# Patient Record
Sex: FEMALE | Race: WHITE | NOT HISPANIC OR LATINO | Employment: OTHER | URBAN - NONMETROPOLITAN AREA
[De-identification: names, ages, dates, MRNs, and addresses within clinical notes are randomized per-mention and may not be internally consistent; named-entity substitution may affect disease eponyms.]

---

## 2017-01-09 RX ORDER — POLYETHYLENE GLYCOL 3350 17 G/17G
POWDER, FOR SOLUTION ORAL
Qty: 1581 G | Refills: 11 | Status: SHIPPED | OUTPATIENT
Start: 2017-01-09 | End: 2018-01-25 | Stop reason: SDUPTHER

## 2017-01-09 NOTE — TELEPHONE ENCOUNTER
----- Message from Holly Macdonald sent at 1/9/2017 10:10 AM CST -----  Regarding: Refill  Contact: [phone]  Refill of miralax 1cap 1-3 times a day prn zahida

## 2017-01-30 ENCOUNTER — OFFICE VISIT (OUTPATIENT)
Dept: FAMILY MEDICINE CLINIC | Facility: CLINIC | Age: 77
End: 2017-01-30

## 2017-01-30 VITALS
HEIGHT: 66 IN | OXYGEN SATURATION: 95 % | TEMPERATURE: 97.6 F | SYSTOLIC BLOOD PRESSURE: 138 MMHG | HEART RATE: 60 BPM | BODY MASS INDEX: 28.19 KG/M2 | WEIGHT: 175.4 LBS | DIASTOLIC BLOOD PRESSURE: 78 MMHG

## 2017-01-30 DIAGNOSIS — L98.9 SKIN LESION: Primary | ICD-10-CM

## 2017-01-30 DIAGNOSIS — I10 ESSENTIAL HYPERTENSION: ICD-10-CM

## 2017-01-30 PROCEDURE — 11100 PR BIOPSY OF SKIN LESION: CPT | Performed by: FAMILY MEDICINE

## 2017-01-30 PROCEDURE — 88305 TISSUE EXAM BY PATHOLOGIST: CPT | Performed by: PATHOLOGY

## 2017-01-30 PROCEDURE — 99213 OFFICE O/P EST LOW 20 MIN: CPT | Performed by: FAMILY MEDICINE

## 2017-01-30 RX ORDER — IBUPROFEN 400 MG/1
400 TABLET ORAL EVERY 6 HOURS PRN
COMMUNITY
End: 2017-01-31 | Stop reason: SDUPTHER

## 2017-01-30 NOTE — PROGRESS NOTES
Subjective   Anahi Calix is a 76 y.o. female.     History of Present Illness     bp good.  Edema better.  Right ankle swells at night still.  Has lasix if needed.  Took for 4 days last week.  i had stopped amlodipine and increased his lisinopril to 40mg qd from 20mg qd.  aaa was an open repair and not endograph.  Has follow up with vascular 6 months with ultrasound.  She is releived there is no other aneurysm.  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  withing last few months, a black spot appeared and skin around it is scaley and red.  It itches sometimes.    Review of Systems   Constitutional: Negative for chills, fatigue and fever.   HENT: Negative for congestion, ear discharge, ear pain, facial swelling, hearing loss, postnasal drip, rhinorrhea, sinus pressure, sore throat, trouble swallowing and voice change.    Eyes: Negative for discharge, redness and visual disturbance.   Respiratory: Negative for cough, chest tightness, shortness of breath and wheezing.    Cardiovascular: Negative for chest pain and palpitations.   Gastrointestinal: Negative for abdominal pain, blood in stool, constipation, diarrhea, nausea and vomiting.   Endocrine: Negative for polydipsia and polyuria.   Genitourinary: Negative for dysuria, flank pain, hematuria and urgency.   Musculoskeletal: Negative for arthralgias, back pain, joint swelling and myalgias.   Skin: Negative for rash.   Neurological: Negative for dizziness, weakness, numbness and headaches.   Hematological: Negative for adenopathy.   Psychiatric/Behavioral: Negative for confusion and sleep disturbance. The patient is not nervous/anxious.        Objective   Physical Exam   Constitutional: She is oriented to person, place, and time. She appears well-developed and well-nourished.   HENT:   Head: Normocephalic and atraumatic.   Right Ear: External ear normal.   Left Ear: External ear normal.   Nose: Nose normal.   Eyes: Conjunctivae and EOM  are normal. Pupils are equal, round, and reactive to light.   Neck: Normal range of motion.   Pulmonary/Chest: Effort normal.   Musculoskeletal: Normal range of motion.   Neurological: She is alert and oriented to person, place, and time.   Skin:   Left lateral wrist, 2cm area of scaley erythema mild, but central is 1mm black macule.     Psychiatric: She has a normal mood and affect. Her behavior is normal. Judgment and thought content normal.   Nursing note and vitals reviewed.      Assessment/Plan   Anahi was seen today for follow-up, leg swelling and wrist injury.    Diagnoses and all orders for this visit:    Skin lesion    Essential hypertension      Continue lasix prn  bp looks good      PROCEDURE NOTE:   AREA OF WRIST BETADINE APPLIED.  LOCAL ANESTHESIA 1% LIDOCAINE.  2MM PUNCH BIOPSY OBTAINED AND DRESSING APPLIED.   SEND SPECIMEN TO PATHOLOGY.

## 2017-01-30 NOTE — MR AVS SNAPSHOT
Anahi Calix   1/30/2017 11:45 AM   Office Visit    Dept Phone:  [phone]   Encounter #:  [id number]    Provider:  Ramiro Gloria MD   Department:  [facility]                Your Full Care Plan              Your Updated Medication List          This list is accurate as of: 1/30/17 12:38 PM.  Always use your most recent med list.                aspirin 81 MG EC tablet       atorvastatin 80 MG tablet   Commonly known as:  LIPITOR   Take 1 tablet by mouth Daily.       azithromycin 250 MG tablet   Commonly known as:  ZITHROMAX   Take 2 tablets the first day, then 1 tablet daily for 4 days.       calcium acetate 667 MG tablet   Commonly known as:  PHOSLO       clopidogrel 75 MG tablet   Commonly known as:  PLAVIX   Take 1 tablet by mouth Daily.       furosemide 20 MG tablet   Commonly known as:  LASIX   Take 1 tablet by mouth Daily. As needed for edema       gabapentin 300 MG capsule   Commonly known as:  NEURONTIN       GNP VITAMIN D SUPER STRENGTH 5000 UNITS tablet   Generic drug:  Cholecalciferol       ibuprofen 400 MG tablet   Commonly known as:  ADVIL,MOTRIN       lisinopril 40 MG tablet   Commonly known as:  PRINIVIL,ZESTRIL   Take 1 tablet by mouth Daily.       loratadine 10 MG tablet   Commonly known as:  CLARITIN       metoprolol tartrate 50 MG tablet   Commonly known as:  LOPRESSOR   Take 1 tablet by mouth 2 (two) times a day.       omeprazole 20 MG capsule   Commonly known as:  priLOSEC   Take 1 capsule by mouth Daily.       polyethylene glycol powder   Commonly known as:  MIRALAX   ONE CAPFUL IN 8 OUNCES OF LIQUID ONCE DAILY UP TO THREE TIMES DAILY AS NEEDED.       VISION FORMULA PO       vitamin D 66554 UNITS capsule capsule   Commonly known as:  ERGOCALCIFEROL               Instructions     None    Patient Instructions History      Upcoming Appointments     Visit Type Date Time Department    OFFICE VISIT 1/30/2017 11:45 AM DENILSON LERNER    "   OFFICE VISIT 8/31/2017 10:30 AM [facility] EZRA Coronel Signup     Our records indicate that you have declined [facility] signup. If you would like to sign up for [facility], please email [email or web address] [email or web address] or call [phone] to obtain an activation code.             Other Info from Your Visit           Your Appointments     Aug 31, 2017 10:30 AM CDT   Office Visit with Dony Conner MD   [facility] CARDIOLOGY (--)    [address] [address] [address]  [facility] [address] [phone]           Arrive 15 minutes prior to appointment.              Allergies     No Known Allergies      Reason for Visit     Follow-up     Leg Swelling     Wrist Injury           Vital Signs     Blood Pressure Pulse Temperature Height    138/78 (BP Location: Right arm, Patient Position: Sitting, Cuff Size: Adult) 60 97.6 °F (36.4 °C) (Temporal Artery ) 65.5\" (166.4 cm)    Weight Oxygen Saturation Body Mass Index Smoking Status    175 lb 6.4 oz (79.6 kg) 95% 28.74 kg/m2 Current Some Day Smoker        "

## 2017-01-31 RX ORDER — GABAPENTIN 300 MG/1
300 CAPSULE ORAL 3 TIMES DAILY
Qty: 270 CAPSULE | Refills: 3 | Status: SHIPPED | OUTPATIENT
Start: 2017-01-31 | End: 2017-12-18 | Stop reason: SDUPTHER

## 2017-01-31 RX ORDER — LISINOPRIL 40 MG/1
40 TABLET ORAL DAILY
Qty: 90 TABLET | Refills: 3 | Status: SHIPPED | OUTPATIENT
Start: 2017-01-31 | End: 2017-10-24 | Stop reason: HOSPADM

## 2017-01-31 RX ORDER — IBUPROFEN 400 MG/1
400 TABLET ORAL EVERY 6 HOURS PRN
Qty: 120 TABLET | Refills: 5 | Status: SHIPPED | OUTPATIENT
Start: 2017-01-31 | End: 2017-10-24 | Stop reason: HOSPADM

## 2017-02-01 LAB
LAB AP CASE REPORT: NORMAL
Lab: NORMAL
PATH REPORT.FINAL DX SPEC: NORMAL
PATH REPORT.GROSS SPEC: NORMAL

## 2017-02-06 DIAGNOSIS — D04.9 BOWEN'S DISEASE: Primary | ICD-10-CM

## 2017-09-18 DIAGNOSIS — I10 ESSENTIAL HYPERTENSION: ICD-10-CM

## 2017-09-18 RX ORDER — METOPROLOL TARTRATE 50 MG/1
TABLET, FILM COATED ORAL
Qty: 180 TABLET | Refills: 0 | Status: SHIPPED | OUTPATIENT
Start: 2017-09-18 | End: 2017-09-19 | Stop reason: SDUPTHER

## 2017-09-19 DIAGNOSIS — I10 ESSENTIAL HYPERTENSION: ICD-10-CM

## 2017-09-19 RX ORDER — METOPROLOL TARTRATE 50 MG/1
50 TABLET, FILM COATED ORAL 2 TIMES DAILY
Qty: 180 TABLET | Refills: 0 | Status: SHIPPED | OUTPATIENT
Start: 2017-09-19 | End: 2018-01-11 | Stop reason: SDUPTHER

## 2017-09-30 ENCOUNTER — APPOINTMENT (OUTPATIENT)
Dept: GENERAL RADIOLOGY | Facility: HOSPITAL | Age: 77
End: 2017-09-30

## 2017-09-30 ENCOUNTER — HOSPITAL ENCOUNTER (INPATIENT)
Facility: HOSPITAL | Age: 77
LOS: 11 days | End: 2017-10-11
Attending: EMERGENCY MEDICINE | Admitting: INTERNAL MEDICINE

## 2017-09-30 DIAGNOSIS — I73.9 PERIPHERAL ARTERIAL DISEASE (HCC): Chronic | ICD-10-CM

## 2017-09-30 DIAGNOSIS — S72.142A DISPLACED INTERTROCHANTERIC FRACTURE OF LEFT FEMUR, INITIAL ENCOUNTER FOR CLOSED FRACTURE (HCC): Primary | ICD-10-CM
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
## 2017-09-30 DIAGNOSIS — K21.9 GASTROESOPHAGEAL REFLUX DISEASE, ESOPHAGITIS PRESENCE NOT SPECIFIED: Chronic | ICD-10-CM

## 2017-09-30 DIAGNOSIS — I71.40 ABDOMINAL AORTIC ANEURYSM (AAA) WITHOUT RUPTURE (HCC): ICD-10-CM

## 2017-09-30 DIAGNOSIS — I10 ESSENTIAL HYPERTENSION: Chronic | ICD-10-CM

## 2017-09-30 DIAGNOSIS — Z74.09 IMPAIRED PHYSICAL MOBILITY: ICD-10-CM

## 2017-09-30 DIAGNOSIS — Z74.09 IMPAIRED MOBILITY AND ACTIVITIES OF DAILY LIVING: ICD-10-CM

## 2017-09-30 DIAGNOSIS — S72.002A CLOSED LEFT HIP FRACTURE, INITIAL ENCOUNTER (HCC): ICD-10-CM

## 2017-09-30 DIAGNOSIS — Z78.9 IMPAIRED MOBILITY AND ACTIVITIES OF DAILY LIVING: ICD-10-CM

## 2017-09-30 PROBLEM — J42 CHRONIC BRONCHITIS (HCC): Chronic | Status: ACTIVE | Noted: 2017-09-30

## 2017-09-30 PROBLEM — I25.10 CAD (CORONARY ARTERY DISEASE): Chronic | Status: ACTIVE | Noted: 2017-09-30

## 2017-09-30 LAB
ALBUMIN SERPL-MCNC: 4 G/DL (ref 3.4–4.8)
ALBUMIN/GLOB SERPL: 1.3 G/DL (ref 1.1–1.8)
ALP SERPL-CCNC: 87 U/L (ref 38–126)
ALT SERPL W P-5'-P-CCNC: 31 U/L (ref 9–52)
ANION GAP SERPL CALCULATED.3IONS-SCNC: 14 MMOL/L (ref 5–15)
APTT PPP: 29.4 SECONDS (ref 20–40.3)
AST SERPL-CCNC: 30 U/L (ref 14–36)
BASOPHILS # BLD AUTO: 0.06 10*3/MM3 (ref 0–0.2)
BASOPHILS NFR BLD AUTO: 0.6 % (ref 0–2)
BILIRUB SERPL-MCNC: 0.4 MG/DL (ref 0.2–1.3)
BUN BLD-MCNC: 21 MG/DL (ref 7–21)
BUN/CREAT SERPL: 22.8 (ref 7–25)
CALCIUM SPEC-SCNC: 9.4 MG/DL (ref 8.4–10.2)
CHLORIDE SERPL-SCNC: 100 MMOL/L (ref 95–110)
CLOSE TME COLL+ADP + EPINEP PNL BLD: 45 % (ref 80–97)
CO2 SERPL-SCNC: 28 MMOL/L (ref 22–31)
CREAT BLD-MCNC: 0.92 MG/DL (ref 0.5–1)
DEPRECATED RDW RBC AUTO: 44.8 FL (ref 36.4–46.3)
EOSINOPHIL # BLD AUTO: 0.24 10*3/MM3 (ref 0–0.7)
EOSINOPHIL NFR BLD AUTO: 2.2 % (ref 0–7)
ERYTHROCYTE [DISTWIDTH] IN BLOOD BY AUTOMATED COUNT: 13.7 % (ref 11.5–14.5)
GFR SERPL CREATININE-BSD FRML MDRD: 59 ML/MIN/1.73 (ref 60–90)
GLOBULIN UR ELPH-MCNC: 3.2 GM/DL (ref 2.3–3.5)
GLUCOSE BLD-MCNC: 119 MG/DL (ref 60–100)
HCT VFR BLD AUTO: 45.4 % (ref 35–45)
HGB BLD-MCNC: 15.2 G/DL (ref 12–15.5)
HOLD SPECIMEN: NORMAL
HOLD SPECIMEN: NORMAL
IMM GRANULOCYTES # BLD: 0.04 10*3/MM3 (ref 0–0.02)
IMM GRANULOCYTES NFR BLD: 0.4 % (ref 0–0.5)
INR PPP: 0.96 (ref 0.8–1.2)
LYMPHOCYTES # BLD AUTO: 3.71 10*3/MM3 (ref 0.6–4.2)
LYMPHOCYTES NFR BLD AUTO: 34.3 % (ref 10–50)
MCH RBC QN AUTO: 29.7 PG (ref 26.5–34)
MCHC RBC AUTO-ENTMCNC: 33.5 G/DL (ref 31.4–36)
MCV RBC AUTO: 88.8 FL (ref 80–98)
MONOCYTES # BLD AUTO: 0.76 10*3/MM3 (ref 0–0.9)
MONOCYTES NFR BLD AUTO: 7 % (ref 0–12)
NEUTROPHILS # BLD AUTO: 6 10*3/MM3 (ref 2–8.6)
NEUTROPHILS NFR BLD AUTO: 55.5 % (ref 37–80)
PA COLL PRP: 72 % (ref 70–100)
PLATELET # BLD AUTO: 229 10*3/MM3 (ref 150–450)
PLATELET # BLD AUTO: 264 10*3/MM3 (ref 150–450)
PMV BLD AUTO: 9.5 FL (ref 8–12)
POTASSIUM BLD-SCNC: 3.7 MMOL/L (ref 3.5–5.1)
PROT SERPL-MCNC: 7.2 G/DL (ref 6.3–8.6)
PROTHROMBIN TIME: 12.7 SECONDS (ref 11.1–15.3)
RBC # BLD AUTO: 5.11 10*6/MM3 (ref 3.77–5.16)
SODIUM BLD-SCNC: 142 MMOL/L (ref 137–145)
WBC NRBC COR # BLD: 10.81 10*3/MM3 (ref 3.2–9.8)
WHOLE BLOOD HOLD SPECIMEN: NORMAL
WHOLE BLOOD HOLD SPECIMEN: NORMAL

## 2017-09-30 PROCEDURE — 93005 ELECTROCARDIOGRAM TRACING: CPT | Performed by: EMERGENCY MEDICINE

## 2017-09-30 PROCEDURE — 73080 X-RAY EXAM OF ELBOW: CPT

## 2017-09-30 PROCEDURE — 85576 BLOOD PLATELET AGGREGATION: CPT | Performed by: EMERGENCY MEDICINE

## 2017-09-30 PROCEDURE — 85610 PROTHROMBIN TIME: CPT | Performed by: EMERGENCY MEDICINE

## 2017-09-30 PROCEDURE — 25010000002 TDAP 5-2.5-18.5 LF-MCG/0.5 SUSPENSION: Performed by: EMERGENCY MEDICINE

## 2017-09-30 PROCEDURE — 80053 COMPREHEN METABOLIC PANEL: CPT | Performed by: EMERGENCY MEDICINE

## 2017-09-30 PROCEDURE — 71010 HC CHEST PA OR AP: CPT

## 2017-09-30 PROCEDURE — 85025 COMPLETE CBC W/AUTO DIFF WBC: CPT | Performed by: EMERGENCY MEDICINE

## 2017-09-30 PROCEDURE — 25010000002 ONDANSETRON PER 1 MG: Performed by: EMERGENCY MEDICINE

## 2017-09-30 PROCEDURE — 73502 X-RAY EXAM HIP UNI 2-3 VIEWS: CPT

## 2017-09-30 PROCEDURE — 94760 N-INVAS EAR/PLS OXIMETRY 1: CPT

## 2017-09-30 PROCEDURE — 93010 ELECTROCARDIOGRAM REPORT: CPT | Performed by: INTERNAL MEDICINE

## 2017-09-30 PROCEDURE — 94799 UNLISTED PULMONARY SVC/PX: CPT

## 2017-09-30 PROCEDURE — 90471 IMMUNIZATION ADMIN: CPT | Performed by: EMERGENCY MEDICINE

## 2017-09-30 PROCEDURE — 85730 THROMBOPLASTIN TIME PARTIAL: CPT | Performed by: EMERGENCY MEDICINE

## 2017-09-30 PROCEDURE — 25010000002 HYDROMORPHONE PER 4 MG: Performed by: EMERGENCY MEDICINE

## 2017-09-30 PROCEDURE — 25010000002 MORPHINE PER 10 MG: Performed by: INTERNAL MEDICINE

## 2017-09-30 PROCEDURE — 99284 EMERGENCY DEPT VISIT MOD MDM: CPT

## 2017-09-30 PROCEDURE — 25010000002 MORPHINE PER 10 MG: Performed by: EMERGENCY MEDICINE

## 2017-09-30 PROCEDURE — 90715 TDAP VACCINE 7 YRS/> IM: CPT | Performed by: EMERGENCY MEDICINE

## 2017-09-30 RX ORDER — LISINOPRIL 40 MG/1
40 TABLET ORAL DAILY
Status: DISCONTINUED | OUTPATIENT
Start: 2017-09-30 | End: 2017-10-02

## 2017-09-30 RX ORDER — ONDANSETRON 2 MG/ML
4 INJECTION INTRAMUSCULAR; INTRAVENOUS EVERY 6 HOURS PRN
Status: DISCONTINUED | OUTPATIENT
Start: 2017-09-30 | End: 2017-10-11 | Stop reason: HOSPADM

## 2017-09-30 RX ORDER — MORPHINE SULFATE 4 MG/ML
4 INJECTION, SOLUTION INTRAMUSCULAR; INTRAVENOUS ONCE
Status: COMPLETED | OUTPATIENT
Start: 2017-09-30 | End: 2017-09-30

## 2017-09-30 RX ORDER — HYDROCHLOROTHIAZIDE 12.5 MG/1
12.5 CAPSULE, GELATIN COATED ORAL DAILY
COMMUNITY
End: 2017-10-24 | Stop reason: HOSPADM

## 2017-09-30 RX ORDER — FUROSEMIDE 20 MG/1
20 TABLET ORAL DAILY
Status: DISCONTINUED | OUTPATIENT
Start: 2017-09-30 | End: 2017-10-11 | Stop reason: HOSPADM

## 2017-09-30 RX ORDER — SODIUM CHLORIDE 0.9 % (FLUSH) 0.9 %
10 SYRINGE (ML) INJECTION AS NEEDED
Status: DISCONTINUED | OUTPATIENT
Start: 2017-09-30 | End: 2017-10-11 | Stop reason: HOSPADM

## 2017-09-30 RX ORDER — SODIUM CHLORIDE 0.9 % (FLUSH) 0.9 %
1-10 SYRINGE (ML) INJECTION AS NEEDED
Status: DISCONTINUED | OUTPATIENT
Start: 2017-09-30 | End: 2017-10-11 | Stop reason: HOSPADM

## 2017-09-30 RX ORDER — SODIUM CHLORIDE 9 MG/ML
50 INJECTION, SOLUTION INTRAVENOUS CONTINUOUS
Status: DISCONTINUED | OUTPATIENT
Start: 2017-09-30 | End: 2017-10-06

## 2017-09-30 RX ORDER — POLYETHYLENE GLYCOL 3350 17 G/17G
17 POWDER, FOR SOLUTION ORAL 2 TIMES DAILY
Status: DISCONTINUED | OUTPATIENT
Start: 2017-09-30 | End: 2017-10-11 | Stop reason: HOSPADM

## 2017-09-30 RX ORDER — SODIUM CHLORIDE 9 MG/ML
125 INJECTION, SOLUTION INTRAVENOUS CONTINUOUS
Status: DISCONTINUED | OUTPATIENT
Start: 2017-09-30 | End: 2017-09-30

## 2017-09-30 RX ORDER — CALCIUM ACETATE 667 MG/1
667 TABLET ORAL
Status: DISCONTINUED | OUTPATIENT
Start: 2017-10-01 | End: 2017-10-11 | Stop reason: HOSPADM

## 2017-09-30 RX ORDER — MORPHINE SULFATE 4 MG/ML
4 INJECTION, SOLUTION INTRAMUSCULAR; INTRAVENOUS
Status: DISCONTINUED | OUTPATIENT
Start: 2017-09-30 | End: 2017-10-03 | Stop reason: CLARIF

## 2017-09-30 RX ORDER — GABAPENTIN 300 MG/1
300 CAPSULE ORAL 3 TIMES DAILY
Status: DISCONTINUED | OUTPATIENT
Start: 2017-09-30 | End: 2017-10-11 | Stop reason: HOSPADM

## 2017-09-30 RX ORDER — ONDANSETRON 2 MG/ML
4 INJECTION INTRAMUSCULAR; INTRAVENOUS ONCE
Status: COMPLETED | OUTPATIENT
Start: 2017-09-30 | End: 2017-09-30

## 2017-09-30 RX ORDER — METOPROLOL TARTRATE 50 MG/1
50 TABLET, FILM COATED ORAL 2 TIMES DAILY
Status: DISCONTINUED | OUTPATIENT
Start: 2017-09-30 | End: 2017-10-05

## 2017-09-30 RX ADMIN — Medication 10 ML: at 20:00

## 2017-09-30 RX ADMIN — LISINOPRIL 40 MG: 40 TABLET ORAL at 21:04

## 2017-09-30 RX ADMIN — MORPHINE SULFATE 4 MG: 4 INJECTION, SOLUTION INTRAMUSCULAR; INTRAVENOUS at 16:16

## 2017-09-30 RX ADMIN — HYDROMORPHONE HYDROCHLORIDE 0.5 MG: 1 INJECTION, SOLUTION INTRAMUSCULAR; INTRAVENOUS; SUBCUTANEOUS at 16:52

## 2017-09-30 RX ADMIN — TETANUS TOXOID, REDUCED DIPHTHERIA TOXOID AND ACELLULAR PERTUSSIS VACCINE, ADSORBED 0.5 ML: 5; 2.5; 8; 8; 2.5 SUSPENSION INTRAMUSCULAR at 18:05

## 2017-09-30 RX ADMIN — GABAPENTIN 300 MG: 300 CAPSULE ORAL at 21:04

## 2017-09-30 RX ADMIN — MORPHINE SULFATE 4 MG: 4 INJECTION, SOLUTION INTRAMUSCULAR; INTRAVENOUS at 19:22

## 2017-09-30 RX ADMIN — MORPHINE SULFATE 4 MG: 4 INJECTION, SOLUTION INTRAMUSCULAR; INTRAVENOUS at 23:04

## 2017-09-30 RX ADMIN — SODIUM CHLORIDE 50 ML/HR: 900 INJECTION, SOLUTION INTRAVENOUS at 19:59

## 2017-09-30 RX ADMIN — METOPROLOL TARTRATE 50 MG: 50 TABLET ORAL at 21:04

## 2017-09-30 RX ADMIN — SODIUM CHLORIDE 125 ML/HR: 0.9 INJECTION, SOLUTION INTRAVENOUS at 16:07

## 2017-09-30 RX ADMIN — MORPHINE SULFATE 4 MG: 4 INJECTION, SOLUTION INTRAMUSCULAR; INTRAVENOUS at 21:04

## 2017-09-30 RX ADMIN — ONDANSETRON 4 MG: 2 INJECTION INTRAMUSCULAR; INTRAVENOUS at 16:15

## 2017-10-01 ENCOUNTER — ANESTHESIA (OUTPATIENT)
Dept: PERIOP | Facility: HOSPITAL | Age: 77
End: 2017-10-01

## 2017-10-01 ENCOUNTER — ANESTHESIA EVENT (OUTPATIENT)
Dept: PERIOP | Facility: HOSPITAL | Age: 77
End: 2017-10-01

## 2017-10-01 ENCOUNTER — APPOINTMENT (OUTPATIENT)
Dept: GENERAL RADIOLOGY | Facility: HOSPITAL | Age: 77
End: 2017-10-01

## 2017-10-01 PROBLEM — I10 ESSENTIAL HYPERTENSION: Status: ACTIVE | Noted: 2017-09-30
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
## 2017-10-01 PROBLEM — F17.200 TOBACCO DEPENDENCE SYNDROME: Status: ACTIVE | Noted: 2017-10-01

## 2017-10-01 LAB
ANION GAP SERPL CALCULATED.3IONS-SCNC: 11 MMOL/L (ref 5–15)
ARTERIAL PATENCY WRIST A: ABNORMAL
ATMOSPHERIC PRESS: ABNORMAL MMHG
BASE EXCESS BLDA CALC-SCNC: -0.6 MMOL/L (ref -2.4–2.4)
BASOPHILS # BLD AUTO: 0.01 10*3/MM3 (ref 0–0.2)
BASOPHILS NFR BLD AUTO: 0.1 % (ref 0–2)
BDY SITE: ABNORMAL
BUN BLD-MCNC: 18 MG/DL (ref 7–21)
BUN/CREAT SERPL: 22.2 (ref 7–25)
CA-I BLD-MCNC: 4.6 MG/DL (ref 4.5–4.9)
CALCIUM SPEC-SCNC: 8.7 MG/DL (ref 8.4–10.2)
CHLORIDE SERPL-SCNC: 102 MMOL/L (ref 95–110)
CO2 BLDA-SCNC: 28.4 MMOL/L (ref 23–27)
CO2 SERPL-SCNC: 28 MMOL/L (ref 22–31)
CREAT BLD-MCNC: 0.81 MG/DL (ref 0.5–1)
DEPRECATED RDW RBC AUTO: 45.3 FL (ref 36.4–46.3)
EOSINOPHIL # BLD AUTO: 0.07 10*3/MM3 (ref 0–0.7)
EOSINOPHIL NFR BLD AUTO: 0.5 % (ref 0–7)
ERYTHROCYTE [DISTWIDTH] IN BLOOD BY AUTOMATED COUNT: 13.7 % (ref 11.5–14.5)
GFR SERPL CREATININE-BSD FRML MDRD: 69 ML/MIN/1.73 (ref 60–90)
GLUCOSE BLD-MCNC: 119 MG/DL (ref 60–100)
GLUCOSE BLDA-MCNC: 130 MMOL/L
HCO3 BLDA-SCNC: 26.7 MMOL/L (ref 22–26)
HCT VFR BLD AUTO: 38.8 % (ref 35–45)
HCT VFR BLD AUTO: 41.6 % (ref 35–45)
HCT VFR BLD CALC: 41 % (ref 38–47)
HGB BLD-MCNC: 12.5 G/DL (ref 12–15.5)
HGB BLD-MCNC: 13.6 G/DL (ref 12–15.5)
HGB BLDA-MCNC: 13.9 G/DL (ref 12–16)
IMM GRANULOCYTES # BLD: 0.03 10*3/MM3 (ref 0–0.02)
IMM GRANULOCYTES NFR BLD: 0.2 % (ref 0–0.5)
INR PPP: 1.13 (ref 0.8–1.2)
LYMPHOCYTES # BLD AUTO: 1.17 10*3/MM3 (ref 0.6–4.2)
LYMPHOCYTES NFR BLD AUTO: 8.4 % (ref 10–50)
MCH RBC QN AUTO: 29.4 PG (ref 26.5–34)
MCHC RBC AUTO-ENTMCNC: 32.7 G/DL (ref 31.4–36)
MCV RBC AUTO: 90 FL (ref 80–98)
MODALITY: ABNORMAL
MONOCYTES # BLD AUTO: 1.26 10*3/MM3 (ref 0–0.9)
MONOCYTES NFR BLD AUTO: 9 % (ref 0–12)
NEUTROPHILS # BLD AUTO: 11.47 10*3/MM3 (ref 2–8.6)
NEUTROPHILS NFR BLD AUTO: 81.8 % (ref 37–80)
PCO2 BLDA: 55.1 MM HG (ref 35–45)
PH BLDA: 7.3 PH UNITS (ref 7.35–7.45)
PLATELET # BLD AUTO: 224 10*3/MM3 (ref 150–450)
PMV BLD AUTO: 9.5 FL (ref 8–12)
PO2 BLDA: 76.5 MM HG (ref 80–105)
POTASSIUM BLD-SCNC: 3.8 MMOL/L (ref 3.5–5.1)
POTASSIUM BLDA-SCNC: 3.64 MMOL/L (ref 3.6–4.9)
PROTHROMBIN TIME: 14.4 SECONDS (ref 11.1–15.3)
RBC # BLD AUTO: 4.62 10*6/MM3 (ref 3.77–5.16)
SAO2 % BLDCOA: 94.1 % (ref 94–100)
SODIUM BLD-SCNC: 141 MMOL/L (ref 137–145)
SODIUM BLDA-SCNC: 133.9 MMOL/L (ref 138–146)
WBC NRBC COR # BLD: 14.01 10*3/MM3 (ref 3.2–9.8)

## 2017-10-01 PROCEDURE — 25010000002 PHENYLEPHRINE PER 1 ML: Performed by: ANESTHESIOLOGY

## 2017-10-01 PROCEDURE — 25010000002 MIDAZOLAM 50 MG/10ML SOLUTION 10 ML VIAL: Performed by: INTERNAL MEDICINE

## 2017-10-01 PROCEDURE — 85014 HEMATOCRIT: CPT | Performed by: INTERNAL MEDICINE

## 2017-10-01 PROCEDURE — 87070 CULTURE OTHR SPECIMN AEROBIC: CPT | Performed by: INTERNAL MEDICINE

## 2017-10-01 PROCEDURE — C1713 ANCHOR/SCREW BN/BN,TIS/BN: HCPCS | Performed by: ORTHOPAEDIC SURGERY

## 2017-10-01 PROCEDURE — 25010000002 SUCCINYLCHOLINE PER 20 MG: Performed by: ANESTHESIOLOGY

## 2017-10-01 PROCEDURE — 80048 BASIC METABOLIC PNL TOTAL CA: CPT | Performed by: INTERNAL MEDICINE

## 2017-10-01 PROCEDURE — 25010000002 PROPOFOL 1000 MG/ML EMULSION: Performed by: INTERNAL MEDICINE

## 2017-10-01 PROCEDURE — 99223 1ST HOSP IP/OBS HIGH 75: CPT | Performed by: ORTHOPAEDIC SURGERY

## 2017-10-01 PROCEDURE — 85018 HEMOGLOBIN: CPT | Performed by: INTERNAL MEDICINE

## 2017-10-01 PROCEDURE — 25010000002 MORPHINE PER 10 MG: Performed by: INTERNAL MEDICINE

## 2017-10-01 PROCEDURE — 25010000002 PROPOFOL 10 MG/ML EMULSION: Performed by: ANESTHESIOLOGY

## 2017-10-01 PROCEDURE — 76000 FLUOROSCOPY <1 HR PHYS/QHP: CPT

## 2017-10-01 PROCEDURE — 85025 COMPLETE CBC W/AUTO DIFF WBC: CPT | Performed by: INTERNAL MEDICINE

## 2017-10-01 PROCEDURE — 82803 BLOOD GASES ANY COMBINATION: CPT | Performed by: INTERNAL MEDICINE

## 2017-10-01 PROCEDURE — 94799 UNLISTED PULMONARY SVC/PX: CPT

## 2017-10-01 PROCEDURE — 87185 SC STD ENZYME DETCJ PER NZM: CPT | Performed by: INTERNAL MEDICINE

## 2017-10-01 PROCEDURE — 5A1955Z RESPIRATORY VENTILATION, GREATER THAN 96 CONSECUTIVE HOURS: ICD-10-PCS | Performed by: INTERNAL MEDICINE

## 2017-10-01 PROCEDURE — 85610 PROTHROMBIN TIME: CPT | Performed by: ORTHOPAEDIC SURGERY

## 2017-10-01 PROCEDURE — 0QS706Z REPOSITION LEFT UPPER FEMUR WITH INTRAMEDULLARY INTERNAL FIXATION DEVICE, OPEN APPROACH: ICD-10-PCS | Performed by: ORTHOPAEDIC SURGERY

## 2017-10-01 PROCEDURE — 25010000002 FENTANYL CITRATE (PF) 100 MCG/2ML SOLUTION: Performed by: ANESTHESIOLOGY

## 2017-10-01 PROCEDURE — 25010000003 CEFAZOLIN PER 500 MG: Performed by: ANESTHESIOLOGY

## 2017-10-01 PROCEDURE — 87205 SMEAR GRAM STAIN: CPT | Performed by: INTERNAL MEDICINE

## 2017-10-01 PROCEDURE — 27245 TREAT THIGH FRACTURE: CPT | Performed by: ORTHOPAEDIC SURGERY

## 2017-10-01 PROCEDURE — 94640 AIRWAY INHALATION TREATMENT: CPT

## 2017-10-01 PROCEDURE — 94002 VENT MGMT INPAT INIT DAY: CPT

## 2017-10-01 PROCEDURE — 25010000002 ONDANSETRON PER 1 MG: Performed by: ANESTHESIOLOGY

## 2017-10-01 PROCEDURE — 0BH17EZ INSERTION OF ENDOTRACHEAL AIRWAY INTO TRACHEA, VIA NATURAL OR ARTIFICIAL OPENING: ICD-10-PCS | Performed by: INTERNAL MEDICINE

## 2017-10-01 DEVICE — LOCKING SCREW, FULLY THREADED: Type: IMPLANTABLE DEVICE | Site: HIP | Status: FUNCTIONAL

## 2017-10-01 DEVICE — LAG SCREW, TI
Type: IMPLANTABLE DEVICE | Status: FUNCTIONAL
Brand: GAMMA

## 2017-10-01 DEVICE — LONG NAIL KIT R1.5, TI, LEFT
Type: IMPLANTABLE DEVICE | Status: FUNCTIONAL
Brand: GAMMA

## 2017-10-01 RX ORDER — ALBUTEROL SULFATE 2.5 MG/3ML
2.5 SOLUTION RESPIRATORY (INHALATION) ONCE
Status: COMPLETED | OUTPATIENT
Start: 2017-10-01 | End: 2017-10-01

## 2017-10-01 RX ORDER — SUCCINYLCHOLINE CHLORIDE 20 MG/ML
INJECTION INTRAMUSCULAR; INTRAVENOUS AS NEEDED
Status: DISCONTINUED | OUTPATIENT
Start: 2017-10-01 | End: 2017-10-01 | Stop reason: SURG

## 2017-10-01 RX ORDER — ONDANSETRON 2 MG/ML
4 INJECTION INTRAMUSCULAR; INTRAVENOUS ONCE AS NEEDED
Status: DISCONTINUED | OUTPATIENT
Start: 2017-10-01 | End: 2017-10-07

## 2017-10-01 RX ORDER — EPHEDRINE SULFATE 50 MG/ML
INJECTION, SOLUTION INTRAVENOUS AS NEEDED
Status: DISCONTINUED | OUTPATIENT
Start: 2017-10-01 | End: 2017-10-01 | Stop reason: SURG

## 2017-10-01 RX ORDER — LIDOCAINE HYDROCHLORIDE 10 MG/ML
0.5 INJECTION, SOLUTION INFILTRATION; PERINEURAL ONCE AS NEEDED
Status: COMPLETED | OUTPATIENT
Start: 2017-10-01 | End: 2017-10-01

## 2017-10-01 RX ORDER — ROCURONIUM BROMIDE 10 MG/ML
INJECTION, SOLUTION INTRAVENOUS AS NEEDED
Status: DISCONTINUED | OUTPATIENT
Start: 2017-10-01 | End: 2017-10-01 | Stop reason: SURG

## 2017-10-01 RX ORDER — ACETAMINOPHEN 500 MG
1000 TABLET ORAL EVERY 6 HOURS
Status: DISCONTINUED | OUTPATIENT
Start: 2017-10-01 | End: 2017-10-11 | Stop reason: HOSPADM

## 2017-10-01 RX ORDER — ACETAMINOPHEN 325 MG/1
650 TABLET ORAL ONCE AS NEEDED
Status: DISCONTINUED | OUTPATIENT
Start: 2017-10-01 | End: 2017-10-07

## 2017-10-01 RX ORDER — DIPHENHYDRAMINE HYDROCHLORIDE 50 MG/ML
12.5 INJECTION INTRAMUSCULAR; INTRAVENOUS
Status: DISCONTINUED | OUTPATIENT
Start: 2017-10-01 | End: 2017-10-07

## 2017-10-01 RX ORDER — SODIUM CHLORIDE, SODIUM GLUCONATE, SODIUM ACETATE, POTASSIUM CHLORIDE, AND MAGNESIUM CHLORIDE 526; 502; 368; 37; 30 MG/100ML; MG/100ML; MG/100ML; MG/100ML; MG/100ML
INJECTION, SOLUTION INTRAVENOUS CONTINUOUS PRN
Status: DISCONTINUED | OUTPATIENT
Start: 2017-10-01 | End: 2017-10-01 | Stop reason: SURG

## 2017-10-01 RX ORDER — NALOXONE HCL 0.4 MG/ML
0.2 VIAL (ML) INJECTION AS NEEDED
Status: DISCONTINUED | OUTPATIENT
Start: 2017-10-01 | End: 2017-10-07

## 2017-10-01 RX ORDER — PROPOFOL 10 MG/ML
VIAL (ML) INTRAVENOUS AS NEEDED
Status: DISCONTINUED | OUTPATIENT
Start: 2017-10-01 | End: 2017-10-01 | Stop reason: SURG

## 2017-10-01 RX ORDER — LIDOCAINE HYDROCHLORIDE 20 MG/ML
INJECTION, SOLUTION INFILTRATION; PERINEURAL AS NEEDED
Status: DISCONTINUED | OUTPATIENT
Start: 2017-10-01 | End: 2017-10-01 | Stop reason: SURG

## 2017-10-01 RX ORDER — FENTANYL CITRATE 50 UG/ML
INJECTION, SOLUTION INTRAMUSCULAR; INTRAVENOUS AS NEEDED
Status: DISCONTINUED | OUTPATIENT
Start: 2017-10-01 | End: 2017-10-01 | Stop reason: SURG

## 2017-10-01 RX ORDER — ACETAMINOPHEN 650 MG/1
650 SUPPOSITORY RECTAL ONCE AS NEEDED
Status: DISCONTINUED | OUTPATIENT
Start: 2017-10-01 | End: 2017-10-07

## 2017-10-01 RX ORDER — FLUMAZENIL 0.1 MG/ML
0.2 INJECTION INTRAVENOUS AS NEEDED
Status: DISCONTINUED | OUTPATIENT
Start: 2017-10-01 | End: 2017-10-07

## 2017-10-01 RX ORDER — WARFARIN SODIUM 5 MG/1
5 TABLET ORAL
Status: DISCONTINUED | OUTPATIENT
Start: 2017-10-01 | End: 2017-10-03 | Stop reason: DRUGHIGH

## 2017-10-01 RX ORDER — HYDROMORPHONE HCL 110MG/55ML
0.5 PATIENT CONTROLLED ANALGESIA SYRINGE INTRAVENOUS
Status: DISCONTINUED | OUTPATIENT
Start: 2017-10-01 | End: 2017-10-07

## 2017-10-01 RX ORDER — LABETALOL HYDROCHLORIDE 5 MG/ML
5 INJECTION, SOLUTION INTRAVENOUS
Status: DISCONTINUED | OUTPATIENT
Start: 2017-10-01 | End: 2017-10-07

## 2017-10-01 RX ORDER — CEFAZOLIN SODIUM 1 G/3ML
INJECTION, POWDER, FOR SOLUTION INTRAMUSCULAR; INTRAVENOUS AS NEEDED
Status: DISCONTINUED | OUTPATIENT
Start: 2017-10-01 | End: 2017-10-01 | Stop reason: SURG

## 2017-10-01 RX ORDER — ONDANSETRON 2 MG/ML
INJECTION INTRAMUSCULAR; INTRAVENOUS AS NEEDED
Status: DISCONTINUED | OUTPATIENT
Start: 2017-10-01 | End: 2017-10-01 | Stop reason: SURG

## 2017-10-01 RX ORDER — EPHEDRINE SULFATE 50 MG/ML
5 INJECTION, SOLUTION INTRAVENOUS ONCE AS NEEDED
Status: DISCONTINUED | OUTPATIENT
Start: 2017-10-01 | End: 2017-10-07

## 2017-10-01 RX ORDER — OXYCODONE HYDROCHLORIDE 5 MG/1
5 TABLET ORAL EVERY 4 HOURS PRN
Status: DISCONTINUED | OUTPATIENT
Start: 2017-10-01 | End: 2017-10-11 | Stop reason: HOSPADM

## 2017-10-01 RX ADMIN — ROCURONIUM BROMIDE 20 MG: 10 INJECTION INTRAVENOUS at 10:30

## 2017-10-01 RX ADMIN — FENTANYL CITRATE 50 MCG: 50 INJECTION, SOLUTION INTRAMUSCULAR; INTRAVENOUS at 10:06

## 2017-10-01 RX ADMIN — ONDANSETRON 4 MG: 2 INJECTION INTRAMUSCULAR; INTRAVENOUS at 09:45

## 2017-10-01 RX ADMIN — ACETAMINOPHEN 1000 MG: 500 TABLET ORAL at 12:45

## 2017-10-01 RX ADMIN — FENTANYL CITRATE 50 MCG: 50 INJECTION, SOLUTION INTRAMUSCULAR; INTRAVENOUS at 09:33

## 2017-10-01 RX ADMIN — SUCCINYLCHOLINE CHLORIDE 100 MG: 20 INJECTION, SOLUTION INTRAMUSCULAR; INTRAVENOUS at 09:30

## 2017-10-01 RX ADMIN — PHENYLEPHRINE HYDROCHLORIDE 200 MCG: 10 INJECTION INTRAVENOUS at 10:30

## 2017-10-01 RX ADMIN — PROPOFOL 150 MG: 10 INJECTION, EMULSION INTRAVENOUS at 09:33

## 2017-10-01 RX ADMIN — EPHEDRINE SULFATE 20 MG: 50 INJECTION INTRAMUSCULAR; INTRAVENOUS; SUBCUTANEOUS at 09:53

## 2017-10-01 RX ADMIN — SODIUM CHLORIDE 1000 ML: 900 INJECTION, SOLUTION INTRAVENOUS at 15:15

## 2017-10-01 RX ADMIN — ROCURONIUM BROMIDE 30 MG: 10 INJECTION INTRAVENOUS at 09:48

## 2017-10-01 RX ADMIN — PROPOFOL 10 MCG/KG/MIN: 10 INJECTION, EMULSION INTRAVENOUS at 11:00

## 2017-10-01 RX ADMIN — CEFAZOLIN SODIUM 2 G: 1 INJECTION, POWDER, FOR SOLUTION INTRAMUSCULAR; INTRAVENOUS at 09:31

## 2017-10-01 RX ADMIN — MORPHINE SULFATE 4 MG: 4 INJECTION, SOLUTION INTRAMUSCULAR; INTRAVENOUS at 06:20

## 2017-10-01 RX ADMIN — EPHEDRINE SULFATE 15 MG: 50 INJECTION INTRAMUSCULAR; INTRAVENOUS; SUBCUTANEOUS at 10:25

## 2017-10-01 RX ADMIN — ALBUTEROL SULFATE 2.5 MG: 2.5 SOLUTION RESPIRATORY (INHALATION) at 08:35

## 2017-10-01 RX ADMIN — SODIUM CHLORIDE: 900 INJECTION, SOLUTION INTRAVENOUS at 09:25

## 2017-10-01 RX ADMIN — LIDOCAINE HYDROCHLORIDE 30 MG: 20 INJECTION, SOLUTION INFILTRATION; PERINEURAL at 09:33

## 2017-10-01 RX ADMIN — SODIUM CHLORIDE, SODIUM GLUCONATE, SODIUM ACETATE, POTASSIUM CHLORIDE, AND MAGNESIUM CHLORIDE: 526; 502; 368; 37; 30 INJECTION, SOLUTION INTRAVENOUS at 10:45

## 2017-10-01 RX ADMIN — EPHEDRINE SULFATE 15 MG: 50 INJECTION INTRAMUSCULAR; INTRAVENOUS; SUBCUTANEOUS at 10:08

## 2017-10-01 RX ADMIN — GABAPENTIN 300 MG: 300 CAPSULE ORAL at 18:04

## 2017-10-01 RX ADMIN — METOPROLOL TARTRATE 50 MG: 50 TABLET ORAL at 07:24

## 2017-10-01 RX ADMIN — ACETAMINOPHEN 1000 MG: 500 TABLET ORAL at 18:04

## 2017-10-01 RX ADMIN — SODIUM CHLORIDE 50 ML/HR: 900 INJECTION, SOLUTION INTRAVENOUS at 15:56

## 2017-10-01 RX ADMIN — MIDAZOLAM 1 MG/HR: 5 INJECTION INTRAMUSCULAR; INTRAVENOUS at 13:52

## 2017-10-01 RX ADMIN — Medication 667 MG: at 12:00

## 2017-10-01 RX ADMIN — ACETAMINOPHEN 1000 MG: 500 TABLET ORAL at 23:54

## 2017-10-01 RX ADMIN — GABAPENTIN 300 MG: 300 CAPSULE ORAL at 22:09

## 2017-10-01 RX ADMIN — SODIUM CHLORIDE 50 ML/HR: 900 INJECTION, SOLUTION INTRAVENOUS at 16:17

## 2017-10-01 RX ADMIN — MORPHINE SULFATE 4 MG: 4 INJECTION, SOLUTION INTRAMUSCULAR; INTRAVENOUS at 01:19

## 2017-10-01 RX ADMIN — WARFARIN SODIUM 5 MG: 5 TABLET ORAL at 18:04

## 2017-10-01 RX ADMIN — LIDOCAINE HYDROCHLORIDE 0.5 ML: 10 INJECTION, SOLUTION EPIDURAL; INFILTRATION; INTRACAUDAL; PERINEURAL at 08:09

## 2017-10-01 RX ADMIN — MORPHINE SULFATE 4 MG: 4 INJECTION, SOLUTION INTRAMUSCULAR; INTRAVENOUS at 03:48

## 2017-10-01 NOTE — PROGRESS NOTES
"Anticoagulation by Pharmacy - Warfarin    Anahi Calix is a 77 y.o.female  [Ht: 68\" (172.7 cm); Wt: 173 lb 8 oz (78.7 kg)] on Warfarin 5 mg PO  for indication of VTE prophylaxis s/p ortho procedure.    Goal INR: 1.7-2.5  Today's INR:   Lab Results   Component Value Date    INR 0.96 09/30/2017         Lab Results   Component Value Date    INR 0.96 09/30/2017    INR 1.0 09/09/2016    PROTIME 12.7 09/30/2017    PROTIME 13.1 09/09/2016     Lab Results   Component Value Date    HGB 13.6 10/01/2017    HGB 15.2 09/30/2017    HGB 15.5 09/11/2016     Lab Results   Component Value Date    HCT 41.6 10/01/2017    HCT 45.4 (H) 09/30/2017    HCT 45.5 (H) 09/11/2016     Assessment/Plan:  Initiated warfarin 5mg daily. PT/INR's are already ordered    Michael Choudhury RPH  10/01/17 12:16 PM     "

## 2017-10-01 NOTE — CONSULTS
Inpatient Consult to Orthopedic Surgery  Consult performed by: MASOOD YBARRA  Consult ordered by: ESTHER LIRIANO  Reason for consult: left hip fracture        Patient Name:  Anahi Calix  Admit Date:  9/30/2017  Consult Date:  10/1/2017      Patient Care Team:  Ramiro Gloria MD as PCP - General    Chief complaint: left hip pain        Subjective .     History of present illness:  The patient is a 77-year-old white female with a long history of cigarette smoking.  She was at her granddaughter's house when she lost her balance and fell landing on her left hip.  She had severe pain in the left hip and was unable to stand or bear weight.  She was brought to the emergency department where she was found to have an intratrochanteric fracture of the left hip.  She was admitted can't obtain for evaluation management of the injury.  She denies a syncopal episode and denies any loss of consciousness.  She denies any other bony injury at this time.  The injury occurred yesterday afternoon just prior to her arrival at the emergency department.    Review of Systems:  The following systems were reviewed and negative;  constitution, respiratory, cardiovascular and gastrointestinal  All other systems reviewed as negative    History  Past Medical History:   Diagnosis Date   • Abdominal aortic aneurysm (AAA) without rupture    • CAD (coronary artery disease)    • Gastroesophageal reflux disease    • Hypercholesterolemia    • Hypertension    • Nuclear senile cataract    • Osteoarthritis    • Osteoporosis    • Peripheral arterial disease    • Solitary lung nodule    ,   Past Surgical History:   Procedure Laterality Date   • ABDOMINAL AORTIC ANEURYSM REPAIR     • ABDOMINAL AORTIC ANEURYSM REPAIR  2006   ,   Family History   Problem Relation Age of Onset   • Hypertension Mother    • Coronary artery disease Father    ,   Social History   Substance Use Topics   • Smoking status: Current Every Day Smoker     Packs/day: 0.50      Types: Cigarettes     Start date: 11/21/1961   • Smokeless tobacco: Never Used   • Alcohol use No   ,   Prescriptions Prior to Admission   Medication Sig Dispense Refill Last Dose   • aspirin 81 MG EC tablet Take 81 mg by mouth daily.   Taking   • calcium acetate (PHOSLO) 667 MG tablet Take 667 mg by mouth every day.   Taking   • Cholecalciferol (GNP VITAMIN D SUPER STRENGTH) 5000 UNITS tablet Take  by mouth.   Taking   • clopidogrel (PLAVIX) 75 MG tablet Take 1 tablet by mouth Daily. 90 tablet 3 Taking   • gabapentin (NEURONTIN) 300 MG capsule Take 1 capsule by mouth 3 (Three) Times a Day. (Patient taking differently: Take 300 mg by mouth 2 (Two) Times a Day.) 270 capsule 3    • hydrochlorothiazide (MICROZIDE) 12.5 MG capsule Take 12.5 mg by mouth Daily.      • ibuprofen (ADVIL,MOTRIN) 400 MG tablet Take 1 tablet by mouth Every 6 (Six) Hours As Needed for mild pain (1-3). 120 tablet 5    • lisinopril (PRINIVIL,ZESTRIL) 40 MG tablet Take 1 tablet by mouth Daily. (Patient taking differently: Take 40 mg by mouth Daily. At noon) 90 tablet 3    • metoprolol tartrate (LOPRESSOR) 50 MG tablet Take 1 tablet by mouth 2 (Two) Times a Day. 180 tablet 0    • Multiple Vitamins-Minerals (VISION FORMULA PO) Take 1 tablet by mouth every day.   Taking   • omeprazole (PriLOSEC) 20 MG capsule Take 1 capsule by mouth Daily. 90 capsule 3 Taking   • polyethylene glycol (MIRALAX) powder ONE CAPFUL IN 8 OUNCES OF LIQUID ONCE DAILY UP TO THREE TIMES DAILY AS NEEDED. 1581 g 11 Taking   • vitamin D (ERGOCALCIFEROL) 39708 UNITS capsule capsule Take 50,000 Units by mouth daily.   Taking   • atorvastatin (LIPITOR) 80 MG tablet Take 1 tablet by mouth Daily. 90 tablet 3 Taking   • azithromycin (ZITHROMAX) 250 MG tablet Take 2 tablets the first day, then 1 tablet daily for 4 days. 6 tablet 0 Taking   • furosemide (LASIX) 20 MG tablet Take 1 tablet by mouth Daily. As needed for edema 30 tablet 0 Taking   • loratadine (CLARITIN) 10 MG tablet Take  10 mg by mouth daily.   Taking    and Allergies:  Review of patient's allergies indicates no known allergies.    Objective     Vital Signs   Temp:  [97.2 °F (36.2 °C)-98.7 °F (37.1 °C)] 97.7 °F (36.5 °C)  Heart Rate:  [65-98] 87  Resp:  [18-20] 18  BP: (129-178)/(68-78) 136/68      Physical Exam:    General:  Awake, Alert, and oriented and in no apparent distress    Eyes:  PERRLA, no lid lesions    Mouth:  Oral mucosa moist, no gum lesions    Neck:  Trachea midline, no thyroidomegaly    Heart:  RR, no murmur, ursula, or rub    Lungs:  Good respiratory effort, clear to ausculation bilaterally    Abdomen:  Soft, nontender, nondistended.  No hepatosplenomegaly.    Skin:  Good skin turgor, no skin lesions    Extremities:  Right lower extremity has good distal pulses and sensation.  Nontender on exam.  Good muscle tone and strength.  Stable joint exam.  Free and good range of motion.    Left lower extremity is tender with any attempted motion.  Good distal pulses and sensation grossly.  Good muscle tone and strength.  Stability is deferred due to known fracture.    Results Review:    Imaging Results (last 24 hours)     Procedure Component Value Units Date/Time    XR Elbow 3+ View Left [[id number]] Collected:  09/30/17 1639     Updated:  09/30/17 1705    Narrative:       Patient Name:  MRS. ALIYA VARGAS  Patient ID:  [id number]   Ordering:  TAMMY OMER  Attending:  TAMMY OMER  Referring:  TAMMY OMER  ------------------------------------------------    Three view left elbow    HISTORY: Pain after falling    AP, lateral and oblique views obtained.    COMPARISON: None    No acute fracture or dislocation.  No joint effusion.  No other osseous or articular abnormality.      Impression:       CONCLUSION:  No acute fracture or dislocation    [id number]    Electronically signed by:  Sammy Marie MD  9/30/2017 5:04 PM CDT  Workstation: [email or web address]    XR Hip With or Without Pelvis 2 - 3 View Left [[id number]] Collected:   09/30/17 1640     Updated:  09/30/17 1708    Narrative:         [facility]    Patient Name: ALIYA VARGAS    ORDERING: TAMMY OMER    ATTENDING: TAMMY OMER     REFERRING: TAMMY OMER    Two view left hip with single view pelvis    HISTORY: Fell    AP and stable lateral views of the left hip and AP film of the  pelvis obtained.    COMPARISON: None    Comminuted displaced intertrochanteric and subtrochanteric  fracture of the left hip.  No dislocation of the femoral head.  Degenerative changes and degenerative disc disease lumbar spine.  Degenerative change greater trochanter each hip.    Previously demonstrated abdominal aortic aneurysm.  Surgical clips overlie the lumbar spine.  Pelvic phleboliths.      Impression:       CONCLUSION:  Comminuted displaced intertrochanteric and subtrochanteric  fracture of the left hip.  Previously demonstrated abdominal aortic aneurysm.    [id number]        Electronically signed by:  Sammy Marie MD  9/30/2017 5:07 PM CDT  Workstation: [employer]    XR Chest 1 View [[id number]] Collected:  09/30/17 1640     Updated:  09/30/17 1713    Narrative:       Patient Name:  MRS. ALIYA VARGAS  Patient ID:  [id number]   Ordering:  TAMMY OMER  Attending:  TAMMY OMER  Referring:  TAMMY OMER  ------------------------------------------------    PORTABLE CHEST    HISTORY: Fell. Left hip fracture.    Portable AP supine film of the chest was obtained at 4:48 PM.  COMPARISON: September 9, 2016    EKG leads.  The lungs are clear of an acute process.  The heart is not enlarged.  The pulmonary vasculature is not increased.  No pleural effusion.  No pneumothorax.  No acute osseous abnormality.  Hypertrophic change right acromioclavicular joint.      Impression:       CONCLUSION:  No Acute Disease    [id number]    Electronically signed by:  Sammy Marie MD  9/30/2017 5:12 PM CDT  Workstation: [employer]          Lab Results (last 24 hours)     Procedure Component Value Units  Date/Time    CBC & Differential [[id number]] Collected:  09/30/17 1609    Specimen:  Blood Updated:  09/30/17 1622    Narrative:       The following orders were created for panel order CBC & Differential.  Procedure                               Abnormality         Status                     ---------                               -----------         ------                     CBC Auto Differential[[id number]]        Abnormal            Final result                 Please view results for these tests on the individual orders.    CBC Auto Differential [[id number]]  (Abnormal) Collected:  09/30/17 1609    Specimen:  Blood Updated:  09/30/17 1622     WBC 10.81 (H) 10*3/mm3      RBC 5.11 10*6/mm3      Hemoglobin 15.2 g/dL      Hematocrit 45.4 (H) %      MCV 88.8 fL      MCH 29.7 pg      MCHC 33.5 g/dL      RDW 13.7 %      RDW-SD 44.8 fl      MPV 9.5 fL      Platelets 264 10*3/mm3      Neutrophil % 55.5 %      Lymphocyte % 34.3 %      Monocyte % 7.0 %      Eosinophil % 2.2 %      Basophil % 0.6 %      Immature Grans % 0.4 %      Neutrophils, Absolute 6.00 10*3/mm3      Lymphocytes, Absolute 3.71 10*3/mm3      Monocytes, Absolute 0.76 10*3/mm3      Eosinophils, Absolute 0.24 10*3/mm3      Basophils, Absolute 0.06 10*3/mm3      Immature Grans, Absolute 0.04 (H) 10*3/mm3     Comprehensive Metabolic Panel [[id number]]  (Abnormal) Collected:  09/30/17 1609    Specimen:  Blood Updated:  09/30/17 1632     Glucose 119 (H) mg/dL      BUN 21 mg/dL      Creatinine 0.92 mg/dL      Sodium 142 mmol/L      Potassium 3.7 mmol/L      Chloride 100 mmol/L      CO2 28.0 mmol/L      Calcium 9.4 mg/dL      Total Protein 7.2 g/dL      Albumin 4.00 g/dL      ALT (SGPT) 31 U/L      AST (SGOT) 30 U/L      Alkaline Phosphatase 87 U/L      Total Bilirubin 0.4 mg/dL      eGFR Non African Amer 59 (L) mL/min/1.73      Globulin 3.2 gm/dL      A/G Ratio 1.3 g/dL      BUN/Creatinine Ratio 22.8     Anion Gap 14.0 mmol/L     Narrative:       The MDRD GFR  formula is only valid for adults with stable renal function between ages 18 and 70.    Protime-INR [[id number]]  (Normal) Collected:  09/30/17 1609    Specimen:  Blood Updated:  09/30/17 1641     Protime 12.7 Seconds      INR 0.96    Narrative:       Therapeutic range for most indications is 2.0-3.0 INR,  or 2.5-3.5 for mechanical heart valves.    aPTT [[id number]]  (Normal) Collected:  09/30/17 1609    Specimen:  Blood Updated:  09/30/17 1641     PTT 29.4 seconds     Narrative:       The recommended Heparin therapeutic range is 68-97 seconds.    [place] Draw [[id number]] Collected:  09/30/17 1609    Specimen:  Blood Updated:  09/30/17 1801    Narrative:       The following orders were created for panel order [place] Draw.  Procedure                               Abnormality         Status                     ---------                               -----------         ------                     Light Blue Top[[id number]]                                   Final result               Green Top (Gel)[[id number]]                                  Final result               Lavender Top[[id number]]                                     Final result               Gold Top - SST[[id number]]                                   Final result                 Please view results for these tests on the individual orders.    Light Blue Top [[id number]] Collected:  09/30/17 1609    Specimen:  Blood Updated:  09/30/17 1801     Extra Tube hold for add-on      Auto resulted       Green Top (Gel) [[id number]] Collected:  09/30/17 1609    Specimen:  Blood Updated:  09/30/17 1801     Extra Tube Hold for add-ons.      Auto resulted.       Lavender Top [[id number]] Collected:  09/30/17 1609    Specimen:  Blood Updated:  09/30/17 1801     Extra Tube hold for add-on      Auto resulted       Gold Top - SST [[id number]] Collected:  09/30/17 1609    Specimen:  Blood Updated:  09/30/17 1801     Extra Tube Hold for add-ons.      Auto resulted.       Platelet  Function ADP [[id number]]  (Abnormal) Collected:  09/30/17 1759    Specimen:  Blood from Arm, Right Updated:  09/30/17 1850     ADP Aggregation, % Platelet 45 (L) %      Platelets 229 10*3/mm3     Platelet Function Collagen [[id number]]  (Normal) Collected:  09/30/17 1759    Specimen:  Blood from Arm, Right Updated:  09/30/17 1850     Collagen Aggregation 72 %     CBC Auto Differential [[id number]] Collected:  10/01/17 0537    Specimen:  Blood Updated:  10/01/17 0614    Basic Metabolic Panel [[id number]] Collected:  10/01/17 0537    Specimen:  Blood Updated:  10/01/17 0615           I reviewed the patient's new clinical results.  I reviewed the patient's new imaging results and agree with the interpretation.      Assessment/Plan     Principal Problem:    Displaced intertrochanteric fracture of left femur, initial encounter for closed fracture  Active Problems:    Peripheral arterial disease    CAD (coronary artery disease)    Hypertension    Chronic bronchitis    Essential hypertension    Fall    Tobacco dependence syndrome      Long discussion was held with patient and her daughter in regards to her injury and resultant treatment.  We discussed proceeding with operative fixation most likely including an intramedullary dakota.  We discussed risks benefits and alternatives including but not limited to infection, bleeding, pneumonia, continued pain, nonunion, malunion, and risk for repeat surgery.  We also discussed risks of anesthesia which would be further clarified by the anesthesia department.  The patient and her daughter were both in agreement to proceed with the surgery.    Plan IM dakota left hip.    Patient is at high risk due to known factors of CAD, HTN, chronic bronchitis, and Prior AAA, although these medical problems are currently stable.    I discussed the patients findings and my recommendations with patient, family and consulting provider    Nicola Rich MD  10/01/17  6:16 AM

## 2017-10-01 NOTE — ANESTHESIA PREPROCEDURE EVALUATION
Anesthesia Evaluation     Patient summary reviewed and Nursing notes reviewed   NPO Solid Status: > 8 hours       Airway   Mallampati: II  TM distance: >3 FB  Neck ROM: full  possible difficult intubation  Dental    (+) edentulous    Pulmonary - normal exam   (+) a smoker Current Abstained day of surgery, COPD moderate, rhonchi, decreased breath sounds, wheezes,     ROS comment: Albuterol treatment given pre-op.  Cardiovascular - normal exam    ECG reviewed  PT is on anticoagulation therapy  Patient on routine beta blocker and Beta blocker given within 24 hours of surgery  Rhythm: regular  Rate: normal    (+) hypertension, CAD, PVD (Peripheral vascular disease.), hyperlipidemia  Murmur: Unable to hear heart tones well due to pulmonary disease.    ROS comment: EKG:NSR EF 60-70%    Neuro/Psych- negative ROS  GI/Hepatic/Renal/Endo    (+)  GERD,     ROS Comment: HGB 13.6 HCT 41.6    Musculoskeletal     Abdominal    Substance History - negative use     OB/GYN negative ob/gyn ROS         Other   (+) arthritis                                   Anesthesia Plan    ASA 4 - emergent     general   (Discussed possible post op ventilation and patient and family understand possible complications,risks and agree.)  intravenous induction   Anesthetic plan and risks discussed with patient and child.

## 2017-10-01 NOTE — ANESTHESIA POSTPROCEDURE EVALUATION
Patient: Anahi Calix    Procedure Summary     Date Anesthesia Start Anesthesia Stop Room / Location    10/01/17 0931 1108  [facility] OR 11 / [facility]       Procedure Diagnosis Surgeon Provider    HIP INTERTROCHANTERIC NAILING - LEFT - Long dakota (Left Hip) Essential hypertension; Peripheral arterial disease; Fall, initial encounter; Gastroesophageal reflux disease, esophagitis presence not specified; Abdominal aortic aneurysm (AAA) without rupture; Displaced intertrochanteric fracture of left femur, initial encounter for closed fracture  (Essential hypertension [I10]; Peripheral arterial disease [I73.9]; Fall, initial encounter [W19.XXXA]; Gastroesophageal reflux disease, esophagitis presence not specified [K21.9]; Abdominal aortic aneurysm (AAA) without rupture [I71.4]; Displaced intertrochanteric fracture of left femur, initial encounter for closed fracture [S72.142A]) MD Terrence Fleming MD          Anesthesia Type: general  Last vitals  BP        Temp        Pulse       Resp        SpO2          Post Anesthesia Care and Evaluation    Patient location during evaluation: ICU  Patient participation: complete - patient cannot participate  Level of consciousness: awake and alert and obtunded/minimal responses  Pain score: 0  Pain management: adequate  Airway patency: patent  Anesthetic complications: No anesthetic complications  PONV Status: none  Cardiovascular status: acceptable, hemodynamically stable, hypertensive and stable  Respiratory status: acceptable, ETT and ventilator  Hydration status: acceptable

## 2017-10-01 NOTE — OP NOTE
HIP INTERTROCHANTERIC NAILING  Procedure Note    Name:    Anahi Calix  YOB: 1940  Date of surgery:   9/30/2017 - 10/1/2017    Pre-op Diagnosis:   Essential hypertension [I10]  Peripheral arterial disease [I73.9]  Fall, initial encounter [W19.XXXA]  Gastroesophageal reflux disease, esophagitis presence not specified [K21.9]  Abdominal aortic aneurysm (AAA) without rupture [I71.4]  Displaced intertrochanteric fracture of left femur, initial encounter for closed fracture [S72.142A]    Post-op Diagnosis:    Post-Op Diagnosis Codes:     * Essential hypertension [I10]     * Peripheral arterial disease [I73.9]     * Fall, initial encounter [W19.XXXA]     * Gastroesophageal reflux disease, esophagitis presence not specified [K21.9]     * Abdominal aortic aneurysm (AAA) without rupture [I71.4]     * Displaced intertrochanteric fracture of left femur, initial encounter for closed fracture [S72.142A]    Procedure:  Procedure(s):  HIP INTERTROCHANTERIC NAILING - LEFT - [place] d[place]    Surgeon:  Surgeon(s):  Nicola Rich MD    ASSISTANT:  Mary Garrett CSA    Anesthesia: Choice    Staff:   Circulator: Ana Rosenthal RN  Scrub Person: Mary Little    Estimated Blood Loss: none    Specimens:                None      Drains:   Urethral Catheter 09/30/17 2100 (Active)   Daily Indications Required activity restriction from trauma, surgery, (e.g. unstable spine, fracture, hemodynamics) 10/1/2017  7:33 AM   Securement secured to upper leg with leg strap 10/1/2017  7:55 AM   Catheter care done Yes 10/1/2017  7:33 AM           Findings:  As below    Complications: None    IMPLANTS:     Implant Name Type Inv. Item Serial No.  Lot No. LRB No. Used   SCRW LAG GAM3 TI [id number] STRL - [id number] Implant SCRW LAG GAM3 TI [id number] STRL  KYLA[employer] KELLIE [id number] Left 1   NAIL FEM RADAMES R1.5 TI 125D [id number] LT STRL - [id number] Implant NAIL FEM RADAEMS R1.5 TI 125D [id number] LT STRL  KYLAH KELLIE [id number] Left  1   SCRW LK FT 5X50MM STRL - [id number] Implant SCRW LK FT 5X50MM STRL   KYLA[employer] KELLIE [id number] Left 1         PROCEDURE:      The patient was taken to the operating room and placed in the supine position.  Preoperative antibiotics were administered. Surgical time out was performed. After adequate induction of anesthesia the patient was then transferred onto the fracture table and positioned appropriately in the supine position. All bony prominences were well padded.  The Left hip was then prepped and draped in the usual sterile fashion.  C-arm image intensification was then used to confirm proper reduction with indirect traction.  The AP and lateral images demonstrated near anatomic reduction.      A small stab wound was then made a few centimeters proximal to the tip of the greater trochanter.  The deep fascia was then incised in line with the skin incision.  A curved Kirkland scissor was then inserted to the tip of the greater trochanter and spread dividing the gluteal muscle.  We then inserted a guide pin at the tip of the greater trochanter as visualized on the AP and lateral C-arm views.  The guidepin was then advanced and images confirm proper position.  We then overreamed the guidepin with the large reamer.  A ball tipped guidewire was then inserted the length of the femur to the level of the patella.  The measuring device was then utilized to determine the appropriate length of the dakota.  A size 13mm reamer was then passed by hand to ensure clear passage of the dakota.  The nail was then chosen, assembled on the back table, and inserted into the shaft of the femur.  We used C-arm image intensification to confirm that the nail had seated appropriately.   We again used C-arm image intensification to make sure that the fracture did not displace.   We used the proximal targeting arm to appropriately position the guidepin for the femoral neck screw.  We made sure that the tip of the guidepin was at the center center  position on AP and lateral views.  The guidepin was then measured, over drilled, and the final lag screw was placed over the guidepin.  We used C-arm image intensification to confirm that the guidepin did not advance through the femoral head.  After the lag screw had been placed in the appropriate position C-arm image intensification was used to confirm and then the locking bolt was seated fully to lock the lag screw.  The distal interlocking screw were then placed by a free hand technique using 'perfect circles'.   Final C-arm images were taken of both the hip and the knee which confirmed near anatomic reduction and proper positioning of the hardware.     All wounds were then copiously irrigated with saline and injected with half percent Marcaine solution.  Sterile dressings were applied and the patient was then removed from the fracture table  and transferred to PACU for recovery from anesthesia.  At the end of the case the sponge and needle counts were reported to me as being correct and there were no known complications.      Nicola Rich MD     Date: 10/1/2017  Time: 11:08 AM

## 2017-10-01 NOTE — ANESTHESIA PROCEDURE NOTES
Airway  Urgency: emergent    Airway not difficult    General Information and Staff    Patient location during procedure: OR  Anesthesiologist: RODGER COLE    Consent for Airway (if performed for an anesthetic, see related documentation for consents)  Patient identity confirmed: arm band and verbally with patient  Consent: No emergent situation. Verbal consent obtained. Written consent obtained.  Risks and benefits: risks, benefits and alternatives were discussed  Consent given by: patient      Indications and Patient Condition  Indications for airway management: respiratory distress/failure and airway protection    Preoxygenated: yes  MILS maintained throughout  Mask difficulty assessment: 2 - vent by mask + OA or adjuvant +/- NMBA    Final Airway Details  Final airway type: endotracheal airway      Successful airway: ETT  Cuffed: yes   Successful intubation technique: direct laryngoscopy  Facilitating devices/methods: cricoid pressure  Endotracheal tube insertion site: oral  Blade: Kathie  Blade size: #3  ETT size: 7.5 mm  Cormack-Lehane Classification: grade I - full view of glottis  Placement verified by: chest auscultation and capnometry   Cuff volume (mL): 8  Measured from: lips  Number of attempts at approach: 1

## 2017-10-01 NOTE — PLAN OF CARE
Problem: Patient Care Overview (Adult)  Goal: Plan of Care Review  Outcome: Ongoing (interventions implemented as appropriate)    10/01/17 0740   Coping/Psychosocial Response Interventions   Plan Of Care Reviewed With patient;family   Patient Care Overview   Progress no change   Outcome Evaluation   Outcome Summary/Follow up Plan sx this morning.       Goal: Adult Individualization and Mutuality  Outcome: Ongoing (interventions implemented as appropriate)  Goal: Discharge Needs Assessment  Outcome: Ongoing (interventions implemented as appropriate)

## 2017-10-02 ENCOUNTER — APPOINTMENT (OUTPATIENT)
Dept: INTERVENTIONAL RADIOLOGY/VASCULAR | Facility: HOSPITAL | Age: 77
End: 2017-10-02
Attending: FAMILY MEDICINE

## 2017-10-02 ENCOUNTER — APPOINTMENT (OUTPATIENT)
Dept: ULTRASOUND IMAGING | Facility: HOSPITAL | Age: 77
End: 2017-10-02

## 2017-10-02 ENCOUNTER — APPOINTMENT (OUTPATIENT)
Dept: CARDIOLOGY | Facility: HOSPITAL | Age: 77
End: 2017-10-02
Attending: INTERNAL MEDICINE

## 2017-10-02 ENCOUNTER — APPOINTMENT (OUTPATIENT)
Dept: GENERAL RADIOLOGY | Facility: HOSPITAL | Age: 77
End: 2017-10-02

## 2017-10-02 PROBLEM — I48.91 ATRIAL FIBRILLATION (HCC): Status: ACTIVE | Noted: 2017-10-02

## 2017-10-02 LAB
ANION GAP SERPL CALCULATED.3IONS-SCNC: 10 MMOL/L (ref 5–15)
ARTERIAL PATENCY WRIST A: ABNORMAL
ATMOSPHERIC PRESS: ABNORMAL MMHG
BASE EXCESS BLDA CALC-SCNC: 0 MMOL/L (ref -2.4–2.4)
BASOPHILS # BLD AUTO: 0.01 10*3/MM3 (ref 0–0.2)
BASOPHILS NFR BLD AUTO: 0.1 % (ref 0–2)
BDY SITE: ABNORMAL
BUN BLD-MCNC: 23 MG/DL (ref 7–21)
BUN/CREAT SERPL: 18.1 (ref 7–25)
CA-I BLD-MCNC: 4.2 MG/DL (ref 4.5–4.9)
CALCIUM SPEC-SCNC: 7.8 MG/DL (ref 8.4–10.2)
CHLORIDE SERPL-SCNC: 108 MMOL/L (ref 95–110)
CO2 BLDA-SCNC: 26.9 MMOL/L (ref 23–27)
CO2 SERPL-SCNC: 24 MMOL/L (ref 22–31)
CREAT BLD-MCNC: 1.27 MG/DL (ref 0.5–1)
DEPRECATED RDW RBC AUTO: 46.3 FL (ref 36.4–46.3)
EOSINOPHIL # BLD AUTO: 0.01 10*3/MM3 (ref 0–0.7)
EOSINOPHIL NFR BLD AUTO: 0.1 % (ref 0–7)
ERYTHROCYTE [DISTWIDTH] IN BLOOD BY AUTOMATED COUNT: 14 % (ref 11.5–14.5)
GFR SERPL CREATININE-BSD FRML MDRD: 41 ML/MIN/1.73 (ref 60–90)
GLUCOSE BLD-MCNC: 130 MG/DL (ref 60–100)
GLUCOSE BLDA-MCNC: 134 MMOL/L
HCO3 BLDA-SCNC: 25.5 MMOL/L (ref 22–26)
HCT VFR BLD AUTO: 35.3 % (ref 35–45)
HCT VFR BLD CALC: 36 % (ref 38–47)
HGB BLD-MCNC: 11.6 G/DL (ref 12–15.5)
HGB BLDA-MCNC: 12.4 G/DL (ref 12–16)
IMM GRANULOCYTES # BLD: 0.07 10*3/MM3 (ref 0–0.02)
IMM GRANULOCYTES NFR BLD: 0.4 % (ref 0–0.5)
INR PPP: 1.28 (ref 0.8–1.2)
LYMPHOCYTES # BLD AUTO: 1.35 10*3/MM3 (ref 0.6–4.2)
LYMPHOCYTES NFR BLD AUTO: 7.7 % (ref 10–50)
MCH RBC QN AUTO: 29.7 PG (ref 26.5–34)
MCHC RBC AUTO-ENTMCNC: 32.9 G/DL (ref 31.4–36)
MCV RBC AUTO: 90.3 FL (ref 80–98)
MODALITY: ABNORMAL
MONOCYTES # BLD AUTO: 1.76 10*3/MM3 (ref 0–0.9)
MONOCYTES NFR BLD AUTO: 10.1 % (ref 0–12)
NEUTROPHILS # BLD AUTO: 14.24 10*3/MM3 (ref 2–8.6)
NEUTROPHILS NFR BLD AUTO: 81.6 % (ref 37–80)
PCO2 BLDA: 44.6 MM HG (ref 35–45)
PH BLDA: 7.38 PH UNITS (ref 7.35–7.45)
PLATELET # BLD AUTO: 216 10*3/MM3 (ref 150–450)
PMV BLD AUTO: 9.5 FL (ref 8–12)
PO2 BLDA: 69.7 MM HG (ref 80–105)
POTASSIUM BLD-SCNC: 4 MMOL/L (ref 3.5–5.1)
POTASSIUM BLDA-SCNC: 4.06 MMOL/L (ref 3.6–4.9)
PROTHROMBIN TIME: 16 SECONDS (ref 11.1–15.3)
RBC # BLD AUTO: 3.91 10*6/MM3 (ref 3.77–5.16)
SAO2 % BLDCOA: 93.7 % (ref 94–100)
SODIUM BLD-SCNC: 142 MMOL/L (ref 137–145)
SODIUM BLDA-SCNC: 135.6 MMOL/L (ref 138–146)
TSH SERPL DL<=0.05 MIU/L-ACNC: 0.24 MIU/ML (ref 0.46–4.68)
WBC NRBC COR # BLD: 17.44 10*3/MM3 (ref 3.2–9.8)

## 2017-10-02 PROCEDURE — 85610 PROTHROMBIN TIME: CPT | Performed by: ORTHOPAEDIC SURGERY

## 2017-10-02 PROCEDURE — 25010000002 PIPERACILLIN-TAZOBACTAM: Performed by: FAMILY MEDICINE

## 2017-10-02 PROCEDURE — 76937 US GUIDE VASCULAR ACCESS: CPT

## 2017-10-02 PROCEDURE — 25010000002 AMIODARONE IN DEXTROSE 5% 150-4.21 MG/100ML-% SOLUTION

## 2017-10-02 PROCEDURE — 93010 ELECTROCARDIOGRAM REPORT: CPT | Performed by: INTERNAL MEDICINE

## 2017-10-02 PROCEDURE — 94799 UNLISTED PULMONARY SVC/PX: CPT

## 2017-10-02 PROCEDURE — 25010000002 ENOXAPARIN PER 10 MG: Performed by: ORTHOPAEDIC SURGERY

## 2017-10-02 PROCEDURE — 87899 AGENT NOS ASSAY W/OPTIC: CPT | Performed by: FAMILY MEDICINE

## 2017-10-02 PROCEDURE — 25010000002 MIDAZOLAM 50 MG/10ML SOLUTION 10 ML VIAL: Performed by: INTERNAL MEDICINE

## 2017-10-02 PROCEDURE — 93005 ELECTROCARDIOGRAM TRACING: CPT | Performed by: INTERNAL MEDICINE

## 2017-10-02 PROCEDURE — 02HV33Z INSERTION OF INFUSION DEVICE INTO SUPERIOR VENA CAVA, PERCUTANEOUS APPROACH: ICD-10-PCS | Performed by: INTERNAL MEDICINE

## 2017-10-02 PROCEDURE — 25010000002 MAGNESIUM SULFATE 2 GM/50ML SOLUTION

## 2017-10-02 PROCEDURE — 99024 POSTOP FOLLOW-UP VISIT: CPT | Performed by: ORTHOPAEDIC SURGERY

## 2017-10-02 PROCEDURE — 80048 BASIC METABOLIC PNL TOTAL CA: CPT | Performed by: FAMILY MEDICINE

## 2017-10-02 PROCEDURE — 85025 COMPLETE CBC W/AUTO DIFF WBC: CPT | Performed by: FAMILY MEDICINE

## 2017-10-02 PROCEDURE — 84443 ASSAY THYROID STIM HORMONE: CPT | Performed by: INTERNAL MEDICINE

## 2017-10-02 PROCEDURE — 25010000002 AMIODARONE IN DEXTROSE 5% 360-4.14 MG/200ML-% SOLUTION: Performed by: INTERNAL MEDICINE

## 2017-10-02 PROCEDURE — 82803 BLOOD GASES ANY COMBINATION: CPT | Performed by: INTERNAL MEDICINE

## 2017-10-02 PROCEDURE — 94003 VENT MGMT INPAT SUBQ DAY: CPT

## 2017-10-02 PROCEDURE — 93306 TTE W/DOPPLER COMPLETE: CPT

## 2017-10-02 PROCEDURE — B548ZZA ULTRASONOGRAPHY OF SUPERIOR VENA CAVA, GUIDANCE: ICD-10-PCS | Performed by: INTERNAL MEDICINE

## 2017-10-02 PROCEDURE — C1751 CATH, INF, PER/CENT/MIDLINE: HCPCS

## 2017-10-02 RX ORDER — LISINOPRIL 2.5 MG/1
2.5 TABLET ORAL DAILY
Status: DISCONTINUED | OUTPATIENT
Start: 2017-10-02 | End: 2017-10-11 | Stop reason: HOSPADM

## 2017-10-02 RX ORDER — SODIUM CHLORIDE 0.9 % (FLUSH) 0.9 %
10 SYRINGE (ML) INJECTION EVERY 12 HOURS SCHEDULED
Status: DISCONTINUED | OUTPATIENT
Start: 2017-10-02 | End: 2017-10-11 | Stop reason: HOSPADM

## 2017-10-02 RX ORDER — DILTIAZEM HYDROCHLORIDE 5 MG/ML
10 INJECTION INTRAVENOUS ONCE
Status: COMPLETED | OUTPATIENT
Start: 2017-10-02 | End: 2017-10-02

## 2017-10-02 RX ORDER — MAGNESIUM SULFATE HEPTAHYDRATE 40 MG/ML
INJECTION, SOLUTION INTRAVENOUS
Status: COMPLETED
Start: 2017-10-02 | End: 2017-10-02

## 2017-10-02 RX ORDER — SODIUM CHLORIDE 0.9 % (FLUSH) 0.9 %
10 SYRINGE (ML) INJECTION AS NEEDED
Status: DISCONTINUED | OUTPATIENT
Start: 2017-10-02 | End: 2017-10-11 | Stop reason: HOSPADM

## 2017-10-02 RX ORDER — MAGNESIUM SULFATE HEPTAHYDRATE 40 MG/ML
2 INJECTION, SOLUTION INTRAVENOUS ONCE
Status: COMPLETED | OUTPATIENT
Start: 2017-10-02 | End: 2017-10-02

## 2017-10-02 RX ADMIN — ACETAMINOPHEN 1000 MG: 500 TABLET ORAL at 13:35

## 2017-10-02 RX ADMIN — DILTIAZEM HYDROCHLORIDE 5 MG/HR: 5 INJECTION INTRAVENOUS at 07:51

## 2017-10-02 RX ADMIN — PIPERACILLIN AND TAZOBACTAM 2.25 G: 2; .25 INJECTION, POWDER, LYOPHILIZED, FOR SOLUTION INTRAVENOUS; PARENTERAL at 21:35

## 2017-10-02 RX ADMIN — ACETAMINOPHEN 1000 MG: 500 TABLET ORAL at 06:19

## 2017-10-02 RX ADMIN — SODIUM CHLORIDE 50 ML/HR: 900 INJECTION, SOLUTION INTRAVENOUS at 22:43

## 2017-10-02 RX ADMIN — SODIUM CHLORIDE 50 ML/HR: 900 INJECTION, SOLUTION INTRAVENOUS at 03:59

## 2017-10-02 RX ADMIN — AMIODARONE HYDROCHLORIDE 1 MG/MIN: 1.8 INJECTION, SOLUTION INTRAVENOUS at 19:46

## 2017-10-02 RX ADMIN — NOREPINEPHRINE BITARTRATE 0.02 MCG/KG/MIN: 1 INJECTION INTRAVENOUS at 08:17

## 2017-10-02 RX ADMIN — ENOXAPARIN SODIUM 40 MG: 40 INJECTION SUBCUTANEOUS at 06:19

## 2017-10-02 RX ADMIN — ACETAMINOPHEN 1000 MG: 500 TABLET ORAL at 18:18

## 2017-10-02 RX ADMIN — GABAPENTIN 300 MG: 300 CAPSULE ORAL at 11:00

## 2017-10-02 RX ADMIN — GABAPENTIN 300 MG: 300 CAPSULE ORAL at 18:13

## 2017-10-02 RX ADMIN — DILTIAZEM HYDROCHLORIDE 10 MG: 5 INJECTION INTRAVENOUS at 07:12

## 2017-10-02 RX ADMIN — LISINOPRIL 2.5 MG: 2.5 TABLET ORAL at 11:00

## 2017-10-02 RX ADMIN — MAGNESIUM SULFATE HEPTAHYDRATE 2 G: 40 INJECTION, SOLUTION INTRAVENOUS at 13:30

## 2017-10-02 RX ADMIN — Medication 10 ML: at 13:39

## 2017-10-02 RX ADMIN — WARFARIN SODIUM 5 MG: 5 TABLET ORAL at 18:13

## 2017-10-02 RX ADMIN — FUROSEMIDE 20 MG: 20 TABLET ORAL at 11:00

## 2017-10-02 RX ADMIN — METOPROLOL TARTRATE 50 MG: 50 TABLET ORAL at 18:14

## 2017-10-02 RX ADMIN — MIDAZOLAM 4 MG/HR: 5 INJECTION INTRAMUSCULAR; INTRAVENOUS at 10:51

## 2017-10-02 RX ADMIN — GABAPENTIN 300 MG: 300 CAPSULE ORAL at 21:33

## 2017-10-02 RX ADMIN — POLYETHYLENE GLYCOL 3350 17 G: 17 POWDER, FOR SOLUTION ORAL at 18:14

## 2017-10-02 RX ADMIN — AMIODARONE HYDROCHLORIDE 300 MG: 1.5 INJECTION, SOLUTION INTRAVENOUS at 13:27

## 2017-10-02 RX ADMIN — AMIODARONE HYDROCHLORIDE 1 MG/MIN: 1.8 INJECTION, SOLUTION INTRAVENOUS at 13:54

## 2017-10-02 RX ADMIN — MIDAZOLAM 6 MG/HR: 5 INJECTION INTRAMUSCULAR; INTRAVENOUS at 20:26

## 2017-10-02 RX ADMIN — POLYETHYLENE GLYCOL 3350 17 G: 17 POWDER, FOR SOLUTION ORAL at 11:00

## 2017-10-02 NOTE — CONSULTS
Cardiology Consultation Note.        Patient Name: Anahi Calix  Age/Sex: 77 y.o. female  : 1940  MRN: [id number]    Date of consultation: 10/2/2017  Consulting Physician: Lacho Courtney MD  Primary care Physician: Ramiro Gloria MD  Requesting Physician:  Ata Farris MD   Reason for consultation:  Atrial fibrillation with a rapid ventricular response      Subjective:       Chief Complaint: Currently intubated in atrial fibrillation with a rapid ventricular response    History of Present Illness:  Anahi Calix is a 77 y.o. female     Body mass index is 26.38 kg/(m^2). with past medical history significant for atherosclerotic coronary artery disease with 50-70% stenosis in the mid circumflex artery and less than 50% calcified stenosis in the first diagonal branch noted on a CTA of the coronary done in 2016, history of arterial hypertension, hypertensive heart disease, vitamin D deficiency, gastroesophageal reflux disease, peripheral vascular disease status post abdominal aortic aneurysm surgical repair done in  with bilateral carotid bruit.    Patient was evaluated recently for routine follow-up in the office and resting electrocardiogram and obtain had revealed normal sinus rhythm.  Patient had no previous history of documented atrial fibrillation. Patient during that office visit had complain of having symptoms of shortness of breath along with dyspnea on exertion.  Patient was awaiting evaluation by Dr. Brar for her carotids.  Patient was hypertensive with a blood pressure 170/80 in the office and was recommended to stagger the medicine for blood pressure which included metoprolol lisinopril and hydrochlorothiazide.    Patient had sustained a fall without any syncopal episode at home and underwent some further surgical intervention for the left hip fracture by Dr. Rich.  Patient postoperatively earlier this morning had episodes of atrial fibrillation with a rapid ventricular  response with low blood pressure requiring pressors for blood pressure support.    Review of the record indicated patient has no previous history of documented atrial fibrillation.  Patient transthoracic echocardiogram done revealed biatrial enlargement with concentric left ventricular hypertrophy with an ejection fraction of 55% with mild mitral regurgitation and mild to moderate mitral stenosis with mitral annular calcification and mild tricuspid regurgitation.    After reviewing the records and discussing with the patient's family discussing the treatment options for atrial fibrillation with a rapid ventricular response patient being hypotensive was recommended electrical cardioversion as the atrial fibrillation is clearly new onset.  Patient did not show off any evidence of any smoke echo contrast or any intracardiac thrombus and is currently on Lovenox.  Plan was to proceed with electrical cardioversion.          Past Medical History:  1.  Atrial fibrillation with a rapid ventricular response.  2.  Atherosclerotic coronary artery disease.  3.  CT angiogram of the coronary which had revealed 50-70% stenosis in the circumflex artery and 50% stenosis in the diagonal branch.  4.  Arterial hypertension.  5.  Hypertensive heart disease.  6.  Mild to moderate mitral stenosis with mild mitral and mild tricuspid regurgitation.  7.  Peripheral vascular disease with bilateral carotid bruit.  8.  Abdominal aortic aneurysm status post surgical repair done in 2006.  9.  Hyperlipidemia.  10.  Osteoarthritis  11.  Vitamin D deficiency.  12.  Gastroesophageal reflux disease.  13.  Solitary pulmonary nodule.    Past Medical History:   Diagnosis Date   • Abdominal aortic aneurysm (AAA) without rupture    • CAD (coronary artery disease)    • Gastroesophageal reflux disease    • Hypercholesterolemia    • Hypertension    • Nuclear senile cataract    • Osteoarthritis    • Osteoporosis    • Peripheral arterial disease    • Solitary  lung nodule        Past Surgical History:  1.  Abdominal aortic aneurysm surgical repair done in 2006.  2.  Appendectomy.  3.  Ectopic pregnancy surgical intervention.     Past Surgical History:   Procedure Laterality Date   • ABDOMINAL AORTIC ANEURYSM REPAIR         Family History: Significant for arterial hypertension and mother with cerebrovascular accidents   Family History   Problem Relation Age of Onset   • Hypertension Mother    • Coronary artery disease Father        Social History: Smokes up to half pack per day denies any alcohol intake   Social History     Social History   • Marital status:      Spouse name: N/A   • Number of children: N/A   • Years of education: N/A     Occupational History   • Not on file.     Social History Main Topics   • Smoking status: Current Every Day Smoker     Packs/day: 0.50     Types: Cigarettes     Start date: 11/21/1961   • Smokeless tobacco: Never Used   • Alcohol use No   • Drug use: No   • Sexual activity: Not Currently     Other Topics Concern   • Not on file     Social History Narrative        Cardiac Risk Factors:   1.  Postmenopausal.  2.  Arterial hypertension.  3.  Hyperlipidemia.  4.  Tobacco abuse.    Allergies:  No Known Allergies    Medication::  Prescriptions Prior to Admission   Medication Sig Dispense Refill Last Dose   • aspirin 81 MG EC tablet Take 81 mg by mouth daily.   Taking   • calcium acetate (PHOSLO) 667 MG tablet Take 667 mg by mouth every day.   Taking   • Cholecalciferol (GNP VITAMIN D SUPER STRENGTH) 5000 UNITS tablet Take  by mouth.   Taking   • clopidogrel (PLAVIX) 75 MG tablet Take 1 tablet by mouth Daily. 90 tablet 3 Taking   • gabapentin (NEURONTIN) 300 MG capsule Take 1 capsule by mouth 3 (Three) Times a Day. (Patient taking differently: Take 300 mg by mouth 2 (Two) Times a Day.) 270 capsule 3    • hydrochlorothiazide (MICROZIDE) 12.5 MG capsule Take 12.5 mg by mouth Daily.      • ibuprofen (ADVIL,MOTRIN) 400 MG tablet Take 1 tablet  by mouth Every 6 (Six) Hours As Needed for mild pain (1-3). 120 tablet 5    • lisinopril (PRINIVIL,ZESTRIL) 40 MG tablet Take 1 tablet by mouth Daily. (Patient taking differently: Take 40 mg by mouth Daily. At noon) 90 tablet 3    • metoprolol tartrate (LOPRESSOR) 50 MG tablet Take 1 tablet by mouth 2 (Two) Times a Day. 180 tablet 0    • Multiple Vitamins-Minerals (VISION FORMULA PO) Take 1 tablet by mouth every day.   Taking   • omeprazole (PriLOSEC) 20 MG capsule Take 1 capsule by mouth Daily. 90 capsule 3 Taking   • polyethylene glycol (MIRALAX) powder ONE CAPFUL IN 8 OUNCES OF LIQUID ONCE DAILY UP TO THREE TIMES DAILY AS NEEDED. 1581 g 11 Taking   • vitamin D (ERGOCALCIFEROL) 46419 UNITS capsule capsule Take 50,000 Units by mouth daily.   Taking   • atorvastatin (LIPITOR) 80 MG tablet Take 1 tablet by mouth Daily. 90 tablet 3 Taking   • azithromycin (ZITHROMAX) 250 MG tablet Take 2 tablets the first day, then 1 tablet daily for 4 days. 6 tablet 0 Taking   • furosemide (LASIX) 20 MG tablet Take 1 tablet by mouth Daily. As needed for edema 30 tablet 0 Taking   • loratadine (CLARITIN) 10 MG tablet Take 10 mg by mouth daily.   Taking           Review of Systems:       Constitutional:  Denies recent weight loss, weight gain, fever or chills, no change in exercise tolerance     HENT:  Denies any hearing loss, epistaxis, hoarseness, or difficulty speaking.     Eyes: Wears eyeglasses or contact lenses     Respiratory:  Denies dyspnea with exertion,no cough, wheezing, or hemoptysis.     Cardiovascular: Negative for palpitations, chest pain, orthopnea, PND, peripheral edema, syncope, or claudication.     Gastrointestinal:  Denies change in bowel habits, dyspepsia, ulcer disease, hematochezia, or melena.  No nausea, no vomiting, no hematemesis, no diarrhea or constipation, no melena      Endocrine: Negative for cold intolerance, heat intolerance, polydipsia, polyphagia and polyuria. Denies any history of weight change,  heat/cold intolerance, polydipsia, polyuria     Genitourinary: Negative for hematuria.      Musculoskeletal: Denies any history of arthritic symptoms or back problems .  No joint pain, joint stiffness, joint swelling, muscle pain, muscle weakness and neck pain    Skin:  Denies any change in hair or nails, rashes, or skin lesions.     Allergic/Immunologic: Negative.  Negative for environmental allergies, food allergies and immunocompromised state.     Neurological:  Denies any history of recurrent headaches, strokes, TIA, or seizure disorder.     Hematological: Denies excessive bleeding, easy bruising, fatigue, lymphadenopathy and petechiae or any bleeding disorders, or lymphadenopathy.     Psychiatric/Behavioral: Denies any history of depression, substance abuse, or change in cognitive function. Denies any psychomotor reaction or tangential thought.  No depression, homicidal ideations and suicidal ideations    Endocrine: No frequent urination and nocturia, temperature intolerance, weight gain, unintended and weight loss, unintended            Objective:     Objective:  Vitals:    10/02/17 1135   BP: 109/56   Pulse: (!) 123   Resp:    Temp:    SpO2: 96%     .    Body mass index is 26.38 kg/(m^2).           Physical Exam:   General Appearance:   Currently intubated on a ventilator with 70% FiO2 and sedated   Head:    Normocephalic, atraumatic, without obvious abnormality   Eyes:           SMILEY  Lids and lashes normal, conjunctivae and sclerae normal, no icterus, no pallor   Ears:    Ears appear intact with no abnormalities noted   Throat:   Mucous membranes pink and moist   Neck:   Supple, trachea midline,Bilateral carotid  bruit, no organomegaly or JVD   Lungs:     Clear to auscultation and percussion, respirations regular, even and Unlabored. No wheezes, rales, rhonchi    Heart:   Irregularly irregular S1 and S2 with a hollow systolic murmur best heard at the apex.   Abdomen:     Soft, non-tender, non-distended, no  guarding, no rebound tenderness, Normal bowel sounds in all four quadrants, no masses, liver and spleen nonpalpable,    Genitalia:    Deferred   Extremities:   Moves all extremities well, no edema, no cyanosis, no              Redness, no clubbing   Pulses:   Pulses palpable and equal bilaterally   Skin:   Moist and warm. No bleeding, bruising or rash   Neurologic/Psychiatric:   Alert and oriented to person, place, and time.  Motor, power and tone in upper and lower extremities are grossly intact. No focal neurological deficits. Normal cognitive function. No psychomotor reaction or tangential thought. No depression, homicidal ideations and suicidal ideations           Lab Review:       Results from last 7 days  Lab Units 10/02/17  0922  09/30/17  1609   SODIUM mmol/L 142  < > 142   SODIUM, ARTERIAL   --   < >  --    POTASSIUM mmol/L 4.0  < > 3.7   CHLORIDE mmol/L 108  < > 100   CO2 mmol/L 24.0  < > 28.0   BUN mg/dL 23*  < > 21   CREATININE mg/dL 1.27*  < > 0.92   CALCIUM mg/dL 7.8*  < > 9.4   BILIRUBIN mg/dL  --   --  0.4   ALK PHOS U/L  --   --  87   ALT (SGPT) U/L  --   --  31   AST (SGOT) U/L  --   --  30   GLUCOSE mg/dL 130*  < > 119*   GLUCOSE, ARTERIAL   --   < >  --    < > = values in this interval not displayed.            Results from last 7 days  Lab Units 10/02/17  0922   WBC 10*3/mm3 17.44*   HEMOGLOBIN g/dL 11.6*   HEMATOCRIT % 35.3   PLATELETS 10*3/mm3 216       Results from last 7 days  Lab Units 10/02/17  0337 10/01/17  1303 09/30/17  1609   INR  1.28* 1.13 0.96   APTT seconds  --   --  29.4                   Invalid input(s):  T4,  FREET4    EKG:   ECG/EMG Results (last 24 hours)     Procedure Component Value Units Date/Time    ECG 12 Lead [[id number]] Collected:  09/30/17 1727     Updated:  10/01/17 1320    Narrative:       Test Reason : fall, hip fx  Blood Pressure : **/** mmHG  Vent. Rate : 073 BPM     Atrial Rate : 073 BPM     P-R Int : 158 ms          QRS Dur : 090 ms      QT Int : 424 ms        P-R-T Axes : 077 010 071 degrees     QTc Int : 467 ms    Normal sinus rhythm  Septal infarct , age undetermined  Abnormal ECG    Confirmed by CLARY LUU MD ([id number]),  YUDI GARZA ([id number]) on  10/1/2017 1:20:37 PM    Referred By:  NEGRA           Confirmed By:CLARY LUU MD    SCANNED EKG [[id number]] Resulted:  09/30/17      Updated:  10/02/17 0843    Adult Transthoracic Echo Complete W/ Cont if Necessary Per Protocol [[id number]] Collected:  10/02/17 0951     Updated:  10/02/17 1107     BSA 1.8 m^2       CV ECHO RUTH ANN - RVDD 2.3 cm      IVSd 1.6 cm      LVIDd 4.1 cm      LVIDs 2.9 cm      LVPWd 1.9 cm      IVS/LVPW 0.84     FS 28.8 %      EDV(Teich) 73.8 ml      ESV(Teich) 32.6 ml      EF(Teich) 55.8 %      EDV(cubed) 68.4 ml      ESV(cubed) 24.8 ml      EF(cubed) 63.8 %      LV mass(C)d 296.7 grams      LV mass(C)dI 162.7 grams/m^2      SV(Teich) 41.2 ml      SI(Teich) 22.6 ml/m^2      SV(cubed) 43.7 ml      SI(cubed) 24.0 ml/m^2      Ao root diam 2.7 cm      Ao root area 5.7 cm^2      ACS 1.6 cm      LA dimension 4.0 cm      LA/Ao 1.5     LVOT diam 1.7 cm      LVOT area 2.3 cm^2      Ao root area (BSA corrected) 1.5     MV E max dimitrios 165.1 cm/sec      MV A max dimitrios 111.3 cm/sec      MV E/A 1.5     MV V2 max 203.0 cm/sec      MV max PG 16.5 mmHg      MV V2 mean 138.0 cm/sec      MV mean PG 8.2 mmHg      MV V2 VTI 36.0 cm      MVA(VTI) 1.8 cm^2      Ao pk dimitrios 254.0 cm/sec      Ao max PG 25.8 mmHg      Ao max PG (full) 8.5 mmHg      Ao V2 mean 160.5 cm/sec      Ao mean PG 11.7 mmHg      Ao mean PG (full) 2.2 mmHg      Ao V2 VTI 31.1 cm      NIMO(I,A) 2.1 cm^2      NIMO(I,D) 2.1 cm^2      NIMO(V,A) 1.9 cm^2      NIMO(V,D) 1.9 cm^2      LV V1 max PG 17.3 mmHg      LV V1 mean PG 9.5 mmHg      LV V1 max 208.0 cm/sec      LV V1 mean 143.7 cm/sec      LV V1 VTI 27.2 cm      MR max dimitrios 682.5 cm/sec      MR max .3 mmHg      SV(Ao) 178.0 ml      SI(Ao) 97.6 ml/m^2      SV(LVOT) 63.8 ml      SI(LVOT) 35.0 ml/m^2       PA V2 max 190.8 cm/sec      PA max PG 14.6 mmHg      PI end-d dimitrios 51.8 cm/sec       CV ECHO RUTH ANN - BZI_BMI 30.9 kilograms/m^2       CV ECHO RUTH ANN - BSA(HAYCOCK) 1.9 m^2       CV ECHO RUTH ANN - BZI_METRIC_WEIGHT 79.0 kg       CV ECHO RUTH ANN - BZI_METRIC_HEIGHT 160.0 cm           Imaging:  Imaging Results (last 24 hours)     Procedure Component Value Units Date/Time    FL C Arm During Surgery [[id number]] Resulted:  10/02/17 0840     Updated:  10/02/17 0840    US Guided Vascular Access [[id number]] Resulted:  10/02/17 1043     Updated:  10/02/17 1043    Narrative:       This procedure was auto-finalized with no dictation required.    XR Chest Post CVA Port [[id number]] Collected:  10/02/17 1015     Updated:  10/02/17 1055    Narrative:         PROCEDURE: Single chest view AP    REASON FOR EXAM:Post PICC placement., S72.142A Displaced  intertrochanteric fracture of left femur, initial encounter for  closed fracture S72.002A Fracture of unspecified part of neck of  left femur, initial encounter for closed fracture W19.XXXA  Unspecified fall, initial encounter I73.9 Peripheral vascular  disease, unspecified I10 Essential (primary) hypertension K21.9  Gastro-esophageal reflux disease without esop    FINDINGS: Comparison exam dated September 30, 2017. ET tube with  tip 5.6 cm above keo. NG tube with tip below level diaphragm.  Right PICC line with tip overlying the SVC. Cardiac size appears  within normal limits. Vague left infrahilar lung base perihilar  haziness. Lungs otherwise clear. Pleural spaces are normal. No  acute osseous abnormality.      Impression:       1.  Tubes and lines as described above.  2.  Vague left infrahilar lung base perihilar haziness. This may  represent very early atypical pulmonary edema versus subsegmental  atelectasis or pneumonia.    Electronically signed by:  Eliseo Smith MD  10/2/2017 10:54 AM CDT  Workstation: [id number]    IR PICC wo fluoro guidance [[id number]] Resulted:  10/02/17 1057      Updated:  10/02/17 1055    Narrative:       This procedure was auto-finalized with no dictation required.          I personally viewed and interpreted the patient's EKG/Telemetry data.    Assessment:   1.  Atrial fibrillation with a rapid ventricular response with hypotension requiring pressors with a current blood pressure of 88/60.  2.  History of arterial hypertension.  3.  Peripheral vascular disease with abdominal aortic aneurysm surgical intervention done in 2006.  4.  Bilateral carotid bruit.  5.  Hyperlipidemia.  6.  Atherosclerotic coronary artery disease noted on a CT angiogram of the coronaries done in 2016          Plan:   1.  Atrial fibrillation with a rapid ventricular response with hypotension.  Patient has no previous history of documented atrial fibrillation.  Patient evaluation on 9/27/2017 resting echocardiogram had revealed sinus rhythm.  As the patient is currently hypotensive with atrial fibrillation with a rapid ventricular response after reviewing the transthoracic echocardiogram and discussing with the patient's family about the treatment options and consideration for electrical cardioversion for atrial fibrillation as the best option as the patient cannot receive any AV blocking medication due to the hypotension.  Risk-benefit treatment options for the electrical cardioversion were discussed with the patient and an informed consent was obtained for the electrical cardioversion.  Patient does have history of peripheral vascular disease with bilateral carotid bruit.  Patient is currently anticoagulated with Lovenox.  As the patient clearly has new onset atrial fibrillation with a rapid ventricular response patient has been recommended to undergo electrical cardioversion in the setting of the hypotension.  2.  Atherosclerotic coronary artery disease.  Patient previous CT angiogram done had revealed 50-70% stenosis in the circumflex artery and 50% stenosis in the first diagonal branch.   Patient on outpatient evaluation and did not complain of having any symptoms of chest pain.  3.  Peripheral vascular disease with abdominal aortic aneurysm surgical intervention done in 2006 is noted.  4.  History of vitamin D deficiency patient is currently on vitamin D supplement.  5.  Status post surgical intervention for the left hip fracture is noted.          Time: time spent in face-to-face evaluation of greater than 55 minutes and interacting and formulating examining and discussing the plan with the patient with 50% of greater time spent in face-to-face interaction.    Lacho Courtney MD  10/02/17  12:14 PM       EMR Dragon/Transcription disclaimer:   Much of this encounter note is an electronic transcription/translation of spoken language to printed text. The electronic translation of spoken language may permit erroneous, or at times, nonsensical words or phrases to be inadvertently transcribed; Although I have reviewed the note for such errors, some may still exist.

## 2017-10-02 NOTE — PROGRESS NOTES
MEDICINE DAILY PROGRESS NOTE  NAME: Anahi Calix  : 1940  MRN: [id number]     LOS: 2 days     PROVIDER OF SERVICE: Ata Farris MD    Chief Complaint: Displaced intertrochanteric fracture of left femur, initial encounter for closed fracture    Subjective:     Interval History:  History taken from: chart RN  Patient currently intubated and sedated.  Went into Afib this morning.  Was given Cardizem bolus and started on Cardizem drip and patient's blood pressure started to drop.  Patient was restarted on her levophed.  PICC line ordered.    Review of Systems:   Review of Systems   Unable to perform ROS: Intubated       Objective:     Vital Signs  Vitals:    10/02/17 0718 10/02/17 0800 10/02/17 0810 10/02/17 0825   BP:  (!) 87/62 (!) 77/54 (!) 85/58   BP Location:       Patient Position:       Pulse: (!) 143 (!) 153 (!) 150 (!) 153   Resp:       Temp:  99.3 °F (37.4 °C)     TempSrc:       SpO2: (!) 87% 97% 97% 97%   Weight:       Height:           Physical Exam  Physical Exam   Constitutional: She appears well-developed and well-nourished.   HENT:   Head: Normocephalic and atraumatic.   Right Ear: External ear normal.   Left Ear: External ear normal.   Nose: Nose normal.   Neck: Neck supple. No thyromegaly present.   Cardiovascular: Normal heart sounds.  Exam reveals no gallop and no friction rub.    Irregularly irregular.   Pulmonary/Chest: She has no wheezes. She has no rales.   Abdominal: Soft. Bowel sounds are normal. She exhibits no distension.   Neurological:   Intubated and sedated.   Skin: Skin is warm and dry.   Vitals reviewed.      Medication Review    Current Facility-Administered Medications:   •  acetaminophen (TYLENOL) tablet 650 mg, 650 mg, Oral, Once PRN **OR** acetaminophen (TYLENOL) suppository 650 mg, 650 mg, Rectal, Once PRN, Terrence Johnson MD  •  acetaminophen (TYLENOL) tablet 1,000 mg, 1,000 mg, Oral, Q6H, Nicola Rich MD, 1,000 mg at 10/02/17 0619  •  calcium acetate  (PHOSLO) tablet 667 mg, 667 mg, Oral, Daily, Cipriano Leigh MD, 667 mg at 10/01/17 1200  •  diltiaZEM (CARDIZEM) 125 mg in 100 mL sodium chloride 0.9% (total volume 125 mL), 5-15 mg/hr, Intravenous, Titrated, Willow Quiñones MD, Last Rate: 15 mL/hr at 10/02/17 0825, 15 mg/hr at 10/02/17 0825  •  diphenhydrAMINE (BENADRYL) injection 12.5 mg, 12.5 mg, Intravenous, Q15 Min PRN, Terrence Johnson MD  •  enoxaparin (LOVENOX) syringe 40 mg, 40 mg, Subcutaneous, Q24H, Nicola Rich MD, 40 mg at 10/02/17 0619  •  ePHEDrine injection 5 mg, 5 mg, Intravenous, Once PRN, Terrence Johnson MD  •  flumazenil (ROMAZICON) injection 0.2 mg, 0.2 mg, Intravenous, PRN, Terrence Johnson MD  •  furosemide (LASIX) tablet 20 mg, 20 mg, Oral, Daily, Cipriano Leigh MD  •  gabapentin (NEURONTIN) capsule 300 mg, 300 mg, Oral, TID, Cipriano Leigh MD, 300 mg at 10/01/17 2209  •  HYDROmorphone (DILAUDID) injection 0.5 mg, 0.5 mg, Intravenous, Q5 Min PRN, Terrence Johnson MD  •  labetalol (NORMODYNE,TRANDATE) injection 5 mg, 5 mg, Intravenous, Q5 Min PRN, Terrence Johnson MD  •  lisinopril (PRINIVIL,ZESTRIL) tablet 2.5 mg, 2.5 mg, Oral, Daily, Ata Farris MD  •  meperidine (DEMEROL) injection 12.5 mg, 12.5 mg, Intravenous, Q5 Min PRN, Terrence Johnson MD  •  metoprolol tartrate (LOPRESSOR) tablet 50 mg, 50 mg, Oral, BID, Cipriano Leigh MD, 50 mg at 10/01/17 0724  •  midazolam (VERSED) 50 mg in sodium chloride 0.9 % 100 mL (0.5 mg/mL) infusion, 1 mg/hr, Intravenous, Titrated, Vivian Banuelos MD, Last Rate: 5 mL/hr at 10/02/17 0709, 2.5 mg/hr at 10/02/17 0709  •  Morphine sulfate (PF) injection 4 mg, 4 mg, Intravenous, Q2H PRN, Cipriano Leigh MD, 4 mg at 10/01/17 0620  •  naloxone (NARCAN) injection 0.2 mg, 0.2 mg, Intravenous, PRN, Terrence Johnson MD  •  norepinephrine (LEVOPHED) 8,000 mcg in sodium chloride 0.9 % 250 mL (32 mcg/mL) infusion, 0.02-0.3 mcg/kg/min, Intravenous, Titrated, Vivian Banuelos MD, Last Rate: 11.81  mL/hr at 10/02/17 0826, 0.08 mcg/kg/min at 10/02/17 0826  •  ondansetron (ZOFRAN) injection 4 mg, 4 mg, Intravenous, Q6H PRN, Cipriano Leigh MD  •  ondansetron (ZOFRAN) injection 4 mg, 4 mg, Intravenous, Once PRN, Terrence Johnson MD  •  oxyCODONE (ROXICODONE) immediate release tablet 5 mg, 5 mg, Oral, Q4H PRN, Nicola Rich MD  •  Pharmacy to dose warfarin, , Does not apply, Continuous PRN, Nicola Rich MD  •  polyethylene glycol (MIRALAX) powder 17 g, 17 g, Oral, BID, Cipriano Leigh MD  •  propofol (DIPRIVAN) infusion 10 mg/mL 100 mL, 5-50 mcg/kg/min, Intravenous, Titrated, Vivian Banuelos MD, Stopped at 10/01/17 1320  •  sodium chloride 0.9 % flush 1-10 mL, 1-10 mL, Intravenous, PRN, Cipriano Leigh MD, 10 mL at 09/30/17 2000  •  sodium chloride 0.9 % flush 10 mL, 10 mL, Intravenous, PRN, Barrie Rodríguez MD  •  sodium chloride 0.9 % infusion, 50 mL/hr, Intravenous, Continuous, Cipriano Leigh MD, Last Rate: 50 mL/hr at 10/02/17 0359, 50 mL/hr at 10/02/17 0359  •  warfarin (COUMADIN) tablet 5 mg, 5 mg, Oral, Daily, Nicola Rich MD, 5 mg at 10/01/17 1804     Diagnostic Data    Lab Results (last 24 hours)     Procedure Component Value Units Date/Time    Blood Gas, Arterial [[id number]]  (Abnormal) Collected:  10/01/17 1253    Specimen:  Arterial Blood Updated:  10/01/17 1254     Site --      Not performed at this site.        Osiel's Test --      Not performed at this site.        pH, Arterial 7.304 (L) pH units      pCO2, Arterial 55.1 (H) mm Hg      pO2, Arterial 76.5 (L) mm Hg      HCO3, Arterial 26.7 (H) mmol/L      Base Excess, Arterial -0.6 mmol/L      O2 Saturation, Arterial 94.1 %      Hemoglobin, Blood Gas 13.9 g/dL      Hematocrit, Blood Gas 41.0 %      CO2 Content 28.4 (H)     Sodium, Arterial 133.9 (L) mmol/L      Potassium, Arterial 3.64 mmol/L      Glucose, Arterial 130 mmol/L      Barometric Pressure for Blood Gas -- mmHg       Not performed at this site.        Modality  --      Not performed at this site.        Ionized Calcium 4.60 mg/dL     Respiratory Culture - Sputum, ET Suction [[id number]] Collected:  10/01/17 1310    Specimen:  Sputum from ET Suction Updated:  10/01/17 1358     Gram Stain Result Many (4+) WBCs per low power field      Rare (1+) Epithelial cells per low power field      Mixed bacterial katie      Many (4+) Gram negative diplococci    Protime-INR [[id number]]  (Normal) Collected:  10/01/17 1303    Specimen:  Blood Updated:  10/01/17 1413     Protime 14.4 Seconds      INR 1.13    Narrative:       Therapeutic range for most indications is 2.0-3.0 INR,  or 2.5-3.5 for mechanical heart valves.    Hemoglobin & Hematocrit, Blood [[id number]]  (Normal) Collected:  10/01/17 1414    Specimen:  Blood Updated:  10/01/17 1420     Hemoglobin 12.5 g/dL      Hematocrit 38.8 %     Protime-INR [[id number]]  (Abnormal) Collected:  10/02/17 0337    Specimen:  Blood Updated:  10/02/17 0427     Protime 16.0 (H) Seconds      INR 1.28 (H)    Narrative:       Therapeutic range for most indications is 2.0-3.0 INR,  or 2.5-3.5 for mechanical heart valves.    Blood Gas, Arterial [[id number]]  (Abnormal) Collected:  10/02/17 0447    Specimen:  Arterial Blood Updated:  10/02/17 0450     Site --      Not performed at this site.        Osiel's Test --      Not performed at this site.        pH, Arterial 7.375 pH units      pCO2, Arterial 44.6 mm Hg      pO2, Arterial 69.7 (L) mm Hg      HCO3, Arterial 25.5 mmol/L      Base Excess, Arterial 0.0 mmol/L      O2 Saturation, Arterial 93.7 (L) %      Hemoglobin, Blood Gas 12.4 g/dL      Hematocrit, Blood Gas 36.0 %      CO2 Content 26.9     Sodium, Arterial 135.6 (L) mmol/L      Potassium, Arterial 4.06 mmol/L      Glucose, Arterial 134 mmol/L      Barometric Pressure for Blood Gas -- mmHg       Not performed at this site.        Modality --      Not performed at this site.        Ionized Calcium 4.20 (L) mg/dL         I reviewed the patient's new  clinical results.    Assessment/Plan:     Active Hospital Problems (** Indicates Principal Problem)    Diagnosis Date Noted   • **Displaced intertrochanteric fracture of left femur, initial encounter for closed fracture [S72.142A] 09/30/2017     POD# 1.  Management per orthopedics.     • Atrial fibrillation [I48.91] 10/02/2017     New onset.  Given Cardizem bolus this morning and started on Cardizem drip.  Blood pressure started to drop with drip.  Patient placed back on pressors.  PICC line ordered.  Cardiology consulted.     • Tobacco dependence syndrome [F17.200] 10/01/2017     Cessation counseling to be provided once extubated and alert.     • CAD (coronary artery disease) [I25.10] 09/30/2017   • Hypertension [I10] 09/30/2017     Has been hypotensive over the last 24 hours intermittently.  Decreased Lisinopril from 40 mg daily to 2.5 mg daily.  Holding lopressor right now.  On Cardizem drip for atrial fibrillation.     • Chronic bronchitis [J42] 09/30/2017     Currently intubated.  Unable to be extubated yesterday.       • Essential hypertension [I10] 09/30/2017     Added automatically from request for surgery [id number]     • Fall [W19.XXXA] 09/30/2017     Fracture s/p repair per orthopedics.     • Peripheral arterial disease [I73.9] 10/31/2016      Resolved Hospital Problems    Diagnosis Date Noted Date Resolved   No resolved problems to display.     Discussed case with pulmonology for vent management.  Dr. Padgett will see.  Discussed case with cardiology for new onset Afib with unstable blood pressure.  Dr. Calvert will see.  Currently on Cardizem drip.  AM labs pending.      Will monitor patient's hospital course and adjust treatment as hospital course dictates.    DVT prophylaxis: Lovenox  Code status is Full Code    Plan for disposition:Unknown at this time.      Time:           This document has been electronically signed by Ata Farris MD on October 2, 2017 8:35 AM

## 2017-10-02 NOTE — PROGRESS NOTES
An informed consent was obtained from the patient's family for the electrical cardioversion for atrial fibrillation with a rapid ventricular response associated with hypotension.    With continuous hemodynamic monitoring with patient being intubated and sedated 250 J biphasic energy was delivered and patient converted to normal sinus rhythm.    Patient would undergo an electrocardiogram and would administer IV amiodarone and IV magnesium to maintain a normal sinus rhythm.    Family was informed of the successful electrical cardioversion.

## 2017-10-02 NOTE — SIGNIFICANT NOTE
10/02/17 1414   Rehab Treatment   Discipline physical therapist   Rehab Evaluation   Evaluation Not Performed other (see comments)  (Am pt waiting for cardiologist due to atrial fib. PM cardiologist performed successful electrical cardioversion. RN requests PT/OT check back tomorrow.)   Recommendation   PT - Next Appointment 10/03/17

## 2017-10-02 NOTE — PROGRESS NOTES
"Anticoagulation by Pharmacy - Warfarin    Anahi Calix is a 77 y.o.female  [Ht: 68\" (172.7 cm); Wt: 173 lb 8 oz (78.7 kg)] on Warfarin 5 mg PO  for indication of VTE prophylaxis s/p ortho procedure.    Goal INR: 1.7-2.5  Today's INR:   Lab Results   Component Value Date    INR 1.28 (H) 10/02/2017         Lab Results   Component Value Date    INR 1.28 (H) 10/02/2017    INR 1.13 10/01/2017    INR 0.96 09/30/2017    PROTIME 16.0 (H) 10/02/2017    PROTIME 14.4 10/01/2017    PROTIME 12.7 09/30/2017     Lab Results   Component Value Date    HGB 11.6 (L) 10/02/2017    HGB 12.5 10/01/2017    HGB 13.6 10/01/2017     Lab Results   Component Value Date    HCT 35.3 10/02/2017    HCT 38.8 10/01/2017    HCT 41.6 10/01/2017     Assessment/Plan:  INR increasing towards goal.  No change.    Nikhil Cartagena III, [facility]  10/02/17 1:54 PM     "

## 2017-10-02 NOTE — PROGRESS NOTES
TWO PATIENT IDENTIFIERS WERE USED. CONSENT WAS SIGNED PER FAMILY EDUCATION MATERIAL WAS GIVEN TO PATIENT AND / OR FAMILY. THE PATIENT WAS DRAPED WITH FULL BODY DRAPE AND PATIENT'SRIGHT   ARM WAS PREPPED WITH CHLORAPREP.  ULTRASOUND WAS USED TO LOCALIZE THERIGHT BASILIC VEIN. SUBCUTANEOUS TISSUE AT THE CATHETER SITE WAS INFILTRATED WITH 2% LIDOCAINE. UNDER ULTRASOUND GUIDANCE, THE VEIN WAS ACCESSED WITH A 21GAUGE  NEEDLE. AN 0.018 WIRE WAS THEN THREADED THROUGH THE NEEDLE INTO THE CENTRAL VENOUS SYSTEM. THE 21GAUGE  NEEDLE WAS REMOVED AND A 6 FRENCHPEEL AWAY SHEATH WAS PLACED OVER THE WIRE. THE PICC LINE CATHETER WAS CUT AT 36 CM. THE PICC LINE CATHETER WAS THEN PLACED OVER THE WIRE INTO THE VEIN, THE SHEATH WAS PEELED AWAY,WIRE WAS REMOVED. CATHETER WAS FLUSHED WITH NORMAL SALINE AND TIPS APPLIED. BIOPATCH PLACED. CATHETER SECURED WITHSTATLOCK  AND TEGADERM. PATIENT TOLERATED PROCEDURE WELL. THIS WAS DONE IN    ICU      IMPRESSION: SUCCESSFUL PLACEMENT OF TRIPLE LUMEN SOLO PICC        Myra Bhagat  10/2/2017  10:30 AM

## 2017-10-02 NOTE — PLAN OF CARE
Problem: Patient Care Overview (Adult)  Goal: Plan of Care Review  Outcome: Ongoing (interventions implemented as appropriate)    10/02/17 0424   Coping/Psychosocial Response Interventions   Plan Of Care Reviewed With patient   Patient Care Overview   Progress no change   Outcome Evaluation   Outcome Summary/Follow up Plan pt rested well overnight. FiO2 decreased to 50% and pt is tolerating well. urine output was just adequate overnight.          Problem: Orthopaedic Fracture (Adult)  Goal: Signs and Symptoms of Listed Potential Problems Will be Absent or Manageable (Orthopaedic Fracture)  Outcome: Ongoing (interventions implemented as appropriate)    Problem: Perioperative Period (Adult)  Goal: Signs and Symptoms of Listed Potential Problems Will be Absent or Manageable (Perioperative Period)  Outcome: Ongoing (interventions implemented as appropriate)    Problem: Fall Risk (Adult)  Goal: Identify Related Risk Factors and Signs and Symptoms  Outcome: Ongoing (interventions implemented as appropriate)  Goal: Absence of Falls  Outcome: Ongoing (interventions implemented as appropriate)    Problem: Skin Integrity Impairment, Risk/Actual (Adult)  Goal: Identify Related Risk Factors and Signs and Symptoms  Outcome: Ongoing (interventions implemented as appropriate)    Problem: Mechanical Ventilation, Invasive (Adult)  Goal: Signs and Symptoms of Listed Potential Problems Will be Absent or Manageable (Mechanical Ventilation, Invasive)  Outcome: Ongoing (interventions implemented as appropriate)

## 2017-10-02 NOTE — SIGNIFICANT NOTE
10/02/17 1425   Rehab Treatment   Discipline occupational therapist   Rehab Evaluation   Evaluation Not Performed other (see comments)  (Nursing says wait until tomorrow.  Pt just finished successful cardioversion..)   Recommendation   OT - Next Appointment 10/03/17

## 2017-10-02 NOTE — PROGRESS NOTES
Discharge Planning Assessment  [facility]     Patient Name: Anahi Calix  MRN: [id number]  Today's Date: 10/2/2017    Admit Date: 9/30/2017          Discharge Needs Assessment       10/02/17 0959    Living Environment    Lives With child(nury), adult    Living Arrangements house    Home Accessibility no concerns    Transportation Available car;family or friend will provide    Living Environment Comment Pt resides at home with son and daughter in law. pt appears to have good family support system. pt independent prior to hospitalization.    Living Environment    Provides Primary Care For no one    Quality Of Family Relationships supportive;helpful;involved    Discharge Needs Assessment    Concerns To Be Addressed adjustment to diagnosis/illness concerns    Equipment Currently Used at Home none    Current Discharge Risk chronically ill    Discharge Disposition still a patient    Discharge Planning Comments Disposition unknown at this time, pt currently intubated.            Discharge Plan       10/02/17 1002    Case Management/Social Work Plan    Additional Comments LSW assesment complete. pt resides at home with son and daughter in law. pt appears to have good family support system. daughter reports pt was very independent prior to hospitalization. goal is for pt to return home at d/c however currently intubated. LSW awaiting further direction from MD. LSW/case mgt will follow up as consulted and complete arrangements as ordered.      10/02/17 0931    Case Management/Social Work Plan    Additional Comments Went into A-fib this morning.  Given Cardizem bolus and then placed on Cardizem gtt. Intubated.  Levophed.          Discharge Placement     No information found                Demographic Summary       10/02/17 0958    Referral Information    Admission Type inpatient    Referral Source high risk screening    Reason For Consult discharge planning    Record Reviewed medical record    Contact Information     Permission Granted to Share Information With     Primary Care Physician Information    Name  Juani            Functional Status       10/02/17 0958    Functional Status Prior    Ambulation 0-->independent    Transferring 0-->independent    Toileting 0-->independent    Bathing 0-->independent    Dressing 0-->independent    Eating 0-->independent    Communication 0-->understands/communicates without difficulty    Swallowing 0-->swallows foods/liquids without difficulty    Prior Functional Level Comment Independent, still drives durning day time    IADL    Medications independent    Meal Preparation independent    Housekeeping independent    Laundry independent    Shopping independent    Oral Care independent    Activity Tolerance    Current Activity Limitations --   Intubated    Usual Activity Tolerance good    Current Activity Tolerance poor    Cognitive/Perceptual/Developmental    Current Mental Status/Cognitive Functioning unable to assess    Recent Changes in Mental Status/Cognitive Functioning unable to assess    Developmental Stage (Eriksson's Stages of Development) Stage 8 (65 years-death/Late Adulthood) Integrity vs. Despair            Psychosocial     None            Abuse/Neglect     None            Legal     None            Substance Abuse     None            Patient Forms     None          AVERY Camarillo

## 2017-10-03 LAB
ANION GAP SERPL CALCULATED.3IONS-SCNC: 9 MMOL/L (ref 5–15)
ARTERIAL PATENCY WRIST A: ABNORMAL
ATMOSPHERIC PRESS: ABNORMAL MMHG
BASE EXCESS BLDA CALC-SCNC: 0.5 MMOL/L (ref -2.4–2.4)
BASOPHILS # BLD AUTO: 0.02 10*3/MM3 (ref 0–0.2)
BASOPHILS NFR BLD AUTO: 0.1 % (ref 0–2)
BDY SITE: ABNORMAL
BUN BLD-MCNC: 23 MG/DL (ref 7–21)
BUN/CREAT SERPL: 20.9 (ref 7–25)
CA-I BLD-MCNC: 4.3 MG/DL (ref 4.5–4.9)
CALCIUM SPEC-SCNC: 7.6 MG/DL (ref 8.4–10.2)
CHLORIDE SERPL-SCNC: 108 MMOL/L (ref 95–110)
CO2 BLDA-SCNC: 27.2 MMOL/L (ref 23–27)
CO2 SERPL-SCNC: 25 MMOL/L (ref 22–31)
CREAT BLD-MCNC: 1.1 MG/DL (ref 0.5–1)
DEPRECATED RDW RBC AUTO: 46.7 FL (ref 36.4–46.3)
EOSINOPHIL # BLD AUTO: 0.01 10*3/MM3 (ref 0–0.7)
EOSINOPHIL NFR BLD AUTO: 0.1 % (ref 0–7)
ERYTHROCYTE [DISTWIDTH] IN BLOOD BY AUTOMATED COUNT: 14.2 % (ref 11.5–14.5)
GFR SERPL CREATININE-BSD FRML MDRD: 48 ML/MIN/1.73 (ref 39–90)
GLUCOSE BLD-MCNC: 127 MG/DL (ref 60–100)
GLUCOSE BLDA-MCNC: 134 MMOL/L
HCO3 BLDA-SCNC: 25.8 MMOL/L (ref 22–26)
HCT VFR BLD AUTO: 31.4 % (ref 35–45)
HCT VFR BLD CALC: 32 % (ref 38–47)
HGB BLD-MCNC: 10.3 G/DL (ref 12–15.5)
HGB BLDA-MCNC: 10.8 G/DL (ref 12–16)
IMM GRANULOCYTES # BLD: 0.08 10*3/MM3 (ref 0–0.02)
IMM GRANULOCYTES NFR BLD: 0.5 % (ref 0–0.5)
INR PPP: 2.28 (ref 0.8–1.2)
L PNEUMO1 AG UR QL IA: NEGATIVE
LYMPHOCYTES # BLD AUTO: 1.42 10*3/MM3 (ref 0.6–4.2)
LYMPHOCYTES NFR BLD AUTO: 8.3 % (ref 10–50)
MCH RBC QN AUTO: 29.6 PG (ref 26.5–34)
MCHC RBC AUTO-ENTMCNC: 32.8 G/DL (ref 31.4–36)
MCV RBC AUTO: 90.2 FL (ref 80–98)
MODALITY: ABNORMAL
MONOCYTES # BLD AUTO: 1.43 10*3/MM3 (ref 0–0.9)
MONOCYTES NFR BLD AUTO: 8.3 % (ref 0–12)
NEUTROPHILS # BLD AUTO: 14.17 10*3/MM3 (ref 2–8.6)
NEUTROPHILS NFR BLD AUTO: 82.7 % (ref 37–80)
PCO2 BLDA: 44.2 MM HG (ref 35–45)
PH BLDA: 7.38 PH UNITS (ref 7.35–7.45)
PLATELET # BLD AUTO: 194 10*3/MM3 (ref 150–450)
PMV BLD AUTO: 9.5 FL (ref 8–12)
PO2 BLDA: 94 MM HG (ref 80–105)
POTASSIUM BLD-SCNC: 3.7 MMOL/L (ref 3.5–5.1)
POTASSIUM BLDA-SCNC: 3.56 MMOL/L (ref 3.6–4.9)
PROTHROMBIN TIME: 25.3 SECONDS (ref 11.1–15.3)
RBC # BLD AUTO: 3.48 10*6/MM3 (ref 3.77–5.16)
S PNEUM AG SPEC QL LA: NEGATIVE
SAO2 % BLDCOA: 97 % (ref 94–100)
SODIUM BLD-SCNC: 142 MMOL/L (ref 137–145)
SODIUM BLDA-SCNC: 137.4 MMOL/L (ref 138–146)
WBC NRBC COR # BLD: 17.13 10*3/MM3 (ref 3.2–9.8)

## 2017-10-03 PROCEDURE — 25010000002 MIDAZOLAM 50 MG/10ML SOLUTION 10 ML VIAL: Performed by: INTERNAL MEDICINE

## 2017-10-03 PROCEDURE — 94799 UNLISTED PULMONARY SVC/PX: CPT

## 2017-10-03 PROCEDURE — 82803 BLOOD GASES ANY COMBINATION: CPT | Performed by: INTERNAL MEDICINE

## 2017-10-03 PROCEDURE — 36600 WITHDRAWAL OF ARTERIAL BLOOD: CPT

## 2017-10-03 PROCEDURE — 94003 VENT MGMT INPAT SUBQ DAY: CPT

## 2017-10-03 PROCEDURE — 94640 AIRWAY INHALATION TREATMENT: CPT

## 2017-10-03 PROCEDURE — 80048 BASIC METABOLIC PNL TOTAL CA: CPT | Performed by: FAMILY MEDICINE

## 2017-10-03 PROCEDURE — 25010000002 ENOXAPARIN PER 10 MG: Performed by: ORTHOPAEDIC SURGERY

## 2017-10-03 PROCEDURE — 94760 N-INVAS EAR/PLS OXIMETRY 1: CPT

## 2017-10-03 PROCEDURE — 99232 SBSQ HOSP IP/OBS MODERATE 35: CPT | Performed by: INTERNAL MEDICINE

## 2017-10-03 PROCEDURE — 85610 PROTHROMBIN TIME: CPT | Performed by: ORTHOPAEDIC SURGERY

## 2017-10-03 PROCEDURE — 25010000002 PIPERACILLIN-TAZOBACTAM: Performed by: FAMILY MEDICINE

## 2017-10-03 PROCEDURE — 85025 COMPLETE CBC W/AUTO DIFF WBC: CPT | Performed by: FAMILY MEDICINE

## 2017-10-03 RX ORDER — MIDAZOLAM HYDROCHLORIDE 1 MG/ML
1 INJECTION INTRAMUSCULAR; INTRAVENOUS EVERY 4 HOURS PRN
Status: DISCONTINUED | OUTPATIENT
Start: 2017-10-03 | End: 2017-10-07

## 2017-10-03 RX ORDER — WARFARIN SODIUM 2.5 MG/1
2.5 TABLET ORAL
Status: DISCONTINUED | OUTPATIENT
Start: 2017-10-03 | End: 2017-10-04

## 2017-10-03 RX ORDER — FAMOTIDINE 10 MG/ML
20 INJECTION, SOLUTION INTRAVENOUS EVERY 12 HOURS SCHEDULED
Status: DISCONTINUED | OUTPATIENT
Start: 2017-10-03 | End: 2017-10-06 | Stop reason: CLARIF

## 2017-10-03 RX ORDER — MORPHINE SULFATE 10 MG/ML
4 INJECTION, SOLUTION INTRAMUSCULAR; INTRAVENOUS
Status: DISCONTINUED | OUTPATIENT
Start: 2017-10-03 | End: 2017-10-11 | Stop reason: HOSPADM

## 2017-10-03 RX ORDER — ALBUTEROL SULFATE 90 UG/1
2 AEROSOL, METERED RESPIRATORY (INHALATION)
Status: DISCONTINUED | OUTPATIENT
Start: 2017-10-03 | End: 2017-10-06 | Stop reason: SDUPTHER

## 2017-10-03 RX ORDER — AMIODARONE HYDROCHLORIDE 200 MG/1
200 TABLET ORAL EVERY 12 HOURS SCHEDULED
Status: DISCONTINUED | OUTPATIENT
Start: 2017-10-03 | End: 2017-10-11 | Stop reason: HOSPADM

## 2017-10-03 RX ADMIN — FUROSEMIDE 20 MG: 20 TABLET ORAL at 10:06

## 2017-10-03 RX ADMIN — NOREPINEPHRINE BITARTRATE 0.08 MCG/KG/MIN: 1 INJECTION INTRAVENOUS at 07:06

## 2017-10-03 RX ADMIN — FAMOTIDINE 20 MG: 10 INJECTION INTRAVENOUS at 21:06

## 2017-10-03 RX ADMIN — ACETAMINOPHEN 1000 MG: 500 TABLET ORAL at 00:33

## 2017-10-03 RX ADMIN — PIPERACILLIN AND TAZOBACTAM 2.25 G: 2; .25 INJECTION, POWDER, LYOPHILIZED, FOR SOLUTION INTRAVENOUS; PARENTERAL at 08:13

## 2017-10-03 RX ADMIN — PIPERACILLIN AND TAZOBACTAM 2.25 G: 2; .25 INJECTION, POWDER, LYOPHILIZED, FOR SOLUTION INTRAVENOUS; PARENTERAL at 02:17

## 2017-10-03 RX ADMIN — GABAPENTIN 300 MG: 300 CAPSULE ORAL at 21:06

## 2017-10-03 RX ADMIN — PIPERACILLIN AND TAZOBACTAM 2.25 G: 2; .25 INJECTION, POWDER, LYOPHILIZED, FOR SOLUTION INTRAVENOUS; PARENTERAL at 20:01

## 2017-10-03 RX ADMIN — MIDAZOLAM 3 MG/HR: 5 INJECTION INTRAMUSCULAR; INTRAVENOUS at 19:36

## 2017-10-03 RX ADMIN — POLYETHYLENE GLYCOL 3350 17 G: 17 POWDER, FOR SOLUTION ORAL at 10:05

## 2017-10-03 RX ADMIN — WARFARIN SODIUM 2.5 MG: 2.5 TABLET ORAL at 18:01

## 2017-10-03 RX ADMIN — ACETAMINOPHEN 1000 MG: 500 TABLET ORAL at 13:37

## 2017-10-03 RX ADMIN — METOPROLOL TARTRATE 50 MG: 50 TABLET ORAL at 18:01

## 2017-10-03 RX ADMIN — PIPERACILLIN AND TAZOBACTAM 2.25 G: 2; .25 INJECTION, POWDER, LYOPHILIZED, FOR SOLUTION INTRAVENOUS; PARENTERAL at 14:06

## 2017-10-03 RX ADMIN — Medication 10 ML: at 10:06

## 2017-10-03 RX ADMIN — MIDAZOLAM 6 MG/HR: 5 INJECTION INTRAMUSCULAR; INTRAVENOUS at 06:24

## 2017-10-03 RX ADMIN — ACETAMINOPHEN 1000 MG: 500 TABLET ORAL at 05:58

## 2017-10-03 RX ADMIN — GABAPENTIN 300 MG: 300 CAPSULE ORAL at 10:05

## 2017-10-03 RX ADMIN — Medication 667 MG: at 13:36

## 2017-10-03 RX ADMIN — METOPROLOL TARTRATE 50 MG: 50 TABLET ORAL at 10:06

## 2017-10-03 RX ADMIN — ALBUTEROL SULFATE 2 PUFF: 90 AEROSOL, METERED RESPIRATORY (INHALATION) at 21:15

## 2017-10-03 RX ADMIN — AMIODARONE HYDROCHLORIDE 200 MG: 200 TABLET ORAL at 13:59

## 2017-10-03 RX ADMIN — ENOXAPARIN SODIUM 40 MG: 40 INJECTION SUBCUTANEOUS at 05:59

## 2017-10-03 RX ADMIN — GABAPENTIN 300 MG: 300 CAPSULE ORAL at 16:23

## 2017-10-03 RX ADMIN — LISINOPRIL 2.5 MG: 2.5 TABLET ORAL at 10:05

## 2017-10-03 RX ADMIN — AMIODARONE HYDROCHLORIDE 200 MG: 200 TABLET ORAL at 21:06

## 2017-10-03 RX ADMIN — Medication 10 ML: at 21:06

## 2017-10-03 RX ADMIN — ACETAMINOPHEN 1000 MG: 500 TABLET ORAL at 18:02

## 2017-10-03 RX ADMIN — POLYETHYLENE GLYCOL 3350 17 G: 17 POWDER, FOR SOLUTION ORAL at 18:01

## 2017-10-03 NOTE — PROGRESS NOTES
[facility]  INPATIENT PROGRESS NOTE    Length of Stay: 3  Date of Admission: 9/30/2017  Primary Care Physician: Ramiro Gloria MD    Subjective   Chief Complaint: SOA   HPI:   Patient is currently intubated, sedated, and on mechanical ventilation. No overnight events.     Review of Systems   Constitutional: Negative for chills and fever.   Respiratory: Negative for shortness of breath.    Cardiovascular: Negative for chest pain.   Gastrointestinal: Negative for abdominal pain, diarrhea, nausea and vomiting.   Genitourinary: Negative for dysuria.   Neurological: Negative for dizziness.        All pertinent negatives and positives are as above. All other systems have been reviewed and are negative unless otherwise stated.     Objective    Temp:  [98.6 °F (37 °C)-99.9 °F (37.7 °C)] 99.9 °F (37.7 °C)  Heart Rate:  [59-84] 67  BP: ()/(50-63) 91/54  FiO2 (%):  [50 %-70 %] 50 %    Physical Exam   Constitutional: She appears well-developed and well-nourished.   Cardiovascular: Normal rate, regular rhythm and normal heart sounds.  Exam reveals no gallop and no friction rub.    No murmur heard.  Pulmonary/Chest: Effort normal and breath sounds normal. No respiratory distress. She has no wheezes. She has no rales.   Abdominal: Soft. Bowel sounds are normal. There is no tenderness.   Musculoskeletal: Normal range of motion. She exhibits no edema.   Neurological:   Pt sedated    Skin: Skin is warm and dry.   Psychiatric:   Pt sedated    Vitals reviewed.        Results Review:  I have reviewed the labs, radiology results, and diagnostic studies.    Laboratory Data:     Results from last 7 days  Lab Units 10/03/17  0434 10/03/17  0335 10/02/17  0922  10/01/17  0537 09/30/17  1609   SODIUM mmol/L  --  142 142  --  141 142   SODIUM, ARTERIAL mmol/L 137.4*  --   --   < >  --   --    POTASSIUM mmol/L  --  3.7 4.0  --  3.8 3.7   CHLORIDE mmol/L  --  108 108  --  102  100   CO2 mmol/L  --  25.0 24.0  --  28.0 28.0   BUN mg/dL  --  23* 23*  --  18 21   CREATININE mg/dL  --  1.10* 1.27*  --  0.81 0.92   GLUCOSE mg/dL  --  127* 130*  --  119* 119*   GLUCOSE, ARTERIAL mmol/L 134  --   --   < >  --   --    CALCIUM mg/dL  --  7.6* 7.8*  --  8.7 9.4   BILIRUBIN mg/dL  --   --   --   --   --  0.4   ALK PHOS U/L  --   --   --   --   --  87   ALT (SGPT) U/L  --   --   --   --   --  31   AST (SGOT) U/L  --   --   --   --   --  30   ANION GAP mmol/L  --  9.0 10.0  --  11.0 14.0   < > = values in this interval not displayed.  Estimated Creatinine Clearance: 47.9 mL/min (by C-G formula based on Cr of 1.1).            Results from last 7 days  Lab Units 10/03/17  0335 10/02/17  0922 10/01/17  1414 10/01/17  0537 09/30/17  1759 09/30/17  1609   WBC 10*3/mm3 17.13* 17.44*  --  14.01*  --  10.81*   HEMOGLOBIN g/dL 10.3* 11.6* 12.5 13.6  --  15.2   HEMATOCRIT % 31.4* 35.3 38.8 41.6  --  45.4*   PLATELETS 10*3/mm3 194 216  --  224 229 264       Results from last 7 days  Lab Units 10/03/17  0335 10/02/17  0337 10/01/17  1303 09/30/17  1609   INR  2.28* 1.28* 1.13 0.96       Culture Data:   No results found for: BLOODCX  No results found for: URINECX  No results found for: RESPCX  No results found for: WOUNDCX  No results found for: STOOLCX  No components found for: BODYFLD    Radiology Data:   Imaging Results (last 24 hours)     ** No results found for the last 24 hours. **          I have reviewed the patient current medications.     Assessment/Plan     Hospital Problem List     * (Principal)Displaced intertrochanteric fracture of left femur, initial encounter for closed fracture    Overview Signed 10/2/2017  8:44 AM by Ata Farris MD     POD# 1.  Management per orthopedics.         Peripheral arterial disease (Chronic)    CAD (coronary artery disease) (Chronic)    Hypertension (Chronic)    Overview Signed 10/2/2017  8:45 AM by Ata Farris MD     Has been hypotensive over the last 24 hours  intermittently.  Decreased Lisinopril from 40 mg daily to 2.5 mg daily.  Holding lopressor right now.  On Cardizem drip for atrial fibrillation.         Chronic bronchitis (Chronic)    Overview Signed 10/2/2017  8:45 AM by Ata Farris MD     Currently intubated.  Unable to be extubated yesterday.           Essential hypertension    Overview Signed 9/30/2017  6:58 PM by Nicola Rich MD     Added automatically from request for surgery [id number]         Fall    Overview Addendum 10/2/2017  8:45 AM by Ata Farris MD     Fracture s/p repair per orthopedics.         Tobacco dependence syndrome    Overview Signed 10/2/2017  8:51 AM by Ata Farris MD     Cessation counseling to be provided once extubated and alert.         Atrial fibrillation    Overview Signed 10/2/2017  8:51 AM by Ata Farris MD     New onset.  Given Cardizem bolus this morning and started on Cardizem drip.  Blood pressure started to drop with drip.  Patient placed back on pressors.  PICC line ordered.  Cardiology consulted.             1) Respiratory failure - Currently intubated and on mechanical ventilation. On AC of 10, Pulmonary following. Sputum cultures growing Gram-negative diplococci, awaiting sensitivities. Patient started on Zosyn. Currently on Versed ggt for sedation, work on weaning drip and relying on versed prn doses.   2) Paroxysmal atrial fibrillation - Currently, has converted to NSR. Continue Amiodarone 200mg. Warfarin 5 given. Cardiology following.   3) Hypotension, unspecified - Started on Levophed yesterday, working to wean pressors.   4) Displaced intertrochanteric fracture of left femur POD#2 s/p [place] placement - Continue routine post op care, Orthopedics following.   5) GI PPx - Pepcid  6) DVT PPx - Lovenox + Coumadin, once bridged d/c Lovenox            Sukhjinder Mendez MD   10/03/17   2:20 PM

## 2017-10-03 NOTE — SIGNIFICANT NOTE
10/03/17 1616   Rehab Treatment   Discipline physical therapist   Rehab Evaluation   Evaluation Not Performed other (see comments)  (OT spoke with MD  and MD requested therapy not see pt until she is off sedation. Nursing is to let PT/OT know when pt off josselyn)   Recommendation   PT - Next Appointment 10/04/17

## 2017-10-03 NOTE — PROGRESS NOTES
ORTHOPEDIC PROGRESS NOTE:    Name:  Anahi Calix  Date:    10/2/2017  Date of admission:  9/30/2017    Post op day:  1 Day Post-Op  Procedure:    Procedure(s) (LRB):  HIP INTERTROCHANTERIC NAILING - LEFT - [place] (Left)    Subjective:  Patient is intubated and sedated.      Vitals:    Vitals:    10/02/17 2303   BP:    Pulse: 64   Resp:    Temp:    SpO2: 97%       Exam:  Dressing is dry.  Good distal pulses.    ASSESSMENT:  Hospital Problem List     * (Principal)Displaced intertrochanteric fracture of left femur, initial encounter for closed fracture    Overview Signed 10/2/2017  8:44 AM by Ata Farris MD     POD# 1.  Management per orthopedics.         Peripheral arterial disease (Chronic)    CAD (coronary artery disease) (Chronic)        Hypertension (Chronic)    Overview Signed 10/2/2017  8:45 AM by Ata Farris MD     Has been hypotensive over the last 24 hours intermittently.  Decreased Lisinopril from 40 mg daily to 2.5 mg daily.  Holding lopressor right now.  On Cardizem drip for atrial fibrillation.             Chronic bronchitis (Chronic)    Overview Signed 10/2/2017  8:45 AM by Ata Farris MD     Currently intubated.  Unable to be extubated yesterday.           Essential hypertension    Overview Signed 9/30/2017  6:58 PM by Nicola Rich MD     Added automatically from request for surgery [id number]             Fall    Overview Addendum 10/2/2017  8:45 AM by Ata Farris MD     Fracture s/p repair per orthopedics.         Tobacco dependence syndrome    Overview Signed 10/2/2017  8:51 AM by Ata Farris MD     Cessation counseling to be provided once extubated and alert.             Atrial fibrillation    Overview Signed 10/2/2017  8:51 AM by Ata Farris MD     New onset.  Given Cardizem bolus this morning and started on Cardizem drip.  Blood pressure started to drop with drip.  Patient placed back on pressors.  PICC line ordered.  Cardiology consulted.                 PLAN:    Patient  was in atrial fibrillation.  She was cardioverted.  She is now on multiple pressors.  Heart rate and blood pressure seemed to be stabilized this point.  No new treatment per orthopedic standpoint.  Events noted.  Continue supportive care and we'll start physical therapy out of bed when extubated.    10/02/17 at 11:34 PM by Nicola Rich MD

## 2017-10-03 NOTE — PLAN OF CARE
Problem: Patient Care Overview (Adult)  Goal: Plan of Care Review  Outcome: Ongoing (interventions implemented as appropriate)    10/03/17 0616   Patient Care Overview   Progress no change

## 2017-10-03 NOTE — PROGRESS NOTES
"Anticoagulation by Pharmacy - Warfarin Day 3 of 42    Anahi Calix is a 77 y.o.female  [Ht: 68\" (172.7 cm); Wt: 173 lb 8 oz (78.7 kg)] on Warfarin 5 mg PO  for indication of VTE prophylaxis s/p ortho procedure.    Goal INR: 1.7-2.5  Today's INR:   Lab Results   Component Value Date    INR 2.28 (H) 10/03/2017         Lab Results   Component Value Date    INR 2.28 (H) 10/03/2017    INR 1.28 (H) 10/02/2017    INR 1.13 10/01/2017    PROTIME 25.3 (H) 10/03/2017    PROTIME 16.0 (H) 10/02/2017    PROTIME 14.4 10/01/2017     Lab Results   Component Value Date    HGB 10.3 (L) 10/03/2017    HGB 11.6 (L) 10/02/2017    HGB 12.5 10/01/2017     Lab Results   Component Value Date    HCT 31.4 (L) 10/03/2017    HCT 35.3 10/02/2017    HCT 38.8 10/01/2017     Lab Results   Component Value Date     10/03/2017     10/02/2017     10/01/2017     Assessment/Plan:  Reviewed above labs. INR is 2.28.  INR is therapeutic, but increased by 1.0 in one day. Pt did receive dose of warfarin last night. No changes in medication list. Concurrent medications include amiodarone and enoxaparin. Will decrease current dose of warfarin to  2.5 mg. Rx will continue to follow and adjust dose accordingly.  Today is day 3 of therapy.      Cristiane Gloria RPH  10/03/17 2:48 PM     "

## 2017-10-03 NOTE — PLAN OF CARE
Problem: Patient Care Overview (Adult)  Goal: Plan of Care Review  Outcome: Ongoing (interventions implemented as appropriate)    10/03/17 6654   Coping/Psychosocial Response Interventions   Plan Of Care Reviewed With caregiver   Patient Care Overview   Progress improving   Outcome Evaluation   Outcome Summary/Follow up Plan vent setting reduced; amio gtt completed et PO amio regimen started; weaned off Levophed et placed on hold for trial discontinuation at this time       Goal: Adult Individualization and Mutuality  Outcome: Ongoing (interventions implemented as appropriate)  Goal: Discharge Needs Assessment  Outcome: Ongoing (interventions implemented as appropriate)    Problem: Orthopaedic Fracture (Adult)  Goal: Signs and Symptoms of Listed Potential Problems Will be Absent or Manageable (Orthopaedic Fracture)  Outcome: Ongoing (interventions implemented as appropriate)    Problem: Fall Risk (Adult)  Goal: Identify Related Risk Factors and Signs and Symptoms  Outcome: Ongoing (interventions implemented as appropriate)  Goal: Absence of Falls  Outcome: Ongoing (interventions implemented as appropriate)    Problem: Mechanical Ventilation, Invasive (Adult)  Goal: Signs and Symptoms of Listed Potential Problems Will be Absent or Manageable (Mechanical Ventilation, Invasive)  Outcome: Ongoing (interventions implemented as appropriate)    Problem: Pressure Ulcer Risk (Keith Q Scale) (Pediatric)  Goal: Identify Related Risk Factors and Signs and Symptoms  Outcome: Ongoing (interventions implemented as appropriate)  Goal: Skin Integrity  Outcome: Ongoing (interventions implemented as appropriate)    Problem: SAFETY - NON-VIOLENT RESTRAINT  Goal: Remains free of injury from restraints (Non-Violent Restraint)  Outcome: Ongoing (interventions implemented as appropriate)  Goal: Free from restraint(s) (Non-Violent Restraint)  Outcome: Ongoing (interventions implemented as appropriate)

## 2017-10-03 NOTE — SIGNIFICANT NOTE
10/03/17 1721   Rehab Treatment   Discipline occupational therapist   Rehab Evaluation   Evaluation Not Performed other (see comments)  (Spoke with MD today and he said wait until pt is off sedation before OT/PT eval.  Nursing to let therapy know when off sedation .  )

## 2017-10-03 NOTE — PROGRESS NOTES
Cardiology Progress Note:     LOS: 3 days   Patient Care Team:  Ramiro Gloria MD as PCP - General      Subjective:    Chart reviewed , patient seen and examined.  Patient has remained in normal sinus rhythm after the electrical cardioversion.  He patient is on IV amiodarone.  Patient is getting weaned off the ventilator.  Patient blood pressures remained stable        Objective:     Objective:  Vitals:    10/03/17 1300   BP: 101/58   Pulse: 64   Resp:    Temp:    SpO2: 96%       Intake/Output Summary (Last 24 hours) at 10/03/17 1322  Last data filed at 10/03/17 0850   Gross per 24 hour   Intake             2889 ml   Output             1000 ml   Net             1889 ml             Physical Exam:   General Appearance:   Currently sedated on a ventilator    Head:    Normocephalic, atraumatic, without obvious abnormality   Eyes:           SMILEY  Lids and lashes normal, conjunctivae and sclerae normal, no icterus, no pallor   Ears:    Ears appear intact with no abnormalities noted   Throat:   Mucous membranes pink and moist   Neck:   Supple, trachea midline, no carotid bruit, no organomegaly or JVD   Lungs:     Clear to auscultation and percussion, respirations regular, even and Unlabored. No wheezes, rales, rhonchi    Heart:    Regular rhythm and normal rate, normal S1 and S2, no            murmur, no gallop, no rub, no click   Abdomen:     Soft, non-tender, non-distended, no guarding, no rebound tenderness, Normal bowel sounds in all four quadrant, no masses, liver and spleen nonpalpable,    Genitalia:    Deferred   Extremities:   Moves all extremities well, no edema, no cyanosis, no              Redness, no clubbing   Pulses:   Pulses palpable and equal bilaterally   Skin:   Moist and warm. No bleeding, bruising or rash   Neurologic/Psychiatric:  Sedated on a ventilator  Motor, power and tone in upper and lower extremity is grossly intact.  No focal neurological deficits. Normal cognitive function. No psychomotor  reaction or tangential thought. No depression, homicidal ideations and suicidal ideations            Results Review:    Lab Results (last 24 hours)     Procedure Component Value Units Date/Time    TSH [[id number]]  (Abnormal) Collected:  10/02/17 0922    Specimen:  Blood Updated:  10/02/17 1850     TSH 0.240 (L) mIU/mL     Legionella Antigen, Urine - Urine, Clean Catch [[id number]]  (Normal) Collected:  10/02/17 2230    Specimen:  Urine from Urine, Clean Catch Updated:  10/03/17 0000     LEGIONELLA ANTIGEN, URINE Negative    S. Pneumo Ag Urine or CSF - Urine, Urine, Clean Catch [[id number]]  (Normal) Collected:  10/02/17 2230    Specimen:  Urine from Urine, Clean Catch Updated:  10/03/17 0001     Strep Pneumo Ag Negative    CBC & Differential [[id number]] Collected:  10/03/17 0335    Specimen:  Blood Updated:  10/03/17 0413    Narrative:       The following orders were created for panel order CBC & Differential.  Procedure                               Abnormality         Status                     ---------                               -----------         ------                     CBC Auto Differential[[id number]]        Abnormal            Final result                 Please view results for these tests on the individual orders.    CBC Auto Differential [[id number]]  (Abnormal) Collected:  10/03/17 0335    Specimen:  Blood Updated:  10/03/17 0413     WBC 17.13 (H) 10*3/mm3      RBC 3.48 (L) 10*6/mm3      Hemoglobin 10.3 (L) g/dL      Hematocrit 31.4 (L) %      MCV 90.2 fL      MCH 29.6 pg      MCHC 32.8 g/dL      RDW 14.2 %      RDW-SD 46.7 (H) fl      MPV 9.5 fL      Platelets 194 10*3/mm3      Neutrophil % 82.7 (H) %      Lymphocyte % 8.3 (L) %      Monocyte % 8.3 %      Eosinophil % 0.1 %      Basophil % 0.1 %      Immature Grans % 0.5 %      Neutrophils, Absolute 14.17 (H) 10*3/mm3      Lymphocytes, Absolute 1.42 10*3/mm3      Monocytes, Absolute 1.43 (H) 10*3/mm3      Eosinophils, Absolute 0.01 10*3/mm3       Basophils, Absolute 0.02 10*3/mm3      Immature Grans, Absolute 0.08 (H) 10*3/mm3     Protime-INR [[id number]]  (Abnormal) Collected:  10/03/17 0335    Specimen:  Blood Updated:  10/03/17 0424     Protime 25.3 (H) Seconds      INR 2.28 (H)    Narrative:       Therapeutic range for most indications is 2.0-3.0 INR,  or 2.5-3.5 for mechanical heart valves.    Basic Metabolic Panel [[id number]]  (Abnormal) Collected:  10/03/17 0335    Specimen:  Blood Updated:  10/03/17 0426     Glucose 127 (H) mg/dL      BUN 23 (H) mg/dL      Creatinine 1.10 (H) mg/dL      Sodium 142 mmol/L      Potassium 3.7 mmol/L      Chloride 108 mmol/L      CO2 25.0 mmol/L      Calcium 7.6 (L) mg/dL      eGFR Non African Amer 48 mL/min/1.73      BUN/Creatinine Ratio 20.9     Anion Gap 9.0 mmol/L     Narrative:       The MDRD GFR formula is only valid for adults with stable renal function between ages 18 and 70.    Blood Gas, Arterial [[id number]]  (Abnormal) Collected:  10/03/17 0434    Specimen:  Arterial Blood Updated:  10/03/17 0502     Site --      Not performed at this site.        Osiel's Test --      Not performed at this site.        pH, Arterial 7.384 pH units      pCO2, Arterial 44.2 mm Hg      pO2, Arterial 94.0 mm Hg      HCO3, Arterial 25.8 mmol/L      Base Excess, Arterial 0.5 mmol/L      O2 Saturation, Arterial 97.0 %      Hemoglobin, Blood Gas 10.8 (L) g/dL      Hematocrit, Blood Gas 32.0 %      CO2 Content 27.2 (H)     Sodium, Arterial 137.4 (L) mmol/L      Potassium, Arterial 3.56 (L) mmol/L      Glucose, Arterial 134 mmol/L      Barometric Pressure for Blood Gas -- mmHg       Not performed at this site.        Modality --      Not performed at this site.        Ionized Calcium 4.30 (L) mg/dL            Medication Review:   Current Facility-Administered Medications   Medication Dose Route Frequency Provider Last Rate Last Dose   • acetaminophen (TYLENOL) tablet 650 mg  650 mg Oral Once PRN Terrence Johnson MD        Or   •  acetaminophen (TYLENOL) suppository 650 mg  650 mg Rectal Once PRN Terrence Johnson MD       • acetaminophen (TYLENOL) tablet 1,000 mg  1,000 mg Oral Q6H Nicola Rich MD   1,000 mg at 10/03/17 0558   • amiodarone (NEXTERONE) 360 mg/200 mL (1.8 mg/mL) infusion  0.5 mg/min Intravenous Continuous Lacho MD Roz 16.67 mL/hr at 10/02/17 2100 0.5 mg/min at 10/02/17 2100   • calcium acetate (PHOSLO) tablet 667 mg  667 mg Oral Daily Cipriano Leigh MD   667 mg at 10/01/17 1200   • diphenhydrAMINE (BENADRYL) injection 12.5 mg  12.5 mg Intravenous Q15 Min PRN Terrence Johnson MD       • enoxaparin (LOVENOX) syringe 40 mg  40 mg Subcutaneous Q24H Nicola Rich MD   40 mg at 10/03/17 0559   • ePHEDrine injection 5 mg  5 mg Intravenous Once PRN Terrence Johnson MD       • flumazenil (ROMAZICON) injection 0.2 mg  0.2 mg Intravenous PRN Terrence Johnson MD       • furosemide (LASIX) tablet 20 mg  20 mg Oral Daily Cipriano Leigh MD   20 mg at 10/03/17 1006   • gabapentin (NEURONTIN) capsule 300 mg  300 mg Oral TID Cipriano Leigh MD   300 mg at 10/03/17 1005   • HYDROmorphone (DILAUDID) injection 0.5 mg  0.5 mg Intravenous Q5 Min PRN Terrence Johnson MD       • labetalol (NORMODYNE,TRANDATE) injection 5 mg  5 mg Intravenous Q5 Min PRN Terrence Johnson MD       • lisinopril (PRINIVIL,ZESTRIL) tablet 2.5 mg  2.5 mg Oral Daily Ata Farris MD   2.5 mg at 10/03/17 1005   • metoprolol tartrate (LOPRESSOR) tablet 50 mg  50 mg Oral BID Cipriano Leigh MD   50 mg at 10/03/17 1006   • midazolam (VERSED) 50 mg in sodium chloride 0.9 % 100 mL (0.5 mg/mL) infusion  1 mg/hr Intravenous Titrated Harshul Phong Banuelos MD 8 mL/hr at 10/03/17 0945 4 mg/hr at 10/03/17 0945   • Morphine sulfate (PF) injection 4 mg  4 mg Intravenous Q2H PRN Cipriano Leigh MD   4 mg at 10/01/17 0620   • naloxone (NARCAN) injection 0.2 mg  0.2 mg Intravenous PRN Terrence Johnson MD       • norepinephrine (LEVOPHED) 8,000 mcg in sodium chloride 0.9  % 250 mL (32 mcg/mL) infusion  0.02-0.3 mcg/kg/min Intravenous Titrated Vivian Banuelos MD 5.9 mL/hr at 10/03/17 1036 0.04 mcg/kg/min at 10/03/17 1036   • ondansetron (ZOFRAN) injection 4 mg  4 mg Intravenous Q6H PRN Cipriano Leigh MD       • ondansetron (ZOFRAN) injection 4 mg  4 mg Intravenous Once PRN Terrence Johnson MD       • oxyCODONE (ROXICODONE) immediate release tablet 5 mg  5 mg Oral Q4H PRN Nicola Rich MD       • Pharmacy to dose warfarin   Does not apply Continuous PRN Nicola Rich MD       • piperacillin-tazobactam (ZOSYN) 2.25 g/100 mL 0.9% NS IVPB (mbp)  2.25 g Intravenous Q6H Ata Farris MD   Stopped at 10/03/17 0850   • polyethylene glycol (MIRALAX) powder 17 g  17 g Oral BID Cipriano Leigh MD   17 g at 10/03/17 1005   • propofol (DIPRIVAN) infusion 10 mg/mL 100 mL  5-50 mcg/kg/min Intravenous Titrated Vivian Banuelos MD   Stopped at 10/01/17 1320   • sodium chloride 0.9 % flush 1-10 mL  1-10 mL Intravenous PRN Cipriano Leigh MD   10 mL at 10/03/17 1006   • sodium chloride 0.9 % flush 10 mL  10 mL Intravenous PRN Barrie Rodríguez MD       • sodium chloride 0.9 % flush 10 mL  10 mL Intracatheter Q12H Ata Farris MD       • sodium chloride 0.9 % flush 10 mL  10 mL Intracatheter PRN Ata Farris MD       • sodium chloride 0.9 % infusion  50 mL/hr Intravenous Continuous Cipriano Leigh MD 50 mL/hr at 10/02/17 2243 50 mL/hr at 10/02/17 2243   • warfarin (COUMADIN) tablet 5 mg  5 mg Oral Daily Nicola Rich MD   5 mg at 10/02/17 1813       Assessment and Plan:    Principal Problem:    Displaced intertrochanteric fracture of left femur, initial encounter for closed fracture  Active Problems:    Peripheral arterial disease    CAD (coronary artery disease)    Hypertension    Chronic bronchitis    Essential hypertension    Fall    Tobacco dependence syndrome    Atrial fibrillation  1.  Paroxysmal atrial fibrillation.  Patient has converted to normal sinus rhythm  after the electrical cardioversion and is currently on IV amiodarone.  At the present time would change to by mouth amiodarone 200 mg twice a day.  Patient is on anticoagulation with Coumadin which would be continued.  Patient would be continued on the present dose of the metoprolol.  2.  Atherosclerotic coronary artery disease.  Patient does not have any electrocardiographic changes suggestive of ischemia.  Patient had not indicated of having any symptoms of chest pain prior to this hospitalization.  3.  Arterial hypertension.  Patient blood pressure has remained stable.  4.  Respiratory support.  Patient is currently getting been off the ventilator with oxygen saturation of 97%.  5.  Status post abdominal aortic aneurysm surgical intervention is noted.  6.  Bilateral carotid bruit.  Would consider getting an ultrasound of the carotids after the patient was extubated.            Lacho Courtney MD  10/03/17  1:22 PM      Time: Time spent on face-to-face interaction 25 minutes    Dictated utilizing Dragon dictation.

## 2017-10-03 NOTE — PLAN OF CARE
Problem: Patient Care Overview (Adult)  Goal: Plan of Care Review  Outcome: Ongoing (interventions implemented as appropriate)    10/03/17 0501   Outcome Evaluation   Outcome Summary/Follow up Plan pt remains on mech vent; pt remains on versed & levophed & amio drip through shift; uop barely adequate; vss; will continue to monitor.        Goal: Adult Individualization and Mutuality  Outcome: Ongoing (interventions implemented as appropriate)  Goal: Discharge Needs Assessment  Outcome: Ongoing (interventions implemented as appropriate)    Problem: Orthopaedic Fracture (Adult)  Goal: Signs and Symptoms of Listed Potential Problems Will be Absent or Manageable (Orthopaedic Fracture)  Outcome: Ongoing (interventions implemented as appropriate)    Problem: Perioperative Period (Adult)  Goal: Signs and Symptoms of Listed Potential Problems Will be Absent or Manageable (Perioperative Period)  Outcome: Ongoing (interventions implemented as appropriate)    Problem: Fall Risk (Adult)  Goal: Identify Related Risk Factors and Signs and Symptoms  Outcome: Ongoing (interventions implemented as appropriate)  Goal: Absence of Falls  Outcome: Ongoing (interventions implemented as appropriate)    Problem: Mechanical Ventilation, Invasive (Adult)  Goal: Signs and Symptoms of Listed Potential Problems Will be Absent or Manageable (Mechanical Ventilation, Invasive)  Outcome: Ongoing (interventions implemented as appropriate)    Problem: Pressure Ulcer Risk (Keith Q Scale) (Pediatric)  Goal: Identify Related Risk Factors and Signs and Symptoms  Outcome: Ongoing (interventions implemented as appropriate)  Goal: Skin Integrity  Outcome: Ongoing (interventions implemented as appropriate)    Problem: SAFETY - NON-VIOLENT RESTRAINT  Goal: Remains free of injury from restraints (Non-Violent Restraint)  Outcome: Ongoing (interventions implemented as appropriate)  Goal: Free from restraint(s) (Non-Violent Restraint)  Outcome: Ongoing  (interventions implemented as appropriate)

## 2017-10-03 NOTE — CONSULTS
"Intensive Care Follow-up      LOS: 3 days     Ms. Anahi Calix, 77 y.o. female is followed for: Displaced intertrochanteric fracture of left femur, initial encounter for closed fracture   CHIEF COMPLIANT  Hip fracture    Subjective - Interval History     This lady sustained a hip fracture which was repaired dakota or she develops respiratory distress and has remained on mechanical ventilator.  She is on assist control of 16 with respiratory rate of 16    The patient's relevant past medical, surgical and social history were reviewed and updated in Epic as appropriate.     Objective   /58  Pulse 73  Temp 98.6 °F (37 °C) (Oral)   Resp 18  Ht 68\" (172.7 cm)  Wt 173 lb 8 oz (78.7 kg)  LMP  (LMP Unknown)  SpO2 97%  BMI 26.38 kg/m2      Vent Settings: Vent Mode: VC+/AC  FiO2 (%):  [60 %-100 %] 60 %  S RR:  [16] 16  PEEP/CPAP (cm H2O):  [5 cm H20] 5 cm H20  IN SUP:  [0 cm H20] 0 cm H20  MAP (cm H2O):  [10-11] 11    Physical Exam:Sedated on mechanical ventilator    General Appearance:       Head:    Normocephalic, without obvious abnormality, atraumatic   Eyes:            Lids and lashes normal, conjunctivae and sclerae normal, no   icterus, no pallor, corneas clear, PERRLA   Ears:    Ears appear intact with no abnormalities noted   Throat:   No oral lesions, no thrush, oral mucosa moist   Neck:   No adenopathy, supple, trachea midline, no thyromegaly, no   carotid bruit, no JVD   Back:     No kyphosis present, no scoliosis present, no skin lesions,      erythema or scars, no tenderness to percussion or                   palpation,   range of motion normal   Lungs:   Lungs are clear     Heart:    Regular rhythm and normal rate, normal S1 and S2, no            murmur, no gallop, no rub, no click   Chest Wall:    No abnormalities observed   Abdomen:     Normal bowel sounds, no masses, no organomegaly, soft        non-tender, non-distended, no guarding, no rebound                tenderness   Rectal:     Deferred "   Extremities:   Moves all extremities well, no edema, no cyanosis, no             redness   Pulses:   Pulses palpable and equal bilaterally   Skin:   No bleeding, bruising or rash   Lymph nodes:   No palpable adenopathy   Neurologic:   Cranial nerves 2 - 12 grossly intact, sensation intact, DTR       present and equal bilaterally     LAB:    pH, Arterial   Date Value Ref Range Status   10/03/2017 7.384 7.350 - 7.450 pH units Final     pCO2, Arterial   Date Value Ref Range Status   10/03/2017 44.2 35.0 - 45.0 mm Hg Final     pO2, Arterial   Date Value Ref Range Status   10/03/2017 94.0 80.0 - 105.0 mm Hg Final     Lab Results   Component Value Date    WBC 17.13 (H) 10/03/2017    HGB 10.3 (L) 10/03/2017    HCT 31.4 (L) 10/03/2017    MCV 90.2 10/03/2017     10/03/2017     Lab Results   Component Value Date    GLUCOSE 134 10/03/2017    BUN 23 (H) 10/03/2017    CREATININE 1.10 (H) 10/03/2017    EGFRIFNONA 48 10/03/2017    BCR 20.9 10/03/2017    CO2 25.0 10/03/2017    CALCIUM 7.6 (L) 10/03/2017    ALBUMIN 4.00 09/30/2017    LABIL2 1.3 09/30/2017    AST 30 09/30/2017    ALT 31 09/30/2017         RAD:   Imaging Results (last 24 hours)     Procedure Component Value Units Date/Time    FL C Arm During Surgery [[id number]] Resulted:  10/02/17 0840     Updated:  10/02/17 0840    US Guided Vascular Access [[id number]] Resulted:  10/02/17 1043     Updated:  10/02/17 1043    Narrative:       This procedure was auto-finalized with no dictation required.    XR Chest Post CVA Port [[id number]] Collected:  10/02/17 1015     Updated:  10/02/17 1055    Narrative:         PROCEDURE: Single chest view AP    REASON FOR EXAM:Post PICC placement., S72.142A Displaced  intertrochanteric fracture of left femur, initial encounter for  closed fracture S72.002A Fracture of unspecified part of neck of  left femur, initial encounter for closed fracture W19.XXXA  Unspecified fall, initial encounter I73.9 Peripheral vascular  disease, unspecified  I10 Essential (primary) hypertension K21.9  Gastro-esophageal reflux disease without esop    FINDINGS: Comparison exam dated September 30, 2017. ET tube with  tip 5.6 cm above keo. NG tube with tip below level diaphragm.  Right PICC line with tip overlying the SVC. Cardiac size appears  within normal limits. Vague left infrahilar lung base perihilar  haziness. Lungs otherwise clear. Pleural spaces are normal. No  acute osseous abnormality.      Impression:       1.  Tubes and lines as described above.  2.  Vague left infrahilar lung base perihilar haziness. This may  represent very early atypical pulmonary edema versus subsegmental  atelectasis or pneumonia.    Electronically signed by:  Eliseo Smith MD  10/2/2017 10:54 AM CDT  Workstation: [id number]    IR PICC wo fluoro guidance [[id number]] Resulted:  10/02/17 1055     Updated:  10/02/17 1055    Narrative:       This procedure was auto-finalized with no dictation required.         Impression      Hospital Problem List     * (Principal)Displaced intertrochanteric fracture of left femur, initial encounter for closed fracture    Overview Signed 10/2/2017  8:44 AM by Ata Farris MD     POD# 1.  Management per orthopedics.         Peripheral arterial disease (Chronic)    CAD (coronary artery disease) (Chronic)    Hypertension (Chronic)    Overview Signed 10/2/2017  8:45 AM by Ata Farris MD     Has been hypotensive over the last 24 hours intermittently.  Decreased Lisinopril from 40 mg daily to 2.5 mg daily.  Holding lopressor right now.  On Cardizem drip for atrial fibrillation.         Chronic bronchitis (Chronic)    Overview Signed 10/2/2017  8:45 AM by Ata Farris MD     Currently intubated.  Unable to be extubated yesterday.           Essential hypertension    Overview Signed 9/30/2017  6:58 PM by Nicola Rich MD     Added automatically from request for surgery [id number]         Fall    Overview Addendum 10/2/2017  8:45 AM by Ata Farris MD      Fracture s/p repair per orthopedics.         Tobacco dependence syndrome    Overview Signed 10/2/2017  8:51 AM by Ata Farris MD     Cessation counseling to be provided once extubated and alert.         Atrial fibrillation    Overview Signed 10/2/2017  8:51 AM by Ata Farris MD     New onset.  Given Cardizem bolus this morning and started on Cardizem drip.  Blood pressure started to drop with drip.  Patient placed back on pressors.  PICC line ordered.  Cardiology consulted.                    Plan        Assessment respiratory failure, improving post hip repair, pulmonary infiltrates    Recommendations reduce assist control rate to 10, follow chest x-ray and blood gas    I discussed the patient's findings and my recommendations with patient and nursing staff

## 2017-10-04 LAB
ANION GAP SERPL CALCULATED.3IONS-SCNC: 7 MMOL/L (ref 5–15)
ARTERIAL PATENCY WRIST A: ABNORMAL
ATMOSPHERIC PRESS: ABNORMAL MMHG
BACTERIA SPEC RESP CULT: ABNORMAL
BASE EXCESS BLDA CALC-SCNC: -1.5 MMOL/L (ref -2.4–2.4)
BASOPHILS # BLD AUTO: 0.01 10*3/MM3 (ref 0–0.2)
BASOPHILS NFR BLD AUTO: 0.1 % (ref 0–2)
BDY SITE: ABNORMAL
BUN BLD-MCNC: 28 MG/DL (ref 7–21)
BUN/CREAT SERPL: 28.6 (ref 7–25)
CA-I BLD-MCNC: 4.5 MG/DL (ref 4.5–4.9)
CALCIUM SPEC-SCNC: 7.7 MG/DL (ref 8.4–10.2)
CHLORIDE SERPL-SCNC: 109 MMOL/L (ref 95–110)
CO2 BLDA-SCNC: 23.9 MMOL/L (ref 23–27)
CO2 SERPL-SCNC: 25 MMOL/L (ref 22–31)
CREAT BLD-MCNC: 0.98 MG/DL (ref 0.5–1)
DEPRECATED RDW RBC AUTO: 47 FL (ref 36.4–46.3)
EOSINOPHIL # BLD AUTO: 0.02 10*3/MM3 (ref 0–0.7)
EOSINOPHIL NFR BLD AUTO: 0.2 % (ref 0–7)
ERYTHROCYTE [DISTWIDTH] IN BLOOD BY AUTOMATED COUNT: 14.3 % (ref 11.5–14.5)
GFR SERPL CREATININE-BSD FRML MDRD: 55 ML/MIN/1.73 (ref 39–90)
GLUCOSE BLD-MCNC: 102 MG/DL (ref 60–100)
GLUCOSE BLDA-MCNC: 93 MMOL/L
GRAM STN SPEC: ABNORMAL
HCO3 BLDA-SCNC: 22.8 MMOL/L (ref 22–26)
HCT VFR BLD AUTO: 28 % (ref 35–45)
HCT VFR BLD CALC: 29 % (ref 38–47)
HGB BLD-MCNC: 9.1 G/DL (ref 12–15.5)
HGB BLDA-MCNC: 9.9 G/DL (ref 12–16)
IMM GRANULOCYTES # BLD: 0.04 10*3/MM3 (ref 0–0.02)
IMM GRANULOCYTES NFR BLD: 0.3 % (ref 0–0.5)
INR PPP: 4.91 (ref 0.8–1.2)
LYMPHOCYTES # BLD AUTO: 0.93 10*3/MM3 (ref 0.6–4.2)
LYMPHOCYTES NFR BLD AUTO: 8.1 % (ref 10–50)
MCH RBC QN AUTO: 29 PG (ref 26.5–34)
MCHC RBC AUTO-ENTMCNC: 32.5 G/DL (ref 31.4–36)
MCV RBC AUTO: 89.2 FL (ref 80–98)
MODALITY: ABNORMAL
MONOCYTES # BLD AUTO: 0.78 10*3/MM3 (ref 0–0.9)
MONOCYTES NFR BLD AUTO: 6.8 % (ref 0–12)
NEUTROPHILS # BLD AUTO: 9.76 10*3/MM3 (ref 2–8.6)
NEUTROPHILS NFR BLD AUTO: 84.5 % (ref 37–80)
PCO2 BLDA: 36.7 MM HG (ref 35–45)
PH BLDA: 7.41 PH UNITS (ref 7.35–7.45)
PLATELET # BLD AUTO: 162 10*3/MM3 (ref 150–450)
PMV BLD AUTO: 9.4 FL (ref 8–12)
PO2 BLDA: 77.1 MM HG (ref 80–105)
POTASSIUM BLD-SCNC: 3.7 MMOL/L (ref 3.5–5.1)
POTASSIUM BLDA-SCNC: 3.61 MMOL/L (ref 3.6–4.9)
PROTHROMBIN TIME: 46.8 SECONDS (ref 11.1–15.3)
RBC # BLD AUTO: 3.14 10*6/MM3 (ref 3.77–5.16)
SAO2 % BLDCOA: 94.6 %
SODIUM BLD-SCNC: 141 MMOL/L (ref 137–145)
SODIUM BLDA-SCNC: 139.2 MMOL/L (ref 138–146)
WBC NRBC COR # BLD: 11.54 10*3/MM3 (ref 3.2–9.8)

## 2017-10-04 PROCEDURE — 94799 UNLISTED PULMONARY SVC/PX: CPT

## 2017-10-04 PROCEDURE — 82803 BLOOD GASES ANY COMBINATION: CPT | Performed by: INTERNAL MEDICINE

## 2017-10-04 PROCEDURE — 25010000002 PIPERACILLIN-TAZOBACTAM: Performed by: FAMILY MEDICINE

## 2017-10-04 PROCEDURE — 85025 COMPLETE CBC W/AUTO DIFF WBC: CPT | Performed by: FAMILY MEDICINE

## 2017-10-04 PROCEDURE — 94760 N-INVAS EAR/PLS OXIMETRY 1: CPT

## 2017-10-04 PROCEDURE — 80048 BASIC METABOLIC PNL TOTAL CA: CPT | Performed by: FAMILY MEDICINE

## 2017-10-04 PROCEDURE — 94003 VENT MGMT INPAT SUBQ DAY: CPT

## 2017-10-04 PROCEDURE — 25010000002 MIDAZOLAM 50 MG/10ML SOLUTION 10 ML VIAL: Performed by: INTERNAL MEDICINE

## 2017-10-04 PROCEDURE — 85610 PROTHROMBIN TIME: CPT | Performed by: ORTHOPAEDIC SURGERY

## 2017-10-04 PROCEDURE — 99232 SBSQ HOSP IP/OBS MODERATE 35: CPT | Performed by: INTERNAL MEDICINE

## 2017-10-04 PROCEDURE — 36600 WITHDRAWAL OF ARTERIAL BLOOD: CPT

## 2017-10-04 RX ADMIN — ACETAMINOPHEN 1000 MG: 500 TABLET ORAL at 06:13

## 2017-10-04 RX ADMIN — Medication 10 ML: at 20:34

## 2017-10-04 RX ADMIN — POLYETHYLENE GLYCOL 3350 17 G: 17 POWDER, FOR SOLUTION ORAL at 08:38

## 2017-10-04 RX ADMIN — AMIODARONE HYDROCHLORIDE 200 MG: 200 TABLET ORAL at 08:33

## 2017-10-04 RX ADMIN — ACETAMINOPHEN 1000 MG: 500 TABLET ORAL at 01:23

## 2017-10-04 RX ADMIN — FUROSEMIDE 20 MG: 20 TABLET ORAL at 08:33

## 2017-10-04 RX ADMIN — LISINOPRIL 2.5 MG: 2.5 TABLET ORAL at 08:34

## 2017-10-04 RX ADMIN — ALBUTEROL SULFATE 2 PUFF: 90 AEROSOL, METERED RESPIRATORY (INHALATION) at 08:10

## 2017-10-04 RX ADMIN — PIPERACILLIN AND TAZOBACTAM 2.25 G: 2; .25 INJECTION, POWDER, LYOPHILIZED, FOR SOLUTION INTRAVENOUS; PARENTERAL at 02:28

## 2017-10-04 RX ADMIN — ACETAMINOPHEN 1000 MG: 500 TABLET ORAL at 18:06

## 2017-10-04 RX ADMIN — FAMOTIDINE 20 MG: 10 INJECTION INTRAVENOUS at 20:21

## 2017-10-04 RX ADMIN — ALBUTEROL SULFATE 2 PUFF: 90 AEROSOL, METERED RESPIRATORY (INHALATION) at 11:27

## 2017-10-04 RX ADMIN — PIPERACILLIN AND TAZOBACTAM 2.25 G: 2; .25 INJECTION, POWDER, LYOPHILIZED, FOR SOLUTION INTRAVENOUS; PARENTERAL at 20:21

## 2017-10-04 RX ADMIN — FAMOTIDINE 20 MG: 10 INJECTION INTRAVENOUS at 08:33

## 2017-10-04 RX ADMIN — PIPERACILLIN AND TAZOBACTAM 2.25 G: 2; .25 INJECTION, POWDER, LYOPHILIZED, FOR SOLUTION INTRAVENOUS; PARENTERAL at 14:39

## 2017-10-04 RX ADMIN — GABAPENTIN 300 MG: 300 CAPSULE ORAL at 18:06

## 2017-10-04 RX ADMIN — GABAPENTIN 300 MG: 300 CAPSULE ORAL at 08:33

## 2017-10-04 RX ADMIN — METOPROLOL TARTRATE 50 MG: 50 TABLET ORAL at 08:34

## 2017-10-04 RX ADMIN — AMIODARONE HYDROCHLORIDE 200 MG: 200 TABLET ORAL at 20:21

## 2017-10-04 RX ADMIN — POLYETHYLENE GLYCOL 3350 17 G: 17 POWDER, FOR SOLUTION ORAL at 18:06

## 2017-10-04 RX ADMIN — GABAPENTIN 300 MG: 300 CAPSULE ORAL at 20:21

## 2017-10-04 RX ADMIN — ACETAMINOPHEN 1000 MG: 500 TABLET ORAL at 12:21

## 2017-10-04 RX ADMIN — PIPERACILLIN AND TAZOBACTAM 2.25 G: 2; .25 INJECTION, POWDER, LYOPHILIZED, FOR SOLUTION INTRAVENOUS; PARENTERAL at 08:33

## 2017-10-04 RX ADMIN — MIDAZOLAM 5 MG/HR: 5 INJECTION INTRAMUSCULAR; INTRAVENOUS at 13:10

## 2017-10-04 RX ADMIN — ALBUTEROL SULFATE 2 PUFF: 90 AEROSOL, METERED RESPIRATORY (INHALATION) at 15:27

## 2017-10-04 RX ADMIN — ALBUTEROL SULFATE 2 PUFF: 90 AEROSOL, METERED RESPIRATORY (INHALATION) at 19:45

## 2017-10-04 RX ADMIN — METOPROLOL TARTRATE 50 MG: 50 TABLET ORAL at 18:06

## 2017-10-04 RX ADMIN — SODIUM CHLORIDE 50 ML/HR: 900 INJECTION, SOLUTION INTRAVENOUS at 20:30

## 2017-10-04 RX ADMIN — Medication 10 ML: at 08:34

## 2017-10-04 NOTE — SIGNIFICANT NOTE
10/04/17 1616   Rehab Treatment   Discipline physical therapist   Rehab Evaluation   Evaluation Not Performed other (see comments)  (Pt still vented and sedated. OT reports RN expects an attempt at weaning tomorrow. PT will recheck tomorrow.)   Recommendation   PT - Next Appointment 10/05/17

## 2017-10-04 NOTE — PROGRESS NOTES
Cardiology Progress Note:     LOS: 4 days   Patient Care Team:  Ramiro Gloria MD as PCP - General      Subjective:     Chart reviewed , patient seen and examined.  He shouldn't remain intubated with an FiO2 of 45%.  Patient telemetry monitor has not revealed off any recurrence of atrial fibrillation.  Patient had an elevated PT/INR and the Coumadin has been held.          Objective:     Objective:  Vitals:    10/04/17 1600   BP: 132/63   Pulse: 77   Resp: 20   Temp: 98.6 °F (37 °C)   SpO2: 92%       Intake/Output Summary (Last 24 hours) at 10/04/17 1651  Last data filed at 10/04/17 1600   Gross per 24 hour   Intake              966 ml   Output              873 ml   Net               93 ml             Physical Exam:   General Appearance:    Currently intubated and sedated on a ventilator    Head:    Normocephalic, atraumatic, without obvious abnormality   Eyes:           SMILEY  Lids and lashes normal, conjunctivae and sclerae normal, no icterus, no pallor   Ears:    Ears appear intact with no abnormalities noted   Throat:   Mucous membranes pink and moist   Neck:   Supple, trachea midline, no carotid bruit, no organomegaly or JVD   Lungs:     Clear to auscultation and percussion, respirations regular, even and Unlabored. No wheezes, rales, rhonchi    Heart:    Regular rhythm and normal rate, normal S1 and S2, no            murmur, no gallop, no rub, no click   Abdomen:     Soft, non-tender, non-distended, no guarding, no rebound tenderness, Normal bowel sounds in all four quadrant, no masses, liver and spleen nonpalpable,    Genitalia:    Deferred   Extremities:   Moves all extremities well, no edema, no cyanosis, no              Redness, no clubbing   Pulses:   Pulses palpable and equal bilaterally   Skin:   Moist and warm. No bleeding, bruising or rash   Neurologic/Psychiatric:  Patient has been moving all 4 extremities             Results Review:    Lab Results (last 24 hours)     Procedure Component Value Units  Date/Time    CBC & Differential [[id number]] Collected:  10/04/17 0402    Specimen:  Blood Updated:  10/04/17 0417    Narrative:       The following orders were created for panel order CBC & Differential.  Procedure                               Abnormality         Status                     ---------                               -----------         ------                     CBC Auto Differential[[id number]]        Abnormal            Final result                 Please view results for these tests on the individual orders.    CBC Auto Differential [[id number]]  (Abnormal) Collected:  10/04/17 0402    Specimen:  Blood Updated:  10/04/17 0417     WBC 11.54 (H) 10*3/mm3      RBC 3.14 (L) 10*6/mm3      Hemoglobin 9.1 (L) g/dL      Hematocrit 28.0 (L) %      MCV 89.2 fL      MCH 29.0 pg      MCHC 32.5 g/dL      RDW 14.3 %      RDW-SD 47.0 (H) fl      MPV 9.4 fL      Platelets 162 10*3/mm3      Neutrophil % 84.5 (H) %      Lymphocyte % 8.1 (L) %      Monocyte % 6.8 %      Eosinophil % 0.2 %      Basophil % 0.1 %      Immature Grans % 0.3 %      Neutrophils, Absolute 9.76 (H) 10*3/mm3      Lymphocytes, Absolute 0.93 10*3/mm3      Monocytes, Absolute 0.78 10*3/mm3      Eosinophils, Absolute 0.02 10*3/mm3      Basophils, Absolute 0.01 10*3/mm3      Immature Grans, Absolute 0.04 (H) 10*3/mm3     Basic Metabolic Panel [[id number]]  (Abnormal) Collected:  10/04/17 0402    Specimen:  Blood Updated:  10/04/17 0424     Glucose 102 (H) mg/dL      BUN 28 (H) mg/dL      Creatinine 0.98 mg/dL      Sodium 141 mmol/L      Potassium 3.7 mmol/L      Chloride 109 mmol/L      CO2 25.0 mmol/L      Calcium 7.7 (L) mg/dL      eGFR Non African Amer 55 mL/min/1.73      BUN/Creatinine Ratio 28.6 (H)     Anion Gap 7.0 mmol/L     Narrative:       The MDRD GFR formula is only valid for adults with stable renal function between ages 18 and 70.    Protime-INR [[id number]]  (Abnormal) Collected:  10/04/17 0402    Specimen:  Blood Updated:  10/04/17 0427      Protime 46.8 (H) Seconds      INR 4.91 (H)    Narrative:       Therapeutic range for most indications is 2.0-3.0 INR,  or 2.5-3.5 for mechanical heart valves.    Blood Gas, Arterial [[id number]]  (Abnormal) Collected:  10/04/17 0436    Specimen:  Arterial Blood Updated:  10/04/17 0447     Site --      Not performed at this site.        Osiel's Test --      Not performed at this site.        pH, Arterial 7.411 pH units      pCO2, Arterial 36.7 mm Hg      pO2, Arterial 77.1 (L) mm Hg      HCO3, Arterial 22.8 mmol/L      Base Excess, Arterial -1.5 mmol/L      O2 Saturation, Arterial 94.6 %      Hemoglobin, Blood Gas 9.9 (L) g/dL      Hematocrit, Blood Gas 29.0 (L) %      CO2 Content 23.9     Sodium, Arterial 139.2 mmol/L      Potassium, Arterial 3.61 mmol/L      Glucose, Arterial 93 mmol/L      Barometric Pressure for Blood Gas -- mmHg       Not performed at this site.        Modality --      Not performed at this site.        Ionized Calcium 4.50 mg/dL     Respiratory Culture - Sputum, ET Suction [[id number]]  (Abnormal) Collected:  10/01/17 1310    Specimen:  Sputum from ET Suction Updated:  10/04/17 0933     Respiratory Culture Heavy growth (4+) Moraxella catarrhalis (A)      Beta lactamase positive confers resistance to Ampicilin, Amoxicillin, Penicillin, Carbenicillin, Ticarcillin, Mezlocillin, and Piperacillin  Susceptibility not indicated          Gram Stain Result Many (4+) WBCs per low power field      Rare (1+) Epithelial cells per low power field      Mixed bacterial katie      Many (4+) Gram negative diplococci           Medication Review:   Current Facility-Administered Medications   Medication Dose Route Frequency Provider Last Rate Last Dose   • acetaminophen (TYLENOL) tablet 650 mg  650 mg Oral Once PRN Terrence Johnson MD        Or   • acetaminophen (TYLENOL) suppository 650 mg  650 mg Rectal Once PRN Terrence Johnson MD       • acetaminophen (TYLENOL) tablet 1,000 mg  1,000 mg Oral Q6H Nicola  Marietta Rich MD   1,000 mg at 10/04/17 1221   • albuterol (PROVENTIL HFA;VENTOLIN HFA) inhaler 2 puff  2 puff Inhalation 4x Daily - RT Mike Teresa MD   2 puff at 10/04/17 1527   • amiodarone (PACERONE) tablet 200 mg  200 mg Oral Q12H Lacho Courtney MD   200 mg at 10/04/17 0833   • calcium acetate (PHOSLO) tablet 667 mg  667 mg Oral Daily Cipriano Leigh MD   667 mg at 10/03/17 1336   • diphenhydrAMINE (BENADRYL) injection 12.5 mg  12.5 mg Intravenous Q15 Min PRN Terrence Johnson MD       • enoxaparin (LOVENOX) syringe 40 mg  40 mg Subcutaneous Q24H Nicola Rich MD   40 mg at 10/03/17 0559   • ePHEDrine injection 5 mg  5 mg Intravenous Once PRN Terrence Johnson MD       • famotidine (PEPCID) injection 20 mg  20 mg Intravenous Q12H Sukhjinder Mendez MD   20 mg at 10/04/17 0833   • flumazenil (ROMAZICON) injection 0.2 mg  0.2 mg Intravenous PRN Terrence Johnson MD       • furosemide (LASIX) tablet 20 mg  20 mg Oral Daily Cipriano Leigh MD   20 mg at 10/04/17 0833   • gabapentin (NEURONTIN) capsule 300 mg  300 mg Oral TID Cipriano Leigh MD   300 mg at 10/04/17 0833   • HYDROmorphone (DILAUDID) injection 0.5 mg  0.5 mg Intravenous Q5 Min PRN Terrence Johnson MD       • labetalol (NORMODYNE,TRANDATE) injection 5 mg  5 mg Intravenous Q5 Min PRN Terrence Johnson MD       • lisinopril (PRINIVIL,ZESTRIL) tablet 2.5 mg  2.5 mg Oral Daily Ata Farris MD   2.5 mg at 10/04/17 0834   • metoprolol tartrate (LOPRESSOR) tablet 50 mg  50 mg Oral BID Cipriano Leigh MD   50 mg at 10/04/17 0834   • midazolam (VERSED) 50 mg in sodium chloride 0.9 % 100 mL (0.5 mg/mL) infusion  1 mg/hr Intravenous Titrated Vivian Banuelos MD 10 mL/hr at 10/04/17 1310 5 mg/hr at 10/04/17 1310   • midazolam (VERSED) injection 1 mg  1 mg Intravenous Q4H PRN Sukhjinder Mendez MD       • Morphine (PF) injection 4 mg  4 mg Intravenous Q2H PRN Nicola Rich MD       • naloxone (NARCAN) injection 0.2 mg  0.2 mg  Intravenous PRN Terrence Johnson MD       • norepinephrine (LEVOPHED) 8,000 mcg in sodium chloride 0.9 % 250 mL (32 mcg/mL) infusion  0.02-0.3 mcg/kg/min Intravenous Titrated Vivian Banuelos MD   Stopped at 10/03/17 1705   • ondansetron (ZOFRAN) injection 4 mg  4 mg Intravenous Q6H PRN Cipriano Leigh MD       • ondansetron (ZOFRAN) injection 4 mg  4 mg Intravenous Once PRN Terrence Johnson MD       • oxyCODONE (ROXICODONE) immediate release tablet 5 mg  5 mg Oral Q4H PRN Nicola Rich MD       • Pharmacy to dose warfarin   Does not apply Continuous PRN Nicola Rich MD       • piperacillin-tazobactam (ZOSYN) 2.25 g/100 mL 0.9% NS IVPB (mbp)  2.25 g Intravenous Q6H Ata Farris MD 0 mL/hr at 10/03/17 1435 2.25 g at 10/04/17 1439   • polyethylene glycol (MIRALAX) powder 17 g  17 g Oral BID Cipriano Leigh MD   17 g at 10/04/17 0838   • propofol (DIPRIVAN) infusion 10 mg/mL 100 mL  5-50 mcg/kg/min Intravenous Titrated Vivian Banuelos MD   Stopped at 10/01/17 1320   • sodium chloride 0.9 % flush 1-10 mL  1-10 mL Intravenous PRN Cipriano Leigh MD   10 mL at 10/03/17 1006   • sodium chloride 0.9 % flush 10 mL  10 mL Intravenous PRN Barrie Rodríguez MD       • sodium chloride 0.9 % flush 10 mL  10 mL Intracatheter Q12H Ata Farris MD   10 mL at 10/04/17 0834   • sodium chloride 0.9 % flush 10 mL  10 mL Intracatheter PRN Ata Farris MD       • sodium chloride 0.9 % infusion  50 mL/hr Intravenous Continuous Cipriano Leigh MD 50 mL/hr at 10/02/17 2243 50 mL/hr at 10/02/17 2243       Assessment and Plan:    Principal Problem:    Displaced intertrochanteric fracture of left femur, initial encounter for closed fracture  Active Problems:    Peripheral arterial disease    CAD (coronary artery disease)    Hypertension    Chronic bronchitis    Essential hypertension    Fall    Tobacco dependence syndrome    Atrial fibrillation  1.  Paroxysmal atrial fibrillation.  Patient has been maintaining normal  sinus rhythm on amiodarone.  Patient is anticoagulated with Coumadin.  Patient had an elevated PT/INR.  Patient Coumadin has been held.  Patient has not had any evidence of any bradycardia arrhythmia.  Patient will be continued on the present dose of the amiodarone.  2.  Arterial hypertension.  Patient blood pressure has been maintained on the current pharmacological therapy and would be continued on the present dose metoprolol.  3.  Respiratory failure.  Patient is currently getting weaned off the ventilator with an FiO2 of 45%.  4.  Anemia.  Patient hemoglobin would be followed closely.  Patient  does not show any focal signs of bleeding.            Lacho Courtney MD  10/04/17  4:51 PM      Time: Time spent on face-to-face interaction 25 minutes    Dictated utilizing Dragon dictation.

## 2017-10-04 NOTE — PLAN OF CARE
Problem: Patient Care Overview (Adult)  Goal: Plan of Care Review  Outcome: Ongoing (interventions implemented as appropriate)    10/04/17 0625   Coping/Psychosocial Response Interventions   Plan Of Care Reviewed With patient   Outcome Evaluation   Outcome Summary/Follow up Plan pt remains on versed drip & mech vent; levophed off entire shift; no complications noted during this shift, vss, will continue to monitor.       Goal: Adult Individualization and Mutuality  Outcome: Ongoing (interventions implemented as appropriate)  Goal: Discharge Needs Assessment  Outcome: Ongoing (interventions implemented as appropriate)    Problem: Orthopaedic Fracture (Adult)  Goal: Signs and Symptoms of Listed Potential Problems Will be Absent or Manageable (Orthopaedic Fracture)  Outcome: Ongoing (interventions implemented as appropriate)    Problem: Fall Risk (Adult)  Goal: Identify Related Risk Factors and Signs and Symptoms  Outcome: Ongoing (interventions implemented as appropriate)  Goal: Absence of Falls  Outcome: Ongoing (interventions implemented as appropriate)    Problem: Mechanical Ventilation, Invasive (Adult)  Goal: Signs and Symptoms of Listed Potential Problems Will be Absent or Manageable (Mechanical Ventilation, Invasive)  Outcome: Ongoing (interventions implemented as appropriate)    Problem: Pressure Ulcer Risk (Keith Q Scale) (Pediatric)  Goal: Identify Related Risk Factors and Signs and Symptoms  Outcome: Ongoing (interventions implemented as appropriate)  Goal: Skin Integrity  Outcome: Ongoing (interventions implemented as appropriate)    Problem: SAFETY - NON-VIOLENT RESTRAINT  Goal: Remains free of injury from restraints (Non-Violent Restraint)  Outcome: Ongoing (interventions implemented as appropriate)  Goal: Free from restraint(s) (Non-Violent Restraint)  Outcome: Ongoing (interventions implemented as appropriate)

## 2017-10-04 NOTE — PROGRESS NOTES
"Anticoagulation by Pharmacy - Warfarin Day 4 of 42    Anahi Calix is a 77 y.o.female  [Ht: 68\" (172.7 cm); Wt: 173 lb 8 oz (78.7 kg)] on Warfarin 2.5 mg PO  for indication of VTE prophylaxis s/p ortho procedure.    Goal INR: 1.7-2.5  Today's INR:   Lab Results   Component Value Date    INR 4.91 (H) 10/04/2017         Lab Results   Component Value Date    INR 4.91 (H) 10/04/2017    INR 2.28 (H) 10/03/2017    INR 1.28 (H) 10/02/2017    PROTIME 46.8 (H) 10/04/2017    PROTIME 25.3 (H) 10/03/2017    PROTIME 16.0 (H) 10/02/2017     Lab Results   Component Value Date    HGB 9.1 (L) 10/04/2017    HGB 10.3 (L) 10/03/2017    HGB 11.6 (L) 10/02/2017     Lab Results   Component Value Date    HCT 28.0 (L) 10/04/2017    HCT 31.4 (L) 10/03/2017    HCT 35.3 10/02/2017     Lab Results   Component Value Date     10/04/2017     10/03/2017     10/02/2017     Assessment/Plan:  Reviewed above labs. INR is 4.91.  INR is supratherapeutic. Pt did receive dose of warfarin last night. Patient is also on Amiodarone, which is likely the culprit for elevated INR. Will hold warfarin today. Rx will continue to follow and adjust dose accordingly.  Today is day 4 of therapy.  .    Cristiane Gloria RPH  10/04/17 1:39 PM     "

## 2017-10-04 NOTE — SIGNIFICANT NOTE
10/04/17 1643   Rehab Treatment   Discipline occupational therapist   Rehab Evaluation   Evaluation Not Performed other (see comments)  (OTR checked on pt via phone and nursing states pt is still sedated and on vent but will attempt to take off vent tomorrow..)   Recommendation   OT - Next Appointment 10/05/17

## 2017-10-04 NOTE — PROGRESS NOTES
"Intensive Care Follow-up      LOS: 4 days     Ms. Anahi Calix, 77 y.o. female is followed for: Displaced intertrochanteric fracture of left femur, initial encounter for closed fracture   CHIEF COMPLIANTVentilator    Subjective - Interval History     This lady has respiratory failure post to hip repair.  He is on assist control of 12 with respiratory rate of 16.  Lungs reveal scattered rhonchi, arterial blood gases are good    The patient's relevant past medical, surgical and social history were reviewed and updated in Epic as appropriate.     Objective   /60  Pulse 74  Temp 99.3 °F (37.4 °C) (Oral)   Resp 15  Ht 68\" (172.7 cm)  Wt 173 lb 8 oz (78.7 kg)  LMP  (LMP Unknown)  SpO2 96%  BMI 26.38 kg/m2      Vent Settings: Vent Mode: VC+/AC  FiO2 (%):  [50 %-55 %] 50 %  S RR:  [12] 12  PEEP/CPAP (cm H2O):  [10 cm H20] 10 cm H20  MD SUP:  [0 cm H20] 0 cm H20  MAP (cm H2O):  [15-17] 16    Physical Exam:Sedated on mechanical ventilator    General Appearance:       Head:    Normocephalic, without obvious abnormality, atraumatic   Eyes:            Lids and lashes normal, conjunctivae and sclerae normal, no   icterus, no pallor, corneas clear, PERRLA   Ears:    Ears appear intact with no abnormalities noted   Throat:   No oral lesions, no thrush, oral mucosa moist   Neck:   No adenopathy, supple, trachea midline, no thyromegaly, no   carotid bruit, no JVD   Back:     No kyphosis present, no scoliosis present, no skin lesions,      erythema or scars, no tenderness to percussion or                   palpation,   range of motion normal   Lungs:   Scattered rhonchi respiratory rate of 16     Heart:    Regular rhythm and normal rate, normal S1 and S2, no            murmur, no gallop, no rub, no click   Chest Wall:    No abnormalities observed   Abdomen:     Normal bowel sounds, no masses, no organomegaly, soft        non-tender, non-distended, no guarding, no rebound                tenderness   Rectal:     " Deferred   Extremities:   Moves all extremities well, no edema, no cyanosis, no             redness   Pulses:   Pulses palpable and equal bilaterally   Skin:   No bleeding, bruising or rash   Lymph nodes:   No palpable adenopathy   Neurologic:   Cranial nerves 2 - 12 grossly intact, sensation intact, DTR       present and equal bilaterally     LAB:    pH, Arterial   Date Value Ref Range Status   10/04/2017 7.411 7.350 - 7.450 pH units Final     pCO2, Arterial   Date Value Ref Range Status   10/04/2017 36.7 35.0 - 45.0 mm Hg Final     pO2, Arterial   Date Value Ref Range Status   10/04/2017 77.1 (L) 80.0 - 105.0 mm Hg Final     Lab Results   Component Value Date    WBC 11.54 (H) 10/04/2017    HGB 9.1 (L) 10/04/2017    HCT 28.0 (L) 10/04/2017    MCV 89.2 10/04/2017     10/04/2017     Lab Results   Component Value Date    GLUCOSE 93 10/04/2017    BUN 28 (H) 10/04/2017    CREATININE 0.98 10/04/2017    EGFRIFNONA 55 10/04/2017    BCR 28.6 (H) 10/04/2017    CO2 25.0 10/04/2017    CALCIUM 7.7 (L) 10/04/2017    ALBUMIN 4.00 09/30/2017    LABIL2 1.3 09/30/2017    AST 30 09/30/2017    ALT 31 09/30/2017         RAD:   Imaging Results (last 24 hours)     ** No results found for the last 24 hours. **         Impression      Hospital Problem List     * (Principal)Displaced intertrochanteric fracture of left femur, initial encounter for closed fracture    Overview Signed 10/2/2017  8:44 AM by Ata Farris MD     POD# 1.  Management per orthopedics.         Peripheral arterial disease (Chronic)    CAD (coronary artery disease) (Chronic)    Hypertension (Chronic)    Overview Signed 10/2/2017  8:45 AM by Ata Farris MD     Has been hypotensive over the last 24 hours intermittently.  Decreased Lisinopril from 40 mg daily to 2.5 mg daily.  Holding lopressor right now.  On Cardizem drip for atrial fibrillation.         Chronic bronchitis (Chronic)    Overview Signed 10/2/2017  8:45 AM by Ata Farris MD     Currently  intubated.  Unable to be extubated yesterday.           Essential hypertension    Overview Signed 9/30/2017  6:58 PM by Nicola Rich MD     Added automatically from request for surgery [id number]         Fall    Overview Addendum 10/2/2017  8:45 AM by Ata Farris MD     Fracture s/p repair per orthopedics.         Tobacco dependence syndrome    Overview Signed 10/2/2017  8:51 AM by Ata Farris MD     Cessation counseling to be provided once extubated and alert.         Atrial fibrillation    Overview Signed 10/2/2017  8:51 AM by Ata Farris MD     New onset.  Given Cardizem bolus this morning and started on Cardizem drip.  Blood pressure started to drop with drip.  Patient placed back on pressors.  PICC line ordered.  Cardiology consulted.                    Plan        Assessment respiratory failure post hip    Plan try IMV, follow chest x-ray blood gas, wean vent when necessary    I discussed the patient's findings and my recommendations with patient and nursing staff

## 2017-10-04 NOTE — CONSULTS
"Adult Nutrition  Assessment    Patient Name:  Anahi Calix  YOB: 1940  MRN: [id number]  Admit Date:  9/30/2017    Assessment Date:  10/4/2017    Comments:  Pt continues to require mechanical ventilation s/p hip repair surgery. RN reports need to start TF. Will order Osmolite 1.2 w/goal rate 55cc/hr to meet javi/pro needs. RD will monitor tolerance and make changes prn.           Reason for Assessment       10/04/17 1002    Reason for Assessment    Reason For Assessment/Visit nurse/nurse practitioner consult;NPO/Clr Policy    Identified At Risk By Screening Criteria tube feeding or parenteral nutrition                    Labs/Tests/Procedures/Meds       10/04/17 1003    Labs/Tests/Procedures/Meds    Labs/Tests Review Reviewed    Medication Review Reviewed, pertinent              Estimated/Assessed Needs       10/04/17 1003    Calculation Measurements    Weight Used For Calculations 63.5 kg (140 lb)    Height Used for Calculations 1.727 m (5' 8\")    Estimated/Assessed Energy Needs    Energy Need Method [place]-St Jeor    Age 77    RMR ([place]-St. Jeor Equation) 1173.54    Activity Factors ([place] St Jeor)  Out of bed, ambulatory  1.3    Total estimated needs ([place] St. Jeor) 1525    Estimated/Assessed Protein Needs    Weight Used for Protein Calculation 63.5 kg (140 lb)    Protein (gm/kg) 1.2    1.2 Gm Protein (gm) 76.2    Estimated/Assessed Fluid Needs    Fluid Need Method RDA method    RDA Method (mL)  1500            Nutrition Prescription Ordered       10/04/17 1007    Nutrition Prescription PO    Current PO Diet NPO              Electronically signed by:  Magda Cerna RD  10/04/17 10:10 AM  "

## 2017-10-05 ENCOUNTER — APPOINTMENT (OUTPATIENT)
Dept: GENERAL RADIOLOGY | Facility: HOSPITAL | Age: 77
End: 2017-10-05

## 2017-10-05 LAB
ANION GAP SERPL CALCULATED.3IONS-SCNC: 9 MMOL/L (ref 5–15)
ARTERIAL PATENCY WRIST A: ABNORMAL
ATMOSPHERIC PRESS: ABNORMAL MMHG
BASE EXCESS BLDA CALC-SCNC: 1.4 MMOL/L (ref -2.4–2.4)
BASOPHILS # BLD AUTO: 0.02 10*3/MM3 (ref 0–0.2)
BASOPHILS NFR BLD AUTO: 0.2 % (ref 0–2)
BDY SITE: ABNORMAL
BUN BLD-MCNC: 30 MG/DL (ref 7–21)
BUN/CREAT SERPL: 37 (ref 7–25)
CA-I BLD-MCNC: 4.6 MG/DL (ref 4.5–4.9)
CALCIUM SPEC-SCNC: 7.7 MG/DL (ref 8.4–10.2)
CHLORIDE SERPL-SCNC: 112 MMOL/L (ref 95–110)
CO2 BLDA-SCNC: 27.1 MMOL/L (ref 23–27)
CO2 SERPL-SCNC: 24 MMOL/L (ref 22–31)
CREAT BLD-MCNC: 0.81 MG/DL (ref 0.5–1)
DEPRECATED RDW RBC AUTO: 46.7 FL (ref 36.4–46.3)
EOSINOPHIL # BLD AUTO: 0.07 10*3/MM3 (ref 0–0.7)
EOSINOPHIL NFR BLD AUTO: 0.8 % (ref 0–7)
ERYTHROCYTE [DISTWIDTH] IN BLOOD BY AUTOMATED COUNT: 14.3 % (ref 11.5–14.5)
GFR SERPL CREATININE-BSD FRML MDRD: 69 ML/MIN/1.73 (ref 60–90)
GLUCOSE BLD-MCNC: 103 MG/DL (ref 60–100)
GLUCOSE BLDA-MCNC: 115 MMOL/L
HCO3 BLDA-SCNC: 25.8 MMOL/L (ref 22–26)
HCT VFR BLD AUTO: 25 % (ref 35–45)
HCT VFR BLD CALC: 27 % (ref 38–47)
HGB BLD-MCNC: 8.3 G/DL (ref 12–15.5)
HGB BLDA-MCNC: 9.1 G/DL (ref 12–16)
IMM GRANULOCYTES # BLD: 0.03 10*3/MM3 (ref 0–0.02)
IMM GRANULOCYTES NFR BLD: 0.3 % (ref 0–0.5)
INR PPP: 4.82 (ref 0.8–1.2)
LYMPHOCYTES # BLD AUTO: 0.79 10*3/MM3 (ref 0.6–4.2)
LYMPHOCYTES NFR BLD AUTO: 8.5 % (ref 10–50)
MCH RBC QN AUTO: 29.5 PG (ref 26.5–34)
MCHC RBC AUTO-ENTMCNC: 33.2 G/DL (ref 31.4–36)
MCV RBC AUTO: 89 FL (ref 80–98)
MODALITY: ABNORMAL
MONOCYTES # BLD AUTO: 0.63 10*3/MM3 (ref 0–0.9)
MONOCYTES NFR BLD AUTO: 6.8 % (ref 0–12)
NEUTROPHILS # BLD AUTO: 7.7 10*3/MM3 (ref 2–8.6)
NEUTROPHILS NFR BLD AUTO: 83.4 % (ref 37–80)
PCO2 BLDA: 40 MM HG (ref 35–45)
PH BLDA: 7.43 PH UNITS (ref 7.35–7.45)
PLATELET # BLD AUTO: 188 10*3/MM3 (ref 150–450)
PMV BLD AUTO: 9.4 FL (ref 8–12)
PO2 BLDA: 101.8 MM HG (ref 80–105)
POTASSIUM BLD-SCNC: 3.6 MMOL/L (ref 3.5–5.1)
POTASSIUM BLDA-SCNC: 3.36 MMOL/L (ref 3.6–4.9)
PROTHROMBIN TIME: 46.1 SECONDS (ref 11.1–15.3)
RBC # BLD AUTO: 2.81 10*6/MM3 (ref 3.77–5.16)
SAO2 % BLDCOA: 97.3 %
SODIUM BLD-SCNC: 145 MMOL/L (ref 137–145)
SODIUM BLDA-SCNC: 141.4 MMOL/L (ref 138–146)
WBC NRBC COR # BLD: 9.24 10*3/MM3 (ref 3.2–9.8)

## 2017-10-05 PROCEDURE — 25010000002 PIPERACILLIN-TAZOBACTAM: Performed by: FAMILY MEDICINE

## 2017-10-05 PROCEDURE — 25010000002 MIDAZOLAM 50 MG/10ML SOLUTION 10 ML VIAL: Performed by: INTERNAL MEDICINE

## 2017-10-05 PROCEDURE — 25010000002 MORPHINE (PF) 10 MG/ML SOLUTION: Performed by: ORTHOPAEDIC SURGERY

## 2017-10-05 PROCEDURE — 94799 UNLISTED PULMONARY SVC/PX: CPT

## 2017-10-05 PROCEDURE — 71010 HC CHEST PA OR AP: CPT

## 2017-10-05 PROCEDURE — 80048 BASIC METABOLIC PNL TOTAL CA: CPT | Performed by: FAMILY MEDICINE

## 2017-10-05 PROCEDURE — 99232 SBSQ HOSP IP/OBS MODERATE 35: CPT | Performed by: INTERNAL MEDICINE

## 2017-10-05 PROCEDURE — 85610 PROTHROMBIN TIME: CPT | Performed by: ORTHOPAEDIC SURGERY

## 2017-10-05 PROCEDURE — 85025 COMPLETE CBC W/AUTO DIFF WBC: CPT | Performed by: FAMILY MEDICINE

## 2017-10-05 PROCEDURE — 82803 BLOOD GASES ANY COMBINATION: CPT | Performed by: INTERNAL MEDICINE

## 2017-10-05 PROCEDURE — 36600 WITHDRAWAL OF ARTERIAL BLOOD: CPT

## 2017-10-05 PROCEDURE — 94003 VENT MGMT INPAT SUBQ DAY: CPT

## 2017-10-05 RX ADMIN — Medication 10 ML: at 08:15

## 2017-10-05 RX ADMIN — POLYETHYLENE GLYCOL 3350 17 G: 17 POWDER, FOR SOLUTION ORAL at 08:14

## 2017-10-05 RX ADMIN — FAMOTIDINE 20 MG: 10 INJECTION INTRAVENOUS at 20:22

## 2017-10-05 RX ADMIN — AMIODARONE HYDROCHLORIDE 200 MG: 200 TABLET ORAL at 20:22

## 2017-10-05 RX ADMIN — ALBUTEROL SULFATE 2 PUFF: 90 AEROSOL, METERED RESPIRATORY (INHALATION) at 17:14

## 2017-10-05 RX ADMIN — ALBUTEROL SULFATE 2 PUFF: 90 AEROSOL, METERED RESPIRATORY (INHALATION) at 07:42

## 2017-10-05 RX ADMIN — ACETAMINOPHEN 1000 MG: 500 TABLET ORAL at 18:11

## 2017-10-05 RX ADMIN — PIPERACILLIN AND TAZOBACTAM 2.25 G: 2; .25 INJECTION, POWDER, LYOPHILIZED, FOR SOLUTION INTRAVENOUS; PARENTERAL at 03:20

## 2017-10-05 RX ADMIN — POLYETHYLENE GLYCOL 3350 17 G: 17 POWDER, FOR SOLUTION ORAL at 17:22

## 2017-10-05 RX ADMIN — MORPHINE SULFATE 4 MG: 10 INJECTION, SOLUTION INTRAMUSCULAR; INTRAVENOUS at 16:12

## 2017-10-05 RX ADMIN — ALBUTEROL SULFATE 2 PUFF: 90 AEROSOL, METERED RESPIRATORY (INHALATION) at 21:14

## 2017-10-05 RX ADMIN — Medication 667 MG: at 13:32

## 2017-10-05 RX ADMIN — Medication 10 ML: at 20:32

## 2017-10-05 RX ADMIN — LISINOPRIL 2.5 MG: 2.5 TABLET ORAL at 09:05

## 2017-10-05 RX ADMIN — FUROSEMIDE 20 MG: 20 TABLET ORAL at 08:14

## 2017-10-05 RX ADMIN — AMIODARONE HYDROCHLORIDE 200 MG: 200 TABLET ORAL at 08:14

## 2017-10-05 RX ADMIN — METOPROLOL TARTRATE 25 MG: 25 TABLET ORAL at 17:22

## 2017-10-05 RX ADMIN — METOPROLOL TARTRATE 25 MG: 25 TABLET ORAL at 09:05

## 2017-10-05 RX ADMIN — PIPERACILLIN AND TAZOBACTAM 2.25 G: 2; .25 INJECTION, POWDER, LYOPHILIZED, FOR SOLUTION INTRAVENOUS; PARENTERAL at 08:14

## 2017-10-05 RX ADMIN — GABAPENTIN 300 MG: 300 CAPSULE ORAL at 16:12

## 2017-10-05 RX ADMIN — GABAPENTIN 300 MG: 300 CAPSULE ORAL at 08:14

## 2017-10-05 RX ADMIN — ACETAMINOPHEN 1000 MG: 500 TABLET ORAL at 01:26

## 2017-10-05 RX ADMIN — MIDAZOLAM 5 MG/HR: 5 INJECTION INTRAMUSCULAR; INTRAVENOUS at 00:59

## 2017-10-05 RX ADMIN — GABAPENTIN 300 MG: 300 CAPSULE ORAL at 20:22

## 2017-10-05 RX ADMIN — PIPERACILLIN AND TAZOBACTAM 2.25 G: 2; .25 INJECTION, POWDER, LYOPHILIZED, FOR SOLUTION INTRAVENOUS; PARENTERAL at 20:20

## 2017-10-05 RX ADMIN — PIPERACILLIN AND TAZOBACTAM 2.25 G: 2; .25 INJECTION, POWDER, LYOPHILIZED, FOR SOLUTION INTRAVENOUS; PARENTERAL at 15:11

## 2017-10-05 RX ADMIN — FAMOTIDINE 20 MG: 10 INJECTION INTRAVENOUS at 08:14

## 2017-10-05 NOTE — PROGRESS NOTES
Cardiology Progress Note:     LOS: 5 days   Patient Care Team:  Ramiro Gloria MD as PCP - General      Subjective:    Chart reviewed , patient seen and examined.  Patient last night had a drop in oxygen saturation requiring increase in the FiO2.  Patient remained intubated and is currently on IMV and getting weaned off the ventilator.  Patient telemetry monitor has not revealed off any recurrence of any atrial fibrillation.  Patient's hemoglobin has dropped with a PT INR which is still elevated and Coumadin has been held.  Patient blood pressure is 114/59.  Patient metoprolol would be over 225 mg twice a day            Objective:     Objective:  Vitals:    10/05/17 0800   BP: 114/59   Pulse: 72   Resp: 17   Temp: 98.6 °F (37 °C)   SpO2: 98%       Intake/Output Summary (Last 24 hours) at 10/05/17 0818  Last data filed at 10/05/17 0800   Gross per 24 hour   Intake             1652 ml   Output              968 ml   Net              684 ml             Physical Exam:   General Appearance:    Alert, oriented, cooperative, in no acute distress   Head:    Normocephalic, atraumatic, without obvious abnormality   Eyes:           SMILEY  Lids and lashes normal, conjunctivae and sclerae normal, no icterus, no pallor   Ears:    Ears appear intact with no abnormalities noted   Throat:   Mucous membranes pink and moist   Neck:   Supple, trachea midline, no carotid bruit, no organomegaly or JVD   Lungs:     Clear to auscultation and percussion, respirations regular, even and Unlabored. No wheezes, rales, rhonchi    Heart:    Regular rhythm and normal rate, normal S1 and S2, no            murmur, no gallop, no rub, no click   Abdomen:     Soft, non-tender, non-distended, no guarding, no rebound tenderness, Normal bowel sounds in all four quadrant, no masses, liver and spleen nonpalpable,    Genitalia:    Deferred   Extremities:   Moves all extremities well, no edema, no cyanosis, no              Redness, no clubbing   Pulses:    Pulses palpable and equal bilaterally   Skin:   Moist and warm. No bleeding, bruising or rash   Neurologic/Psychiatric:   Alert and oriented to person, place, and time.  Motor, power and tone in upper and lower extremity is grossly intact.  No focal neurological deficits. Normal cognitive function. No psychomotor reaction or tangential thought. No depression, homicidal ideations and suicidal ideations            Results Review:    Lab Results (last 24 hours)     Procedure Component Value Units Date/Time    Respiratory Culture - Sputum, ET Suction [[id number]]  (Abnormal) Collected:  10/01/17 1310    Specimen:  Sputum from ET Suction Updated:  10/04/17 0933     Respiratory Culture Heavy growth (4+) Moraxella catarrhalis (A)      Beta lactamase positive confers resistance to Ampicilin, Amoxicillin, Penicillin, Carbenicillin, Ticarcillin, Mezlocillin, and Piperacillin  Susceptibility not indicated          Gram Stain Result Many (4+) WBCs per low power field      Rare (1+) Epithelial cells per low power field      Mixed bacterial katie      Many (4+) Gram negative diplococci    Blood Gas, Arterial [[id number]]  (Abnormal) Collected:  10/05/17 0355    Specimen:  Arterial Blood Updated:  10/05/17 0400     Site --      Not performed at this site.        Osiel's Test --      Not performed at this site.        pH, Arterial 7.428 pH units      pCO2, Arterial 40.0 mm Hg      pO2, Arterial 101.8 mm Hg      HCO3, Arterial 25.8 mmol/L      Base Excess, Arterial 1.4 mmol/L      O2 Saturation, Arterial 97.3 %      Hemoglobin, Blood Gas 9.1 (L) g/dL      Hematocrit, Blood Gas 27.0 (L) %      CO2 Content 27.1 (H)     Sodium, Arterial 141.4 mmol/L      Potassium, Arterial 3.36 (L) mmol/L      Glucose, Arterial 115 mmol/L      Barometric Pressure for Blood Gas -- mmHg       Not performed at this site.        Modality --      Not performed at this site.        Ionized Calcium 4.60 mg/dL     CBC & Differential [[id number]] Collected:   10/05/17 0251    Specimen:  Blood Updated:  10/05/17 0403    Narrative:       The following orders were created for panel order CBC & Differential.  Procedure                               Abnormality         Status                     ---------                               -----------         ------                     CBC Auto Differential[[id number]]        Abnormal            Final result                 Please view results for these tests on the individual orders.    CBC Auto Differential [[id number]]  (Abnormal) Collected:  10/05/17 0251    Specimen:  Blood Updated:  10/05/17 0403     WBC 9.24 10*3/mm3      RBC 2.81 (L) 10*6/mm3      Hemoglobin 8.3 (L) g/dL      Hematocrit 25.0 (L) %      MCV 89.0 fL      MCH 29.5 pg      MCHC 33.2 g/dL      RDW 14.3 %      RDW-SD 46.7 (H) fl      MPV 9.4 fL      Platelets 188 10*3/mm3      Neutrophil % 83.4 (H) %      Lymphocyte % 8.5 (L) %      Monocyte % 6.8 %      Eosinophil % 0.8 %      Basophil % 0.2 %      Immature Grans % 0.3 %      Neutrophils, Absolute 7.70 10*3/mm3      Lymphocytes, Absolute 0.79 10*3/mm3      Monocytes, Absolute 0.63 10*3/mm3      Eosinophils, Absolute 0.07 10*3/mm3      Basophils, Absolute 0.02 10*3/mm3      Immature Grans, Absolute 0.03 (H) 10*3/mm3     Basic Metabolic Panel [[id number]]  (Abnormal) Collected:  10/05/17 0251    Specimen:  Blood Updated:  10/05/17 0409     Glucose 103 (H) mg/dL      BUN 30 (H) mg/dL      Creatinine 0.81 mg/dL      Sodium 145 mmol/L      Potassium 3.6 mmol/L      Chloride 112 (H) mmol/L      CO2 24.0 mmol/L      Calcium 7.7 (L) mg/dL      eGFR Non African Amer 69 mL/min/1.73      BUN/Creatinine Ratio 37.0 (H)     Anion Gap 9.0 mmol/L     Narrative:       The MDRD GFR formula is only valid for adults with stable renal function between ages 18 and 70.    Protime-INR [[id number]]  (Abnormal) Collected:  10/05/17 0251    Specimen:  Blood Updated:  10/05/17 0438     Protime 46.1 (H) Seconds      INR 4.82 (H)    Narrative:        Therapeutic range for most indications is 2.0-3.0 INR,  or 2.5-3.5 for mechanical heart valves.           Medication Review:   Current Facility-Administered Medications   Medication Dose Route Frequency Provider Last Rate Last Dose   • acetaminophen (TYLENOL) tablet 650 mg  650 mg Oral Once PRN Terrence Johnson MD        Or   • acetaminophen (TYLENOL) suppository 650 mg  650 mg Rectal Once PRN Terrence Johnson MD       • acetaminophen (TYLENOL) tablet 1,000 mg  1,000 mg Oral Q6H Nicola Rich MD   Stopped at 10/05/17 0624   • albuterol (PROVENTIL HFA;VENTOLIN HFA) inhaler 2 puff  2 puff Inhalation 4x Daily - RT Mike Teresa MD   2 puff at 10/05/17 0742   • amiodarone (PACERONE) tablet 200 mg  200 mg Oral Q12H Lacho Courtney MD   200 mg at 10/05/17 0814   • calcium acetate (PHOSLO) tablet 667 mg  667 mg Oral Daily Cipriano Leigh MD   667 mg at 10/03/17 1336   • diphenhydrAMINE (BENADRYL) injection 12.5 mg  12.5 mg Intravenous Q15 Min PRN Terrence Johnson MD       • ePHEDrine injection 5 mg  5 mg Intravenous Once PRN Terrence Johnson MD       • famotidine (PEPCID) injection 20 mg  20 mg Intravenous Q12H Sukhjinder Mendez MD   20 mg at 10/05/17 0814   • flumazenil (ROMAZICON) injection 0.2 mg  0.2 mg Intravenous PRN Terrence Johnson MD       • furosemide (LASIX) tablet 20 mg  20 mg Oral Daily Cipriano Leigh MD   20 mg at 10/05/17 0814   • gabapentin (NEURONTIN) capsule 300 mg  300 mg Oral TID Cipriano Leigh MD   300 mg at 10/05/17 0814   • HYDROmorphone (DILAUDID) injection 0.5 mg  0.5 mg Intravenous Q5 Min PRN Terrence Johnson MD       • labetalol (NORMODYNE,TRANDATE) injection 5 mg  5 mg Intravenous Q5 Min PRN Terrence Johnson MD       • lisinopril (PRINIVIL,ZESTRIL) tablet 2.5 mg  2.5 mg Oral Daily Ata Farris MD   2.5 mg at 10/04/17 0834   • metoprolol tartrate (LOPRESSOR) tablet 50 mg  50 mg Oral BID Cipriano Leigh MD   50 mg at 10/04/17 1806   • midazolam (VERSED) 50 mg in sodium chloride  0.9 % 100 mL (0.5 mg/mL) infusion  1 mg/hr Intravenous Titrated Vivian Banuelos MD 4 mL/hr at 10/05/17 0742 2 mg/hr at 10/05/17 0742   • midazolam (VERSED) injection 1 mg  1 mg Intravenous Q4H PRN Sukhjinder Mendez MD       • Morphine (PF) injection 4 mg  4 mg Intravenous Q2H PRN Nicola Rich MD       • naloxone (NARCAN) injection 0.2 mg  0.2 mg Intravenous PRN Terrence Johnson MD       • norepinephrine (LEVOPHED) 8,000 mcg in sodium chloride 0.9 % 250 mL (32 mcg/mL) infusion  0.02-0.3 mcg/kg/min Intravenous Titrated Vivian Banuelos MD   Stopped at 10/03/17 1705   • ondansetron (ZOFRAN) injection 4 mg  4 mg Intravenous Q6H PRN Cipriano Leigh MD       • ondansetron (ZOFRAN) injection 4 mg  4 mg Intravenous Once PRN Terrence Johnson MD       • oxyCODONE (ROXICODONE) immediate release tablet 5 mg  5 mg Oral Q4H PRN Nicola Rich MD       • Pharmacy to dose warfarin   Does not apply Continuous PRN Nicola Rich MD       • piperacillin-tazobactam (ZOSYN) 2.25 g/100 mL 0.9% NS IVPB (mbp)  2.25 g Intravenous Q6H Ata Farris MD 0 mL/hr at 10/03/17 1435 2.25 g at 10/05/17 0814   • polyethylene glycol (MIRALAX) powder 17 g  17 g Oral BID Cipriano Leigh MD   17 g at 10/05/17 0814   • propofol (DIPRIVAN) infusion 10 mg/mL 100 mL  5-50 mcg/kg/min Intravenous Titrated Vivian Banuelos MD   Stopped at 10/01/17 1320   • sodium chloride 0.9 % flush 1-10 mL  1-10 mL Intravenous PRN Cipriano Leigh MD   10 mL at 10/03/17 1006   • sodium chloride 0.9 % flush 10 mL  10 mL Intravenous PRN Barrie Rodríguez MD       • sodium chloride 0.9 % flush 10 mL  10 mL Intracatheter Q12H Ata Farris MD   10 mL at 10/05/17 0815   • sodium chloride 0.9 % flush 10 mL  10 mL Intracatheter PRN Ata Farris MD       • sodium chloride 0.9 % infusion  50 mL/hr Intravenous Continuous Cipriano Leigh MD 50 mL/hr at 10/04/17 2030 50 mL/hr at 10/04/17 2030       Assessment and Plan:    Principal Problem:     Displaced intertrochanteric fracture of left femur, initial encounter for closed fracture  Active Problems:    Peripheral arterial disease    CAD (coronary artery disease)    Hypertension    Chronic bronchitis    Essential hypertension    Fall    Tobacco dependence syndrome    Atrial fibrillation  1.  Paroxysmal atrial fibrillation.  Patient has remained in normal sinus rhythm after the electrical cardioversion and has been tolerating the amiodarone.  Patient would be continued on the present dose of the amiodarone 200 mg twice a day.  Due to the patient's low blood pressure patient metoprolol would be lowered to 25 mg twice a day.  Patient anticoagulation has been held secondary to the patient's elevated PT/INR.  2.  Arterial hypertension.  Patient blood pressure is currently well controlled.  3.  Hypertensive heart disease.  Clinically patient is not in congestive heart failure.  4.  Respiratory failure post open reduction and internal fixation of the left hip.  Patient is getting weaned off the ventilator.  5.  Bilateral carotid bruit.  Patient would undergo carotid ultrasound after the patient is extubated to further evaluate the carotid stenosis.  6.  Anemia.  Patient's hemoglobin and 8.3 patient does not show any signs of occult bleeding patient PT/INR is elevated.  Will follow the H&H            Lacho Courtney MD  10/05/17  8:18 AM      Time:       EMR Dragon/Transcription disclaimer:   Much of this encounter note is an electronic transcription/translation of spoken language to printed text. The electronic translation of spoken language may permit erroneous, or at times, nonsensical words or phrases to be inadvertently transcribed; Although I have reviewed the note for such errors, some may still exist.

## 2017-10-05 NOTE — PLAN OF CARE
Problem: Patient Care Overview (Adult)  Goal: Plan of Care Review  Outcome: Ongoing (interventions implemented as appropriate)    10/05/17 6529   Coping/Psychosocial Response Interventions   Plan Of Care Reviewed With family   Patient Care Overview   Progress improving      Sedation dc'd in anticipation of extubation, tolerating spontaneous breathing trial, morphine for pain control.    Problem: Orthopaedic Fracture (Adult)  Goal: Signs and Symptoms of Listed Potential Problems Will be Absent or Manageable (Orthopaedic Fracture)  Outcome: Ongoing (interventions implemented as appropriate)    Problem: Perioperative Period (Adult)  Goal: Signs and Symptoms of Listed Potential Problems Will be Absent or Manageable (Perioperative Period)  Outcome: Ongoing (interventions implemented as appropriate)    Problem: Fall Risk (Adult)  Goal: Identify Related Risk Factors and Signs and Symptoms  Outcome: Ongoing (interventions implemented as appropriate)  Goal: Absence of Falls  Outcome: Ongoing (interventions implemented as appropriate)    Problem: Mechanical Ventilation, Invasive (Adult)  Goal: Signs and Symptoms of Listed Potential Problems Will be Absent or Manageable (Mechanical Ventilation, Invasive)  Outcome: Ongoing (interventions implemented as appropriate)    Problem: SAFETY - NON-VIOLENT RESTRAINT  Goal: Remains free of injury from restraints (Non-Violent Restraint)  Outcome: Ongoing (interventions implemented as appropriate)  Goal: Free from restraint(s) (Non-Violent Restraint)  Outcome: Ongoing (interventions implemented as appropriate)    Problem: Pressure Ulcer Risk (Keith Scale) (Adult,Obstetrics,Pediatric)  Goal: Identify Related Risk Factors and Signs and Symptoms  Outcome: Ongoing (interventions implemented as appropriate)  Goal: Skin Integrity  Outcome: Ongoing (interventions implemented as appropriate)

## 2017-10-05 NOTE — PROGRESS NOTES
"Intensive Care Follow-up      LOS: 5 days     Ms. Anahi Calix, 77 y.o. female is followed for: Displaced intertrochanteric fracture of left femur, initial encounter for closed fracture   CHIEF COMPLIANTShort of breath    Subjective - Interval History     This lady is on the ventilator post hip repair.  She is now on IMV.  She arouses off sedation.  Lungs appear to be less congested today    The patient's relevant past medical, surgical and social history were reviewed and updated in Epic as appropriate.     Objective   BP 98/54  Pulse 66  Temp 98.9 °F (37.2 °C) (Temporal Artery )   Resp 16  Ht 68\" (172.7 cm)  Wt 173 lb 8 oz (78.7 kg)  LMP  (LMP Unknown)  SpO2 98%  BMI 26.38 kg/m2      Vent Settings: Vent Mode: SIMV/VC+  FiO2 (%):  [45 %-65 %] 45 %  S RR:  [12] 12  PEEP/CPAP (cm H2O):  [5 cm H20-10 cm H20] 10 cm H20  [place] SUP:  [15 cm H20] 15 cm H20  MAP (cm H2O):  [16-18] 16    Physical Exam:Sedated on mechanical ventilator    General Appearance:       Head:    Normocephalic, without obvious abnormality, atraumatic   Eyes:            Lids and lashes normal, conjunctivae and sclerae normal, no   icterus, no pallor, corneas clear, PERRLA   Ears:    Ears appear intact with no abnormalities noted   Throat:   No oral lesions, no thrush, oral mucosa moist   Neck:   No adenopathy, supple, trachea midline, no thyromegaly, no   carotid bruit, no JVD   Back:     No kyphosis present, no scoliosis present, no skin lesions,      erythema or scars, no tenderness to percussion or                   palpation,   range of motion normal   Lungs:   No rales rhonchi or wheeze, mild diminished breath sounds in the left base     Heart:    Regular rhythm and normal rate, normal S1 and S2, no            murmur, no gallop, no rub, no click   Chest Wall:    No abnormalities observed   Abdomen:     Normal bowel sounds, no masses, no organomegaly, soft        non-tender, non-distended, no guarding, no rebound                " tenderness   Rectal:     Deferred   Extremities:   Moves all extremities well, no edema, no cyanosis, no             redness   Pulses:   Pulses palpable and equal bilaterally   Skin:   No bleeding, bruising or rash   Lymph nodes:   No palpable adenopathy   Neurologic:   Cranial nerves 2 - 12 grossly intact, sensation intact, DTR       present and equal bilaterally     LAB:    pH, Arterial   Date Value Ref Range Status   10/05/2017 7.428 7.350 - 7.450 pH units Final     pCO2, Arterial   Date Value Ref Range Status   10/05/2017 40.0 35.0 - 45.0 mm Hg Final     pO2, Arterial   Date Value Ref Range Status   10/05/2017 101.8 80.0 - 105.0 mm Hg Final     Lab Results   Component Value Date    WBC 9.24 10/05/2017    HGB 8.3 (L) 10/05/2017    HCT 25.0 (L) 10/05/2017    MCV 89.0 10/05/2017     10/05/2017     Lab Results   Component Value Date    GLUCOSE 115 10/05/2017    BUN 30 (H) 10/05/2017    CREATININE 0.81 10/05/2017    EGFRIFNONA 69 10/05/2017    BCR 37.0 (H) 10/05/2017    CO2 24.0 10/05/2017    CALCIUM 7.7 (L) 10/05/2017    ALBUMIN 4.00 09/30/2017    LABIL2 1.3 09/30/2017    AST 30 09/30/2017    ALT 31 09/30/2017         RAD:   Imaging Results (last 24 hours)     Procedure Component Value Units Date/Time    XR Chest 1 View [[id number]] Collected:  10/05/17 0450     Updated:  10/05/17 0613    Narrative:       Exam: AP portable chest    INDICATION: On ventilator    COMPARISON: 10/2/2015    FINDINGS: Endotracheal tube NG tube and right PICC line remain in  place. Heart size is normal. Persistent hazy opacity left lung  compatible pleural effusion with atelectasis and/or infiltrate.  Right lung remains clear.      Impression:       No significant change.    Electronically signed by:  Donny Meneses MD  10/5/2017 6:12 AM  CDT Workstation: [id number] [facility] Problem List     * (Principal)Displaced intertrochanteric fracture of left femur, initial encounter for closed fracture    Overview  Signed 10/2/2017  8:44 AM by Ata Farris MD     POD# 1.  Management per orthopedics.         Peripheral arterial disease (Chronic)    CAD (coronary artery disease) (Chronic)    Hypertension (Chronic)    Overview Signed 10/2/2017  8:45 AM by Ata Farris MD     Has been hypotensive over the last 24 hours intermittently.  Decreased Lisinopril from 40 mg daily to 2.5 mg daily.  Holding lopressor right now.  On Cardizem drip for atrial fibrillation.         Chronic bronchitis (Chronic)    Overview Signed 10/2/2017  8:45 AM by Ata Farris MD     Currently intubated.  Unable to be extubated yesterday.           Essential hypertension    Overview Signed 9/30/2017  6:58 PM by Nicola Rich MD     Added automatically from request for surgery [id number]         Fall    Overview Addendum 10/2/2017  8:45 AM by Ata Farris MD     Fracture s/p repair per orthopedics.         Tobacco dependence syndrome    Overview Signed 10/2/2017  8:51 AM by Ata Farris MD     Cessation counseling to be provided once extubated and alert.         Atrial fibrillation    Overview Signed 10/2/2017  8:51 AM by Ata Farris MD     New onset.  Given Cardizem bolus this morning and started on Cardizem drip.  Blood pressure started to drop with drip.  Patient placed back on pressors.  PICC line ordered.  Cardiology consulted.                    Plan        Assessment respiratory failure status post hip repair clinically improving    Plan reduce sedation, weaning per respiratory today, if tolerated will extubate    I discussed the patient's findings and my recommendations with patient and nursing staff

## 2017-10-05 NOTE — PLAN OF CARE
Problem: Patient Care Overview (Adult)  Goal: Plan of Care Review  Outcome: Ongoing (interventions implemented as appropriate)    10/05/17 0521   Outcome Evaluation   Outcome Summary/Follow up Plan Pt was maintained on mechanical ventilation throughout the ight. Morning ABG was drawn per  order. Pt did desat in night to upper 80's, FiO2 was increased, then decreased back to 45%, where she remains as of now. Will continue to implement ongoing interventions and maintin patient on mechanical ventilation

## 2017-10-05 NOTE — SIGNIFICANT NOTE
10/05/17 1517   Rehab Treatment   Discipline physical therapist   Rehab Evaluation   Evaluation Not Performed (RN reports pt still not weaned off vent and not appropriate for PT today. PT will check back tomorrow.)   Recommendation   PT - Next Appointment 10/06/17

## 2017-10-05 NOTE — PLAN OF CARE
Problem: Patient Care Overview (Adult)  Goal: Plan of Care Review  Outcome: Ongoing (interventions implemented as appropriate)    10/05/17 0428   Coping/Psychosocial Response Interventions   Plan Of Care Reviewed With other (see comments)   Patient Care Overview   Progress no change   Outcome Evaluation   Outcome Summary/Follow up Plan pt rested well during night. sedation holiday started at 0419. pt not yet awake enough to follow commands. vitals stable through night.        Goal: Adult Individualization and Mutuality  Outcome: Ongoing (interventions implemented as appropriate)  Goal: Discharge Needs Assessment  Outcome: Ongoing (interventions implemented as appropriate)    Problem: Orthopaedic Fracture (Adult)  Goal: Signs and Symptoms of Listed Potential Problems Will be Absent or Manageable (Orthopaedic Fracture)  Outcome: Ongoing (interventions implemented as appropriate)    Problem: Perioperative Period (Adult)  Goal: Signs and Symptoms of Listed Potential Problems Will be Absent or Manageable (Perioperative Period)  Outcome: Ongoing (interventions implemented as appropriate)    Problem: Fall Risk (Adult)  Goal: Identify Related Risk Factors and Signs and Symptoms  Outcome: Ongoing (interventions implemented as appropriate)  Goal: Absence of Falls  Outcome: Ongoing (interventions implemented as appropriate)    Problem: Mechanical Ventilation, Invasive (Adult)  Goal: Signs and Symptoms of Listed Potential Problems Will be Absent or Manageable (Mechanical Ventilation, Invasive)  Outcome: Ongoing (interventions implemented as appropriate)    Problem: Pressure Ulcer Risk (Kieth Q Scale) (Pediatric)  Goal: Identify Related Risk Factors and Signs and Symptoms  Outcome: Ongoing (interventions implemented as appropriate)  Goal: Skin Integrity  Outcome: Ongoing (interventions implemented as appropriate)    Problem: SAFETY - NON-VIOLENT RESTRAINT  Goal: Remains free of injury from restraints (Non-Violent  Restraint)  Outcome: Ongoing (interventions implemented as appropriate)  Goal: Free from restraint(s) (Non-Violent Restraint)  Outcome: Ongoing (interventions implemented as appropriate)

## 2017-10-05 NOTE — PROGRESS NOTES
"Anticoagulation by Pharmacy - Warfarin Day 5 of 42    Anahi Calix is a 77 y.o.female  [Ht: 68\" (172.7 cm); Wt: 173 lb 8 oz (78.7 kg)] on Warfarin PO  for indication of VTE prophylaxis s/p ortho procedure. Patient also has new onset atrial fibrillation.    Goal INR: 1.7-2.5  Today's INR:   Lab Results   Component Value Date    INR 4.82 (H) 10/05/2017         Lab Results   Component Value Date    INR 4.82 (H) 10/05/2017    INR 4.91 (H) 10/04/2017    INR 2.28 (H) 10/03/2017    PROTIME 46.1 (H) 10/05/2017    PROTIME 46.8 (H) 10/04/2017    PROTIME 25.3 (H) 10/03/2017     Lab Results   Component Value Date    HGB 8.3 (L) 10/05/2017    HGB 9.1 (L) 10/04/2017    HGB 10.3 (L) 10/03/2017     Lab Results   Component Value Date    HCT 25.0 (L) 10/05/2017    HCT 28.0 (L) 10/04/2017    HCT 31.4 (L) 10/03/2017     Lab Results   Component Value Date     10/05/2017     10/04/2017     10/03/2017     Assessment/Plan:  Reviewed above labs. INR is 4.82.  INR is supratherapeutic, but trending down. No changes in medication list. Concurrent medications include Amiodarone. Dose was held yesterday.  Will hold warfarin again today. Rx will continue to follow and adjust dose accordingly.  Today is day 5 of therapy.      Cristiane Gloria RPH  10/05/17 11:28 AM     "

## 2017-10-05 NOTE — SIGNIFICANT NOTE
10/05/17 1309   Rehab Treatment   Discipline occupational therapist  (Resident PT/INR is too high no treatment this date.)

## 2017-10-05 NOTE — PROGRESS NOTES
[facility] Medicine Services  INPATIENT PROGRESS NOTE    Length of Stay: 5  Date of Admission: 9/30/2017  Primary Care Physician: Ramiro Gloria MD    Subjective   Chief Complaint: SOA   HPI:   Patient is currently intubated, sedated, and on mechanical ventilation. No overnight events. Heart rate improved. Doing well with spontaneous breathing, anticipate extubation today if continues to do well.     Review of Systems   Constitutional: Negative for chills and fever.   Respiratory: Negative for shortness of breath.    Cardiovascular: Negative for chest pain.   Gastrointestinal: Negative for abdominal pain, diarrhea, nausea and vomiting.   Genitourinary: Negative for dysuria.   Neurological: Negative for dizziness.        All pertinent negatives and positives are as above. All other systems have been reviewed and are negative unless otherwise stated.     Objective    Temp:  [98.4 °F (36.9 °C)-98.9 °F (37.2 °C)] 98.6 °F (37 °C)  Heart Rate:  [65-91] 70  Resp:  [15-21] 17  BP: ()/(51-74) 129/63  FiO2 (%):  [45 %-65 %] 45 %    Physical Exam   Constitutional: She appears well-developed and well-nourished.   Cardiovascular: Normal rate, regular rhythm and normal heart sounds.  Exam reveals no gallop and no friction rub.    No murmur heard.  Pulmonary/Chest: Effort normal and breath sounds normal. No respiratory distress. She has no wheezes. She has no rales.   Abdominal: Soft. Bowel sounds are normal. There is no tenderness.   Musculoskeletal: Normal range of motion. She exhibits no edema.   Neurological:   Pt sedated    Skin: Skin is warm and dry.   Psychiatric:   Pt sedated    Vitals reviewed.        Results Review:  I have reviewed the labs, radiology results, and diagnostic studies.    Laboratory Data:     Results from last 7 days  Lab Units 10/05/17  0355 10/05/17  0251 10/04/17  0436 10/04/17  0402  10/03/17  0335  09/30/17  1609   SODIUM mmol/L  --  145  --  141  --   142  < > 142   SODIUM, ARTERIAL mmol/L 141.4  --  139.2  --   < >  --   < >  --    POTASSIUM mmol/L  --  3.6  --  3.7  --  3.7  < > 3.7   CHLORIDE mmol/L  --  112*  --  109  --  108  < > 100   CO2 mmol/L  --  24.0  --  25.0  --  25.0  < > 28.0   BUN mg/dL  --  30*  --  28*  --  23*  < > 21   CREATININE mg/dL  --  0.81  --  0.98  --  1.10*  < > 0.92   GLUCOSE mg/dL  --  103*  --  102*  --  127*  < > 119*   GLUCOSE, ARTERIAL mmol/L 115  --  93  --   < >  --   < >  --    CALCIUM mg/dL  --  7.7*  --  7.7*  --  7.6*  < > 9.4   BILIRUBIN mg/dL  --   --   --   --   --   --   --  0.4   ALK PHOS U/L  --   --   --   --   --   --   --  87   ALT (SGPT) U/L  --   --   --   --   --   --   --  31   AST (SGOT) U/L  --   --   --   --   --   --   --  30   ANION GAP mmol/L  --  9.0  --  7.0  --  9.0  < > 14.0   < > = values in this interval not displayed.  Estimated Creatinine Clearance: 65.1 mL/min (by C-G formula based on Cr of 0.81).            Results from last 7 days  Lab Units 10/05/17  0251 10/04/17  0402 10/03/17  0335 10/02/17  0922 10/01/17  1414 10/01/17  0537   WBC 10*3/mm3 9.24 11.54* 17.13* 17.44*  --  14.01*   HEMOGLOBIN g/dL 8.3* 9.1* 10.3* 11.6* 12.5 13.6   HEMATOCRIT % 25.0* 28.0* 31.4* 35.3 38.8 41.6   PLATELETS 10*3/mm3 188 162 194 216  --  224       Results from last 7 days  Lab Units 10/05/17  0251 10/04/17  0402 10/03/17  0335 10/02/17  0337 10/01/17  1303   INR  4.82* 4.91* 2.28* 1.28* 1.13       Culture Data:   No results found for: BLOODCX  No results found for: URINECX  No results found for: RESPCX  No results found for: WOUNDCX  No results found for: STOOLCX  No components found for: BODYFLD    Radiology Data:   Imaging Results (last 24 hours)     Procedure Component Value Units Date/Time    XR Chest 1 View [[id number]] Collected:  10/05/17 0450     Updated:  10/05/17 0613    Narrative:       Exam: AP portable chest    INDICATION: On ventilator    COMPARISON: 10/2/2015    FINDINGS: Endotracheal tube NG tube  and right PICC line remain in  place. Heart size is normal. Persistent hazy opacity left lung  compatible pleural effusion with atelectasis and/or infiltrate.  Right lung remains clear.      Impression:       No significant change.    Electronically signed by:  Donny Meneses MD  10/5/2017 6:12 AM  CDT Workstation: [id number]          I have reviewed the patient current medications.     Assessment/Plan     Hospital Problem List     * (Principal)Displaced intertrochanteric fracture of left femur, initial encounter for closed fracture    Overview Signed 10/2/2017  8:44 AM by Ata Farris MD     POD# 1.  Management per orthopedics.         Peripheral arterial disease (Chronic)    CAD (coronary artery disease) (Chronic)    Hypertension (Chronic)    Overview Signed 10/2/2017  8:45 AM by Ata Farris MD     Has been hypotensive over the last 24 hours intermittently.  Decreased Lisinopril from 40 mg daily to 2.5 mg daily.  Holding lopressor right now.  On Cardizem drip for atrial fibrillation.         Chronic bronchitis (Chronic)    Overview Signed 10/2/2017  8:45 AM by Ata Farris MD     Currently intubated.  Unable to be extubated yesterday.           Essential hypertension    Overview Signed 9/30/2017  6:58 PM by Nicola Rich MD     Added automatically from request for surgery [id number]         Fall    Overview Addendum 10/2/2017  8:45 AM by Ata Farris MD     Fracture s/p repair per orthopedics.         Tobacco dependence syndrome    Overview Signed 10/2/2017  8:51 AM by Ata Farris MD     Cessation counseling to be provided once extubated and alert.         Atrial fibrillation    Overview Signed 10/2/2017  8:51 AM by Ata Farris MD     New onset.  Given Cardizem bolus this morning and started on Cardizem drip.  Blood pressure started to drop with drip.  Patient placed back on pressors.  PICC line ordered.  Cardiology consulted.             1) Respiratory failure - Currently intubated and on  mechanical ventilation. On pressure support ventilation, Pulmonary following. Versed PRN for sedation. Continue tube feeds. Anticipate extubation today.  2) Moraxella on sputum Cx - Continue Zosyn Day #4   3) Paroxysmal atrial fibrillation - Currently, has converted to NSR. Continue Amiodarone 200mg. Cardiology following. Patient on Coumadin for anticoagulation, her INR currently is supratherapeutic, plan to hold next dose. NO active bleeding, Hemoglobin at 8.3, monitor H/H.   4) Hypotension, unspecified - Off of vasopressors   5) Displaced intertrochanteric fracture of left femur POD#4 s/p dakota placement - Continue routine post op care, Orthopedics following.   6) GI PPx - Pepcid  7) DVT PPx - Lovenox          Sukhjinder Mendez MD   10/05/17   1:07 PM

## 2017-10-06 LAB
ANION GAP SERPL CALCULATED.3IONS-SCNC: 9 MMOL/L (ref 5–15)
ARTERIAL PATENCY WRIST A: ABNORMAL
ATMOSPHERIC PRESS: ABNORMAL MMHG
BASE EXCESS BLDA CALC-SCNC: 0.9 MMOL/L (ref -2.4–2.4)
BASOPHILS # BLD AUTO: 0.03 10*3/MM3 (ref 0–0.2)
BASOPHILS NFR BLD AUTO: 0.4 % (ref 0–2)
BDY SITE: ABNORMAL
BH CV ECHO MEAS - ACS: 1.6 CM
BH CV ECHO MEAS - AO MAX PG (FULL): 8.5 MMHG
BH CV ECHO MEAS - AO MAX PG: 25.8 MMHG
BH CV ECHO MEAS - AO MEAN PG (FULL): 2.2 MMHG
BH CV ECHO MEAS - AO MEAN PG: 11.7 MMHG
BH CV ECHO MEAS - AO ROOT AREA (BSA CORRECTED): 1.5
BH CV ECHO MEAS - AO ROOT AREA: 5.7 CM^2
BH CV ECHO MEAS - AO ROOT DIAM: 2.7 CM
BH CV ECHO MEAS - AO V2 MAX: 254 CM/SEC
BH CV ECHO MEAS - AO V2 MEAN: 160.5 CM/SEC
BH CV ECHO MEAS - AO V2 VTI: 31.1 CM
BH CV ECHO MEAS - AVA(I,A): 2.1 CM^2
BH CV ECHO MEAS - AVA(I,D): 2.1 CM^2
BH CV ECHO MEAS - AVA(V,A): 1.9 CM^2
BH CV ECHO MEAS - AVA(V,D): 1.9 CM^2
BH CV ECHO MEAS - BSA(HAYCOCK): 1.9 M^2
BH CV ECHO MEAS - BSA: 1.8 M^2
BH CV ECHO MEAS - BZI_BMI: 30.9 KILOGRAMS/M^2
BH CV ECHO MEAS - BZI_METRIC_HEIGHT: 160 CM
BH CV ECHO MEAS - BZI_METRIC_WEIGHT: 79 KG
BH CV ECHO MEAS - EDV(CUBED): 68.4 ML
BH CV ECHO MEAS - EDV(TEICH): 73.8 ML
BH CV ECHO MEAS - EF(CUBED): 63.8 %
BH CV ECHO MEAS - EF(TEICH): 55.8 %
BH CV ECHO MEAS - ESV(CUBED): 24.8 ML
BH CV ECHO MEAS - ESV(TEICH): 32.6 ML
BH CV ECHO MEAS - FS: 28.8 %
BH CV ECHO MEAS - IVS/LVPW: 0.84
BH CV ECHO MEAS - IVSD: 1.6 CM
BH CV ECHO MEAS - LA DIMENSION: 4 CM
BH CV ECHO MEAS - LA/AO: 1.5
BH CV ECHO MEAS - LV MASS(C)D: 296.7 GRAMS
BH CV ECHO MEAS - LV MASS(C)DI: 162.7 GRAMS/M^2
BH CV ECHO MEAS - LV MAX PG: 17.3 MMHG
BH CV ECHO MEAS - LV MEAN PG: 9.5 MMHG
BH CV ECHO MEAS - LV V1 MAX: 208 CM/SEC
BH CV ECHO MEAS - LV V1 MEAN: 143.7 CM/SEC
BH CV ECHO MEAS - LV V1 VTI: 27.2 CM
BH CV ECHO MEAS - LVIDD: 4.1 CM
BH CV ECHO MEAS - LVIDS: 2.9 CM
BH CV ECHO MEAS - LVOT AREA: 2.3 CM^2
BH CV ECHO MEAS - LVOT DIAM: 1.7 CM
BH CV ECHO MEAS - LVPWD: 1.9 CM
BH CV ECHO MEAS - MR MAX PG: 186.3 MMHG
BH CV ECHO MEAS - MR MAX VEL: 682.5 CM/SEC
BH CV ECHO MEAS - MV A MAX VEL: 111.3 CM/SEC
BH CV ECHO MEAS - MV E MAX VEL: 165.1 CM/SEC
BH CV ECHO MEAS - MV E/A: 1.5
BH CV ECHO MEAS - MV MAX PG: 16.5 MMHG
BH CV ECHO MEAS - MV MEAN PG: 8.2 MMHG
BH CV ECHO MEAS - MV V2 MAX: 203 CM/SEC
BH CV ECHO MEAS - MV V2 MEAN: 138 CM/SEC
BH CV ECHO MEAS - MV V2 VTI: 36 CM
BH CV ECHO MEAS - MVA(VTI): 1.8 CM^2
BH CV ECHO MEAS - PA MAX PG: 14.6 MMHG
BH CV ECHO MEAS - PA V2 MAX: 190.8 CM/SEC
BH CV ECHO MEAS - PI END-D VEL: 51.8 CM/SEC
BH CV ECHO MEAS - RVDD: 2.3 CM
BH CV ECHO MEAS - SI(AO): 97.6 ML/M^2
BH CV ECHO MEAS - SI(CUBED): 24 ML/M^2
BH CV ECHO MEAS - SI(LVOT): 35 ML/M^2
BH CV ECHO MEAS - SI(TEICH): 22.6 ML/M^2
BH CV ECHO MEAS - SV(AO): 178 ML
BH CV ECHO MEAS - SV(CUBED): 43.7 ML
BH CV ECHO MEAS - SV(LVOT): 63.8 ML
BH CV ECHO MEAS - SV(TEICH): 41.2 ML
BUN BLD-MCNC: 22 MG/DL (ref 7–21)
BUN/CREAT SERPL: 30.1 (ref 7–25)
CA-I BLD-MCNC: 4.6 MG/DL (ref 4.5–4.9)
CALCIUM SPEC-SCNC: 8.2 MG/DL (ref 8.4–10.2)
CHLORIDE SERPL-SCNC: 114 MMOL/L (ref 95–110)
CO2 BLDA-SCNC: 26.8 MMOL/L (ref 23–27)
CO2 SERPL-SCNC: 27 MMOL/L (ref 22–31)
CREAT BLD-MCNC: 0.73 MG/DL (ref 0.5–1)
DEPRECATED RDW RBC AUTO: 48.2 FL (ref 36.4–46.3)
EOSINOPHIL # BLD AUTO: 0.19 10*3/MM3 (ref 0–0.7)
EOSINOPHIL NFR BLD AUTO: 2.4 % (ref 0–7)
ERYTHROCYTE [DISTWIDTH] IN BLOOD BY AUTOMATED COUNT: 14.6 % (ref 11.5–14.5)
GFR SERPL CREATININE-BSD FRML MDRD: 78 ML/MIN/1.73 (ref 60–90)
GLUCOSE BLD-MCNC: 94 MG/DL (ref 60–100)
GLUCOSE BLDA-MCNC: 95 MMOL/L
HCO3 BLDA-SCNC: 25.5 MMOL/L (ref 22–26)
HCT VFR BLD AUTO: 28.3 % (ref 35–45)
HCT VFR BLD CALC: 30 % (ref 38–47)
HGB BLD-MCNC: 9 G/DL (ref 12–15.5)
HGB BLDA-MCNC: 10.1 G/DL (ref 12–16)
IMM GRANULOCYTES # BLD: 0.08 10*3/MM3 (ref 0–0.02)
IMM GRANULOCYTES NFR BLD: 1 % (ref 0–0.5)
INR PPP: 3.5 (ref 0.8–1.2)
LYMPHOCYTES # BLD AUTO: 0.97 10*3/MM3 (ref 0.6–4.2)
LYMPHOCYTES NFR BLD AUTO: 12.2 % (ref 10–50)
MCH RBC QN AUTO: 28.8 PG (ref 26.5–34)
MCHC RBC AUTO-ENTMCNC: 31.8 G/DL (ref 31.4–36)
MCV RBC AUTO: 90.7 FL (ref 80–98)
MODALITY: ABNORMAL
MONOCYTES # BLD AUTO: 0.63 10*3/MM3 (ref 0–0.9)
MONOCYTES NFR BLD AUTO: 7.9 % (ref 0–12)
NEUTROPHILS # BLD AUTO: 6.06 10*3/MM3 (ref 2–8.6)
NEUTROPHILS NFR BLD AUTO: 76.1 % (ref 37–80)
PCO2 BLDA: 40.7 MM HG (ref 35–45)
PH BLDA: 7.42 PH UNITS (ref 7.35–7.45)
PLATELET # BLD AUTO: 236 10*3/MM3 (ref 150–450)
PMV BLD AUTO: 9.4 FL (ref 8–12)
PO2 BLDA: 67.4 MM HG (ref 80–105)
POTASSIUM BLD-SCNC: 3.4 MMOL/L (ref 3.5–5.1)
POTASSIUM BLDA-SCNC: 3.32 MMOL/L (ref 3.6–4.9)
PROTHROMBIN TIME: 35.7 SECONDS (ref 11.1–15.3)
RBC # BLD AUTO: 3.12 10*6/MM3 (ref 3.77–5.16)
SAO2 % BLDCOA: 93 %
SODIUM BLD-SCNC: 150 MMOL/L (ref 137–145)
SODIUM BLDA-SCNC: 145.4 MMOL/L (ref 138–146)
WBC NRBC COR # BLD: 7.96 10*3/MM3 (ref 3.2–9.8)

## 2017-10-06 PROCEDURE — 85025 COMPLETE CBC W/AUTO DIFF WBC: CPT | Performed by: FAMILY MEDICINE

## 2017-10-06 PROCEDURE — 97110 THERAPEUTIC EXERCISES: CPT

## 2017-10-06 PROCEDURE — G8988 SELF CARE GOAL STATUS: HCPCS

## 2017-10-06 PROCEDURE — G8978 MOBILITY CURRENT STATUS: HCPCS

## 2017-10-06 PROCEDURE — 94799 UNLISTED PULMONARY SVC/PX: CPT

## 2017-10-06 PROCEDURE — G8987 SELF CARE CURRENT STATUS: HCPCS

## 2017-10-06 PROCEDURE — 25010000002 PIPERACILLIN-TAZOBACTAM: Performed by: FAMILY MEDICINE

## 2017-10-06 PROCEDURE — 97162 PT EVAL MOD COMPLEX 30 MIN: CPT

## 2017-10-06 PROCEDURE — 97167 OT EVAL HIGH COMPLEX 60 MIN: CPT

## 2017-10-06 PROCEDURE — 94003 VENT MGMT INPAT SUBQ DAY: CPT

## 2017-10-06 PROCEDURE — 36600 WITHDRAWAL OF ARTERIAL BLOOD: CPT

## 2017-10-06 PROCEDURE — 99232 SBSQ HOSP IP/OBS MODERATE 35: CPT | Performed by: INTERNAL MEDICINE

## 2017-10-06 PROCEDURE — 94640 AIRWAY INHALATION TREATMENT: CPT

## 2017-10-06 PROCEDURE — 82803 BLOOD GASES ANY COMBINATION: CPT | Performed by: INTERNAL MEDICINE

## 2017-10-06 PROCEDURE — 94760 N-INVAS EAR/PLS OXIMETRY 1: CPT

## 2017-10-06 PROCEDURE — G8979 MOBILITY GOAL STATUS: HCPCS

## 2017-10-06 PROCEDURE — 80048 BASIC METABOLIC PNL TOTAL CA: CPT | Performed by: FAMILY MEDICINE

## 2017-10-06 PROCEDURE — 85610 PROTHROMBIN TIME: CPT | Performed by: ORTHOPAEDIC SURGERY

## 2017-10-06 RX ORDER — ALBUTEROL SULFATE 2.5 MG/3ML
2.5 SOLUTION RESPIRATORY (INHALATION)
Status: DISCONTINUED | OUTPATIENT
Start: 2017-10-06 | End: 2017-10-11 | Stop reason: HOSPADM

## 2017-10-06 RX ORDER — WARFARIN SODIUM 1 MG/1
1 TABLET ORAL
Status: DISCONTINUED | OUTPATIENT
Start: 2017-10-06 | End: 2017-10-07

## 2017-10-06 RX ORDER — DEXTROSE MONOHYDRATE 50 MG/ML
75 INJECTION, SOLUTION INTRAVENOUS CONTINUOUS
Status: DISCONTINUED | OUTPATIENT
Start: 2017-10-06 | End: 2017-10-11 | Stop reason: HOSPADM

## 2017-10-06 RX ORDER — FAMOTIDINE 20 MG/1
20 TABLET, FILM COATED ORAL 2 TIMES DAILY
Status: DISCONTINUED | OUTPATIENT
Start: 2017-10-06 | End: 2017-10-11 | Stop reason: HOSPADM

## 2017-10-06 RX ORDER — POTASSIUM CHLORIDE 750 MG/1
40 CAPSULE, EXTENDED RELEASE ORAL ONCE
Status: COMPLETED | OUTPATIENT
Start: 2017-10-06 | End: 2017-10-06

## 2017-10-06 RX ADMIN — ALBUTEROL SULFATE 2.5 MG: 2.5 SOLUTION RESPIRATORY (INHALATION) at 19:05

## 2017-10-06 RX ADMIN — POTASSIUM CHLORIDE 40 MEQ: 750 CAPSULE, EXTENDED RELEASE ORAL at 14:23

## 2017-10-06 RX ADMIN — Medication 10 ML: at 09:25

## 2017-10-06 RX ADMIN — ALBUTEROL SULFATE 2.5 MG: 2.5 SOLUTION RESPIRATORY (INHALATION) at 09:39

## 2017-10-06 RX ADMIN — FUROSEMIDE 20 MG: 20 TABLET ORAL at 09:24

## 2017-10-06 RX ADMIN — PIPERACILLIN AND TAZOBACTAM 2.25 G: 2; .25 INJECTION, POWDER, LYOPHILIZED, FOR SOLUTION INTRAVENOUS; PARENTERAL at 19:43

## 2017-10-06 RX ADMIN — PIPERACILLIN AND TAZOBACTAM 2.25 G: 2; .25 INJECTION, POWDER, LYOPHILIZED, FOR SOLUTION INTRAVENOUS; PARENTERAL at 08:55

## 2017-10-06 RX ADMIN — DEXTROSE MONOHYDRATE 75 ML/HR: 50 INJECTION, SOLUTION INTRAVENOUS at 17:25

## 2017-10-06 RX ADMIN — ACETAMINOPHEN 1000 MG: 500 TABLET ORAL at 06:19

## 2017-10-06 RX ADMIN — FAMOTIDINE 20 MG: 20 TABLET ORAL at 20:08

## 2017-10-06 RX ADMIN — LISINOPRIL 2.5 MG: 2.5 TABLET ORAL at 09:25

## 2017-10-06 RX ADMIN — Medication 10 ML: at 20:13

## 2017-10-06 RX ADMIN — METOPROLOL TARTRATE 25 MG: 25 TABLET ORAL at 09:24

## 2017-10-06 RX ADMIN — FAMOTIDINE 20 MG: 10 INJECTION INTRAVENOUS at 09:23

## 2017-10-06 RX ADMIN — GABAPENTIN 300 MG: 300 CAPSULE ORAL at 09:24

## 2017-10-06 RX ADMIN — OXYCODONE HYDROCHLORIDE 5 MG: 5 TABLET ORAL at 08:54

## 2017-10-06 RX ADMIN — ALBUTEROL SULFATE 2.5 MG: 2.5 SOLUTION RESPIRATORY (INHALATION) at 15:17

## 2017-10-06 RX ADMIN — OXYCODONE HYDROCHLORIDE 5 MG: 5 TABLET ORAL at 12:58

## 2017-10-06 RX ADMIN — OXYCODONE HYDROCHLORIDE 5 MG: 5 TABLET ORAL at 20:08

## 2017-10-06 RX ADMIN — ACETAMINOPHEN 1000 MG: 500 TABLET ORAL at 23:50

## 2017-10-06 RX ADMIN — GABAPENTIN 300 MG: 300 CAPSULE ORAL at 20:08

## 2017-10-06 RX ADMIN — METOPROLOL TARTRATE 25 MG: 25 TABLET ORAL at 17:26

## 2017-10-06 RX ADMIN — Medication 667 MG: at 12:58

## 2017-10-06 RX ADMIN — AMIODARONE HYDROCHLORIDE 200 MG: 200 TABLET ORAL at 20:08

## 2017-10-06 RX ADMIN — PIPERACILLIN AND TAZOBACTAM 2.25 G: 2; .25 INJECTION, POWDER, LYOPHILIZED, FOR SOLUTION INTRAVENOUS; PARENTERAL at 02:37

## 2017-10-06 RX ADMIN — ACETAMINOPHEN 1000 MG: 500 TABLET ORAL at 13:39

## 2017-10-06 RX ADMIN — ACETAMINOPHEN 1000 MG: 500 TABLET ORAL at 01:00

## 2017-10-06 RX ADMIN — GABAPENTIN 300 MG: 300 CAPSULE ORAL at 17:26

## 2017-10-06 RX ADMIN — WARFARIN SODIUM 1 MG: 1 TABLET ORAL at 17:26

## 2017-10-06 RX ADMIN — PIPERACILLIN AND TAZOBACTAM 2.25 G: 2; .25 INJECTION, POWDER, LYOPHILIZED, FOR SOLUTION INTRAVENOUS; PARENTERAL at 13:39

## 2017-10-06 RX ADMIN — AMIODARONE HYDROCHLORIDE 200 MG: 200 TABLET ORAL at 09:24

## 2017-10-06 NOTE — PROGRESS NOTES
"Intensive Care Follow-up      LOS: 6 days     Ms. Anahi Calix, 77 y.o. female is followed for: Displaced intertrochanteric fracture of left femur, initial encounter for closed fracture   CHIEF COMPLIANTShortness of breath    Subjective - Interval History     This lady has a story failure post repair.  Breathing has improved to the point she can probably be extubated today    The patient's relevant past medical, surgical and social history were reviewed and updated in Epic as appropriate.     Objective   /77 (BP Location: Right leg, Patient Position: Lying)  Pulse 87  Temp 97.5 °F (36.4 °C) (Temporal Artery )   Resp 16  Ht 68\" (172.7 cm)  Wt 175 lb 7.8 oz (79.6 kg)  LMP  (LMP Unknown)  SpO2 97%  BMI 26.68 kg/m2      Vent Settings: Vent Mode: PS  FiO2 (%):  [30 %-45 %] 40 %  S RR:  [12] 12  PEEP/CPAP (cm H2O):  [10 cm H20] 10 cm H20  [place] SUP:  [10 cm H20-15 cm H20] 10 cm H20  MAP (cm H2O):  [15-17] 15    Physical Exam:Arousable on a ventilator at minimal settings    General Appearance:       Head:    Normocephalic, without obvious abnormality, atraumatic   Eyes:            Lids and lashes normal, conjunctivae and sclerae normal, no   icterus, no pallor, corneas clear, PERRLA   Ears:    Ears appear intact with no abnormalities noted   Throat:   No oral lesions, no thrush, oral mucosa moist   Neck:   No adenopathy, supple, trachea midline, no thyromegaly, no   carotid bruit, no JVD   Back:     No kyphosis present, no scoliosis present, no skin lesions,      erythema or scars, no tenderness to percussion or                   palpation,   range of motion normal   Lungs:   Diminished breath sounds with scattered rhonchi     Heart:    Regular rhythm and normal rate, normal S1 and S2, no            murmur, no gallop, no rub, no click   Chest Wall:    No abnormalities observed   Abdomen:     Normal bowel sounds, no masses, no organomegaly, soft        non-tender, non-distended, no guarding, no rebound        "         tenderness   Rectal:     Deferred   Extremities:   Moves all extremities well, no edema, no cyanosis, no             redness   Pulses:   Pulses palpable and equal bilaterally   Skin:   No bleeding, bruising or rash   Lymph nodes:   No palpable adenopathy   Neurologic:   Cranial nerves 2 - 12 grossly intact, sensation intact, DTR       present and equal bilaterally     LAB:    pH, Arterial   Date Value Ref Range Status   10/06/2017 7.415 7.350 - 7.450 pH units Final     pCO2, Arterial   Date Value Ref Range Status   10/06/2017 40.7 35.0 - 45.0 mm Hg Final     pO2, Arterial   Date Value Ref Range Status   10/06/2017 67.4 (L) 80.0 - 105.0 mm Hg Final     Lab Results   Component Value Date    WBC 7.96 10/06/2017    HGB 9.0 (L) 10/06/2017    HCT 28.3 (L) 10/06/2017    MCV 90.7 10/06/2017     10/06/2017     Lab Results   Component Value Date    GLUCOSE 94 10/06/2017    BUN 22 (H) 10/06/2017    CREATININE 0.73 10/06/2017    EGFRIFNONA 78 10/06/2017    BCR 30.1 (H) 10/06/2017    CO2 27.0 10/06/2017    CALCIUM 8.2 (L) 10/06/2017    ALBUMIN 4.00 09/30/2017    LABIL2 1.3 09/30/2017    AST 30 09/30/2017    ALT 31 09/30/2017         RAD:   Imaging Results (last 24 hours)     ** No results found for the last 24 hours. **         Impression      Hospital Problem List     * (Principal)Displaced intertrochanteric fracture of left femur, initial encounter for closed fracture    Overview Signed 10/2/2017  8:44 AM by Ata Farris MD     POD# 1.  Management per orthopedics.         Peripheral arterial disease (Chronic)    CAD (coronary artery disease) (Chronic)    Hypertension (Chronic)    Overview Signed 10/2/2017  8:45 AM by Ata Farris MD     Has been hypotensive over the last 24 hours intermittently.  Decreased Lisinopril from 40 mg daily to 2.5 mg daily.  Holding lopressor right now.  On Cardizem drip for atrial fibrillation.         Chronic bronchitis (Chronic)    Overview Signed 10/2/2017  8:45 AM by Ata  MD [place]     Currently intubated.  Unable to be extubated yesterday.           Essential hypertension    Overview Signed 9/30/2017  6:58 PM by Nicola Rich MD     Added automatically from request for surgery [id number]         Fall    Overview Addendum 10/2/2017  8:45 AM by Ata Farris MD     Fracture s/p repair per orthopedics.         Tobacco dependence syndrome    Overview Signed 10/2/2017  8:51 AM by Ata Farris MD     Cessation counseling to be provided once extubated and alert.         Atrial fibrillation    Overview Signed 10/2/2017  8:51 AM by Ata Farris MD     New onset.  Given Cardizem bolus this morning and started on Cardizem drip.  Blood pressure started to drop with drip.  Patient placed back on pressors.  PICC line ordered.  Cardiology consulted.                    Plan        Assessment is postop respiratory failure improving with pulmonary atelectasis    Plan extubate neb treatments O2, follow chest x-ray and blood gas    I discussed the patient's findings and my recommendations with patient and nursing staff

## 2017-10-06 NOTE — PROGRESS NOTES
[facility] Medicine Services  INPATIENT PROGRESS NOTE    Length of Stay: 6  Date of Admission: 9/30/2017  Primary Care Physician: Ramiro Gloria MD    Subjective   Chief Complaint: SOA   HPI:   Patient was extubated earlier this morning. Breathing better, feeling drowsy. No fevers/chills reported. PT/OT to assess patient later today.     Review of Systems   Constitutional: Negative for chills and fever.   Respiratory: Positive for shortness of breath.    Cardiovascular: Negative for chest pain.   Gastrointestinal: Negative for abdominal pain, diarrhea, nausea and vomiting.   Genitourinary: Negative for dysuria.   Neurological: Negative for dizziness.        All pertinent negatives and positives are as above. All other systems have been reviewed and are negative unless otherwise stated.     Objective    Temp:  [97.1 °F (36.2 °C)-98.6 °F (37 °C)] 97.5 °F (36.4 °C)  Heart Rate:  [] 92  Resp:  [14-19] 15  BP: (103-165)/(54-77) 135/62  FiO2 (%):  [30 %-40 %] 40 %    Physical Exam   Constitutional: She is oriented to person, place, and time. She appears well-developed and well-nourished.   Cardiovascular: Normal rate, regular rhythm and normal heart sounds.  Exam reveals no gallop and no friction rub.    No murmur heard.  Pulmonary/Chest: Effort normal and breath sounds normal. No respiratory distress. She has no wheezes. She has no rales.   Abdominal: Soft. Bowel sounds are normal. There is no tenderness.   Musculoskeletal: Normal range of motion. She exhibits no edema.   Neurological: She is alert and oriented to person, place, and time.   Skin: Skin is warm and dry.   Psychiatric: She has a normal mood and affect. Her behavior is normal.   Vitals reviewed.        Results Review:  I have reviewed the labs, radiology results, and diagnostic studies.    Laboratory Data:     Results from last 7 days  Lab Units 10/06/17  0448 10/06/17  0406 10/05/17  0355 10/05/17  0251   10/04/17  0402  09/30/17  1609   SODIUM mmol/L 150*  --   --  145  --  141  < > 142   SODIUM, ARTERIAL mmol/L  --  145.4 141.4  --   < >  --   < >  --    POTASSIUM mmol/L 3.4*  --   --  3.6  --  3.7  < > 3.7   CHLORIDE mmol/L 114*  --   --  112*  --  109  < > 100   CO2 mmol/L 27.0  --   --  24.0  --  25.0  < > 28.0   BUN mg/dL 22*  --   --  30*  --  28*  < > 21   CREATININE mg/dL 0.73  --   --  0.81  --  0.98  < > 0.92   GLUCOSE mg/dL 94  --   --  103*  --  102*  < > 119*   GLUCOSE, ARTERIAL mmol/L  --  95 115  --   < >  --   < >  --    CALCIUM mg/dL 8.2*  --   --  7.7*  --  7.7*  < > 9.4   BILIRUBIN mg/dL  --   --   --   --   --   --   --  0.4   ALK PHOS U/L  --   --   --   --   --   --   --  87   ALT (SGPT) U/L  --   --   --   --   --   --   --  31   AST (SGOT) U/L  --   --   --   --   --   --   --  30   ANION GAP mmol/L 9.0  --   --  9.0  --  7.0  < > 14.0   < > = values in this interval not displayed.  Estimated Creatinine Clearance: 66.3 mL/min (by C-G formula based on Cr of 0.73).            Results from last 7 days  Lab Units 10/06/17  0448 10/05/17  0251 10/04/17  0402 10/03/17  0335 10/02/17  0922   WBC 10*3/mm3 7.96 9.24 11.54* 17.13* 17.44*   HEMOGLOBIN g/dL 9.0* 8.3* 9.1* 10.3* 11.6*   HEMATOCRIT % 28.3* 25.0* 28.0* 31.4* 35.3   PLATELETS 10*3/mm3 236 188 162 194 216       Results from last 7 days  Lab Units 10/06/17  0448 10/05/17  0251 10/04/17  0402 10/03/17  0335 10/02/17  0337   INR  3.50* 4.82* 4.91* 2.28* 1.28*       Culture Data:   No results found for: BLOODCX  No results found for: URINECX  No results found for: RESPCX  No results found for: WOUNDCX  No results found for: STOOLCX  No components found for: BODYFLD    Radiology Data:   Imaging Results (last 24 hours)     ** No results found for the last 24 hours. **          I have reviewed the patient current medications.     Assessment/Plan     Hospital Problem List     * (Principal)Displaced intertrochanteric fracture of left femur, initial  encounter for closed fracture    Overview Signed 10/2/2017  8:44 AM by Ata Farris MD     POD# 1.  Management per orthopedics.         Peripheral arterial disease (Chronic)    CAD (coronary artery disease) (Chronic)    Hypertension (Chronic)    Overview Signed 10/2/2017  8:45 AM by Ata Farris MD     Has been hypotensive over the last 24 hours intermittently.  Decreased Lisinopril from 40 mg daily to 2.5 mg daily.  Holding lopressor right now.  On Cardizem drip for atrial fibrillation.         Chronic bronchitis (Chronic)    Overview Signed 10/2/2017  8:45 AM by Ata Farris MD     Currently intubated.  Unable to be extubated yesterday.           Essential hypertension    Overview Signed 9/30/2017  6:58 PM by Nicola Rich MD     Added automatically from request for surgery [id number]         Fall    Overview Addendum 10/2/2017  8:45 AM by Ata Farris MD     Fracture s/p repair per orthopedics.         Tobacco dependence syndrome    Overview Signed 10/2/2017  8:51 AM by Ata Farris MD     Cessation counseling to be provided once extubated and alert.         Atrial fibrillation    Overview Signed 10/2/2017  8:51 AM by Ata Farris MD     New onset.  Given Cardizem bolus this morning and started on Cardizem drip.  Blood pressure started to drop with drip.  Patient placed back on pressors.  PICC line ordered.  Cardiology consulted.             1) Respiratory failure - S/P extubation, currently on oxygen via nasal cannula, continue on neb treatments.   2) Moraxella on sputum Cx - Continue Zosyn Day #5   3) Paroxysmal atrial fibrillation - Currently, has converted to NSR. Continue Amiodarone 200mg. Cardiology following. Patient on Coumadin for anticoagulation, her INR currently is supratherapeutic, restarting Coumadin at low dose, Pharmacy following, goal INR of 2-3.    4) Hypotension, unspecified - Off of vasopressors   5) Displaced intertrochanteric fracture of left femur POD#5 s/p dakota placement -  Continue routine post op care, Orthopedics following.   6) Hypernatremia - Trend sodium levels, will change fluids to D5W, oral intake as tolerated s/p extubation  7) Hypokalemia - PO replacement   8) GI PPx - Pepcid  9) DVT PPx - Lovenox          Sukhjinder Mendez MD   10/06/17   12:29 PM

## 2017-10-06 NOTE — PROGRESS NOTES
"Anticoagulation by Pharmacy - Warfarin    Anahi Calxi is a 77 y.o.female  [Ht: 68\" (172.7 cm); Wt: 175 lb 7.8 oz (79.6 kg)] on Warfarin for indication of atrial fibrillation.     Patient was initially started on warfarin for VTE prophylaxis s/p ortho procedure, but has experienced new onset atrial fibrillation since admission. Spoke with Dr. Mendez who confirmed a new INR goal of 2-3    Goal INR: 2-3  Today's INR:   Lab Results   Component Value Date    INR 3.50 (H) 10/06/2017         Lab Results   Component Value Date    INR 3.50 (H) 10/06/2017    INR 4.82 (H) 10/05/2017    INR 4.91 (H) 10/04/2017    PROTIME 35.7 (H) 10/06/2017    PROTIME 46.1 (H) 10/05/2017    PROTIME 46.8 (H) 10/04/2017     Lab Results   Component Value Date    HGB 9.0 (L) 10/06/2017    HGB 8.3 (L) 10/05/2017    HGB 9.1 (L) 10/04/2017     Lab Results   Component Value Date    HCT 28.3 (L) 10/06/2017    HCT 25.0 (L) 10/05/2017    HCT 28.0 (L) 10/04/2017     Lab Results   Component Value Date     10/06/2017     10/05/2017     10/04/2017     Assessment/Plan:  Reviewed above labs. INR is 3.5.  INR is supratherapeutic, but decreased quickly from 4.82 yesterday. Warfarin was held last night. No changes in medication list. Concurrent medications include Amiodarone. Although INR remains supratherapeutic, will initiate 1 mg due to expected INR decrease tomorrow. Rx will continue to follow and adjust dose accordingly.        Cristiane Gloria RPH  10/06/17 10:04 AM     "

## 2017-10-06 NOTE — PLAN OF CARE
Problem: Mechanical Ventilation, Invasive (Adult)  Goal: Signs and Symptoms of Listed Potential Problems Will be Absent or Manageable (Mechanical Ventilation, Invasive)  Outcome: Outcome(s) achieved Date Met:  10/06/17

## 2017-10-06 NOTE — PROGRESS NOTES
Cardiology Progress Note:     LOS: 6 days   Patient Care Team:  Ramiro Gloria MD as PCP - General      Subjective:    Chart reviewed , patient seen and examined. Patient denies any chest pain, shortness of breath palpitation.  Patient is extubated and has been feeling okay.  Patient telemetry monitor did not reveal off any evidence of any atrial fibrillation.  Patient is tolerating the metoprolol and amiodarone.  Patient hemoglobin has increased to 9 with a PT INR of 3.5.              Objective:     Objective:  Vitals:    10/06/17 1100   BP: 135/62   Pulse: 92   Resp:    Temp:    SpO2: 95%       Intake/Output Summary (Last 24 hours) at 10/06/17 1318  Last data filed at 10/06/17 0600   Gross per 24 hour   Intake             1633 ml   Output              775 ml   Net              858 ml             Physical Exam:   General Appearance:    Alert, oriented, cooperative, in no acute distress   Head:    Normocephalic, atraumatic, without obvious abnormality   Eyes:           SMILEY  Lids and lashes normal, conjunctivae and sclerae normal, no icterus, no pallor   Ears:    Ears appear intact with no abnormalities noted   Throat:   Mucous membranes pink and moist   Neck:   Supple, trachea midline, no carotid bruit, no organomegaly or JVD   Lungs:     Clear to auscultation and percussion, respirations regular, even and Unlabored. No wheezes, rales, rhonchi    Heart:    Regular rhythm and normal rate, normal S1 and S2, no            murmur, no gallop, no rub, no click   Abdomen:     Soft, non-tender, non-distended, no guarding, no rebound tenderness, Normal bowel sounds in all four quadrant, no masses, liver and spleen nonpalpable,    Genitalia:    Deferred   Extremities:   Moves all extremities well, no edema, no cyanosis, no              Redness, no clubbing   Pulses:   Pulses palpable and equal bilaterally   Skin:   Moist and warm. No bleeding, bruising or rash   Neurologic/Psychiatric:   Alert and oriented to person,  place, and time.  Motor, power and tone in upper and lower extremity is grossly intact.  No focal neurological deficits. Normal cognitive function. No psychomotor reaction or tangential thought. No depression, homicidal ideations and suicidal ideations            Results Review:    Lab Results (last 24 hours)     Procedure Component Value Units Date/Time    Blood Gas, Arterial [[id number]]  (Abnormal) Collected:  10/06/17 0406    Specimen:  Arterial Blood Updated:  10/06/17 0427     Site --      Not performed at this site.        Osiel's Test --      Not performed at this site.        pH, Arterial 7.415 pH units      pCO2, Arterial 40.7 mm Hg      pO2, Arterial 67.4 (L) mm Hg      HCO3, Arterial 25.5 mmol/L      Base Excess, Arterial 0.9 mmol/L      O2 Saturation, Arterial 93.0 %      Hemoglobin, Blood Gas 10.1 (L) g/dL      Hematocrit, Blood Gas 30.0 (L) %      CO2 Content 26.8     Sodium, Arterial 145.4 mmol/L      Potassium, Arterial 3.32 (L) mmol/L      Glucose, Arterial 95 mmol/L      Barometric Pressure for Blood Gas -- mmHg       Not performed at this site.        Modality --      Not performed at this site.        Ionized Calcium 4.60 mg/dL     Basic Metabolic Panel [[id number]]  (Abnormal) Collected:  10/06/17 0448    Specimen:  Blood Updated:  10/06/17 0553     Glucose 94 mg/dL      BUN 22 (H) mg/dL      Creatinine 0.73 mg/dL      Sodium 150 (H) mmol/L      Potassium 3.4 (L) mmol/L      Chloride 114 (H) mmol/L      CO2 27.0 mmol/L      Calcium 8.2 (L) mg/dL      eGFR Non African Amer 78 mL/min/1.73      BUN/Creatinine Ratio 30.1 (H)     Anion Gap 9.0 mmol/L     Narrative:       The MDRD GFR formula is only valid for adults with stable renal function between ages 18 and 70.    Protime-INR [[id number]]  (Abnormal) Collected:  10/06/17 0448    Specimen:  Blood Updated:  10/06/17 0601     Protime 35.7 (H) Seconds      INR 3.50 (H)    Narrative:       Therapeutic range for most indications is 2.0-3.0 INR,  or  2.5-3.5 for mechanical heart valves.    CBC & Differential [[id number]] Collected:  10/06/17 0448    Specimen:  Blood Updated:  10/06/17 0606    Narrative:       The following orders were created for panel order CBC & Differential.  Procedure                               Abnormality         Status                     ---------                               -----------         ------                     CBC Auto Differential[[id number]]        Abnormal            Final result                 Please view results for these tests on the individual orders.    CBC Auto Differential [[id number]]  (Abnormal) Collected:  10/06/17 0448    Specimen:  Blood Updated:  10/06/17 0606     WBC 7.96 10*3/mm3      RBC 3.12 (L) 10*6/mm3      Hemoglobin 9.0 (L) g/dL      Hematocrit 28.3 (L) %      MCV 90.7 fL      MCH 28.8 pg      MCHC 31.8 g/dL      RDW 14.6 (H) %      RDW-SD 48.2 (H) fl      MPV 9.4 fL      Platelets 236 10*3/mm3      Neutrophil % 76.1 %      Lymphocyte % 12.2 %      Monocyte % 7.9 %      Eosinophil % 2.4 %      Basophil % 0.4 %      Immature Grans % 1.0 (H) %      Neutrophils, Absolute 6.06 10*3/mm3      Lymphocytes, Absolute 0.97 10*3/mm3      Monocytes, Absolute 0.63 10*3/mm3      Eosinophils, Absolute 0.19 10*3/mm3      Basophils, Absolute 0.03 10*3/mm3      Immature Grans, Absolute 0.08 (H) 10*3/mm3            Medication Review:   Current Facility-Administered Medications   Medication Dose Route Frequency Provider Last Rate Last Dose   • acetaminophen (TYLENOL) tablet 650 mg  650 mg Oral Once PRN Terrence Johnson MD        Or   • acetaminophen (TYLENOL) suppository 650 mg  650 mg Rectal Once PRN Terrence Johnson MD       • acetaminophen (TYLENOL) tablet 1,000 mg  1,000 mg Oral Q6H Nicola Rich MD   1,000 mg at 10/06/17 0619   • albuterol (PROVENTIL) nebulizer solution 0.083% 2.5 mg/3mL  2.5 mg Nebulization Q6H - RT Lenin Padgett MD   2.5 mg at 10/06/17 0939   • amiodarone (PACERONE) tablet 200 mg  200  mg Oral Q12H Lacho Courtney MD   200 mg at 10/06/17 0924   • calcium acetate (PHOSLO) tablet 667 mg  667 mg Oral Daily Cipriano Leigh MD   667 mg at 10/06/17 1258   • diphenhydrAMINE (BENADRYL) injection 12.5 mg  12.5 mg Intravenous Q15 Min PRN Terrence Johnson MD       • ePHEDrine injection 5 mg  5 mg Intravenous Once PRN Terrence Johnson MD       • famotidine (PEPCID) injection 20 mg  20 mg Intravenous Q12H Sukhjinder Mendez MD   20 mg at 10/06/17 0923   • flumazenil (ROMAZICON) injection 0.2 mg  0.2 mg Intravenous PRN Terrence Johnson MD       • furosemide (LASIX) tablet 20 mg  20 mg Oral Daily Cipriano Leigh MD   20 mg at 10/06/17 0924   • gabapentin (NEURONTIN) capsule 300 mg  300 mg Oral TID Cipriano Leigh MD   300 mg at 10/06/17 0924   • HYDROmorphone (DILAUDID) injection 0.5 mg  0.5 mg Intravenous Q5 Min PRN Terrence Johnson MD       • labetalol (NORMODYNE,TRANDATE) injection 5 mg  5 mg Intravenous Q5 Min PRN Terrence Johnson MD       • lisinopril (PRINIVIL,ZESTRIL) tablet 2.5 mg  2.5 mg Oral Daily Ata Farris MD   2.5 mg at 10/06/17 0925   • metoprolol tartrate (LOPRESSOR) tablet 25 mg  25 mg Oral BID Lacho Courtney MD   25 mg at 10/06/17 0924   • midazolam (VERSED) 50 mg in sodium chloride 0.9 % 100 mL (0.5 mg/mL) infusion  1 mg/hr Intravenous Titrated Vivian Banuelos MD   Stopped at 10/05/17 0855   • midazolam (VERSED) injection 1 mg  1 mg Intravenous Q4H PRN Sukhjinder Mendez MD       • Morphine (PF) injection 4 mg  4 mg Intravenous Q2H PRN Nicola Rich MD   4 mg at 10/05/17 1612   • naloxone (NARCAN) injection 0.2 mg  0.2 mg Intravenous PRN Terrence Johnson MD       • norepinephrine (LEVOPHED) 8,000 mcg in sodium chloride 0.9 % 250 mL (32 mcg/mL) infusion  0.02-0.3 mcg/kg/min Intravenous Titrated Harshul Phong Banuelos MD   Stopped at 10/03/17 1705   • ondansetron (ZOFRAN) injection 4 mg  4 mg Intravenous Q6H PRN Cipriano Leigh MD       • ondansetron (ZOFRAN) injection 4 mg  4  mg Intravenous Once PRN Terrence Johnson MD       • oxyCODONE (ROXICODONE) immediate release tablet 5 mg  5 mg Oral Q4H PRN Nicola Rich MD   5 mg at 10/06/17 1258   • Pharmacy to dose warfarin   Does not apply Continuous PRN Nicola Rich MD       • piperacillin-tazobactam (ZOSYN) 2.25 g/100 mL 0.9% NS IVPB (mbp)  2.25 g Intravenous Q6H Ata Farris MD 0 mL/hr at 10/03/17 1435 2.25 g at 10/06/17 0855   • polyethylene glycol (MIRALAX) powder 17 g  17 g Oral BID Cipriano Leigh MD   17 g at 10/05/17 1722   • potassium chloride (MICRO-K) CR capsule 40 mEq  40 mEq Oral Once Sukhjinder Mendez MD       • propofol (DIPRIVAN) infusion 10 mg/mL 100 mL  5-50 mcg/kg/min Intravenous Titrated Harshul Phong Banuelos MD   Stopped at 10/01/17 1320   • sodium chloride 0.9 % flush 1-10 mL  1-10 mL Intravenous PRN Cipriano Leigh MD   10 mL at 10/03/17 1006   • sodium chloride 0.9 % flush 10 mL  10 mL Intravenous PRN Barrie Rodríguez MD       • sodium chloride 0.9 % flush 10 mL  10 mL Intracatheter Q12H Ata Farris MD   10 mL at 10/06/17 0925   • sodium chloride 0.9 % flush 10 mL  10 mL Intracatheter PRN Ata Farris MD       • warfarin (COUMADIN) tablet 1 mg  1 mg Oral Daily Sukhjinder Mendez MD           Assessment and Plan:    Principal Problem:    Displaced intertrochanteric fracture of left femur, initial encounter for closed fracture  Active Problems:    Peripheral arterial disease    CAD (coronary artery disease)    Hypertension    Chronic bronchitis    Essential hypertension    Fall    Tobacco dependence syndrome    Atrial fibrillation  1.  Paroxysmal atrial fibrillation.  Patient is status post electrical cardioversion to normal sinus rhythm and is currently on amiodarone 200 mg twice a day.  Patient has not had any recurrence of atrial fibrillation and is currently tolerating amiodarone and metoprolol.  Patient will be continued on the present dose of the amiodarone and the metoprolol.  2.   Anticoagulation with Coumadin.  Patient's PT/INR was 3.5 and Coumadin is still being held and would follow the patient PT/INR.  3.  Arterial hypertension.  Patient blood pressure has remained stable and would continue with the present dose of the metoprolol and lisinopril  4.  Status post  intertrochanteric fracture of left femur surgical intervention.  Patient is currently extubated and is stable.  5.  Anemia.  Patient hemoglobin is 9 and currently does not have any signs of hemoptysis hematuria bright red blood per rectum.  Patient anticoagulation has been initiated at 1 mg.  Would follow the H&H.            Lacho Courtney MD  10/06/17  1:18 PM      Time: Time spent on face-to-face interaction 25 minutes    Dictated utilizing Dragon dictation.

## 2017-10-06 NOTE — THERAPY EVALUATION
Acute Care - Occupational Therapy Initial Evaluation  [facility]     Patient Name: Anahi Calix  : 1940  MRN: [id number]  Today's Date: 10/6/2017  Onset of Illness/Injury or Date of Surgery Date: 17  Date of Referral to OT: 10/01/17  Referring Physician: Dr. DONNA Rich.    Admit Date: 2017       ICD-10-CM ICD-9-CM   1. Displaced intertrochanteric fracture of left femur, initial encounter for closed fracture S72.142A 820.21   2. Closed left hip fracture, initial encounter S72.002A 820.8   3. Fall, initial encounter W19.XXXA E888.9   4. Peripheral arterial disease I73.9 443.9   5. Essential hypertension I10 401.9   6. Gastroesophageal reflux disease, esophagitis presence not specified K21.9 530.81   7. Abdominal aortic aneurysm (AAA) without rupture I71.4 441.4   8. Impaired mobility and activities of daily living Z74.09 799.89     Patient Active Problem List   Diagnosis   • Peripheral arterial disease   • Gastroesophageal reflux disease   • Abdominal aortic aneurysm (AAA) without rupture   • Displaced intertrochanteric fracture of left femur, initial encounter for closed fracture   • CAD (coronary artery disease)   • Hypertension   • Chronic bronchitis   • Essential hypertension   • Fall   • Tobacco dependence syndrome   • Atrial fibrillation     Past Medical History:   Diagnosis Date   • Abdominal aortic aneurysm (AAA) without rupture    • CAD (coronary artery disease)    • Gastroesophageal reflux disease    • Hypercholesterolemia    • Hypertension    • Nuclear senile cataract    • Osteoarthritis    • Osteoporosis    • Peripheral arterial disease    • Solitary lung nodule      Past Surgical History:   Procedure Laterality Date   • ABDOMINAL AORTIC ANEURYSM REPAIR     • HIP INTERTROCHANTERIC NAILING Left 10/1/2017    Procedure: HIP INTERTROCHANTERIC NAILING - LEFT - Long d[place]ota;  Surgeon: Nicola Rich MD;  Location: [facility];  Service:           OT ASSESSMENT FLOWSHEET (last 72 hours)       OT Evaluation       10/06/17 1009 10/05/17 1517 10/04/17 1643 10/04/17 1616 10/03/17 1721    Rehab Evaluation    Document Type evaluation  -RB        Subjective Information agree to therapy;complains of;pain  -RB        Evaluation Not Performed  --   RN reports pt still not weaned off vent and not appropriate for PT today. PT will check back tomorrow.  -BLAIR other (see comments)   OTR checked on pt via phone and nursing states pt is still sedated and on vent but will attempt to take off vent tomorrow..  -RB other (see comments)   Pt still vented and sedated. OT reports RN expects an attempt at weaning tomorrow. PT will recheck tomorrow.  -BLAIR other (see comments)   Spoke with MD today and he said wait until pt is off sedation before OT/PT eval.  Nursing to let therapy know when off sedation .    -RB    Patient Effort, Rehab Treatment poor  -RB        Symptoms Noted During/After Treatment fatigue  -RB        General Information    Patient Profile Review yes  -RB        Onset of Illness/Injury or Date of Surgery Date 09/30/17  -RB        Referring Physician Dr. DONNA Rich.  -RB        General Observations Supine in bed with HOB up, nasal canula, IV and brush cathiter.  -RB        Pertinent History Of Current Problem Fall on 9/30/17 with L hip fx - intertrochanteric nailing-long dakota on 10/1/17.  Intubated and sedated. Extubated on 10/6/17.  -RB        Precautions/Limitations fall precautions  -RB        Prior Level of Function independent:;gait;transfer;ADL's  -RB        Equipment Currently Used at Home none  -RB        Plans/Goals Discussed With patient  -RB        Risks Reviewed patient:  -RB        Living Environment    Lives With child(nury), adult   Son who works.  -RB        Living Arrangements house  -RB        Home Accessibility stairs to enter home;tub/shower is not walk in;grab bars present (bathtub)  -RB        Number of Stairs to Enter Home 3  -RB        Stair Railings at Home outside, present on right side   -RB        Transportation Available family or friend will provide  -RB        Clinical Impression    Date of Referral to OT 10/01/17  -RB        OT Diagnosis Impaired mobility and ADLs.  -RB        Functional Level At Time Of Evaluation Impaired mobility and ADLs.  -RB        Impairments Found (describe specific impairments) gait, locomotion, and balance;aerobic capacity/endurance;ROM;motor function  -RB        Criteria for Skilled Therapeutic Interventions Met yes;treatment indicated  -RB        Rehab Potential fair, will monitor progress closely  -RB        Therapy Frequency --   3-14x/wk  -RB        Predicted Duration of Therapy Intervention (days/wks) Until goals met.  -RB        Anticipated Equipment Needs At Discharge gait belt;front wheeled walker;raised toilet seat;wheelchair  -RB        Functional Level Prior    Ambulation 0-->independent  -RB        Transferring 0-->independent  -RB        Toileting 0-->independent  -RB        Bathing 0-->independent  -RB        Dressing 0-->independent  -RB        Eating 0-->independent  -RB        Communication 0-->understands/communicates without difficulty  -RB        Swallowing 0-->swallows foods/liquids without difficulty  -RB        Vital Signs    Pre Systolic BP Rehab 158  -RB        Pre Treatment Diastolic BP 71  -RB        Post Systolic BP Rehab 145  -RB        Post Treatment Diastolic BP 65  -RB        Pretreatment Heart Rate (beats/min) 99  -RB        Posttreatment Heart Rate (beats/min) 95  -RB        Pre SpO2 (%) 96  -RB        O2 Delivery Pre Treatment supplemental O2   3 L  -RB        Post SpO2 (%) 98  -RB        O2 Delivery Post Treatment supplemental O2   3 L  -RB        Pre Patient Position Supine  -RB        Intra Patient Position Supine  -RB        Post Patient Position Supine  -RB        Pain Assessment    Pain Assessment 0-10  -RB        Pain Score 10  -RB        Post Pain Score 10  -RB        Pain Type Chronic pain;Acute pain  -RB        Pain  Location Generalized  -RB        Pain Orientation --   All over.  -RB        Vision Assessment/Intervention    Visual Impairment WFL with corrective lenses  -RB        Cognitive Assessment/Intervention    Current Cognitive/Communication Assessment impaired  -RB        Orientation Status oriented to;person   and present month.  -RB        Follows Commands/Answers Questions 50% of the time;75% of the time  -RB        Personal Safety mild impairment  -RB        Personal Safety Interventions supervised activity  -RB        ROM (Range of Motion)    General ROM upper extremity range of motion deficits identified  -RB        General ROM Detail minimal movement in all UE joints.  -RB        MMT (Manual Muscle Testing)    General MMT Assessment upper extremity strength deficits identified  -RB        General MMT Assessment Detail Grossly 1/5 with all UE strength.  -RB        Transfer Assessment/Treatment    Transfers, Bed-Chair Carter not tested  -RB        Functional Mobility    Functional Mobility- Ind. Level not tested  -RB        General Therapy Interventions    Planned Therapy Interventions activity intolerance;ADL retraining;balance training;energy conservation;bed mobility training;transfer training;strengthening;ROM (Range of Motion)  -RB        Activity Intolerance poor  -RB        Positioning and Restraints    Pre-Treatment Position in bed  -RB        Post Treatment Position bed  -RB        In Bed supine;call light within reach;encouraged to call for assist  -RB          10/03/17 1616                Rehab Evaluation    Evaluation Not Performed other (see comments)   OT spoke with MD  and MD requested therapy not see pt until she is off sedation. Nursing is to let PT/OT know when pt off josselyn  -BLAIR          User Key  (r) = Recorded By, (t) = Taken By, (c) = Cosigned By    Initials Name Effective Dates    RB Jonny Pardo, OT 06/15/16 -     BLAIR Nelson, PT 10/17/16 -            Occupational Therapy Education      Title: PT OT SLP Therapies (Active)     Topic: Occupational Therapy (Active)     Point: Precautions (Done)    Description: Instruct learner(s) on prescribed precautions during self-care and functional transfers.    Learning Progress Summary    Learner Readiness Method Response Comment Documented by Status   Patient Acceptance E NR,VU Edu pt on no OOB without assist.  10/06/17 1152 Done                      User Key     Initials Effective Dates Name Provider Type Discipline     06/15/16 -  Jonny Pardo, OT Occupational Therapist OT                  OT Recommendation and Plan  Anticipated Equipment Needs At Discharge: gait belt, front wheeled walker, raised toilet seat, wheelchair  Planned Therapy Interventions: activity intolerance, ADL retraining, balance training, energy conservation, bed mobility training, transfer training, strengthening, ROM (Range of Motion)  Therapy Frequency:  (3-14x/wk)  Plan of Care Review  Plan Of Care Reviewed With: patient  Outcome Summary/Follow up Plan: OT archie complete on this date.  Pt dep with all ADLS and mobility.  Minimal movenent in all B UE joints . She would benefit form OT services to increase independence with ADLs and functional mobility/transfers.          OT Goals       10/06/17 1155          Transfer Training OT LTG    Transfer Training OT LTG, Date Established 10/06/17  -RB      Transfer Training OT LTG, Time to Achieve by discharge  -RB      Transfer Training OT LTG, Activity Type all transfers  -RB      Transfer Training OT LTG, [place] Level supervision required;contact guard assist  -RB      Transfer Training OT LTG, Assist Device walker, rolling  -RB      Range of Motion OT LTG    Range of Motion Goal OT LTG, Date Established 10/06/17  -RB      Range of Motion Goal OT LTG, Time to Achieve by discharge  -RB      Range of Motion Goal OT LTG, AROM Measure Full AROM to all UE joints to increase independence with ADLs.  -RB      Dynamic Sitting Balance OT  LTG    Dynamic Sitting Balance OT LTG, Date Established 10/06/17  -RB      Dynamic Sitting Balance OT LTG, Time to Achieve by discharge  -RB      Dynamic Sitting Balance OT LTG, [place] Level contact guard assist   15 minutes with functional activity.  -RB      Dynamic Sitting Balance OT LTG, Assist Device bed rails  -RB      Eating Self-Feeding OT LTG    Eat Self Feeding Goal OT LTG, Date Established 10/06/17  -RB      Eat Self Feeding Goal OT LTG, Time to Achieve by discharge  -RB      Eat Self Feed Goal OT LTG, [place] Level supervision required;contact guard assist  -RB      Eat Self Feeding Goal OT LTG, Position sitting in chair  -RB      ADL OT LTG    ADL OT LTG, Date Established 10/06/17  -RB      ADL OT LTG, Time to Achieve by discharge  -RB      ADL OT LTG, Activity Type ADL skills   Sponge bath and dress when appropriate.  -RB      ADL OT LTG, [place] Level min assist  -RB        User Key  (r) = Recorded By, (t) = Taken By, (c) = Cosigned By    Initials Name Provider Type    MIESHA Pardo OT Occupational Therapist                Outcome Measures       10/06/17 1009          How much help from another is currently needed...    Putting on and taking off regular lower body clothing? 1  -RB      Bathing (including washing, rinsing, and drying) 1  -RB      Toileting (which includes using toilet bed pan or urinal) 1  -RB      Putting on and taking off regular upper body clothing 1  -RB      Taking care of personal grooming (such as brushing teeth) 1  -RB      Eating meals 1  -RB      Score 6  -RB      Functional Assessment    Outcome Measure Options AM-PAC 6 Clicks Daily Activity (OT)  -RB        User Key  (r) = Recorded By, (t) = Taken By, (c) = Cosigned By    Initials Name Provider Type    MIESHA Pardo OT Occupational Therapist          Time Calculation:   OT Start Time: 1009  OT Stop Time: 1033  OT Time Calculation (min): 24 min    Therapy Charges for Today     Code Description Service  Date Service Provider Modifiers Qty    [id number]  OT SELFCARE CURRENT 10/6/2017 Jonny Pardo OT GO, CN 1    [id number]  OT SELFCARE PROJECTED 10/6/2017 ANMOL Browne, CM 1    [id number]  OT EVAL HIGH COMPLEXITY 2 10/6/2017 ANMOL Browne 1          OT G-codes  OT Professional Judgement Used?: Yes  OT Functional Scales Options: AM-PAC 6 Clicks Daily Activity (OT)  Score: 6  Functional Limitation: Self care  Self Care Current Status (): 100 percent impaired, limited or restricted  Self Care Goal Status (): At least 80 percent but less than 100 percent impaired, limited or restricted    Jonny Pardo OT  10/6/2017

## 2017-10-06 NOTE — PLAN OF CARE
Problem: Patient Care Overview (Adult)  Goal: Plan of Care Review  Outcome: Ongoing (interventions implemented as appropriate)    10/06/17 1526   Coping/Psychosocial Response Interventions   Plan Of Care Reviewed With patient   Outcome Evaluation   Outcome Summary/Follow up Plan PT evaluation completed. Pt still considerably groggy and was inconsistent in active participation with PT evaluation. Function limited primarily by decreased strength, range and tolerance for functional mobility and activities. Pt will benefit from skilled PT to gently regain lost function as pt improves medically. If pt progresses slowly, anticipate she may need placement for care prior to discharge home with continued therapy services.         Problem: Inpatient Physical Therapy  Goal: Bed Mobility Goal LTG- PT  Outcome: Ongoing (interventions implemented as appropriate)    10/06/17 1526   Bed Mobility PT LTG   Bed Mobility PT LTG, Date Established 10/06/17   Bed Mobility PT LTG, Time to Achieve by discharge   Bed Mobility PT LTG, Activity Type all bed mobility   Bed Mobility PT LTG, [place] Level moderate assist (50% patient effort);minimum assist (75% patient effort)   Bed Mobility PT LTG, Outcome goal ongoing       Goal: Transfer Training Goal 1 LTG- PT  Outcome: Ongoing (interventions implemented as appropriate)    10/06/17 1526   Transfer Training PT LTG   Transfer Training PT LTG, Date Established 10/06/17   Transfer Training PT LTG, Time to Achieve by discharge   Transfer Training PT LTG, Activity Type bed to chair /chair to bed;sit to stand/stand to sit;toilet   Transfer Training PT LTG, [place] Level moderate assist (50% patient effort);minimum assist (75% patient effort)   Transfer Training PT LTG, Assist Device walker, rolling   Transfer Training PT LTG, Outcome goal ongoing       Goal: Gait Training Goal LTG- PT  Outcome: Ongoing (interventions implemented as appropriate)    10/06/17 1526   Gait Training PT LTG    Gait Training Goal PT LTG, Date Established 10/06/17   Gait Training Goal PT LTG, Time to Achieve by discharge   Gait Training Goal PT LTG, [place] Level moderate assist (50% patient effort);minimum assist (75% patient effort)   Gait Training Goal PT LTG, Distance to Achieve 10ft   Gait Training Goal PT LTG, Additional Goal 50ftx2   Gait Training Goal PT LTG, Outcome goal ongoing       Goal: Strength Goal LTG- PT  Outcome: Ongoing (interventions implemented as appropriate)    10/06/17 1526   Strength Goal PT LTG   Strength Goal PT LTG, Date Established 10/06/17   Strength Goal PT LTG, Time to Achieve by discharge   Strength Goal PT LTG, Measure to Achieve 10reps of PROM/AAROM exercises   Strength Goal PT LTG, Functional Goal 2 sets of 10 reps AROM exercises   Strength Goal PT LTG, Outcome goal ongoing       Goal: Patient Education Goal LTG- PT  Outcome: Ongoing (interventions implemented as appropriate)

## 2017-10-06 NOTE — CONSULTS
Adult Nutrition  Assessment    Patient Name:  Anahi Calix  YOB: 1940  MRN: [id number]  Admit Date:  9/30/2017    Assessment Date:  10/6/2017    Comments:  Pt extubated this am. No po diet order yet, but RN plans to be able to start po soon. Labs noted. RD will monitor and make recs for supplements prn.           Reason for Assessment       10/06/17 1016    Reason for Assessment    Reason For Assessment/Visit follow up protocol                Nutrition/Diet History       10/06/17 1016    Nutrition/Diet History    Typical Food/Fluid Intake Per RN, pt has been extubated and TF stopped. Plan for po diet to be started.               Labs/Tests/Procedures/Meds       10/06/17 1017    Labs/Tests/Procedures/Meds    Labs/Tests Review Reviewed    Medication Review Reviewed, pertinent                    Electronically signed by:  Magda Cerna RD  10/06/17 10:20 AM

## 2017-10-06 NOTE — PLAN OF CARE
Problem: Patient Care Overview (Adult)  Goal: Plan of Care Review  Outcome: Ongoing (interventions implemented as appropriate)    10/06/17 1155   Coping/Psychosocial Response Interventions   Plan Of Care Reviewed With patient   Outcome Evaluation   Outcome Summary/Follow up Plan OT archie complete on this date. Pt dep with all ADLS and mobility. Minimal movenent in all B UE joints . She would benefit form OT services to increase independence with ADLs and functional mobility/transfers.       Goal: Discharge Needs Assessment    10/06/17 1155   Current Health   Outpatient/Agency/Support Group Needs skilled nursing facility (specify)         Problem: Inpatient Occupational Therapy  Goal: Transfer Training Goal 1 LTG- OT  Outcome: Ongoing (interventions implemented as appropriate)    10/06/17 1155   Transfer Training OT LTG   Transfer Training OT LTG, Date Established 10/06/17   Transfer Training OT LTG, Time to Achieve by discharge   Transfer Training OT LTG, Activity Type all transfers   Transfer Training OT LTG, [place] Level supervision required;contact guard assist   Transfer Training OT LTG, Assist Device walker, rolling       Goal: Range of Motion Goal LTG- OT  Outcome: Ongoing (interventions implemented as appropriate)    10/06/17 1155   Range of Motion OT LTG   Range of Motion Goal OT LTG, Date Established 10/06/17   Range of Motion Goal OT LTG, Time to Achieve by discharge   Range of Motion Goal OT LTG, AROM Measure Full AROM to all UE joints to increase independence with ADLs.       Goal: Dynamic Sitting Balance Goal LTG- OT  Outcome: Ongoing (interventions implemented as appropriate)    10/06/17 1155   Dynamic Sitting Balance OT LTG   Dynamic Sitting Balance OT LTG, Date Established 10/06/17   Dynamic Sitting Balance OT LTG, Time to Achieve by discharge   Dynamic Sitting Balance OT LTG, [place] Level contact guard assist   Dynamic Sitting Balance OT LTG, Assist Device bed rails         10/06/17 1155    Dynamic Sitting Balance OT LTG   Dynamic Sitting Balance OT LTG, Date Established 10/06/17   Dynamic Sitting Balance OT LTG, Time to Achieve by discharge   Dynamic Sitting Balance OT LTG, [place] Level contact guard assist  (15 minutes with functional activity.)   Dynamic Sitting Balance OT LTG, Assist Device bed rails       Goal: Eating Self-Feeding Goal LTG- OT  Outcome: Ongoing (interventions implemented as appropriate)    10/06/17 1155   Eating Self-Feeding OT LTG   Eat Self Feeding Goal OT LTG, Date Established 10/06/17   Eat Self Feeding Goal OT LTG, Time to Achieve by discharge   Eat Self Feed Goal OT LTG, [place] Level supervision required;contact guard assist   Eat Self Feeding Goal OT LTG, Position sitting in chair       Goal: ADL Goal LTG- OT  Outcome: Ongoing (interventions implemented as appropriate)    10/06/17 1155   ADL OT LTG   ADL OT LTG, Date Established 10/06/17   ADL OT LTG, Time to Achieve by discharge   ADL OT LTG, Activity Type ADL skills  (Sponge bath and dress when appropriate.)   ADL OT LTG, [place] Level min assist

## 2017-10-06 NOTE — THERAPY EVALUATION
Acute Care - Physical Therapy Initial Evaluation  [facility]     Patient Name: Anahi Calix  : 1940  MRN: [id number]  Today's Date: 10/6/2017   Onset of Illness/Injury or Date of Surgery Date: 17  Date of Referral to PT: 10/01/17  Referring Physician: Dr. Nicola Rich      Admit Date: 2017     Visit Dx:    ICD-10-CM ICD-9-CM   1. Displaced intertrochanteric fracture of left femur, initial encounter for closed fracture S72.142A 820.21   2. Closed left hip fracture, initial encounter S72.002A 820.8   3. Fall, initial encounter W19.XXXA E888.9   4. Peripheral arterial disease I73.9 443.9   5. Essential hypertension I10 401.9   6. Gastroesophageal reflux disease, esophagitis presence not specified K21.9 530.81   7. Abdominal aortic aneurysm (AAA) without rupture I71.4 441.4   8. Impaired mobility and activities of daily living Z74.09 799.89   9. Impaired physical mobility Z74.09 781.99     Patient Active Problem List   Diagnosis   • Peripheral arterial disease   • Gastroesophageal reflux disease   • Abdominal aortic aneurysm (AAA) without rupture   • Displaced intertrochanteric fracture of left femur, initial encounter for closed fracture   • CAD (coronary artery disease)   • Hypertension   • Chronic bronchitis   • Essential hypertension   • Fall   • Tobacco dependence syndrome   • Atrial fibrillation     Past Medical History:   Diagnosis Date   • Abdominal aortic aneurysm (AAA) without rupture    • CAD (coronary artery disease)    • Gastroesophageal reflux disease    • Hypercholesterolemia    • Hypertension    • Nuclear senile cataract    • Osteoarthritis    • Osteoporosis    • Peripheral arterial disease    • Solitary lung nodule      Past Surgical History:   Procedure Laterality Date   • ABDOMINAL AORTIC ANEURYSM REPAIR     • HIP INTERTROCHANTERIC NAILING Left 10/1/2017    Procedure: HIP INTERTROCHANTERIC NAILING - LEFT - [place];  Surgeon: Nicola Rich MD;  Location: [facility] OR;   Service:           PT ASSESSMENT (last 72 hours)      PT Evaluation       10/06/17 1123 10/06/17 1009    Rehab Evaluation    Document Type evaluation  -BLAIR evaluation  -RB    Subjective Information agree to therapy   Rn says ok to see pt  -BLAIR agree to therapy;complains of;pain  -RB    Patient Effort, Rehab Treatment fair  -BLAIR poor  -RB    Symptoms Noted During/After Treatment other (see comments)   groggy moderately difficult to rouse  -BLAIR fatigue  -RB    General Information    Patient Profile Review yes  -BLAIR yes  -RB    Onset of Illness/Injury or Date of Surgery Date 09/30/17  -BLAIR 09/30/17  -RB    Referring Physician Dr. Nicola Rich  -BLAIR Dr. DONNA Rich.  -RB    General Observations Pt supine;noted IV, brush, telemetry, O2 3L/min per nasal cannula  -BLAIR Supine in bed with HOB up, nasal canula, IV and brush cathiter.  -RB    Pertinent History Of Current Problem Pt admitted to [facility] after fall fracturing L hip. Pt underwent intertrochanteric nailing L hip 10/1/2017. Pt developed atrial fib and underwent electrical cardioversion to NSR. Pt also developed respiratory failure and was intubated until able to be weaned off vent today.  -BLAIR Fall on 9/30/17 with L hip fx - intertrochanteric nailing-long [place] on 10/1/17.  Intubated and sedated. Extubated on 10/6/17.  -RB    Precautions/Limitations fall precautions  -BLAIR fall precautions  -RB    Prior Level of Function independent:  -BLAIR independent:;gait;transfer;ADL's  -RB    Equipment Currently Used at Home none  -BLAIR none  -RB    Plans/Goals Discussed With patient  -BLAIR patient  -RB    Risks Reviewed patient:  -BLAIR patient:  -RB    Benefits Reviewed patient:  -BLAIR     Barriers to Rehab medically complex  -BLAIR     Living Environment    Lives With child(nury), adult   son, dtr-in-law  -BLAIR child(nury), adult   Son who works.  -RB    Living Arrangements house  -BLAIR house  -RB    Home Accessibility stairs to enter home  -BLAIR stairs to enter home;tub/shower is not walk in;grab bars present  (bathtub)  -RB    Number of Stairs to Enter Home 3  -BLAIR 3  -RB    Stair Railings at Home outside, present on right side  -BLAIR outside, present on right side  -RB    Transportation Available  family or friend will provide  -RB    Clinical Impression    Date of Referral to PT 10/01/17  -BLAIR     PT Diagnosis impaired mobility due to s/p L hip fx repair s/p fall with respiratory failureand PAF  -BLAIR     Prognosis per MD  -BLAIR     Patient/Family Goals Statement Be stronger  -BLAIR     Criteria for Skilled Therapeutic Interventions Met yes  -BLAIR     Rehab Potential good, to achieve stated therapy goals  -BLAIR     Predicted Duration of Therapy Intervention (days/wks) until discharge or goals met  -BLAIR     Vital Signs    Pre Systolic BP Rehab 135  -BLAIR 158  -RB    Pre Treatment Diastolic BP 62  -BLAIR 71  -RB    Post Systolic BP Rehab 130  -BLAIR 145  -RB    Post Treatment Diastolic BP 63  -BLAIR 65  -RB    Pretreatment Heart Rate (beats/min) 92  -BLAIR 99  -RB    Posttreatment Heart Rate (beats/min) 90  -BLAIR 95  -RB    Pre SpO2 (%) 96  -BLAIR 96  -RB    O2 Delivery Pre Treatment supplemental O2   3L/min  -BLAIR supplemental O2   3 L  -RB    Post SpO2 (%) 94  -BLAIR 98  -RB    O2 Delivery Post Treatment supplemental O2  -BLAIR supplemental O2   3 L  -RB    Pre Patient Position Supine  -BLAIR Supine  -RB    Intra Patient Position  Supine  -RB    Post Patient Position Supine  -BLAIR Supine  -RB    Pain Assessment    Pain Assessment 0-10  -BLAIR 0-10  -RB    Pain Score 10  -BLAIR 10  -RB    Post Pain Score 10  -BLAIR 10  -RB    Pain Type Acute pain;Surgical pain  -BLAIR Chronic pain;Acute pain  -RB    Pain Location Generalized   L leg  -BLAIR Generalized  -RB    Pain Orientation  --   All over.  -RB    Pain Intervention(s) Rest   reported to RN  -BLAIR     Vision Assessment/Intervention    Visual Impairment WFL with corrective lenses  -BLAIR WFL with corrective lenses  -RB    Cognitive Assessment/Intervention    Current Cognitive/Communication Assessment impaired  -BLAIR impaired  -RB     Orientation Status oriented to;person     -BLAIR oriented to;person   and present month.  -RB    Follows Commands/Answers Questions 50% of the time  -BLAIR 50% of the time;75% of the time  -RB    Personal Safety mild impairment  -BLAIR mild impairment  -RB    Personal Safety Interventions supervised activity  -BLAIR supervised activity  -RB    ROM (Range of Motion)    General ROM upper extremity range of motion deficits identified;lower extremity range of motion deficits identified  -BLAIR upper extremity range of motion deficits identified  -RB    General ROM Detail  minimal movement in all UE joints.  -RB    General UE Assessment    ROM Detail Please refer to OT evaluation for BUE range detail  -BLAIR     General LE Assessment    ROM Detail RLE: AAROM WFL ankle/foot, knee, hip;RLE: AAROM WFL stephen/foot, pt tolerated knee/hip flexion to 50 degrees  -BLAIR     MMT (Manual Muscle Testing)    General MMT Assessment upper extremity strength deficits identified;lower extremity strength deficits identified  - upper extremity strength deficits identified  -RB    General MMT Assessment Detail  Grossly 1/5 with all UE strength.  -RB    Upper Extremity    Upper Ext Manual Muscle Testing Detail Please refer to OT evaluation for BUE strength detail  -BLAIR     Lower Extremity    Lower Ext Manual Muscle Testing Detail RLE: pt had difficulty following  instructions and tending to task for MMT due to grogginess; observed at least 2/5 ankle/foot, 2-/5 knee, hip: LLE: observed at least 2-/5 ankle/foot, knee, 1-/5 hip  -     Mobility Assessment/Training    Extremity Weight-Bearing Status left lower extremity  -     Left Lower Extremity Weight-Bearing weight-bearing as tolerated   when pt cleared medically to be OOB per ortho  -     Bed Mobility, Assessment/Treatment    Bed Mobility, Roll Left, [place] dependent (less than 25% patient effort)  -     Bed Mobility, Roll Right, [place] dependent (less than 25% patient effort)  -     Bed  Mobility, Comment deferred EOB/OOB as pt too lethargic to safely participate and pt has not been cleared by medical to progress mobility  -BLAIR     Transfer Assessment/Treatment    Transfers, Bed-Chair [place] not appropriate to assess  -BLAIR not tested  -    Transfers, Chair-Bed [place] not appropriate to assess  -BLAIR     Gait Assessment/Treatment    Gait, [place] Level not appropriate to assess  -     Therapy Exercises    Left Lower Extremity ankle pumps/circles;quad sets;heel slides   range mostly passive,negligible quadset performance   -     Sensory Assessment/Intervention    Light Touch --  -     Edema Management    Edema Amount right:;left:;moderate  -BLAIR     Positioning and Restraints    Pre-Treatment Position in bed  -BLAIR in bed  -RB    Post Treatment Position bed  -BLAIR bed  -RB    In Bed supine;call light within reach;encouraged to call for assist;patient within staff view  -BLAIR supine;call light within reach;encouraged to call for assist  -      10/05/17 1517 10/04/17 1643    Rehab Evaluation    Evaluation Not Performed --   RN reports pt still not weaned off vent and not appropriate for PT today. PT will check back tomorrow.  -BLAIR other (see comments)   OTR checked on pt via phone and nursing states pt is still sedated and on vent but will attempt to take off vent tomorrow..  -      10/04/17 1616 10/03/17 1721    Rehab Evaluation    Evaluation Not Performed other (see comments)   Pt still vented and sedated. OT reports RN expects an attempt at weaning tomorrow. PT will recheck tomorrow.  -BLAIR other (see comments)   Spoke with MD today and he said wait until pt is off sedation before OT/PT eval.  Nursing to let therapy know when off sedation .    -      10/03/17 1616       Rehab Evaluation    Evaluation Not Performed other (see comments)   OT spoke with MD  and MD requested therapy not see pt until she is off sedation. Nursing is to let PT/OT know when pt off josselyn  -       User Key   (r) = Recorded By, (t) = Taken By, (c) = Cosigned By    Initials Name Provider Type    MIESHA Pardo, OT Occupational Therapist    BLAIR Nelson, PT Physical Therapist              PT Recommendation and Plan  Anticipated Equipment Needs At Discharge:  (TBD as pt progresses)  Anticipated Discharge Disposition: placement for care (vs ARU if pt progresses to point she could tolerate)  Planned Therapy Interventions: (S) bed mobility training, gait training, home exercise program, patient/family education, strengthening, stair training, transfer training, ROM (Range of Motion) (progress mobility when/as allowed)  PT Frequency: per priority policy (bid M-F and at least daily on weekends)  Plan of Care Review  Plan Of Care Reviewed With: patient  Outcome Summary/Follow up Plan: PT evaluation completed. Pt still considerably groggy and was inconsistent in active participation with PT evaluation. Function limited primarily by decreased strength, range and tolerance for functional mobility and activities. Pt will benefit from skilled PT to gently regain lost function as pt improves medically. If pt progresses slowly, anticipate she may need placement for care prior to discharge home with continued therapy services.          IP PT Goals       10/06/17 1526          Bed Mobility PT LTG    Bed Mobility PT LTG, Date Established 10/06/17  -      Bed Mobility PT LTG, Time to Achieve by discharge  -      Bed Mobility PT LTG, Activity Type all bed mobility  -      Bed Mobility PT LTG, [place] Level moderate assist (50% patient effort);minimum assist (75% patient effort)  -      Bed Mobility PT LTG, Outcome goal ongoing  -      Transfer Training PT LTG    Transfer Training PT LTG, Date Established 10/06/17  -      Transfer Training PT LTG, Time to Achieve by discharge  -      Transfer Training PT LTG, Activity Type bed to chair /chair to bed;sit to stand/stand to sit;toilet  -      Transfer Training PT LTG,  [place] Level moderate assist (50% patient effort);minimum assist (75% patient effort)  -      Transfer Training PT LTG, Assist Device walker, rolling  -      Transfer Training PT LTG, Outcome goal ongoing  -      Gait Training PT LTG    Gait Training Goal PT LTG, Date Established 10/06/17  -      Gait Training Goal PT LTG, Time to Achieve by discharge  -      Gait Training Goal PT LTG, [place] Level moderate assist (50% patient effort);minimum assist (75% patient effort)  -      Gait Training Goal PT LTG, Distance to Achieve 10ft  -      Gait Training Goal PT LTG, Additional Goal 50ftx2  -      Gait Training Goal PT LTG, Outcome goal ongoing  [facility]      Strength Goal PT LTG    Strength Goal PT LTG, Date Established 10/06/17  -      Strength Goal PT LTG, Time to Achieve by discharge  -      Strength Goal PT LTG, Measure to Achieve 10reps of PROM/AAROM exercises  -      Strength Goal PT LTG, Functional Goal 2 sets of 10 reps AROM exercises  -      Strength Goal PT LTG, Outcome goal [facility]      Patient Education PT LTG    Patient Education PT LTG, Date Established 10/06/17  -      Patient Education PT LTG, Time to Achieve by discharge  -      Patient Education PT LTG, Education Type HEP;gait;transfers;home safety  -      Patient Education PT LTG Outcome goal [facility]        User Key  (r) = Recorded By, (t) = Taken By, (c) = Cosigned By    Initials Name Provider Type    BLAIR Nelson, PT Physical Therapist                Outcome Measures       10/06/17 1123 10/06/17 1009       How much help from another person do you currently need...    Turning from your back to your side while in flat bed without using bedrails? 1  -BLAIR      Moving from lying on back to sitting on the side of a flat bed without bedrails? 1  -BLAIR      Moving to and from a bed to a chair (including a wheelchair)? 1  -BLAIR      Standing up from a chair using your arms (e.g., wheelchair, bedside chair)? 1   -BLAIR      Climbing 3-5 steps with a railing? 1  -BLAIR      To walk in hospital room? 1  -BLAIR      AM-PAC 6 Clicks Score 6  -BLAIR      How much help from another is currently needed...    Putting on and taking off regular lower body clothing?  1  -RB     Bathing (including washing, rinsing, and drying)  1  -RB     Toileting (which includes using toilet bed pan or urinal)  1  -RB     Putting on and taking off regular upper body clothing  1  -RB     Taking care of personal grooming (such as brushing teeth)  1  -RB     Eating meals  1  -RB     Score  6  -RB     Functional Assessment    Outcome Measure Options AM-PAC 6 Clicks Basic Mobility (PT)  -BLAIR AM-PAC 6 Clicks Daily Activity (OT)  -RB       User Key  (r) = Recorded By, (t) = Taken By, (c) = Cosigned By    Initials Name Provider Type    MIESHA Pardo, OT Occupational Therapist    BLAIR Jadyn Nelson, PT Physical Therapist           Time Calculation:       Therapy Charges for Today     Code Description Service Date Service Provider Modifiers Qty    83125120036 HC PT MOBILITY CURRENT 10/6/2017 Jadyn Nelson, PT GP, CN 1    24098727510 HC PT MOBILITY PROJECTED 10/6/2017 Jadyn Nelson, PT GP, CK 1    19434519658 HC PT EVAL MOD COMPLEXITY 1 10/6/2017 Jadyn Nelson, PT GP 1    92005919939 HC PT THER PROC EA 15 MIN 10/6/2017 Jadyn Nelson, PT GP 1          PT G-Codes  PT Professional Judgement Used?: Yes  Outcome Measure Options: AM-PAC 6 Clicks Basic Mobility (PT)  Score: 6  Functional Limitation: Mobility: Walking and moving around  Mobility: Walking and Moving Around Current Status (): 100 percent impaired, limited or restricted  Mobility: Walking and Moving Around Goal Status (): At least 40 percent but less than 60 percent impaired, limited or restricted      Jadyn Nelson, PT  10/6/2017

## 2017-10-06 NOTE — PLAN OF CARE
Problem: Patient Care Overview (Adult)  Goal: Plan of Care Review    10/06/17 0647   Outcome Evaluation   Outcome Summary/Follow up Plan Plans to extubate today. Pt has been off sedation X 24 hrs.       Goal: Adult Individualization and Mutuality    10/03/17 1815   Individualization   Patient Specific Preferences (EDWIN; pt vented)   Patient Specific Goals (EDWIN; pt vented)   Patient Specific Interventions (EDWIN; pt vented)   Mutuality/Individual Preferences   What Anxieties, Fears or Concerns Do You Have About Your Health or Care? (EDWIN; pt vented)   What Questions Do You Have About Your Health or Care? (EDWIN; pt vented)   What Information Would Help Us Give You More Personalized Care? (EDWIN; pt vented)       Goal: Discharge Needs Assessment    10/02/17 0959   Discharge Needs Assessment   Concerns To Be Addressed adjustment to diagnosis/illness concerns         Problem: Orthopaedic Fracture (Adult)  Goal: Signs and Symptoms of Listed Potential Problems Will be Absent or Manageable (Orthopaedic Fracture)    10/05/17 1658   Orthopaedic Fracture   Problems Assessed (Orthopaedic Fracture) all   Problems Present (Orthopaedic Fracture) pain;respiratory compromise         Problem: Perioperative Period (Adult)  Goal: Signs and Symptoms of Listed Potential Problems Will be Absent or Manageable (Perioperative Period)    10/05/17 1658   Perioperative Period   Problems Assessed (Perioperative Period) all   Problems Present (Perioperative Period) pain;hypoxia/hypoxemia         Problem: Fall Risk (Adult)  Goal: Identify Related Risk Factors and Signs and Symptoms    10/05/17 1658   Fall Risk   Fall Risk: Related Risk Factors age-related changes;culprit medication(s);history of falls;environment unfamiliar   Fall Risk: Signs and Symptoms presence of risk factors       Goal: Absence of Falls    10/06/17 0647   Fall Risk (Adult)   Absence of Falls making progress toward outcome         Problem: Mechanical Ventilation, Invasive  (Adult)  Goal: Signs and Symptoms of Listed Potential Problems Will be Absent or Manageable (Mechanical Ventilation, Invasive)    10/06/17 0647   Mechanical Ventilation, Invasive   Problems Assessed (Mechanical Ventilation, Invasive) all   Problems Present (Mechanical Ventilation, Invasive) immobility;inability to wean         Problem: SAFETY - NON-VIOLENT RESTRAINT  Goal: Remains free of injury from restraints (Non-Violent Restraint)  Outcome: Ongoing (interventions implemented as appropriate)  Goal: Free from restraint(s) (Non-Violent Restraint)  Outcome: Ongoing (interventions implemented as appropriate)    Problem: Pressure Ulcer Risk (Keith Scale) (Adult,Obstetrics,Pediatric)  Goal: Identify Related Risk Factors and Signs and Symptoms    10/05/17 1658   Pressure Ulcer Risk (Keith Scale)   Related Risk Factors (Pressure Ulcer Risk (Keith Scale)) age extremes;critical care admission;hospitalization prolonged;medical devices;medication       Goal: Skin Integrity    10/06/17 0647   Pressure Ulcer Risk (Keith Scale) (Adult,Obstetrics,Pediatric)   Skin Integrity making progress toward outcome

## 2017-10-06 NOTE — PLAN OF CARE
Problem: Patient Care Overview (Adult)  Goal: Plan of Care Review  Outcome: Ongoing (interventions implemented as appropriate)    10/05/17 1658 10/06/17 1802   Coping/Psychosocial Response Interventions   Plan Of Care Reviewed With --  patient;daughter   Patient Care Overview   Progress improving --    Outcome Evaluation   Outcome Summary/Follow up Plan --  Pt was extubated today and has done well on nasal cannula. Pain medication given for hip pain       Goal: Adult Individualization and Mutuality  Outcome: Ongoing (interventions implemented as appropriate)  Goal: Discharge Needs Assessment  Outcome: Ongoing (interventions implemented as appropriate)    Problem: Orthopaedic Fracture (Adult)  Goal: Signs and Symptoms of Listed Potential Problems Will be Absent or Manageable (Orthopaedic Fracture)  Outcome: Ongoing (interventions implemented as appropriate)    Problem: Perioperative Period (Adult)  Goal: Signs and Symptoms of Listed Potential Problems Will be Absent or Manageable (Perioperative Period)  Outcome: Ongoing (interventions implemented as appropriate)    Problem: Fall Risk (Adult)  Goal: Identify Related Risk Factors and Signs and Symptoms  Outcome: Ongoing (interventions implemented as appropriate)  Goal: Absence of Falls  Outcome: Ongoing (interventions implemented as appropriate)    Problem: Pressure Ulcer Risk (Keith Scale) (Adult,Obstetrics,Pediatric)  Goal: Identify Related Risk Factors and Signs and Symptoms  Outcome: Ongoing (interventions implemented as appropriate)  Goal: Skin Integrity  Outcome: Ongoing (interventions implemented as appropriate)

## 2017-10-07 ENCOUNTER — APPOINTMENT (OUTPATIENT)
Dept: GENERAL RADIOLOGY | Facility: HOSPITAL | Age: 77
End: 2017-10-07

## 2017-10-07 LAB
ANION GAP SERPL CALCULATED.3IONS-SCNC: 9 MMOL/L (ref 5–15)
ARTERIAL PATENCY WRIST A: ABNORMAL
ATMOSPHERIC PRESS: ABNORMAL MMHG
BASE EXCESS BLDA CALC-SCNC: 5.3 MMOL/L (ref -2.4–2.4)
BASOPHILS # BLD AUTO: 0.03 10*3/MM3 (ref 0–0.2)
BASOPHILS NFR BLD AUTO: 0.4 % (ref 0–2)
BDY SITE: ABNORMAL
BUN BLD-MCNC: 15 MG/DL (ref 7–21)
BUN/CREAT SERPL: 23.8 (ref 7–25)
CA-I BLD-MCNC: 4.7 MG/DL (ref 4.5–4.9)
CALCIUM SPEC-SCNC: 8.4 MG/DL (ref 8.4–10.2)
CHLORIDE SERPL-SCNC: 111 MMOL/L (ref 95–110)
CO2 BLDA-SCNC: 31.5 MMOL/L (ref 23–27)
CO2 SERPL-SCNC: 28 MMOL/L (ref 22–31)
CREAT BLD-MCNC: 0.63 MG/DL (ref 0.5–1)
DEPRECATED RDW RBC AUTO: 46.8 FL (ref 36.4–46.3)
EOSINOPHIL # BLD AUTO: 0.23 10*3/MM3 (ref 0–0.7)
EOSINOPHIL NFR BLD AUTO: 3.2 % (ref 0–7)
ERYTHROCYTE [DISTWIDTH] IN BLOOD BY AUTOMATED COUNT: 14.3 % (ref 11.5–14.5)
GFR SERPL CREATININE-BSD FRML MDRD: 92 ML/MIN/1.73 (ref 60–90)
GLUCOSE BLD-MCNC: 106 MG/DL (ref 60–100)
GLUCOSE BLDA-MCNC: 114 MMOL/L
HCO3 BLDA-SCNC: 30.1 MMOL/L (ref 22–26)
HCT VFR BLD AUTO: 27.7 % (ref 35–45)
HCT VFR BLD CALC: 29 % (ref 38–47)
HGB BLD-MCNC: 8.9 G/DL (ref 12–15.5)
HGB BLDA-MCNC: 9.7 G/DL (ref 12–16)
IMM GRANULOCYTES # BLD: 0.18 10*3/MM3 (ref 0–0.02)
IMM GRANULOCYTES NFR BLD: 2.5 % (ref 0–0.5)
INR PPP: 4.92 (ref 0.8–1.2)
LYMPHOCYTES # BLD AUTO: 1.07 10*3/MM3 (ref 0.6–4.2)
LYMPHOCYTES NFR BLD AUTO: 14.9 % (ref 10–50)
MCH RBC QN AUTO: 28.8 PG (ref 26.5–34)
MCHC RBC AUTO-ENTMCNC: 32.1 G/DL (ref 31.4–36)
MCV RBC AUTO: 89.6 FL (ref 80–98)
MODALITY: ABNORMAL
MONOCYTES # BLD AUTO: 0.81 10*3/MM3 (ref 0–0.9)
MONOCYTES NFR BLD AUTO: 11.3 % (ref 0–12)
NEUTROPHILS # BLD AUTO: 4.87 10*3/MM3 (ref 2–8.6)
NEUTROPHILS NFR BLD AUTO: 67.7 % (ref 37–80)
PCO2 BLDA: 45.2 MM HG (ref 35–45)
PH BLDA: 7.44 PH UNITS (ref 7.35–7.45)
PLATELET # BLD AUTO: 254 10*3/MM3 (ref 150–450)
PMV BLD AUTO: 8.8 FL (ref 8–12)
PO2 BLDA: 75.5 MM HG (ref 80–105)
POTASSIUM BLD-SCNC: 3.2 MMOL/L (ref 3.5–5.1)
POTASSIUM BLDA-SCNC: 3.13 MMOL/L (ref 3.6–4.9)
PROTHROMBIN TIME: 46.9 SECONDS (ref 11.1–15.3)
RBC # BLD AUTO: 3.09 10*6/MM3 (ref 3.77–5.16)
SAO2 % BLDCOA: 95.2 % (ref 94–100)
SODIUM BLD-SCNC: 148 MMOL/L (ref 137–145)
SODIUM BLDA-SCNC: 144.1 MMOL/L (ref 138–146)
WBC NRBC COR # BLD: 7.19 10*3/MM3 (ref 3.2–9.8)

## 2017-10-07 PROCEDURE — 80048 BASIC METABOLIC PNL TOTAL CA: CPT | Performed by: FAMILY MEDICINE

## 2017-10-07 PROCEDURE — 82803 BLOOD GASES ANY COMBINATION: CPT | Performed by: INTERNAL MEDICINE

## 2017-10-07 PROCEDURE — 25010000002 ONDANSETRON PER 1 MG: Performed by: INTERNAL MEDICINE

## 2017-10-07 PROCEDURE — 99232 SBSQ HOSP IP/OBS MODERATE 35: CPT | Performed by: INTERNAL MEDICINE

## 2017-10-07 PROCEDURE — 94799 UNLISTED PULMONARY SVC/PX: CPT

## 2017-10-07 PROCEDURE — 97110 THERAPEUTIC EXERCISES: CPT

## 2017-10-07 PROCEDURE — 97530 THERAPEUTIC ACTIVITIES: CPT

## 2017-10-07 PROCEDURE — 25010000002 PIPERACILLIN-TAZOBACTAM: Performed by: FAMILY MEDICINE

## 2017-10-07 PROCEDURE — 25010000002 ONDANSETRON PER 1 MG: Performed by: ANESTHESIOLOGY

## 2017-10-07 PROCEDURE — 94760 N-INVAS EAR/PLS OXIMETRY 1: CPT

## 2017-10-07 PROCEDURE — 36600 WITHDRAWAL OF ARTERIAL BLOOD: CPT

## 2017-10-07 PROCEDURE — 85025 COMPLETE CBC W/AUTO DIFF WBC: CPT | Performed by: FAMILY MEDICINE

## 2017-10-07 PROCEDURE — 85610 PROTHROMBIN TIME: CPT | Performed by: ORTHOPAEDIC SURGERY

## 2017-10-07 PROCEDURE — 71010 HC CHEST PA OR AP: CPT

## 2017-10-07 RX ORDER — PANTOPRAZOLE SODIUM 40 MG/1
40 TABLET, DELAYED RELEASE ORAL
Status: DISCONTINUED | OUTPATIENT
Start: 2017-10-08 | End: 2017-10-11 | Stop reason: HOSPADM

## 2017-10-07 RX ADMIN — PIPERACILLIN AND TAZOBACTAM 2.25 G: 2; .25 INJECTION, POWDER, LYOPHILIZED, FOR SOLUTION INTRAVENOUS; PARENTERAL at 08:21

## 2017-10-07 RX ADMIN — GABAPENTIN 300 MG: 300 CAPSULE ORAL at 08:25

## 2017-10-07 RX ADMIN — Medication 10 ML: at 08:26

## 2017-10-07 RX ADMIN — ALBUTEROL SULFATE 2.5 MG: 2.5 SOLUTION RESPIRATORY (INHALATION) at 19:31

## 2017-10-07 RX ADMIN — ALBUTEROL SULFATE 2.5 MG: 2.5 SOLUTION RESPIRATORY (INHALATION) at 13:10

## 2017-10-07 RX ADMIN — Medication 10 ML: at 21:07

## 2017-10-07 RX ADMIN — GABAPENTIN 300 MG: 300 CAPSULE ORAL at 21:07

## 2017-10-07 RX ADMIN — FUROSEMIDE 20 MG: 20 TABLET ORAL at 08:24

## 2017-10-07 RX ADMIN — PIPERACILLIN AND TAZOBACTAM 2.25 G: 2; .25 INJECTION, POWDER, LYOPHILIZED, FOR SOLUTION INTRAVENOUS; PARENTERAL at 01:37

## 2017-10-07 RX ADMIN — ACETAMINOPHEN 1000 MG: 500 TABLET ORAL at 13:15

## 2017-10-07 RX ADMIN — METOPROLOL TARTRATE 25 MG: 25 TABLET ORAL at 17:02

## 2017-10-07 RX ADMIN — FAMOTIDINE 20 MG: 20 TABLET ORAL at 08:25

## 2017-10-07 RX ADMIN — OXYCODONE HYDROCHLORIDE 5 MG: 5 TABLET ORAL at 10:19

## 2017-10-07 RX ADMIN — ACETAMINOPHEN 1000 MG: 500 TABLET ORAL at 18:01

## 2017-10-07 RX ADMIN — AMIODARONE HYDROCHLORIDE 200 MG: 200 TABLET ORAL at 08:24

## 2017-10-07 RX ADMIN — POLYETHYLENE GLYCOL 3350 17 G: 17 POWDER, FOR SOLUTION ORAL at 08:24

## 2017-10-07 RX ADMIN — METOPROLOL TARTRATE 25 MG: 25 TABLET ORAL at 08:25

## 2017-10-07 RX ADMIN — OXYCODONE HYDROCHLORIDE 5 MG: 5 TABLET ORAL at 05:08

## 2017-10-07 RX ADMIN — ALBUTEROL SULFATE 2.5 MG: 2.5 SOLUTION RESPIRATORY (INHALATION) at 00:12

## 2017-10-07 RX ADMIN — ACETAMINOPHEN 1000 MG: 500 TABLET ORAL at 05:59

## 2017-10-07 RX ADMIN — ALBUTEROL SULFATE 2.5 MG: 2.5 SOLUTION RESPIRATORY (INHALATION) at 06:20

## 2017-10-07 RX ADMIN — OXYCODONE HYDROCHLORIDE 5 MG: 5 TABLET ORAL at 01:37

## 2017-10-07 RX ADMIN — Medication 667 MG: at 12:15

## 2017-10-07 RX ADMIN — FAMOTIDINE 20 MG: 20 TABLET ORAL at 21:07

## 2017-10-07 RX ADMIN — ONDANSETRON 4 MG: 2 INJECTION INTRAMUSCULAR; INTRAVENOUS at 14:42

## 2017-10-07 RX ADMIN — LISINOPRIL 2.5 MG: 2.5 TABLET ORAL at 08:24

## 2017-10-07 RX ADMIN — ONDANSETRON 4 MG: 2 INJECTION INTRAMUSCULAR; INTRAVENOUS at 14:40

## 2017-10-07 RX ADMIN — AMIODARONE HYDROCHLORIDE 200 MG: 200 TABLET ORAL at 21:07

## 2017-10-07 RX ADMIN — PIPERACILLIN AND TAZOBACTAM 2.25 G: 2; .25 INJECTION, POWDER, LYOPHILIZED, FOR SOLUTION INTRAVENOUS; PARENTERAL at 21:08

## 2017-10-07 NOTE — THERAPY TREATMENT NOTE
Acute Care - Physical Therapy Treatment Note  [facility]     Patient Name: Anahi Calix  : 1940  MRN: [id number]  Today's Date: 10/7/2017  Onset of Illness/Injury or Date of Surgery Date: 17  Date of Referral to PT: 10/01/17  Referring Physician: Dr. Nicola Rich    Admit Date: 2017    Visit Dx:    ICD-10-CM ICD-9-CM   1. Displaced intertrochanteric fracture of left femur, initial encounter for closed fracture S72.142A 820.21   2. Closed left hip fracture, initial encounter S72.002A 820.8   3. Fall, initial encounter W19.XXXA E888.9   4. Peripheral arterial disease I73.9 443.9   5. Essential hypertension I10 401.9   6. Gastroesophageal reflux disease, esophagitis presence not specified K21.9 530.81   7. Abdominal aortic aneurysm (AAA) without rupture I71.4 441.4   8. Impaired mobility and activities of daily living Z74.09 799.89   9. Impaired physical mobility Z74.09 781.99     Patient Active Problem List   Diagnosis   • Peripheral arterial disease   • Gastroesophageal reflux disease   • Abdominal aortic aneurysm (AAA) without rupture   • Displaced intertrochanteric fracture of left femur, initial encounter for closed fracture   • CAD (coronary artery disease)   • Hypertension   • Chronic bronchitis   • Essential hypertension   • Fall   • Tobacco dependence syndrome   • Atrial fibrillation               Adult Rehabilitation Note       10/07/17 0835          Rehab Assessment/Intervention    Discipline physical therapy assistant  -SS      Document Type therapy note (daily note)  -SS      Subjective Information agree to therapy;complains of;pain  -SS      Patient Effort, Rehab Treatment fair  -SS      Symptoms Noted During/After Treatment other (see comments)   pt lethargic and difficult to keep on task  -SS      Precautions/Limitations fall precautions;oxygen therapy device and L/min  -SS      Recorded by [SS] Meme Jenkins PTA      Vital Signs    Pre Systolic BP Rehab 177  -SS      Pre  "Treatment Diastolic BP 77  -SS      Post Systolic BP Rehab 165  -SS      Post Treatment Diastolic BP 72  -SS      Pretreatment Heart Rate (beats/min) 92  -SS      Posttreatment Heart Rate (beats/min) 79  -SS      Pre SpO2 (%) 96  -SS      O2 Delivery Pre Treatment supplemental O2  -SS      Post SpO2 (%) 98  -SS      O2 Delivery Post Treatment supplemental O2  -SS      Pre Patient Position Supine  -SS      Post Patient Position Supine  -SS      Recorded by [SS] Meme Jenkins PTA      Pain Assessment    Pain Assessment 0-10  -SS      Pain Score 8  -SS      Post Pain Score 8  -SS      Pain Type Acute pain  -SS      Pain Location --   \"tail bone\"  -      Pain Intervention(s) Repositioned  -SS      Recorded by [SS] Meme Jenkins PTA      Cognitive Assessment/Intervention    Current Cognitive/Communication Assessment impaired  -      Orientation Status oriented to;person;place;situation  -SS      Follows Commands/Answers Questions 75% of the time  -      Personal Safety mild impairment  -      Personal Safety Interventions supervised activity  -SS      Recorded by [SS] Meme Jenkins PTA      Mobility Assessment/Training    Left Lower Extremity Weight-Bearing weight-bearing as tolerated  -SS      Recorded by [SS] Meme Jenkins PTA      Bed Mobility, Assessment/Treatment    Bed Mobility, Assistive Device bed rails  -      Bed Mobility, Roll Left, [place] maximum assist (25% patient effort)  -      Bed Mobility, Roll Right, [place] maximum assist (25% patient effort)  -      Bed Mobility, Comment pt would keep eyes closed despite frequent cues. Max assistance needed for rolling and to help guide hand to railing. Pt placed on bed pan per pt request and then pt rolled for repositioning. pt able to assist more with rolling towards L using R hand to pull on BR  -SS      Recorded by [SS] Meme Jenkins PTA      Transfer Assessment/Treatment    Transfers, Bed-Chair [place] not " tested  -SS      Recorded by [SS] Meme Jenkins PTA      Gait Assessment/Treatment    Gait, [place] Level not tested  -SS      Recorded by [SS] Meme Jenkins PTA      Therapy Exercises    Bilateral Lower Extremities AROM:;10 reps;ankle pumps/circles;quad sets;AAROM:;heel slides;hip abduction/adduction  -SS      Recorded by [SS] Meme Jenkins PTA      Positioning and Restraints    Pre-Treatment Position in bed  -SS      Post Treatment Position bed  -SS      In Bed supine;call light within reach;encouraged to call for assist;patient within staff view;side rails up x3;SCD pump applied;LUE elevated;legs elevated  -SS      Recorded by [SS] Meme Jenkins PTA        User Key  (r) = Recorded By, (t) = Taken By, (c) = Cosigned By    Initials Name Effective Dates     Meme Jenkins PTA 10/17/16 -                 IP PT Goals       10/07/17 0835 10/06/17 1526       Bed Mobility PT LTG    Bed Mobility PT LTG, Date Established  10/06/17  -     Bed Mobility PT LTG, Time to Achieve  by discharge  -     Bed Mobility PT LTG, Activity Type  all bed mobility  -     Bed Mobility PT LTG, [place] Level  moderate assist (50% patient effort);minimum assist (75% patient effort)  -     Bed Mobility PT LTG, Date Goal Reviewed 10/07/17  -      Bed Mobility PT LTG, Outcome goal ongoing  - goal ongoing  -     Transfer Training PT LTG    Transfer Training PT LTG, Date Established  10/06/17  -     Transfer Training PT LTG, Time to Achieve  by discharge  -     Transfer Training PT LTG, Activity Type  bed to chair /chair to bed;sit to stand/stand to sit;toilet  -     Transfer Training PT LTG, [place] Level  moderate assist (50% patient effort);minimum assist (75% patient effort)  -     Transfer Training PT LTG, Assist Device  walker, rolling  -     Transfer Training PT  LTG, Date Goal Reviewed 10/07/17  -      Transfer Training PT LTG, Outcome goal ongoing  - goal ongoing  -      Gait Training PT LTG    Gait Training Goal PT LTG, Date Established  10/06/17  -     Gait Training Goal PT LTG, Time to Achieve  by discharge  -     Gait Training Goal PT LTG, [place] Level  moderate assist (50% patient effort);minimum assist (75% patient effort)  -     Gait Training Goal PT LTG, Distance to Achieve  10ft  -     Gait Training Goal PT LTG, Additional Goal  50ftx2  -     Gait Training Goal PT LTG, Outcome goal ongoing  - goal ongoing  -     Strength Goal PT LTG    Strength Goal PT LTG, Date Established  10/06/17  -     Strength Goal PT LTG, Time to Achieve  by discharge  -     Strength Goal PT LTG, Measure to Achieve  10reps of PROM/AAROM exercises  -     Strength Goal PT LTG, Functional Goal  2 sets of 10 reps AROM exercises  -     Strength Goal PT LTG, Date Goal Reviewed 10/07/17  -      Strength Goal PT LTG, Outcome goal partially met  - goal ongoing  -     Patient Education PT LTG    Patient Education PT LTG, Date Established  10/06/17  -     Patient Education PT LTG, Time to Achieve  by discharge  -     Patient Education PT LTG, Education Type  HEP;gait;transfers;home safety  -     Patient Education PT LTG, Date Goal Reviewed 10/07/17  -      Patient Education PT LTG Outcome goal ongoing  - goal ongoing  -       User Key  (r) = Recorded By, (t) = Taken By, (c) = Cosigned By    Initials Name Provider Type    BLAIR Nelson, PT Physical Therapist    SS Meme Jenkins, PTA Physical Therapy Assistant                  PT Recommendation and Plan  Anticipated Equipment Needs At Discharge:  (TBD as pt progresses)  Anticipated Discharge Disposition: placement for care (vs ARU if pt progresses to point she could tolerate)  Planned Therapy Interventions: (S) bed mobility training, gait training, home exercise program, patient/family education, strengthening, stair training, transfer training, ROM (Range of Motion) (progress mobility when/as allowed)  PT  Frequency: per priority policy (bid M-F and at least daily on weekends)  Plan of Care Review  Plan Of Care Reviewed With: patient  Progress: improving  Outcome Summary/Follow up Plan: Pt lethargic this AM but, able to follow commands ~ 75% of the time. Pt completed A/AROM ther ex supine. Pt required Max A for bed mobility and increased assistance needed to help pt reach the bedrail. Pt partiially met 1 goal this date. Further rehab recommended prior to discharge.           Outcome Measures       10/07/17 0835 10/06/17 1123 10/06/17 1009    How much help from another person do you currently need...    Turning from your back to your side while in flat bed without using bedrails? 2  -SS 1  -BLAIR     Moving from lying on back to sitting on the side of a flat bed without bedrails? 2  -SS 1  -BLAIR     Moving to and from a bed to a chair (including a wheelchair)? 1  -SS 1  -BLAIR     Standing up from a chair using your arms (e.g., wheelchair, bedside chair)? 1  -SS 1  -BLAIR     Climbing 3-5 steps with a railing? 1  -SS 1  -BLAIR     To walk in hospital room? 1  -SS 1  -BLAIR     AM-PAC 6 Clicks Score 8  -SS 6  -BLAIR     How much help from another is currently needed...    Putting on and taking off regular lower body clothing?   1  -RB    Bathing (including washing, rinsing, and drying)   1  -RB    Toileting (which includes using toilet bed pan or urinal)   1  -RB    Putting on and taking off regular upper body clothing   1  -RB    Taking care of personal grooming (such as brushing teeth)   1  -RB    Eating meals   1  -RB    Score   6  -RB    Functional Assessment    Outcome Measure Options AM-PAC 6 Clicks Basic Mobility (PT)  -SS AM-PAC 6 Clicks Basic Mobility (PT)  -BLAIR AM-PAC 6 Clicks Daily Activity (OT)  -RB      User Key  (r) = Recorded By, (t) = Taken By, (c) = Cosigned By    Initials Name Provider Type    MIESHA Pardo, OT Occupational Therapist    BLAIR Jadyn Nelson, PT Physical Therapist    SS Meme Jenkins, PTA Physical Therapy  Assistant           Time Calculation:         PT Charges       10/07/17 1212          Time Calculation    Start Time 0835  -SS      Stop Time 0915  -SS      Time Calculation (min) 40 min  -SS      PT Received On 10/07/17  -      Time Calculation- PT    Total Timed Code Minutes- PT 40 minute(s)  -SS        User Key  (r) = Recorded By, (t) = Taken By, (c) = Cosigned By    Initials Name Provider Type     Meme Jenkins PTA Physical Therapy Assistant          Therapy Charges for Today     Code Description Service Date Service Provider Modifiers Qty    15790686994 HC PT THER PROC EA 15 MIN 10/7/2017 Meme Jenkins PTA GP 2    97986254662 HC PT THERAPEUTIC ACT EA 15 MIN 10/7/2017 Meme Jenkins PTA GP 1          PT G-Codes  PT Professional Judgement Used?: Yes  Outcome Measure Options: AM-PAC 6 Clicks Basic Mobility (PT)  Score: 6  Functional Limitation: Mobility: Walking and moving around  Mobility: Walking and Moving Around Current Status (): 100 percent impaired, limited or restricted  Mobility: Walking and Moving Around Goal Status (): At least 40 percent but less than 60 percent impaired, limited or restricted    Meme Jenkins PTA  10/7/2017

## 2017-10-07 NOTE — PLAN OF CARE
Problem: Patient Care Overview (Adult)  Goal: Plan of Care Review  Outcome: Ongoing (interventions implemented as appropriate)    10/07/17 0835   Coping/Psychosocial Response Interventions   Plan Of Care Reviewed With patient   Patient Care Overview   Progress improving   Outcome Evaluation   Outcome Summary/Follow up Plan Pt lethargic this AM but, able to follow commands ~ 75% of the time. Pt completed A/AROM ther ex supine. Pt required Max A for bed mobility and increased assistance needed to help pt reach the bedrail. Pt partiially met 1 goal this date. Further rehab recommended prior to discharge.          Problem: Inpatient Physical Therapy  Goal: Bed Mobility Goal LTG- PT  Outcome: Ongoing (interventions implemented as appropriate)    10/06/17 1526 10/07/17 0835   Bed Mobility PT LTG   Bed Mobility PT LTG, Date Established 10/06/17 --    Bed Mobility PT LTG, Time to Achieve by discharge --    Bed Mobility PT LTG, Activity Type all bed mobility --    Bed Mobility PT LTG, [place] Level moderate assist (50% patient effort);minimum assist (75% patient effort) --    Bed Mobility PT LTG, Date Goal Reviewed --  10/07/17   Bed Mobility PT LTG, Outcome --  goal ongoing       Goal: Transfer Training Goal 1 LTG- PT  Outcome: Ongoing (interventions implemented as appropriate)    10/06/17 1526 10/07/17 0835   Transfer Training PT LTG   Transfer Training PT LTG, Date Established 10/06/17 --    Transfer Training PT LTG, Time to Achieve by discharge --    Transfer Training PT LTG, Activity Type bed to chair /chair to bed;sit to stand/stand to sit;toilet --    Transfer Training PT LTG, [place] Level moderate assist (50% patient effort);minimum assist (75% patient effort) --    Transfer Training PT LTG, Assist Device walker, rolling --    Transfer Training PT LTG, Date Goal Reviewed --  10/07/17   Transfer Training PT LTG, Outcome --  goal ongoing       Goal: Gait Training Goal LTG- PT  Outcome: Ongoing (interventions  implemented as appropriate)    10/06/17 1526 10/07/17 0835   Gait Training PT LTG   Gait Training Goal PT LTG, Date Established 10/06/17 --    Gait Training Goal PT LTG, Time to Achieve by discharge --    Gait Training Goal PT LTG, [place] Level moderate assist (50% patient effort);minimum assist (75% patient effort) --    Gait Training Goal PT LTG, Distance to Achieve 10ft --    Gait Training Goal PT LTG, Additional Goal 50ftx2 --    Gait Training Goal PT LTG, Outcome --  goal ongoing       Goal: Strength Goal LTG- PT  Outcome: Ongoing (interventions implemented as appropriate)    10/06/17 1526 10/07/17 0835   Strength Goal PT LTG   Strength Goal PT LTG, Date Established 10/06/17 --    Strength Goal PT LTG, Time to Achieve by discharge --    Strength Goal PT LTG, Measure to Achieve 10reps of PROM/AAROM exercises --    Strength Goal PT LTG, Functional Goal 2 sets of 10 reps AROM exercises --    Strength Goal PT LTG, Date Goal Reviewed --  10/07/17   Strength Goal PT LTG, Outcome --  goal partially met       Goal: Patient Education Goal LTG- PT  Outcome: Ongoing (interventions implemented as appropriate)    10/06/17 1526 10/07/17 0835   Patient Education PT LTG   Patient Education PT LTG, Date Established 10/06/17 --    Patient Education PT LTG, Time to Achieve by discharge --    Patient Education PT LTG, Education Type HEP;gait;transfers;home safety --    Patient Education PT LTG, Date Goal Reviewed --  10/07/17   Patient Education PT LTG Outcome --  goal ongoing

## 2017-10-07 NOTE — PROGRESS NOTES
"Hospital Follow-up      LOS: 7 days     Ms. Anahi Calix, 77 y.o. female is followed for: Displaced intertrochanteric fracture of left femur, initial encounter for closed fracture   CHIEF COMPLAINT Shortness of breath    Subjective - Interval History     His lady is status post hip repair.  She developed respiratory failure now she is off the ventilator breathing well however she still somewhat confused despite normal arterial blood gases area and last x-ray showed some atelectasis    The patient's relevant past medical, surgical and social history were reviewed and updated in Epic as appropriate.     Objective   /77  Pulse 76  Temp 97.5 °F (36.4 °C) (Temporal Artery )   Resp 14  Ht 68\" (172.7 cm)  Wt 186 lb 8.2 oz (84.6 kg)  LMP  (LMP Unknown)  SpO2 98%  BMI 28.36 kg/m2      Physical Exam:Alert and mildly confused in no respiratory distress    General Appearance:       Head:    Normocephalic, without obvious abnormality, atraumatic   Eyes:            Lids and lashes normal, conjunctivae and sclerae normal, no   icterus, no pallor, corneas clear, PERRLA   Ears:    Ears appear intact with no abnormalities noted   Throat:   No oral lesions, no thrush, oral mucosa moist   Neck:   No adenopathy, supple, trachea midline, no thyromegaly, no   carotid bruit, no JVD   Back:     No kyphosis present, no scoliosis present, no skin lesions,      erythema or scars, no tenderness to percussion or                   palpation,   range of motion normal   Lungs:   Dear without rales rhonchi or wheeze     Heart:    Regular rhythm and normal rate, normal S1 and S2, no            murmur, no gallop, no rub, no click   Chest Wall:    No abnormalities observed   Abdomen:     Normal bowel sounds, no masses, no organomegaly, soft        non-tender, non-distended, no guarding, no rebound                tenderness   Rectal:     Deferred   Extremities:   Moves all extremities well, no edema, no cyanosis, no             redness "   Pulses:   Pulses palpable and equal bilaterally   Skin:   No bleeding, bruising or rash   Lymph nodes:   No palpable adenopathy     Labs:   pH, Arterial   Date Value Ref Range Status   10/07/2017 7.441 7.350 - 7.450 pH units Final     pCO2, Arterial   Date Value Ref Range Status   10/07/2017 45.2 (H) 35.0 - 45.0 mm Hg Final     pO2, Arterial   Date Value Ref Range Status   10/07/2017 75.5 (L) 80.0 - 105.0 mm Hg Final     Lab Results   Component Value Date    WBC 7.19 10/07/2017    HGB 8.9 (L) 10/07/2017    HCT 27.7 (L) 10/07/2017    MCV 89.6 10/07/2017     10/07/2017     Lab Results   Component Value Date    GLUCOSE 114 10/07/2017    BUN 15 10/07/2017    CREATININE 0.63 10/07/2017    EGFRIFNONA 92 10/07/2017    BCR 23.8 10/07/2017    CO2 28.0 10/07/2017    CALCIUM 8.4 10/07/2017    ALBUMIN 4.00 09/30/2017    LABIL2 1.3 09/30/2017    AST 30 09/30/2017    ALT 31 09/30/2017         Imaging Results (last 24 hours)     ** No results found for the last 24 hours. **        Images reviewed  Impression      Hospital Problem List     * (Principal)Displaced intertrochanteric fracture of left femur, initial encounter for closed fracture    Overview Signed 10/2/2017  8:44 AM by Ata Farris MD     POD# 1.  Management per orthopedics.         Peripheral arterial disease (Chronic)    CAD (coronary artery disease) (Chronic)    Hypertension (Chronic)    Overview Signed 10/2/2017  8:45 AM by Ata Farris MD     Has been hypotensive over the last 24 hours intermittently.  Decreased Lisinopril from 40 mg daily to 2.5 mg daily.  Holding lopressor right now.  On Cardizem drip for atrial fibrillation.         Chronic bronchitis (Chronic)    Overview Signed 10/2/2017  8:45 AM by Ata Farris MD     Currently intubated.  Unable to be extubated yesterday.           Essential hypertension    Overview Signed 9/30/2017  6:58 PM by Nicola Rich MD     Added automatically from request for surgery [id number]         Fall     Overview Addendum 10/2/2017  8:45 AM by Ata Farris MD     Fracture s/p repair per orthopedics.         Tobacco dependence syndrome    Overview Signed 10/2/2017  8:51 AM by Ata Farris MD     Cessation counseling to be provided once extubated and alert.         Atrial fibrillation    Overview Signed 10/2/2017  8:51 AM by Ata Farris MD     New onset.  Given Cardizem bolus this morning and started on Cardizem drip.  Blood pressure started to drop with drip.  Patient placed back on pressors.  PICC line ordered.  Cardiology consulted.                    Plan        Assessment post respiratory failure and atelectasis improving, mild confusion    Plan continue present treatment, check chest x-ray and morning    I discussed the patient's findings and my recommendations with patient and nursing staff             Please note that portions of this note were completed with a voice recognition program. Efforts were made to edit the dictations, but occasionally words are mistranscribed.

## 2017-10-07 NOTE — PROGRESS NOTES
"Anticoagulation by Pharmacy - Warfarin    Anahi Calix is a 77 y.o.female  [Ht: 68\" (172.7 cm); Wt: 186 lb 8.2 oz (84.6 kg)] on Warfarin (currently on HOLD) for indication of VTE prophylaxis s/p ortho procedure and new onset A-fib.    Goal INR: 2-3  Today's INR:   Lab Results   Component Value Date    INR 4.92 (H) 10/07/2017         Lab Results   Component Value Date    INR 4.92 (H) 10/07/2017    INR 3.50 (H) 10/06/2017    INR 4.82 (H) 10/05/2017    PROTIME 46.9 (H) 10/07/2017    PROTIME 35.7 (H) 10/06/2017    PROTIME 46.1 (H) 10/05/2017     Lab Results   Component Value Date    HGB 8.9 (L) 10/07/2017    HGB 9.0 (L) 10/06/2017    HGB 8.3 (L) 10/05/2017     Lab Results   Component Value Date    HCT 27.7 (L) 10/07/2017    HCT 28.3 (L) 10/06/2017    HCT 25.0 (L) 10/05/2017     Assessment/Plan:  INR 4.92, is supratherapeutic. INR increased significantly after restarting at 1 mg yesterday. Concurrent medications include amiodarone. Will HOLD dose until INR drops below 3. Plan to restart at 0.5 mg once INR reduces to therapeutic level.     [place] RPH  10/07/17 4:24 PM     "

## 2017-10-07 NOTE — PROGRESS NOTES
[facility] Medicine Services  INPATIENT PROGRESS NOTE     LOS: 7 days   Patient Care Team:  Ramiro Gloria MD as PCP - General    Chief Complaint:  Displaced intertrochanteric fracture of left femur, initial encounter for closed fracture      Subjective     Interval History:     Patient Complaints: Patient seen and examined.  Patient resting comfortably.    History taken from: Patient.    Review of Systems:    Review of Systems   Constitutional: Negative for chills and fever.   Respiratory: Positive for shortness of breath.    Cardiovascular: Negative for chest pain.   Gastrointestinal: Negative for abdominal pain, diarrhea, nausea and vomiting.   Genitourinary: Negative for dysuria.   Neurological: Negative for dizziness.         Objective     Vital Signs  Temp:  [97.5 °F (36.4 °C)-98.4 °F (36.9 °C)] 97.5 °F (36.4 °C)  Heart Rate:  [72-92] 76  Resp:  [14-16] 14  BP: (125-173)/(60-77) 173/77    Physical Exam:   Physical Exam   Constitutional: She is oriented to person, place, and time. She appears well-developed and well-nourished.   Cardiovascular: Normal rate, regular rhythm and normal heart sounds.  Exam reveals no gallop and no friction rub.    No murmur heard.  Pulmonary/Chest: Effort normal and breath sounds normal. No respiratory distress. She has no wheezes. She has no rales.   Abdominal: Soft. Bowel sounds are normal. There is no tenderness.   Musculoskeletal: Normal range of motion. She exhibits no edema.   Neurological: She is alert and oriented to person, place, and time.   Skin: Skin is warm and dry.   Psychiatric: She has a normal mood and affect. Her behavior is normal.   Vitals reviewed.         Results Review:       Results from last 7 days  Lab Units 10/07/17  0348 10/07/17  0300 10/06/17  0448 10/06/17  0406 10/05/17  0355 10/05/17  0251 10/04/17  0436 10/04/17  0402  10/03/17  0335 10/02/17  0922  10/01/17  0537 09/30/17  1609   SODIUM mmol/L  --   148* 150*  --   --  145  --  141  --  142 142  --  141 142   SODIUM, ARTERIAL mmol/L 144.1  --   --  145.4 141.4  --  139.2  --   < >  --   --   < >  --   --    POTASSIUM mmol/L  --  3.2* 3.4*  --   --  3.6  --  3.7  --  3.7 4.0  --  3.8 3.7   CHLORIDE mmol/L  --  111* 114*  --   --  112*  --  109  --  108 108  --  102 100   CO2 mmol/L  --  28.0 27.0  --   --  24.0  --  25.0  --  25.0 24.0  --  28.0 28.0   BUN mg/dL  --  15 22*  --   --  30*  --  28*  --  23* 23*  --  18 21   CREATININE mg/dL  --  0.63 0.73  --   --  0.81  --  0.98  --  1.10* 1.27*  --  0.81 0.92   GLUCOSE mg/dL  --  106* 94  --   --  103*  --  102*  --  127* 130*  --  119* 119*   GLUCOSE, ARTERIAL mmol/L 114  --   --  95 115  --  93  --   < >  --   --   < >  --   --    CALCIUM mg/dL  --  8.4 8.2*  --   --  7.7*  --  7.7*  --  7.6* 7.8*  --  8.7 9.4   BILIRUBIN mg/dL  --   --   --   --   --   --   --   --   --   --   --   --   --  0.4   ALK PHOS U/L  --   --   --   --   --   --   --   --   --   --   --   --   --  87   ALT (SGPT) U/L  --   --   --   --   --   --   --   --   --   --   --   --   --  31   AST (SGOT) U/L  --   --   --   --   --   --   --   --   --   --   --   --   --  30   < > = values in this interval not displayed.            Results from last 7 days  Lab Units 10/07/17  0300 10/06/17  0448 10/05/17  0251 10/04/17  0402 10/03/17  0335 10/02/17  0922 10/01/17  1414 10/01/17  0537   WBC 10*3/mm3 7.19 7.96 9.24 11.54* 17.13* 17.44*  --  14.01*   HEMOGLOBIN g/dL 8.9* 9.0* 8.3* 9.1* 10.3* 11.6* 12.5 13.6   HEMATOCRIT % 27.7* 28.3* 25.0* 28.0* 31.4* 35.3 38.8 41.6   PLATELETS 10*3/mm3 254 236 188 162 194 216  --  224       Lab Results   Component Value Date    CKTOTAL 66 09/10/2016    CKMB 2.9 09/10/2016    TROPONINI 0.269 (H) 09/10/2016       pH, Arterial   Date Value Ref Range Status   10/07/2017 7.441 7.350 - 7.450 pH units Final     CO2   Date Value Ref Range Status   10/07/2017 28.0 22.0 - 31.0 mmol/L Final         Results from last 7  days  Lab Units 10/07/17  0300 10/06/17  0448 10/05/17  0251 10/04/17  0402 10/03/17  0335 10/02/17  0337 10/01/17  1303   INR  4.92* 3.50* 4.82* 4.91* 2.28* 1.28* 1.13        Imaging Results (last 7 days)     Procedure Component Value Units Date/Time    XR Elbow 3+ View Left [[id number]] Collected:  09/30/17 1639     Updated:  09/30/17 1705    Narrative:       Patient Name:  MRS. ALIYA VARGAS  Patient ID:  [id number]   Ordering:  TAMMY OMER  Attending:  TAMMY OMER  Referring:  TAMMY OMER  ------------------------------------------------    Three view left elbow    HISTORY: Pain after falling    AP, lateral and oblique views obtained.    COMPARISON: None    No acute fracture or dislocation.  No joint effusion.  No other osseous or articular abnormality.      Impression:       CONCLUSION:  No acute fracture or dislocation    [id number]    Electronically signed by:  Sammy Marie MD  9/30/2017 5:04 PM CDT  Workstation: [email or web address]    XR Hip With or Without Pelvis 2 - 3 View Left [[id number]] Collected:  09/30/17 1640     Updated:  09/30/17 1708    Narrative:         [facility] [place]    Patient Name: ALIYA VARGAS    ORDERING: TAMMY OMER    ATTENDING: TAMMY OMER     REFERRING: TAMMY OMER    Two view left hip with single view pelvis    HISTORY: Fell    AP and stable lateral views of the left hip and AP film of the  pelvis obtained.    COMPARISON: None    Comminuted displaced intertrochanteric and subtrochanteric  fracture of the left hip.  No dislocation of the femoral head.  Degenerative changes and degenerative disc disease lumbar spine.  Degenerative change greater trochanter each hip.    Previously demonstrated abdominal aortic aneurysm.  Surgical clips overlie the lumbar spine.  Pelvic phleboliths.      Impression:       CONCLUSION:  Comminuted displaced intertrochanteric and subtrochanteric  fracture of the left hip.  Previously demonstrated abdominal aortic  aneurysm.    [id number]        Electronically signed by:  Sammy Marie MD  9/30/2017 5:07 PM CDT  Workstation: [id number]    XR Chest 1 View [[id number]] Collected:  09/30/17 1640     Updated:  09/30/17 1713    Narrative:       Patient Name:  MRS. ALIYA VARGAS  Patient ID:  [id number]   Ordering:  TAMMY OMER  Attending:  TAMMY OMER  Referring:  TAMMY OMER  ------------------------------------------------    PORTABLE CHEST    HISTORY: Fell. Left hip fracture.    Portable AP supine film of the chest was obtained at 4:48 PM.  COMPARISON: September 9, 2016    EKG leads.  The lungs are clear of an acute process.  The heart is not enlarged.  The pulmonary vasculature is not increased.  No pleural effusion.  No pneumothorax.  No acute osseous abnormality.  Hypertrophic change right acromioclavicular joint.      Impression:       CONCLUSION:  No Acute Disease    [id number]    Electronically signed by:  Sammy Marie MD  9/30/2017 5:12 PM CDT  Workstation: [id number]    FL C Arm During Surgery [[id number]] Resulted:  10/02/17 0840     Updated:  10/02/17 0840    US Guided Vascular Access [[id number]] Resulted:  10/02/17 1043     Updated:  10/02/17 1043    Narrative:       This procedure was auto-finalized with no dictation required.    XR Chest Post CVA Port [[id number]] Collected:  10/02/17 1015     Updated:  10/02/17 1055    Narrative:         PROCEDURE: Single chest view AP    REASON FOR EXAM:Post PICC placement., S72.142A Displaced  intertrochanteric fracture of left femur, initial encounter for  closed fracture S72.002A Fracture of unspecified part of neck of  left femur, initial encounter for closed fracture W19.XXXA  Unspecified fall, initial encounter I73.9 Peripheral vascular  disease, unspecified I10 Essential (primary) hypertension K21.9  Gastro-esophageal reflux disease without esop    FINDINGS: Comparison exam dated September 30, 2017. ET tube with  tip 5.6 cm above keo. NG tube with tip below level  diaphragm.  Right PICC line with tip overlying the SVC. Cardiac size appears  within normal limits. Vague left infrahilar lung base perihilar  haziness. Lungs otherwise clear. Pleural spaces are normal. No  acute osseous abnormality.      Impression:       1.  Tubes and lines as described above.  2.  Vague left infrahilar lung base perihilar haziness. This may  represent very early atypical pulmonary edema versus subsegmental  atelectasis or pneumonia.    Electronically signed by:  Eliseo Smith MD  10/2/2017 10:54 AM CDT  Workstation: [id number]    IR PICC wo fluoro guidance [[id number]] Resulted:  10/02/17 1055     Updated:  10/02/17 1055    Narrative:       This procedure was auto-finalized with no dictation required.    XR Chest 1 View [[id number]] Collected:  10/05/17 0450     Updated:  10/05/17 0613    Narrative:       Exam: AP portable chest    INDICATION: On ventilator    COMPARISON: 10/2/2015    FINDINGS: Endotracheal tube NG tube and right PICC line remain in  place. Heart size is normal. Persistent hazy opacity left lung  compatible pleural effusion with atelectasis and/or infiltrate.  Right lung remains clear.      Impression:       No significant change.    Electronically signed by:  Donny Meneses MD  10/5/2017 6:12 AM  CDT Workstation: [id number]    XR Chest 1 View [[id number]] Collected:  10/07/17 0955     Updated:  10/07/17 1021    Narrative:         PROCEDURE: Single chest view AP    REASON FOR EXAM:Atelectasis, S72.142A Displaced intertrochanteric  fracture of left femur, initial encounter for closed fracture  S72.002A Fracture of unspecified part of neck of left femur,  initial encounter for closed fracture W19.XXXA Unspecified fall,  initial encounter I73.9 Peripheral vascular disease, unspecified  I10 Essential (primary) hypertension K21.9 Gastro-esophageal  reflux disease without esophagitis I    FINDINGS: Comparison exam dated October 5, 2017. Interval removal  of ET tube and NG tube. Right PICC  line appears stable with tip  overlying the SVC. Cardiac and pulmonary vasculature are normal.  Left lung base small linear opacity. Lungs otherwise clear.  Pleural spaces are normal. No acute osseous abnormality.      Impression:       1.  Left lung base small linear opacities suspicious for discoid  atelectasis versus less likely early pneumonia.  2.  Otherwise negative chest.    Electronically signed by:  Eliseo Smith MD  10/7/2017 10:19 AM CDT  Workstation: [id number]                                    Medication Review:   Current Facility-Administered Medications   Medication Dose Route Frequency Provider Last Rate Last Dose   • acetaminophen (TYLENOL) tablet 650 mg  650 mg Oral Once PRN Terrence Johnson MD        Or   • acetaminophen (TYLENOL) suppository 650 mg  650 mg Rectal Once PRN Terrence Johnson MD       • acetaminophen (TYLENOL) tablet 1,000 mg  1,000 mg Oral Q6H Nicola Rich MD   1,000 mg at 10/07/17 0559   • albuterol (PROVENTIL) nebulizer solution 0.083% 2.5 mg/3mL  2.5 mg Nebulization Q6H - RT Lenin CHRISTIANO Padgett MD   2.5 mg at 10/07/17 0620   • amiodarone (PACERONE) tablet 200 mg  200 mg Oral Q12H Lacho Courtney MD   200 mg at 10/07/17 0824   • calcium acetate (PHOSLO) tablet 667 mg  667 mg Oral Daily Cipriano Leigh MD   667 mg at 10/06/17 1258   • dextrose (D5W) 5 % infusion  75 mL/hr Intravenous Continuous Sukhjinder Mendez MD 75 mL/hr at 10/06/17 1725 75 mL/hr at 10/06/17 1725   • diphenhydrAMINE (BENADRYL) injection 12.5 mg  12.5 mg Intravenous Q15 Min PRN Terrence Johnson MD       • ePHEDrine injection 5 mg  5 mg Intravenous Once PRN Terrence Johnson MD       • famotidine (PEPCID) tablet 20 mg  20 mg Oral BID Sukhjinder Mendez MD   20 mg at 10/07/17 0825   • flumazenil (ROMAZICON) injection 0.2 mg  0.2 mg Intravenous PRN Terrence Johnson MD       • furosemide (LASIX) tablet 20 mg  20 mg Oral Daily Cipriano Leigh MD   20 mg at 10/07/17 0824   • gabapentin (NEURONTIN) capsule 300 mg  300  mg Oral TID Cipriano Leigh MD   300 mg at 10/07/17 0825   • HYDROmorphone (DILAUDID) injection 0.5 mg  0.5 mg Intravenous Q5 Min PRN Terrence Johnson MD       • labetalol (NORMODYNE,TRANDATE) injection 5 mg  5 mg Intravenous Q5 Min PRN Terrence Johnson MD       • lisinopril (PRINIVIL,ZESTRIL) tablet 2.5 mg  2.5 mg Oral Daily Ata Farris MD   2.5 mg at 10/07/17 0824   • metoprolol tartrate (LOPRESSOR) tablet 25 mg  25 mg Oral BID Lacho Courtney MD   25 mg at 10/07/17 0825   • midazolam (VERSED) 50 mg in sodium chloride 0.9 % 100 mL (0.5 mg/mL) infusion  1 mg/hr Intravenous Titrated Vivian Banuelos MD   Stopped at 10/05/17 0855   • midazolam (VERSED) injection 1 mg  1 mg Intravenous Q4H PRN Sukhjinder Mendez MD       • Morphine (PF) injection 4 mg  4 mg Intravenous Q2H PRN Nicola Rich MD   4 mg at 10/05/17 1612   • naloxone (NARCAN) injection 0.2 mg  0.2 mg Intravenous PRN Terrence Johnson MD       • norepinephrine (LEVOPHED) 8,000 mcg in sodium chloride 0.9 % 250 mL (32 mcg/mL) infusion  0.02-0.3 mcg/kg/min Intravenous Titrated Vivian Banuelos MD   Stopped at 10/03/17 1705   • ondansetron (ZOFRAN) injection 4 mg  4 mg Intravenous Q6H PRN Cipriano Leigh MD       • ondansetron (ZOFRAN) injection 4 mg  4 mg Intravenous Once PRN Terrence Johnson MD       • oxyCODONE (ROXICODONE) immediate release tablet 5 mg  5 mg Oral Q4H PRN Nicola Rich MD   5 mg at 10/07/17 1019   • Pharmacy to dose warfarin   Does not apply Continuous PRN Nicola Rich MD       • piperacillin-tazobactam (ZOSYN) 2.25 g/100 mL 0.9% NS IVPB (mbp)  2.25 g Intravenous Q6H Ata Farris MD 0 mL/hr at 10/03/17 1435 2.25 g at 10/07/17 0821   • polyethylene glycol (MIRALAX) powder 17 g  17 g Oral BID Cipriano Leigh MD   17 g at 10/07/17 0824   • propofol (DIPRIVAN) infusion 10 mg/mL 100 mL  5-50 mcg/kg/min Intravenous Titrated Gunnerul Phong Banuelos MD   Stopped at 10/01/17 1320   • sodium chloride 0.9 %  flush 1-10 mL  1-10 mL Intravenous PRN Cipriano Leigh MD   10 mL at 10/03/17 1006   • sodium chloride 0.9 % flush 10 mL  10 mL Intravenous PRN Barrie Rodríguez MD       • sodium chloride 0.9 % flush 10 mL  10 mL Intracatheter Q12H Ata Farris MD   10 mL at 10/07/17 0826   • sodium chloride 0.9 % flush 10 mL  10 mL Intracatheter PRN Ata Farris MD       • warfarin (COUMADIN) tablet 1 mg  1 mg Oral Daily Sukhjinder Mendez MD   1 mg at 10/06/17 1726         Assessment/Plan     Principal Problem:    Displaced intertrochanteric fracture of left femur, initial encounter for closed fracture  Active Problems:    Peripheral arterial disease    CAD (coronary artery disease)    Hypertension    Chronic bronchitis    Essential hypertension    Fall    Tobacco dependence syndrome    Atrial fibrillation          -We will get follow-up orthopedic doctor.  -We'll get PT OT.  -Continue present management.  -DVT and GI prophylaxis in place.  -Clinically improving and we'll consider transferring patient regular floor.  -We'll continue monitoring patient in hospital setting and treat patient as course dictates.          This document has been electronically signed by Vivian Banuelos MD on October 7, 2017 10:51 AM        EMR Dragon/Transcription disclaimer:   Dictated utilizing Dragon dictation.

## 2017-10-08 LAB
ALBUMIN SERPL-MCNC: 2.6 G/DL (ref 3.4–4.8)
ALBUMIN/GLOB SERPL: 1 G/DL (ref 1.1–1.8)
ALP SERPL-CCNC: 59 U/L (ref 38–126)
ALT SERPL W P-5'-P-CCNC: 45 U/L (ref 9–52)
ANION GAP SERPL CALCULATED.3IONS-SCNC: 8 MMOL/L (ref 5–15)
ANISOCYTOSIS BLD QL: ABNORMAL
AST SERPL-CCNC: 43 U/L (ref 14–36)
BASOPHILS # BLD AUTO: 0.04 10*3/MM3 (ref 0–0.2)
BASOPHILS NFR BLD AUTO: 0.6 % (ref 0–2)
BILIRUB SERPL-MCNC: 0.7 MG/DL (ref 0.2–1.3)
BUN BLD-MCNC: 11 MG/DL (ref 7–21)
BUN/CREAT SERPL: 17.2 (ref 7–25)
CALCIUM SPEC-SCNC: 8.4 MG/DL (ref 8.4–10.2)
CHLORIDE SERPL-SCNC: 102 MMOL/L (ref 95–110)
CO2 SERPL-SCNC: 31 MMOL/L (ref 22–31)
CREAT BLD-MCNC: 0.64 MG/DL (ref 0.5–1)
DEPRECATED RDW RBC AUTO: 46.3 FL (ref 36.4–46.3)
EOSINOPHIL # BLD AUTO: 0.28 10*3/MM3 (ref 0–0.7)
EOSINOPHIL # BLD MANUAL: 0.14 10*3/MM3 (ref 0–0.7)
EOSINOPHIL NFR BLD AUTO: 4.1 % (ref 0–7)
EOSINOPHIL NFR BLD MANUAL: 2 % (ref 0–7)
ERYTHROCYTE [DISTWIDTH] IN BLOOD BY AUTOMATED COUNT: 14.1 % (ref 11.5–14.5)
GFR SERPL CREATININE-BSD FRML MDRD: 90 ML/MIN/1.73 (ref 39–90)
GLOBULIN UR ELPH-MCNC: 2.6 GM/DL (ref 2.3–3.5)
GLUCOSE BLD-MCNC: 110 MG/DL (ref 60–100)
HCT VFR BLD AUTO: 29.7 % (ref 35–45)
HGB BLD-MCNC: 9.7 G/DL (ref 12–15.5)
IMM GRANULOCYTES # BLD: 0.41 10*3/MM3 (ref 0–0.02)
IMM GRANULOCYTES NFR BLD: 5.9 % (ref 0–0.5)
INR PPP: 4.75 (ref 0.8–1.2)
LYMPHOCYTES # BLD AUTO: 1.15 10*3/MM3 (ref 0.6–4.2)
LYMPHOCYTES # BLD MANUAL: 0.97 10*3/MM3 (ref 0.6–4.2)
LYMPHOCYTES NFR BLD AUTO: 16.6 % (ref 10–50)
LYMPHOCYTES NFR BLD MANUAL: 14 % (ref 10–50)
LYMPHOCYTES NFR BLD MANUAL: 9 % (ref 0–12)
MACROCYTES BLD QL SMEAR: ABNORMAL
MAGNESIUM SERPL-MCNC: 1.9 MG/DL (ref 1.6–2.3)
MCH RBC QN AUTO: 29.2 PG (ref 26.5–34)
MCHC RBC AUTO-ENTMCNC: 32.7 G/DL (ref 31.4–36)
MCV RBC AUTO: 89.5 FL (ref 80–98)
METAMYELOCYTES NFR BLD MANUAL: 4 % (ref 0–0)
MONOCYTES # BLD AUTO: 0.62 10*3/MM3 (ref 0–0.9)
MONOCYTES # BLD AUTO: 1 10*3/MM3 (ref 0–0.9)
MONOCYTES NFR BLD AUTO: 14.5 % (ref 0–12)
NEUTROPHILS # BLD AUTO: 4.03 10*3/MM3 (ref 2–8.6)
NEUTROPHILS # BLD AUTO: 4.84 10*3/MM3 (ref 2–8.6)
NEUTROPHILS NFR BLD AUTO: 58.3 % (ref 37–80)
NEUTROPHILS NFR BLD MANUAL: 70 % (ref 37–80)
NRBC BLD MANUAL-RTO: 0.4 /100 WBC (ref 0–0)
NRBC SPEC MANUAL: 1 /100 WBC (ref 0–0)
OVALOCYTES BLD QL SMEAR: ABNORMAL
PLATELET # BLD AUTO: 279 10*3/MM3 (ref 150–450)
PMV BLD AUTO: 8.7 FL (ref 8–12)
POLYCHROMASIA BLD QL SMEAR: ABNORMAL
POTASSIUM BLD-SCNC: 3.2 MMOL/L (ref 3.5–5.1)
PROT SERPL-MCNC: 5.2 G/DL (ref 6.3–8.6)
PROTHROMBIN TIME: 45.6 SECONDS (ref 11.1–15.3)
RBC # BLD AUTO: 3.32 10*6/MM3 (ref 3.77–5.16)
SMALL PLATELETS BLD QL SMEAR: ADEQUATE
SODIUM BLD-SCNC: 141 MMOL/L (ref 137–145)
VARIANT LYMPHS NFR BLD MANUAL: 1 % (ref 0–5)
WBC MORPH BLD: NORMAL
WBC NRBC COR # BLD: 6.91 10*3/MM3 (ref 3.2–9.8)

## 2017-10-08 PROCEDURE — 97110 THERAPEUTIC EXERCISES: CPT

## 2017-10-08 PROCEDURE — 97535 SELF CARE MNGMENT TRAINING: CPT

## 2017-10-08 PROCEDURE — 25010000002 PIPERACILLIN-TAZOBACTAM: Performed by: FAMILY MEDICINE

## 2017-10-08 PROCEDURE — 94760 N-INVAS EAR/PLS OXIMETRY 1: CPT

## 2017-10-08 PROCEDURE — 94799 UNLISTED PULMONARY SVC/PX: CPT

## 2017-10-08 PROCEDURE — 97112 NEUROMUSCULAR REEDUCATION: CPT

## 2017-10-08 PROCEDURE — 85610 PROTHROMBIN TIME: CPT | Performed by: ORTHOPAEDIC SURGERY

## 2017-10-08 PROCEDURE — 80053 COMPREHEN METABOLIC PANEL: CPT | Performed by: INTERNAL MEDICINE

## 2017-10-08 PROCEDURE — 97530 THERAPEUTIC ACTIVITIES: CPT

## 2017-10-08 PROCEDURE — 85025 COMPLETE CBC W/AUTO DIFF WBC: CPT | Performed by: INTERNAL MEDICINE

## 2017-10-08 PROCEDURE — 85007 BL SMEAR W/DIFF WBC COUNT: CPT | Performed by: INTERNAL MEDICINE

## 2017-10-08 PROCEDURE — 83735 ASSAY OF MAGNESIUM: CPT | Performed by: INTERNAL MEDICINE

## 2017-10-08 RX ORDER — POTASSIUM CHLORIDE 750 MG/1
20 CAPSULE, EXTENDED RELEASE ORAL ONCE
Status: COMPLETED | OUTPATIENT
Start: 2017-10-08 | End: 2017-10-08

## 2017-10-08 RX ADMIN — PIPERACILLIN AND TAZOBACTAM 2.25 G: 2; .25 INJECTION, POWDER, LYOPHILIZED, FOR SOLUTION INTRAVENOUS; PARENTERAL at 20:25

## 2017-10-08 RX ADMIN — FUROSEMIDE 20 MG: 20 TABLET ORAL at 09:25

## 2017-10-08 RX ADMIN — ALBUTEROL SULFATE 2.5 MG: 2.5 SOLUTION RESPIRATORY (INHALATION) at 06:57

## 2017-10-08 RX ADMIN — AMIODARONE HYDROCHLORIDE 200 MG: 200 TABLET ORAL at 20:25

## 2017-10-08 RX ADMIN — ACETAMINOPHEN 1000 MG: 500 TABLET ORAL at 06:02

## 2017-10-08 RX ADMIN — ALBUTEROL SULFATE 2.5 MG: 2.5 SOLUTION RESPIRATORY (INHALATION) at 18:58

## 2017-10-08 RX ADMIN — ACETAMINOPHEN 1000 MG: 500 TABLET ORAL at 23:24

## 2017-10-08 RX ADMIN — PIPERACILLIN AND TAZOBACTAM 2.25 G: 2; .25 INJECTION, POWDER, LYOPHILIZED, FOR SOLUTION INTRAVENOUS; PARENTERAL at 09:25

## 2017-10-08 RX ADMIN — Medication 10 ML: at 20:26

## 2017-10-08 RX ADMIN — AMIODARONE HYDROCHLORIDE 200 MG: 200 TABLET ORAL at 10:20

## 2017-10-08 RX ADMIN — PIPERACILLIN AND TAZOBACTAM 2.25 G: 2; .25 INJECTION, POWDER, LYOPHILIZED, FOR SOLUTION INTRAVENOUS; PARENTERAL at 02:09

## 2017-10-08 RX ADMIN — DEXTROSE MONOHYDRATE 75 ML/HR: 50 INJECTION, SOLUTION INTRAVENOUS at 00:27

## 2017-10-08 RX ADMIN — METOPROLOL TARTRATE 25 MG: 25 TABLET ORAL at 17:13

## 2017-10-08 RX ADMIN — FAMOTIDINE 20 MG: 20 TABLET ORAL at 09:25

## 2017-10-08 RX ADMIN — ACETAMINOPHEN 1000 MG: 500 TABLET ORAL at 00:01

## 2017-10-08 RX ADMIN — POTASSIUM CHLORIDE 20 MEQ: 750 CAPSULE, EXTENDED RELEASE ORAL at 20:25

## 2017-10-08 RX ADMIN — PANTOPRAZOLE SODIUM 40 MG: 40 TABLET, DELAYED RELEASE ORAL at 06:02

## 2017-10-08 RX ADMIN — GABAPENTIN 300 MG: 300 CAPSULE ORAL at 09:25

## 2017-10-08 RX ADMIN — ACETAMINOPHEN 1000 MG: 500 TABLET ORAL at 17:13

## 2017-10-08 RX ADMIN — Medication 10 ML: at 09:25

## 2017-10-08 RX ADMIN — ALBUTEROL SULFATE 2.5 MG: 2.5 SOLUTION RESPIRATORY (INHALATION) at 12:37

## 2017-10-08 RX ADMIN — ALBUTEROL SULFATE 2.5 MG: 2.5 SOLUTION RESPIRATORY (INHALATION) at 01:27

## 2017-10-08 RX ADMIN — METOPROLOL TARTRATE 25 MG: 25 TABLET ORAL at 09:25

## 2017-10-08 RX ADMIN — PIPERACILLIN AND TAZOBACTAM 2.25 G: 2; .25 INJECTION, POWDER, LYOPHILIZED, FOR SOLUTION INTRAVENOUS; PARENTERAL at 14:38

## 2017-10-08 RX ADMIN — GABAPENTIN 300 MG: 300 CAPSULE ORAL at 20:25

## 2017-10-08 RX ADMIN — LISINOPRIL 2.5 MG: 2.5 TABLET ORAL at 09:25

## 2017-10-08 RX ADMIN — FAMOTIDINE 20 MG: 20 TABLET ORAL at 20:25

## 2017-10-08 RX ADMIN — ACETAMINOPHEN 1000 MG: 500 TABLET ORAL at 12:15

## 2017-10-08 RX ADMIN — POLYETHYLENE GLYCOL 3350 17 G: 17 POWDER, FOR SOLUTION ORAL at 09:25

## 2017-10-08 NOTE — THERAPY TREATMENT NOTE
Acute Care - Occupational Therapy Progress Note  [facility]     Patient Name: Anahi Calix  : 1940  MRN: [id number]  Today's Date: 10/8/2017  Onset of Illness/Injury or Date of Surgery Date: 17  Date of Referral to OT: 10/01/17  Referring Physician: Dr. Nicola Rich      Admit Date: 2017    Visit Dx:     ICD-10-CM ICD-9-CM   1. Displaced intertrochanteric fracture of left femur, initial encounter for closed fracture S72.142A 820.21   2. Closed left hip fracture, initial encounter S72.002A 820.8   3. Fall, initial encounter W19.XXXA E888.9   4. Peripheral arterial disease I73.9 443.9   5. Essential hypertension I10 401.9   6. Gastroesophageal reflux disease, esophagitis presence not specified K21.9 530.81   7. Abdominal aortic aneurysm (AAA) without rupture I71.4 441.4   8. Impaired mobility and activities of daily living Z74.09 799.89   9. Impaired physical mobility Z74.09 781.99     Patient Active Problem List   Diagnosis   • Peripheral arterial disease   • Gastroesophageal reflux disease   • Abdominal aortic aneurysm (AAA) without rupture   • Displaced intertrochanteric fracture of left femur, initial encounter for closed fracture   • CAD (coronary artery disease)   • Hypertension   • Chronic bronchitis   • Essential hypertension   • Fall   • Tobacco dependence syndrome   • Atrial fibrillation             Adult Rehabilitation Note       10/08/17 1400 10/08/17 1128 10/08/17 0849    Rehab Assessment/Intervention    Discipline occupational therapy assistant  -BL physical therapy assistant  -EM physical therapy assistant  -EM    Document Type therapy note (daily note)  -BL therapy note (daily note)  -EM therapy note (daily note)  -EM    Subjective Information agree to therapy  -BL agree to therapy   nsg req assistance from PT to get pt to bedside commode/bed  -EM agree to therapy   reports its difficult to control her hands since surgery.   -EM    Precautions/Limitations fall  precautions;oxygen therapy device and L/min  -BL fall precautions;oxygen therapy device and L/min  -EM fall precautions;oxygen therapy device and L/min  -EM    Recorded by [BL] MELO Jhaveri/SADIA [EM] Oskar Marie PTA [EM] Oskar Marie PTA    Vital Signs    Pre Systolic BP Rehab 120  -BL  165  -EM    Pre Treatment Diastolic BP 64  -BL  81  -EM    Pretreatment Heart Rate (beats/min) 67  -BL  79  -EM    Posttreatment Heart Rate (beats/min) 76  -BL      Pre SpO2 (%) 96  -BL  95  -EM    O2 Delivery Pre Treatment supplemental O2  -BL  supplemental O2  -EM    Post SpO2 (%) 95  -BL      O2 Delivery Post Treatment supplemental O2  -BL      Pre Patient Position Supine  -BL  Supine  -EM    Intra Patient Position Sitting  -BL      Post Patient Position Supine  -BL      Recorded by [BL] MELO Jhaveri/SADIA  [EM] Oskar Marie PTA    Pain Assessment    Pain Assessment No/denies pain  -BL 0-10  -EM 0-10  -EM    Pain Score  0  -EM 3  -EM    Post Pain Score  0  -EM 0  -EM    Pain Type   Acute pain  -EM    Pain Location   Hip  -EM    Pain Orientation   Left  -EM    Recorded by [BL] MELO Jhaveri/SADIA [EM] Oskar Marie PTA [EM] Oskar Marie PTA    Cognitive Assessment/Intervention    Current Cognitive/Communication Assessment impaired  -BL impaired  -EM impaired  -EM    Orientation Status oriented to;person;place  -BL oriented to;person;place;situation;time  -EM oriented to;person;place;situation  -EM    Follows Commands/Answers Questions 75% of the time;100% of the time;able to follow single-step instructions;needs cueing;needs increased time;needs repetition  -% of the time  -% of the time  -EM    Personal Safety mild impairment  -BL      Personal Safety Interventions gait belt;muscle strengthening facilitated;nonskid shoes/slippers when out of bed  -BL gait belt;fall prevention program maintained;toileting scheduled;supervised activity;nonskid shoes/slippers when out of bed  -EM fall prevention program  maintained;gait belt;nonskid shoes/slippers when out of bed;supervised activity  -EM    Recorded by [BL] MELO Jhaveri/SADIA [EM] Oskar Marie PTA [EM] Oskar Marie PTA    Mobility Assessment/Training    Left Lower Extremity Weight-Bearing  weight-bearing as tolerated  -EM weight-bearing as tolerated  -EM    Recorded by  [EM] Oskar Marie PTA [EM] Oskar Marie PTA    Bed Mobility, Assessment/Treatment    Bed Mob, Supine to Sit, [place] moderate assist (50% patient effort);maximum assist (25% patient effort);nonverbal cues required (demo/gesture)  -BL  moderate assist (50% patient effort);maximum assist (25% patient effort)  -EM    Bed Mob, Sit to Supine, [place] maximum assist (25% patient effort);2 person assist required  -BL maximum assist (25% patient effort);2 person assist required  -EM     Bed Mobility, Safety Issues decreased use of legs for bridging/pushing  -BL      Bed Mobility, Impairments ROM decreased;strength decreased;impaired balance;coordination impaired  -BL      Bed Mobility, Comment   Mod Ax1 to scoot to EOB due to B UE weakness  -EM    Recorded by [BL] MELO Jhaveri/SADIA [EM] Oskar Marie PTA [EM] Oskar Marie PTA    Transfer Assessment/Treatment    Transfers, Bed-Chair [place] not tested  -BL maximum assist (25% patient effort)  -EM maximum assist (25% patient effort)  -EM    Transfers, Chair-Bed [place] not tested  -BL maximum assist (25% patient effort)  -EM     Transfers, Sit-Stand [place] not tested  -BL maximum assist (25% patient effort)  -EM maximum assist (25% patient effort)  -EM    Transfers, Stand-Sit [place] not tested  -BL maximum assist (25% patient effort)  -EM maximum assist (25% patient effort)  -EM    Transfer, Comment Pt previously returned to bed from chair and reports she sat up for ~2 hours before being put back to bed.   -BL Pt t/f ortho chair->bedside commode->EOB all via stand pivot t/f to R. Pt stood with MaxAx1 for dep  rayo from [facility] staff.   -EM t/f EOB to ortho chair with mod/max Ax1 via a R stand pivot. Attempted standing with FWRW at EOB-unsuccessful.   -EM    Recorded by [BL] LILA Jhaevri [EM] Oskar Marie PTA [EM] Oskar Marie PTA    Grooming Assessment/Training    Grooming Assess/Train, Assistive Device --   simulated task sitting EOB with BUE washing face  -BL      Grooming Assess/Train, Position sitting  -BL      Grooming Assess/Train, Indepen Level verbal cues required;minimum assist (75% patient effort)  -BL      Grooming Assess/Train, Impairments impaired balance;strength decreased;ROM decreased;decreased flexibility;motor control impaired;postural control impaired;coordination impaired  -BL      Grooming Assess/Train, Comment Pt completed simulated face washing task EOB this date requiring min assist to demo proper way to wash face with BUE and decreased ROM. Discussed possible need for AE during ADL's this date and family was given booklet on AE and DME.   -BL      Recorded by [BL] LILA Jhaveri      Motor Skills/Interventions    Additional Documentation Neuromuscular Re-education (Group);Gross Motor Coordination Training (Group);Fine Motor Coordination Training (Group);Balance Skills Training (Group)  -BL      Recorded by [BL] LILA Jhaveri      Balance Skills Training    Sitting-Level of Assistance Contact guard  -BL      Sitting-Balance Support Feet supported  -BL      Sitting-Balance Activities Reaching for objects  -BL      Sitting # of Minutes 15  -BL      Recorded by [BL] LILA Jhaveri      Therapy Exercises    Bilateral Lower Extremities   AROM:;20 reps;sitting;ankle pumps/circles;glut sets;LAQ;AAROM:;SLR  -EM    Bilateral Upper Extremity 15 reps;sitting;elbow flexion/extension;hand pumps;pronation/supination;shoulder abduction/adduction;shoulder extension/flexion;shoulder ER/IR;shoulder horizontal abd/add;PROM:;AAROM:  -BL      BUE Resistance other  (comment);theraband;theraputty  -BL      Recorded by [BL] LILA Jhaveri  [EM] Oskar Marie PTA    Gross Motor Coordination Training    Gross Motor Skill, Impairments Detail GMC task completed for reaching to target this date x5 reps each arm with 75% accuracy. Pt required vc's and increased time and effort for processing commands.   -BL      Recorded by [BL] MELO Jhavrei/SADIA      Fine Motor Coordination Training    Detail (Fine Motor Coordination Training) FMC training task this date demo'd and educated to pt and family this date. Pt was able to complete putty task this date of yellow and red however pt required increased time and effort mostly for L hand weakness.  -BL      Recorded by [BL] LILA Jhaveri      Positioning and Restraints    Pre-Treatment Position in bed  -BL sitting in chair/recliner  -EM in bed  -EM    Post Treatment Position bed  -BL bed  -EM chair  -EM    In Bed sitting;call light within reach;encouraged to call for assist;with family/caregiver   sitting at 90* in bed  -BL supine;call light within reach;encouraged to call for assist;exit alarm on;with nsg  -EM encouraged to call for assist;call light within reach;with family/caregiver  -EM    Recorded by [BL] MELO Jhaveri/SADIA [EM] Oskar Marie PTA [EM] Oskar Marie PTA      10/07/17 0835          Rehab Assessment/Intervention    Discipline physical therapy assistant  -SS      Document Type therapy note (daily note)  -SS      Subjective Information agree to therapy;complains of;pain  -SS      Patient Effort, Rehab Treatment fair  -SS      Symptoms Noted During/After Treatment other (see comments)   pt lethargic and difficult to keep on task  -SS      Precautions/Limitations fall precautions;oxygen therapy device and L/min  -SS      Recorded by [SS] Meme Jenkins PTA      Vital Signs    Pre Systolic BP Rehab 177  -SS      Pre Treatment Diastolic BP 77  -SS      Post Systolic BP Rehab 165  -SS      Post Treatment  "Diastolic BP 72  -SS      Pretreatment Heart Rate (beats/min) 92  -SS      Posttreatment Heart Rate (beats/min) 79  -SS      Pre SpO2 (%) 96  -SS      O2 Delivery Pre Treatment supplemental O2  -SS      Post SpO2 (%) 98  -SS      O2 Delivery Post Treatment supplemental O2  -SS      Pre Patient Position Supine  -SS      Post Patient Position Supine  -SS      Recorded by [SS] Meme Jenkins PTA      Pain Assessment    Pain Assessment 0-10  -SS      Pain Score 8  -SS      Post Pain Score 8  -SS      Pain Type Acute pain  -      Pain Location --   \"tail bone\"  -      Pain Intervention(s) Repositioned  -SS      Recorded by [SS] eMme Jenkins PTA      Cognitive Assessment/Intervention    Current Cognitive/Communication Assessment impaired  -      Orientation Status oriented to;person;place;situation  -SS      Follows Commands/Answers Questions 75% of the time  -      Personal Safety mild impairment  -      Personal Safety Interventions supervised activity  -SS      Recorded by [SS] Meme Jenkins PTA      Mobility Assessment/Training    Left Lower Extremity Weight-Bearing weight-bearing as tolerated  -SS      Recorded by [SS] Meme Jenkins PTA      Bed Mobility, Assessment/Treatment    Bed Mobility, Assistive Device bed rails  -      Bed Mobility, Roll Left, [place] maximum assist (25% patient effort)  -      Bed Mobility, Roll Right, [place] maximum assist (25% patient effort)  -      Bed Mobility, Comment pt would keep eyes closed despite frequent cues. Max assistance needed for rolling and to help guide hand to railing. Pt placed on bed pan per pt request and then pt rolled for repositioning. pt able to assist more with rolling towards L using R hand to pull on BR  -SS      Recorded by [SS] Meme Jenkins PTA      Transfer Assessment/Treatment    Transfers, Bed-Chair [place] not tested  -SS      Recorded by [SS] Meme Jenkins PTA      Gait Assessment/Treatment "    Gait, [place] Level not tested  -SS      Recorded by [SS] Meme Jenkins PTA      Therapy Exercises    Bilateral Lower Extremities AROM:;10 reps;ankle pumps/circles;quad sets;AAROM:;heel slides;hip abduction/adduction  -SS      Recorded by [SS] Meme Jenkins PTA      Positioning and Restraints    Pre-Treatment Position in bed  -SS      Post Treatment Position bed  -SS      In Bed supine;call light within reach;encouraged to call for assist;patient within staff view;side rails up x3;SCD pump applied;LUE elevated;legs elevated  -SS      Recorded by [SS] Meme Jenkins PTA        User Key  (r) = Recorded By, (t) = Taken By, (c) = Cosigned By    Initials Name Effective Dates    AMANDA Marie, PTA 08/11/15 -     SS Meme Jenkins, PTA 10/17/16 -     BL Monica Hadley, ALEJO/L 10/17/16 -                 OT Goals       10/08/17 1553 10/06/17 1155       Transfer Training OT LTG    Transfer Training OT LTG, Date Established  10/06/17  -RB     Transfer Training OT LTG, Time to Achieve  by discharge  -RB     Transfer Training OT LTG, Activity Type  all transfers  -RB     Transfer Training OT LTG, [place] Level  supervision required;contact guard assist  -RB     Transfer Training OT LTG, Assist Device  walker, rolling  -RB     Transfer Training OT LTG, Date Goal Reviewed 10/08/17  -BL      Transfer Training OT LTG, Outcome goal ongoing  -BL      Range of Motion OT LTG    Range of Motion Goal OT LTG, Date Established  10/06/17  -RB     Range of Motion Goal OT LTG, Time to Achieve  by discharge  -RB     Range of Motion Goal OT LTG, AROM Measure  Full AROM to all UE joints to increase independence with ADLs.  -RB     Range of Motion Goal OT LTG, Date Goal Reviewed 10/08/17  -BL      Range of Motion Goal OT LTG, Outcome goal ongoing  -BL      Dynamic Sitting Balance OT LTG    Dynamic Sitting Balance OT LTG, Date Established  10/06/17  -RB     Dynamic Sitting Balance OT LTG, Time to Achieve  by  discharge  -RB     Dynamic Sitting Balance OT LTG, [place] Level  contact guard assist   15 minutes with functional activity.  -RB     Dynamic Sitting Balance OT LTG, Assist Device  bed rails  -RB     Dynamic Sitting Balance OT LTG, Date Goal Reviewed 10/08/17  -BL      Dynamic Sitting Balance OT LTG, Outcome goal ongoing  -BL      Eating Self-Feeding OT LTG    Eat Self Feeding Goal OT LTG, Date Established  10/06/17  -RB     Eat Self Feeding Goal OT LTG, Time to Achieve  by discharge  -RB     Eat Self Feed Goal OT LTG, [place] Level  supervision required;contact guard assist  -RB     Eat Self Feeding Goal OT LTG, Position  sitting in chair  -RB     Eat Self Feeding Goal OT LTG, Date Goal Review 10/08/17  -BL      Eat Self Feeding Goal OT LTG, Outcome goal ongoing  -BL      ADL OT LTG    ADL OT LTG, Date Established  10/06/17  -RB     ADL OT LTG, Time to Achieve  by discharge  -RB     ADL OT LTG, Activity Type  ADL skills   Sponge bath and dress when appropriate.  -RB     ADL OT LTG, [place] Level  min assist  -RB     ADL OT LTG, Date Goal Reviewed 10/08/17  -BL      ADL OT LTG, Outcome goal ongoing  -BL        User Key  (r) = Recorded By, (t) = Taken By, (c) = Cosigned By    Initials Name Provider Type    MIESHA Pardo OT Occupational Therapist    LILA Daniel Occupational Therapy Assistant          Occupational Therapy Education     Title: PT OT SLP Therapies (Active)     Topic: Occupational Therapy (Done)     Point: ADL training (Done)    Description: Instruct learner(s) on proper safety adaptation and remediation techniques during self care or transfers.   Instruct in proper use of assistive devices.    Learning Progress Summary    Learner Readiness Method Response Comment Documented by Status   Patient KAMALJIT Foote D VU,MARY CARMEN,[facility] 10/08/17 1557 Done   Family KAMALJIT Foote D VU, NR,[facility] 10/08/17 1558 Done               Point: Home exercise program (Done)    Description: Instruct  learner(s) on appropriate technique for monitoring, assisting and/or progressing therapeutic exercises/activities.    Learning Progress Summary    Learner Readiness Method Response Comment Documented by Status   Patient Eager E,TB,D VU,NR,DU  BL 10/08/17 1553 Done   Family Eager E,TB,D VU,NR,DU  BL 10/08/17 1553 Done               Point: Precautions (Done)    Description: Instruct learner(s) on prescribed precautions during self-care and functional transfers.    Learning Progress Summary    Learner Readiness Method Response Comment Documented by Status   Patient Eager E,TB,D VU,NR,DU  BL 10/08/17 1553 Done    Acceptance E NR,VU Edu pt on no OOB without assist. RB 10/06/17 1152 Done   Family Eager E,TB,D VU,NR,DU  BL 10/08/17 1553 Done               Point: Body mechanics (Done)    Description: Instruct learner(s) on proper positioning and spine alignment during self-care, functional mobility activities and/or exercises.    Learning Progress Summary    Learner Readiness Method Response Comment Documented by Status   Patient Eager E,TB,D VU,NR,DU  BL 10/08/17 1553 Done   Family Amorer E,TB,D VU,NR,DU   10/08/17 1553 Done                      User Key     Initials Effective Dates Name Provider Type Discipline    RB 06/15/16 -  Jonny Pardo, OT Occupational Therapist OT     10/17/16 -  LILA Jhaveri Occupational Therapy Assistant OT                  OT Recommendation and Plan  Anticipated Equipment Needs At Discharge: gait belt, front wheeled walker, raised toilet seat, wheelchair  Planned Therapy Interventions: activity intolerance, ADL retraining, balance training, energy conservation, bed mobility training, transfer training, strengthening, ROM (Range of Motion)  Therapy Frequency:  (3-14x/wk)  Plan of Care Review  Plan Of Care Reviewed With: patient  Progress: progress toward functional goals as expected  Outcome Summary/Follow up Plan: Pt participated very well today requiring increased time and effort  for all sequencing task however patient and family very motivated. Pt required rest breaks and was able to complete supine-sit transfer with mod assist of 1 this date. Pt needs self feeding addressed quickly to increased independence as pt is very concerned about not being able to feed herself.        Outcome Measures       10/08/17 1400 10/08/17 1128 10/08/17 0849    How much help from another person do you currently need...    Turning from your back to your side while in flat bed without using bedrails?  2  -EM 2  -EM    Moving from lying on back to sitting on the side of a flat bed without bedrails?  2  -EM 2  -EM    Moving to and from a bed to a chair (including a wheelchair)?  2  -EM 2  -EM    Standing up from a chair using your arms (e.g., wheelchair, bedside chair)?  1  -EM 1  -EM    Climbing 3-5 steps with a railing?  1  -EM 1  -EM    To walk in hospital room?  1  -EM 1  -EM    AM-PAC 6 Clicks Score  9  -EM 9  -EM    How much help from another is currently needed...    Putting on and taking off regular lower body clothing? 1  -BL      Bathing (including washing, rinsing, and drying) 1  -BL      Toileting (which includes using toilet bed pan or urinal) 1  -BL      Putting on and taking off regular upper body clothing 1  -BL      Taking care of personal grooming (such as brushing teeth) 2  -BL      Eating meals 2  -BL      Score 8  -BL      Functional Assessment    Outcome Measure Options AM-PAC 6 Clicks Daily Activity (OT)  -BL AM-PAC 6 Clicks Basic Mobility (PT)  -EM AM-PAC 6 Clicks Basic Mobility (PT)  -EM      10/07/17 0835 10/06/17 1123 10/06/17 1009    How much help from another person do you currently need...    Turning from your back to your side while in flat bed without using bedrails? 2  -SS 1  -BLAIR     Moving from lying on back to sitting on the side of a flat bed without bedrails? 2  -SS 1  -BLAIR     Moving to and from a bed to a chair (including a wheelchair)? 1  -SS 1  -BLAIR     Standing up from a  chair using your arms (e.g., wheelchair, bedside chair)? 1  -SS 1  -BLAIR     Climbing 3-5 steps with a railing? 1  -SS 1  -BLAIR     To walk in hospital room? 1  -SS 1  -BLAIR     AM-PAC 6 Clicks Score 8  -SS 6  -BLAIR     How much help from another is currently needed...    Putting on and taking off regular lower body clothing?   1  -RB    Bathing (including washing, rinsing, and drying)   1  -RB    Toileting (which includes using toilet bed pan or urinal)   1  -RB    Putting on and taking off regular upper body clothing   1  -RB    Taking care of personal grooming (such as brushing teeth)   1  -RB    Eating meals   1  -RB    Score   6  -RB    Functional Assessment    Outcome Measure Options AM-PAC 6 Clicks Basic Mobility (PT)  -SS AM-PAC 6 Clicks Basic Mobility (PT)  -BLAIR AM-PAC 6 Clicks Daily Activity (OT)  -RB      User Key  (r) = Recorded By, (t) = Taken By, (c) = Cosigned By    Initials Name Provider Type    EM Oskar Marie, PTA Physical Therapy Assistant    RB Jonny Pardo, OT Occupational Therapist    BLAIR Jadyn Nelson, PT Physical Therapist    SS Meme Jenkins, PTA Physical Therapy Assistant    BL MELO Jhaveri/SADIA Occupational Therapy Assistant           Time Calculation:         Time Calculation- OT       10/08/17 1557          Time Calculation- OT    OT Start Time 1400  -BL      OT Stop Time 1520  -BL      OT Time Calculation (min) 80 min  -BL      Total Timed Code Minutes- OT 80 minute(s)  -BL      OT Received On 10/08/17  -BL        User Key  (r) = Recorded By, (t) = Taken By, (c) = Cosigned By    Initials Name Provider Type     Monica Hadley ALEJO/SADIA Occupational Therapy Assistant           Therapy Charges for Today     Code Description Service Date Service Provider Modifiers Qty    07710647696 HC OT NEUROMUSC RE EDUCATION EA 15 MIN 10/8/2017 ROSALINA JhaveriA/L GO 2    33100476500 HC OT THER PROC EA 15 MIN 10/8/2017 ROSALINA JhaveriA/L GO 2    38088466518 HC OT SELF CARE/MGMT/TRAIN EA 15 MIN  10/8/2017 LILA Jhaveri GO 1          OT G-codes  OT Professional Judgement Used?: Yes  OT Functional Scales Options: AM-PAC 6 Clicks Daily Activity (OT)  Score: 6  Functional Limitation: Self care  Self Care Current Status (): 100 percent impaired, limited or restricted  Self Care Goal Status (): At least 80 percent but less than 100 percent impaired, limited or restricted    LILA Jhaveri  10/8/2017

## 2017-10-08 NOTE — PLAN OF CARE
Problem: Patient Care Overview (Adult)  Goal: Plan of Care Review  Outcome: Ongoing (interventions implemented as appropriate)    10/08/17 1208 10/08/17 1316   Coping/Psychosocial Response Interventions   Plan Of Care Reviewed With patient --    Patient Care Overview   Progress no change --    Outcome Evaluation   Outcome Summary/Follow up Plan --  Pt tyrese tx well with increased awareness this tx. Pt was able to t/f sup-sit with mod/max Ax1, and t/f EOB to ortho chair via stand pivot with mod/max Ax1. Pt currently is unable to stand while using an AD.          Problem: Inpatient Physical Therapy  Goal: Bed Mobility Goal LTG- PT  Outcome: Ongoing (interventions implemented as appropriate)    10/06/17 1526 10/07/17 0835   Bed Mobility PT LTG   Bed Mobility PT LTG, Date Established 10/06/17 --    Bed Mobility PT LTG, Time to Achieve by discharge --    Bed Mobility PT LTG, Activity Type all bed mobility --    Bed Mobility PT LTG, [place] Level moderate assist (50% patient effort);minimum assist (75% patient effort) --    Bed Mobility PT LTG, Date Goal Reviewed --  10/07/17   Bed Mobility PT LTG, Outcome --  goal ongoing       Goal: Transfer Training Goal 1 LTG- PT  Outcome: Ongoing (interventions implemented as appropriate)    10/06/17 1526 10/07/17 0835   Transfer Training PT LTG   Transfer Training PT LTG, Date Established 10/06/17 --    Transfer Training PT LTG, Time to Achieve by discharge --    Transfer Training PT LTG, Activity Type bed to chair /chair to bed;sit to stand/stand to sit;toilet --    Transfer Training PT LTG, [place] Level moderate assist (50% patient effort);minimum assist (75% patient effort) --    Transfer Training PT LTG, Assist Device walker, rolling --    Transfer Training PT LTG, Date Goal Reviewed --  10/07/17   Transfer Training PT LTG, Outcome --  goal ongoing       Goal: Gait Training Goal LTG- PT  Outcome: Ongoing (interventions implemented as appropriate)    10/06/17 1526  10/07/17 0835   Gait Training PT LTG   Gait Training Goal PT LTG, Date Established 10/06/17 --    Gait Training Goal PT LTG, Time to Achieve by discharge --    Gait Training Goal PT LTG, [place] Level moderate assist (50% patient effort);minimum assist (75% patient effort) --    Gait Training Goal PT LTG, Distance to Achieve 10ft --    Gait Training Goal PT LTG, Additional Goal 50ftx2 --    Gait Training Goal PT LTG, Outcome --  goal ongoing       Goal: Strength Goal LTG- PT  Outcome: Ongoing (interventions implemented as appropriate)    10/06/17 1526 10/07/17 0835   Strength Goal PT LTG   Strength Goal PT LTG, Date Established 10/06/17 --    Strength Goal PT LTG, Time to Achieve by discharge --    Strength Goal PT LTG, Measure to Achieve 10reps of PROM/AAROM exercises --    Strength Goal PT LTG, Functional Goal 2 sets of 10 reps AROM exercises --    Strength Goal PT LTG, Date Goal Reviewed --  10/07/17   Strength Goal PT LTG, Outcome --  goal partially met       Goal: Patient Education Goal LTG- PT  Outcome: Ongoing (interventions implemented as appropriate)    10/06/17 1526 10/07/17 0835   Patient Education PT LTG   Patient Education PT LTG, Date Established 10/06/17 --    Patient Education PT LTG, Time to Achieve by discharge --    Patient Education PT LTG, Education Type HEP;gait;transfers;home safety --    Patient Education PT LTG, Date Goal Reviewed --  10/07/17   Patient Education PT LTG Outcome --  goal ongoing

## 2017-10-08 NOTE — PROGRESS NOTES
C/o not being able to use her hands?  Vitals:    10/08/17 0814   BP:    Pulse: 73   Resp:    Temp:    SpO2:      Lab Results (last 24 hours)     Procedure Component Value Units Date/Time    CBC Auto Differential [[id number]]  (Abnormal) Collected:  10/08/17 0545    Specimen:  Blood Updated:  10/08/17 0617     WBC 6.91 10*3/mm3      RBC 3.32 (L) 10*6/mm3      Hemoglobin 9.7 (L) g/dL      Hematocrit 29.7 (L) %      MCV 89.5 fL      MCH 29.2 pg      MCHC 32.7 g/dL      RDW 14.1 %      RDW-SD 46.3 fl      MPV 8.7 fL      Platelets 279 10*3/mm3      Neutrophil % 58.3 %      Lymphocyte % 16.6 %      Monocyte % 14.5 (H) %      Eosinophil % 4.1 %      Basophil % 0.6 %      Immature Grans % 5.9 (H) %      Neutrophils, Absolute 4.03 10*3/mm3      Lymphocytes, Absolute 1.15 10*3/mm3      Monocytes, Absolute 1.00 (H) 10*3/mm3      Eosinophils, Absolute 0.28 10*3/mm3      Basophils, Absolute 0.04 10*3/mm3      Immature Grans, Absolute 0.41 (H) 10*3/mm3      nRBC 0.4 (H) /100 WBC     Magnesium [[id number]]  (Normal) Collected:  10/08/17 0545    Specimen:  Blood Updated:  10/08/17 0623     Magnesium 1.9 mg/dL     Comprehensive Metabolic Panel [[id number]]  (Abnormal) Collected:  10/08/17 0545    Specimen:  Blood Updated:  10/08/17 0623     Glucose 110 (H) mg/dL      BUN 11 mg/dL      Creatinine 0.64 mg/dL      Sodium 141 mmol/L      Potassium 3.2 (L) mmol/L      Chloride 102 mmol/L      CO2 31.0 mmol/L      Calcium 8.4 mg/dL      Total Protein 5.2 (L) g/dL      Albumin 2.60 (L) g/dL      ALT (SGPT) 45 U/L      AST (SGOT) 43 (H) U/L      Alkaline Phosphatase 59 U/L      Total Bilirubin 0.7 mg/dL      eGFR Non African Amer 90 mL/min/1.73      Globulin 2.6 gm/dL      A/G Ratio 1.0 (L) g/dL      BUN/Creatinine Ratio 17.2     Anion Gap 8.0 mmol/L     Narrative:       The MDRD GFR formula is only valid for adults with stable renal function between ages 18 and 70.    Protime-INR [[id number]]  (Abnormal) Collected:  10/08/17 0545    Specimen:   Blood Updated:  10/08/17 0629     Protime 45.6 (H) Seconds      INR 4.75 (H)    Narrative:       Therapeutic range for most indications is 2.0-3.0 INR,  or 2.5-3.5 for mechanical heart valves.    CBC & Differential [[id number]] Collected:  10/08/17 0545    Specimen:  Blood Updated:  10/08/17 0805    Narrative:       The following orders were created for panel order CBC & Differential.  Procedure                               Abnormality         Status                     ---------                               -----------         ------                     Manual Differential[[id number]]          Abnormal            Final result               Scan Slide[[id number]]                                                                  CBC Auto Differential[[id number]]        Abnormal            Final result                 Please view results for these tests on the individual orders.    Manual Differential [[id number]]  (Abnormal) Collected:  10/08/17 0545    Specimen:  Blood Updated:  10/08/17 0805     Neutrophil % 70.0 %      Lymphocyte % 14.0 %      Monocyte % 9.0 %      Eosinophil % 2.0 %      Metamyelocyte % 4.0 (H) %      Atypical Lymphocyte % 1.0 %      Neutrophils Absolute 4.84 10*3/mm3      Lymphocytes Absolute 0.97 10*3/mm3      Monocytes Absolute 0.62 10*3/mm3      Eosinophils Absolute 0.14 10*3/mm3      nRBC 1.0 (H) /100 WBC      Anisocytosis Slight/1+     Macrocytes Slight/1+     Ovalocytes --      FEW CELLS        Polychromasia Slight/1+     WBC Morphology Normal     Platelet Estimate Adequate        Sitting up in reclining chair working with therapist  Alert, answering questions appropriately  Left hip/thigh incision looks good, no erythema, no drainage  No signs dvt/pe, AMY/ in place  Moving toes, feet up and down with plantarflexion and dorsiflexion  Mobilize/PT  dvt prophylaxis  Nursing placement.

## 2017-10-08 NOTE — PLAN OF CARE
Problem: Patient Care Overview (Adult)  Goal: Plan of Care Review  Outcome: Ongoing (interventions implemented as appropriate)    10/08/17 1553   Coping/Psychosocial Response Interventions   Plan Of Care Reviewed With patient   Patient Care Overview   Progress progress toward functional goals as expected   Outcome Evaluation   Outcome Summary/Follow up Plan Pt participated very well today requiring increased time and effort for all sequencing task however patient and family very motivated. Pt required rest breaks and was able to complete supine-sit transfer with mod assist of 1 this date. Pt needs self feeding addressed quickly to increased independence as pt is very concerned about not being able to feed herself.         Problem: Inpatient Occupational Therapy  Goal: Transfer Training Goal 1 LTG- OT  Outcome: Ongoing (interventions implemented as appropriate)    10/06/17 1155 10/08/17 1553   Transfer Training OT LTG   Transfer Training OT LTG, Date Established 10/06/17 --    Transfer Training OT LTG, Time to Achieve by discharge --    Transfer Training OT LTG, Activity Type all transfers --    Transfer Training OT LTG, [place] Level supervision required;contact guard assist --    Transfer Training OT LTG, Assist Device walker, rolling --    Transfer Training OT LTG, Date Goal Reviewed --  10/08/17   Transfer Training OT LTG, Outcome --  goal ongoing       Goal: Range of Motion Goal LTG- OT  Outcome: Ongoing (interventions implemented as appropriate)    10/06/17 1155 10/08/17 1553   Range of Motion OT LTG   Range of Motion Goal OT LTG, Date Established 10/06/17 --    Range of Motion Goal OT LTG, Time to Achieve by discharge --    Range of Motion Goal OT LTG, AROM Measure Full AROM to all UE joints to increase independence with ADLs. --    Range of Motion Goal OT LTG, Date Goal Reviewed --  10/08/17   Range of Motion Goal OT LTG, Outcome --  goal ongoing       Goal: Dynamic Sitting Balance Goal LTG- OT  Outcome:  Ongoing (interventions implemented as appropriate)    10/06/17 1155 10/08/17 1553   Dynamic Sitting Balance OT LTG   Dynamic Sitting Balance OT LTG, Date Established 10/06/17 --    Dynamic Sitting Balance OT LTG, Time to Achieve by discharge --    Dynamic Sitting Balance OT LTG, [place] Level contact guard assist  (15 minutes with functional activity.) --    Dynamic Sitting Balance OT LTG, Assist Device bed rails --    Dynamic Sitting Balance OT LTG, Date Goal Reviewed --  10/08/17   Dynamic Sitting Balance OT LTG, Outcome --  goal ongoing       Goal: Eating Self-Feeding Goal LTG- OT  Outcome: Ongoing (interventions implemented as appropriate)    10/06/17 1155 10/08/17 1553   Eating Self-Feeding OT LTG   Eat Self Feeding Goal OT LTG, Date Established 10/06/17 --    Eat Self Feeding Goal OT LTG, Time to Achieve by discharge --    Eat Self Feed Goal OT LTG, [place] Level supervision required;contact guard assist --    Eat Self Feeding Goal OT LTG, Position sitting in chair --    Eat Self Feeding Goal OT LTG, Date Goal Review --  10/08/17   Eat Self Feeding Goal OT LTG, Outcome --  goal ongoing       Goal: ADL Goal LTG- OT  Outcome: Ongoing (interventions implemented as appropriate)    10/06/17 1155 10/08/17 1553   ADL OT LTG   ADL OT LTG, Date Established 10/06/17 --    ADL OT LTG, Time to Achieve by discharge --    ADL OT LTG, Activity Type ADL skills  (Sponge bath and dress when appropriate.) --    ADL OT LTG, [place] Level min assist --    ADL OT LTG, Date Goal Reviewed --  10/08/17   ADL OT LTG, Outcome --  goal ongoing

## 2017-10-08 NOTE — THERAPY TREATMENT NOTE
Acute Care - Physical Therapy Treatment Note  [facility]     Patient Name: Anahi Calix  : 1940  MRN: [id number]  Today's Date: 10/8/2017  Onset of Illness/Injury or Date of Surgery Date: 17  Date of Referral to PT: 10/01/17  Referring Physician: Dr. Nicola Rich    Admit Date: 2017    Visit Dx:    ICD-10-CM ICD-9-CM   1. Displaced intertrochanteric fracture of left femur, initial encounter for closed fracture S72.142A 820.21   2. Closed left hip fracture, initial encounter S72.002A 820.8   3. Fall, initial encounter W19.XXXA E888.9   4. Peripheral arterial disease I73.9 443.9   5. Essential hypertension I10 401.9   6. Gastroesophageal reflux disease, esophagitis presence not specified K21.9 530.81   7. Abdominal aortic aneurysm (AAA) without rupture I71.4 441.4   8. Impaired mobility and activities of daily living Z74.09 799.89   9. Impaired physical mobility Z74.09 781.99     Patient Active Problem List   Diagnosis   • Peripheral arterial disease   • Gastroesophageal reflux disease   • Abdominal aortic aneurysm (AAA) without rupture   • Displaced intertrochanteric fracture of left femur, initial encounter for closed fracture   • CAD (coronary artery disease)   • Hypertension   • Chronic bronchitis   • Essential hypertension   • Fall   • Tobacco dependence syndrome   • Atrial fibrillation               Adult Rehabilitation Note       10/08/17 0849 10/07/17 0835       Rehab Assessment/Intervention    Discipline physical therapy assistant  -EM physical therapy assistant  -SS     Document Type therapy note (daily note)  -EM therapy note (daily note)  -SS     Subjective Information agree to therapy   reports its difficult to control her hands since surgery.   -EM agree to therapy;complains of;pain  -SS     Patient Effort, Rehab Treatment  fair  -SS     Symptoms Noted During/After Treatment  other (see comments)   pt lethargic and difficult to keep on task  -SS     Precautions/Limitations fall  "precautions;oxygen therapy device and L/min  -EM fall precautions;oxygen therapy device and L/min  -SS     Recorded by [EM] Oskar Marie PTA [SS] Meme Jenkins PTA     Vital Signs    Pre Systolic BP Rehab 165  -  -SS     Pre Treatment Diastolic BP 81  -EM 77  -SS     Post Systolic BP Rehab  165  -SS     Post Treatment Diastolic BP  72  -SS     Pretreatment Heart Rate (beats/min) 79  -EM 92  -SS     Posttreatment Heart Rate (beats/min)  79  -SS     Pre SpO2 (%) 95  -EM 96  -SS     O2 Delivery Pre Treatment supplemental O2  -EM supplemental O2  -SS     Post SpO2 (%)  98  -SS     O2 Delivery Post Treatment  supplemental O2  -SS     Pre Patient Position Supine  -EM Supine  -SS     Post Patient Position  Supine  -SS     Recorded by [EM] Oskar Marie PTA [SS] Meme Jenkins PTA     Pain Assessment    Pain Assessment 0-10  -EM 0-10  -SS     Pain Score 3  -EM 8  -SS     Post Pain Score 0  -EM 8  -SS     Pain Type Acute pain  -EM Acute pain  -SS     Pain Location Hip  -EM --   \"tail bone\"  -SS     Pain Orientation Left  -EM      Pain Intervention(s)  Repositioned  -SS     Recorded by [EM] [place] Marie PTA [SS] Meme Jenkins PTA     Cognitive Assessment/Intervention    Current Cognitive/Communication Assessment impaired  -EM impaired  -SS     Orientation Status oriented to;person;place;situation  -EM oriented to;person;place;situation  -SS     Follows Commands/Answers Questions 100% of the time  -EM 75% of the time  -SS     Personal Safety  mild impairment  -SS     Personal Safety Interventions fall prevention program maintained;gait belt;nonskid shoes/slippers when out of bed;supervised activity  -EM supervised activity  -SS     Recorded by [EM] Oskar Marie PTA [SS] Meme Jenkins PTA     Mobility Assessment/Training    Left Lower Extremity Weight-Bearing weight-bearing as tolerated  -EM weight-bearing as tolerated  -SS     Recorded by [EM] Oskar Marie PTA [SS] Meme Jenkins PTA     " Bed Mobility, Assessment/Treatment    Bed Mobility, Assistive Device  bed rails  -SS     Bed Mobility, Roll Left, [place]  maximum assist (25% patient effort)  -SS     Bed Mobility, Roll Right, [place]  maximum assist (25% patient effort)  -SS     Bed Mob, Supine to Sit, [place] moderate assist (50% patient effort);maximum assist (25% patient effort)  -EM      Bed Mobility, Comment Mod Ax1 to scoot to EOB due to B UE weakness  -EM pt would keep eyes closed despite frequent cues. Max assistance needed for rolling and to help guide hand to railing. Pt placed on bed pan per pt request and then pt rolled for repositioning. pt able to assist more with rolling towards L using R hand to pull on BR  -SS     Recorded by [EM] Oskar Marie PTA [SS] Meme Jenkins PTA     Transfer Assessment/Treatment    Transfers, Bed-Chair [place] maximum assist (25% patient effort)  -EM not tested  -SS     Transfers, Sit-Stand [place] maximum assist (25% patient effort)  -EM      Transfers, Stand-Sit [place] maximum assist (25% patient effort)  -EM      Transfer, Comment t/f EOB to ortho chair with mod/max Ax1 via a R stand pivot. Attempted standing with FWRW at EOB-unsuccessful.   -EM      Recorded by [EM] Oskar Marie PTA [SS] Meme Jenkins PTA     Gait Assessment/Treatment    Gait, [place] Level  not tested  -SS     Recorded by  [SS] Meme Jenkins PTA     Therapy Exercises    Bilateral Lower Extremities AROM:;20 reps;sitting;ankle pumps/circles;glut sets;LAQ;AAROM:;SLR  -EM AROM:;10 reps;ankle pumps/circles;quad sets;AAROM:;heel slides;hip abduction/adduction  -SS     Recorded by [EM] Oskar Marie PTA [SS] Meme Jenkins PTA     Positioning and Restraints    Pre-Treatment Position in bed  -EM in bed  -SS     Post Treatment Position chair  -EM bed  -SS     In Bed encouraged to call for assist;call light within reach;with family/caregiver  -EM supine;call light within  reach;encouraged to call for assist;patient within staff view;side rails up x3;SCD pump applied;LUE elevated;legs elevated  -SS     Recorded by [EM] Oskar Marie, PTA [SS] Meme Jenkins PTA       User Key  (r) = Recorded By, (t) = Taken By, (c) = Cosigned By    Initials Name Effective Dates    EM Oskar Marie, PTA 08/11/15 -     SS Meme Jenkins PTA 10/17/16 -                 IP PT Goals       10/07/17 0835 10/06/17 1526       Bed Mobility PT LTG    Bed Mobility PT LTG, Date Established  10/06/17  -     Bed Mobility PT LTG, Time to Achieve  by discharge  -     Bed Mobility PT LTG, Activity Type  all bed mobility  -     Bed Mobility PT LTG, [place] Level  moderate assist (50% patient effort);minimum assist (75% patient effort)  -     Bed Mobility PT LTG, Date Goal Reviewed 10/07/17  -SS      Bed Mobility PT LTG, Outcome goal ongoing  - goal ongoing  -     Transfer Training PT LTG    Transfer Training PT LTG, Date Established  10/06/17  -     Transfer Training PT LTG, Time to Achieve  by discharge  -     Transfer Training PT LTG, Activity Type  bed to chair /chair to bed;sit to stand/stand to sit;toilet  -     Transfer Training PT LTG, [place] Level  moderate assist (50% patient effort);minimum assist (75% patient effort)  -     Transfer Training PT LTG, Assist Device  walker, rolling  -     Transfer Training PT  LTG, Date Goal Reviewed 10/07/17  -      Transfer Training PT LTG, Outcome goal ongoing  - goal ongoing  -     Gait Training PT LTG    Gait Training Goal PT LTG, Date Established  10/06/17  -     Gait Training Goal PT LTG, Time to Achieve  by discharge  -     Gait Training Goal PT LTG, [place] Level  moderate assist (50% patient effort);minimum assist (75% patient effort)  -     Gait Training Goal PT LTG, Distance to Achieve  10ft  -BLAIR     Gait Training Goal PT LTG, Additional Goal  50ftx2  -BLAIR     Gait Training Goal PT LTG, Outcome goal ongoing   -SS goal ongoing  -     Strength Goal PT LTG    Strength Goal PT LTG, Date Established  10/06/17  -     Strength Goal PT LTG, Time to Achieve  by discharge  -     Strength Goal PT LTG, Measure to Achieve  10reps of PROM/AAROM exercises  -     Strength Goal PT LTG, Functional Goal  2 sets of 10 reps AROM exercises  -     Strength Goal PT LTG, Date Goal Reviewed 10/07/17  -      Strength Goal PT LTG, Outcome goal partially met  -SS goal ongoing  -     Patient Education PT LTG    Patient Education PT LTG, Date Established  10/06/17  -     Patient Education PT LTG, Time to Achieve  by discharge  -     Patient Education PT LTG, Education Type  HEP;gait;transfers;home safety  -     Patient Education PT LTG, Date Goal Reviewed 10/07/17  -      Patient Education PT LTG Outcome goal ongoing  - goal ongoing  -       User Key  (r) = Recorded By, (t) = Taken By, (c) = Cosigned By    Initials Name Provider Type    BLAIR Nelson, PT Physical Therapist     Meme Jenkins, PTA Physical Therapy Assistant                  PT Recommendation and Plan  Anticipated Equipment Needs At Discharge:  (TBD as pt progresses)  Anticipated Discharge Disposition: placement for care (vs ARU if pt progresses to point she could tolerate)  Planned Therapy Interventions: (S) bed mobility training, gait training, home exercise program, patient/family education, strengthening, stair training, transfer training, ROM (Range of Motion) (progress mobility when/as allowed)  PT Frequency: per priority policy (bid M-F and at least daily on weekends)  Plan of Care Review  Outcome Summary/Follow up Plan: Pt tyrese tx well with increased awareness this tx. Pt was able to t/f sup-sit with mod/max Ax1, and t/f EOB to ortho chair via stand pivot with mod/max Ax1. Pt currently is unable to stand while using an AD.           Outcome Measures       10/08/17 0849 10/07/17 0835 10/06/17 1123    How much help from another person do you  currently need...    Turning from your back to your side while in flat bed without using bedrails? 2  -EM 2  -SS 1  -BLAIR    Moving from lying on back to sitting on the side of a flat bed without bedrails? 2  -EM 2  -SS 1  -BLAIR    Moving to and from a bed to a chair (including a wheelchair)? 2  -EM 1  -SS 1  -BLAIR    Standing up from a chair using your arms (e.g., wheelchair, bedside chair)? 1  -EM 1  -SS 1  -BLAIR    Climbing 3-5 steps with a railing? 1  -EM 1  -SS 1  -BLAIR    To walk in hospital room? 1  -EM 1  -SS 1  -BLAIR    AM-PAC 6 Clicks Score 9  -EM 8  -SS 6  -BLAIR    Functional Assessment    Outcome Measure Options AM-PAC 6 Clicks Basic Mobility (PT)  -EM AM-PAC 6 Clicks Basic Mobility (PT)  -SS AM-PAC 6 Clicks Basic Mobility (PT)  -BLAIR      10/06/17 1009          How much help from another is currently needed...    Putting on and taking off regular lower body clothing? 1  -RB      Bathing (including washing, rinsing, and drying) 1  -RB      Toileting (which includes using toilet bed pan or urinal) 1  -RB      Putting on and taking off regular upper body clothing 1  -RB      Taking care of personal grooming (such as brushing teeth) 1  -RB      Eating meals 1  -RB      Score 6  -RB      Functional Assessment    Outcome Measure Options AM-PAC 6 Clicks Daily Activity (OT)  -RB        User Key  (r) = Recorded By, (t) = Taken By, (c) = Cosigned By    Initials Name Provider Type    EM Oskar Marie, PTA Physical Therapy Assistant    RB Jonny Pardo, OT Occupational Therapist    BLAIR Nelson, PT Physical Therapist    SS Meme Jenkins, PTA Physical Therapy Assistant           Time Calculation:         PT Charges       10/08/17 1318          Time Calculation    Start Time 0849  -EM      Stop Time 0954  -EM      Time Calculation (min) 65 min  -EM      Time Calculation- PT    Total Timed Code Minutes- PT 65 minute(s)  -EM        User Key  (r) = Recorded By, (t) = Taken By, (c) = Cosigned By    Initials Name Provider Type      Oskar Marie PTA Physical Therapy Assistant          Therapy Charges for Today     Code Description Service Date Service Provider Modifiers Qty    98062489402 HC PT THER PROC EA 15 MIN 10/8/2017 Oskar Marie PTA GP 1    77210881210 HC PT THERAPEUTIC ACT EA 15 MIN 10/8/2017 Oskar Marie PTA GP 3          PT G-Codes  PT Professional Judgement Used?: Yes  Outcome Measure Options: AM-PAC 6 Clicks Basic Mobility (PT)  Score: 6  Functional Limitation: Mobility: Walking and moving around  Mobility: Walking and Moving Around Current Status (): 100 percent impaired, limited or restricted  Mobility: Walking and Moving Around Goal Status (): At least 40 percent but less than 60 percent impaired, limited or restricted    Oskar Marie PTA  10/8/2017

## 2017-10-08 NOTE — PLAN OF CARE
Problem: Patient Care Overview (Adult)  Goal: Plan of Care Review  Outcome: Ongoing (interventions implemented as appropriate)    10/08/17 0351   Coping/Psychosocial Response Interventions   Plan Of Care Reviewed With patient;family   Patient Care Overview   Progress no change   Outcome Evaluation   Outcome Summary/Follow up Plan VSS, upper body weakness, having hard time grasping items with hands, more alert, pain is controlled with Tylenol       Goal: Adult Individualization and Mutuality  Outcome: Ongoing (interventions implemented as appropriate)  Goal: Discharge Needs Assessment  Outcome: Ongoing (interventions implemented as appropriate)    Problem: Orthopaedic Fracture (Adult)  Goal: Signs and Symptoms of Listed Potential Problems Will be Absent or Manageable (Orthopaedic Fracture)  Outcome: Ongoing (interventions implemented as appropriate)    Problem: Pressure Ulcer Risk (Keith Scale) (Adult,Obstetrics,Pediatric)  Goal: Identify Related Risk Factors and Signs and Symptoms  Outcome: Ongoing (interventions implemented as appropriate)    10/08/17 0351   Pressure Ulcer Risk (Keith Scale)   Related Risk Factors (Pressure Ulcer Risk (Keith Scale)) age extremes;hospitalization prolonged;medication       Goal: Skin Integrity  Outcome: Ongoing (interventions implemented as appropriate)

## 2017-10-08 NOTE — THERAPY TREATMENT NOTE
Acute Care - Physical Therapy Treatment Note  [facility]     Patient Name: Anahi Calix  : 1940  MRN: [id number]  Today's Date: 10/8/2017  Onset of Illness/Injury or Date of Surgery Date: 17  Date of Referral to PT: 10/01/17  Referring Physician: Dr. Nicola Rich    Admit Date: 2017    Visit Dx:    ICD-10-CM ICD-9-CM   1. Displaced intertrochanteric fracture of left femur, initial encounter for closed fracture S72.142A 820.21   2. Closed left hip fracture, initial encounter S72.002A 820.8   3. Fall, initial encounter W19.XXXA E888.9   4. Peripheral arterial disease I73.9 443.9   5. Essential hypertension I10 401.9   6. Gastroesophageal reflux disease, esophagitis presence not specified K21.9 530.81   7. Abdominal aortic aneurysm (AAA) without rupture I71.4 441.4   8. Impaired mobility and activities of daily living Z74.09 799.89   9. Impaired physical mobility Z74.09 781.99     Patient Active Problem List   Diagnosis   • Peripheral arterial disease   • Gastroesophageal reflux disease   • Abdominal aortic aneurysm (AAA) without rupture   • Displaced intertrochanteric fracture of left femur, initial encounter for closed fracture   • CAD (coronary artery disease)   • Hypertension   • Chronic bronchitis   • Essential hypertension   • Fall   • Tobacco dependence syndrome   • Atrial fibrillation               Adult Rehabilitation Note       10/08/17 1128 10/08/17 0849 10/07/17 0835    Rehab Assessment/Intervention    Discipline physical therapy assistant  -EM physical therapy assistant  -EM physical therapy assistant  -SS    Document Type therapy note (daily note)  -EM therapy note (daily note)  -EM therapy note (daily note)  -SS    Subjective Information agree to therapy   nsg req assistance from PT to get pt to bedside commode/bed  -EM agree to therapy   reports its difficult to control her hands since surgery.   -EM agree to therapy;complains of;pain  -SS    Patient Effort, Rehab Treatment    "fair  -SS    Symptoms Noted During/After Treatment   other (see comments)   pt lethargic and difficult to keep on task  -SS    Precautions/Limitations fall precautions;oxygen therapy device and L/min  -EM fall precautions;oxygen therapy device and L/min  -EM fall precautions;oxygen therapy device and L/min  -SS    Recorded by [EM] Oskar Marie PTA [EM] Oskar Marie PTA [SS] Meme Jenkins PTA    Vital Signs    Pre Systolic BP Rehab  165  -  -SS    Pre Treatment Diastolic BP  81  -EM 77  -SS    Post Systolic BP Rehab   165  -SS    Post Treatment Diastolic BP   72  -SS    Pretreatment Heart Rate (beats/min)  79  -EM 92  -SS    Posttreatment Heart Rate (beats/min)   79  -SS    Pre SpO2 (%)  95  -EM 96  -SS    O2 Delivery Pre Treatment  supplemental O2  -EM supplemental O2  -SS    Post SpO2 (%)   98  -SS    O2 Delivery Post Treatment   supplemental O2  -SS    Pre Patient Position  Supine  -EM Supine  -SS    Post Patient Position   Supine  -SS    Recorded by  [EM] Oskar Marie PTA [SS] Meme Jenkins PTA    Pain Assessment    Pain Assessment 0-10  -EM 0-10  -EM 0-10  -SS    Pain Score 0  -EM 3  -EM 8  -SS    Post Pain Score 0  -EM 0  -EM 8  -SS    Pain Type  Acute pain  -EM Acute pain  -SS    Pain Location  Hip  -EM --   \"tail bone\"  -SS    Pain Orientation  Left  -EM     Pain Intervention(s)   Repositioned  -SS    Recorded by [EM] Oskar Marie PTA [EM] Oskar Marie PTA [SS] Meme Jenkins PTA    Cognitive Assessment/Intervention    Current Cognitive/Communication Assessment impaired  -EM impaired  -EM impaired  -SS    Orientation Status oriented to;person;place;situation;time  -EM oriented to;person;place;situation  -EM oriented to;person;place;situation  -SS    Follows Commands/Answers Questions 100% of the time  -% of the time  -EM 75% of the time  -SS    Personal Safety   mild impairment  -SS    Personal Safety Interventions gait belt;fall prevention program maintained;toileting " scheduled;supervised activity;nonskid shoes/slippers when out of bed  -EM fall prevention program maintained;gait belt;nonskid shoes/slippers when out of bed;supervised activity  -EM supervised activity  -SS    Recorded by [EM] Oskar Marie PTA [EM] Oskar Marie PTA [SS] Meme Jenkins PTA    Mobility Assessment/Training    Left Lower Extremity Weight-Bearing weight-bearing as tolerated  -EM weight-bearing as tolerated  -EM weight-bearing as tolerated  -SS    Recorded by [EM] Oskar Marie PTA [EM] Oskar Marie, YONAS [SS] Meme Jenkins PTA    Bed Mobility, Assessment/Treatment    Bed Mobility, Assistive Device   bed rails  -SS    Bed Mobility, Roll Left, [place]   maximum assist (25% patient effort)  -SS    Bed Mobility, Roll Right, [place]   maximum assist (25% patient effort)  -SS    Bed Mob, Supine to Sit, [place]  moderate assist (50% patient effort);maximum assist (25% patient effort)  -EM     Bed Mob, Sit to Supine, [place] maximum assist (25% patient effort);2 person assist required  -EM      Bed Mobility, Comment  Mod Ax1 to scoot to EOB due to B UE weakness  -EM pt would keep eyes closed despite frequent cues. Max assistance needed for rolling and to help guide hand to railing. Pt placed on bed pan per pt request and then pt rolled for repositioning. pt able to assist more with rolling towards L using R hand to pull on BR  -SS    Recorded by [EM] Oskar Marie PTA [EM] Oskar Marie, YONAS [SS] Meme Jenkins PTA    Transfer Assessment/Treatment    Transfers, Bed-Chair [place] maximum assist (25% patient effort)  -EM maximum assist (25% patient effort)  -EM not tested  -SS    Transfers, Chair-Bed [place] maximum assist (25% patient effort)  -EM      Transfers, Sit-Stand [place] maximum assist (25% patient effort)  -EM maximum assist (25% patient effort)  -EM     Transfers, Stand-Sit [place] maximum assist (25% patient effort)  -EM maximum assist (25%  patient effort)  -EM     Transfer, Comment Pt t/f ortho chair->bedside commode->EOB all via stand pivot t/f to R. Pt stood with MaxAx1 for dep pericare from ns staff.   -EM t/f EOB to ortho chair with mod/max Ax1 via a R stand pivot. Attempted standing with FWRW at EOB-unsuccessful.   -EM     Recorded by [EM] Oskar Marie PTA [EM] Oskar Marie PTA [SS] Meme Jenkins PTA    Gait Assessment/Treatment    Gait, [place] Level   not tested  -SS    Recorded by   [SS] Meme Jenkins PTA    Therapy Exercises    Bilateral Lower Extremities  AROM:;20 reps;sitting;ankle pumps/circles;glut sets;LAQ;AAROM:;SLR  -EM AROM:;10 reps;ankle pumps/circles;quad sets;AAROM:;heel slides;hip abduction/adduction  -SS    Recorded by  [EM] Oskar Marie PTA [SS] Meme Jenkins PTA    Positioning and Restraints    Pre-Treatment Position sitting in chair/recliner  -EM in bed  -EM in bed  -SS    Post Treatment Position bed  -EM chair  -EM bed  -SS    In Bed supine;call light within reach;encouraged to call for assist;exit alarm on;with nsg  -EM encouraged to call for assist;call light within reach;with family/caregiver  -EM supine;call light within reach;encouraged to call for assist;patient within staff view;side rails up x3;SCD pump applied;LUE elevated;legs elevated  -SS    Recorded by [EM] Oskar Marie PTA [EM] Oskar Marie PTA [SS] Meme Jenkins PTA      User Key  (r) = Recorded By, (t) = Taken By, (c) = Cosigned By    Initials Name Effective Dates    EM Oskar Marie PTA 08/11/15 -     SS Meme Jenkins PTA 10/17/16 -                 IP PT Goals       10/07/17 0835 10/06/17 1526       Bed Mobility PT LTG    Bed Mobility PT LTG, Date Established  10/06/17  -BLAIR     Bed Mobility PT LTG, Time to Achieve  by discharge  -BLAIR     Bed Mobility PT LTG, Activity Type  all bed mobility  -BLAIR     Bed Mobility PT LTG, [place] Level  moderate assist (50% patient effort);minimum assist (75% patient effort)  -BLAIR      Bed Mobility PT LTG, Date Goal Reviewed 10/07/17  -      Bed Mobility PT LTG, Outcome goal ongoing  - goal ongoing  -     Transfer Training PT LTG    Transfer Training PT LTG, Date Established  10/06/17  -     Transfer Training PT LTG, Time to Achieve  by discharge  -     Transfer Training PT LTG, Activity Type  bed to chair /chair to bed;sit to stand/stand to sit;toilet  -     Transfer Training PT LTG, [place] Level  moderate assist (50% patient effort);minimum assist (75% patient effort)  -     Transfer Training PT LTG, Assist Device  walker, rolling  -     Transfer Training PT  LTG, Date Goal Reviewed 10/07/17  -      Transfer Training PT LTG, Outcome goal ongoing  - goal ongoing  -     Gait Training PT LTG    Gait Training Goal PT LTG, Date Established  10/06/17  -     Gait Training Goal PT LTG, Time to Achieve  by discharge  -     Gait Training Goal PT LTG, [place] Level  moderate assist (50% patient effort);minimum assist (75% patient effort)  -     Gait Training Goal PT LTG, Distance to Achieve  10ft  -     Gait Training Goal PT LTG, Additional Goal  50ftx2  -     Gait Training Goal PT LTG, Outcome goal ongoing  - goal ongoing  -     Strength Goal PT LTG    Strength Goal PT LTG, Date Established  10/06/17  -     Strength Goal PT LTG, Time to Achieve  by discharge  -     Strength Goal PT LTG, Measure to Achieve  10reps of PROM/AAROM exercises  -     Strength Goal PT LTG, Functional Goal  2 sets of 10 reps AROM exercises  -     Strength Goal PT LTG, Date Goal Reviewed 10/07/17  -      Strength Goal PT LTG, Outcome goal partially met  - goal ongoing  -     Patient Education PT LTG    Patient Education PT LTG, Date Established  10/06/17  -     Patient Education PT LTG, Time to Achieve  by discharge  -     Patient Education PT LTG, Education Type  HEP;gait;transfers;home safety  -     Patient Education PT LTG, Date Goal Reviewed 10/07/17  -       Patient Education PT LTG Outcome goal ongoing  -SS goal ongoing  -BLAIR       User Key  (r) = Recorded By, (t) = Taken By, (c) = Cosigned By    Initials Name Provider Type    BLAIR Nelson, PT Physical Therapist    SS Meme Jenkins, PTA Physical Therapy Assistant                  PT Recommendation and Plan  Anticipated Equipment Needs At Discharge:  (TBD as pt progresses)  Anticipated Discharge Disposition: placement for care (vs ARU if pt progresses to point she could tolerate)  Planned Therapy Interventions: (S) bed mobility training, gait training, home exercise program, patient/family education, strengthening, stair training, transfer training, ROM (Range of Motion) (progress mobility when/as allowed)  PT Frequency: per priority policy (bid M-F and at least daily on weekends)  Plan of Care Review  Outcome Summary/Follow up Plan: Pt tyrese tx well. Pt performed stand pivot t/f to R from ortho chair to bedside commode with Max Ax1, stood Max Ax1 for pericare, then t/f bedside commode to EOB via R stand pivot with Max Ax1.  Pt t/f sit to supine with Max Ax2.           Outcome Measures       10/08/17 1128 10/08/17 0849 10/07/17 0835    How much help from another person do you currently need...    Turning from your back to your side while in flat bed without using bedrails? 2  -EM 2  -EM 2  -SS    Moving from lying on back to sitting on the side of a flat bed without bedrails? 2  -EM 2  -EM 2  -SS    Moving to and from a bed to a chair (including a wheelchair)? 2  -EM 2  -EM 1  -SS    Standing up from a chair using your arms (e.g., wheelchair, bedside chair)? 1  -EM 1  -EM 1  -SS    Climbing 3-5 steps with a railing? 1  -EM 1  -EM 1  -SS    To walk in hospital room? 1  -EM 1  -EM 1  -SS    AM-PAC 6 Clicks Score 9  -EM 9  -EM 8  -SS    Functional Assessment    Outcome Measure Options AM-PAC 6 Clicks Basic Mobility (PT)  -EM AM-PAC 6 Clicks Basic Mobility (PT)  -EM AM-PAC 6 Clicks Basic Mobility (PT)  -SS       10/06/17 1123 10/06/17 1009       How much help from another person do you currently need...    Turning from your back to your side while in flat bed without using bedrails? 1  -BLAIR      Moving from lying on back to sitting on the side of a flat bed without bedrails? 1  -BLAIR      Moving to and from a bed to a chair (including a wheelchair)? 1  -BLAIR      Standing up from a chair using your arms (e.g., wheelchair, bedside chair)? 1  -BLAIR      Climbing 3-5 steps with a railing? 1  -BLAIR      To walk in hospital room? 1  -BLAIR      AM-PAC 6 Clicks Score 6  -BLAIR      How much help from another is currently needed...    Putting on and taking off regular lower body clothing?  1  -RB     Bathing (including washing, rinsing, and drying)  1  -RB     Toileting (which includes using toilet bed pan or urinal)  1  -RB     Putting on and taking off regular upper body clothing  1  -RB     Taking care of personal grooming (such as brushing teeth)  1  -RB     Eating meals  1  -RB     Score  6  -RB     Functional Assessment    Outcome Measure Options AM-PAC 6 Clicks Basic Mobility (PT)  -BLAIR AM-PAC 6 Clicks Daily Activity (OT)  -RB       User Key  (r) = Recorded By, (t) = Taken By, (c) = Cosigned By    Initials Name Provider Type    AMANDA Marie PTA Physical Therapy Assistant    MIESHA Pardo, OT Occupational Therapist    BLAIR Nelson, PT Physical Therapist    SS Meme Jenkins, PTA Physical Therapy Assistant           Time Calculation:         PT Charges       10/08/17 1327 10/08/17 1318       Time Calculation    Start Time 1128  -EM 0849  -EM     Stop Time 1142  -EM 0954  -EM     Time Calculation (min) 14 min  -EM 65 min  -EM     Time Calculation- PT    Total Timed Code Minutes- PT 14 minute(s)  -EM 65 minute(s)  -EM       User Key  (r) = Recorded By, (t) = Taken By, (c) = Cosigned By    Initials Name Provider Type    AMANDA Marie PTA Physical Therapy Assistant          Therapy Charges for Today     Code Description Service  Date Service Provider Modifiers Qty    09852187748 HC PT THER PROC EA 15 MIN 10/8/2017 Oskar Marie PTA GP 1    63605845557 HC PT THERAPEUTIC ACT EA 15 MIN 10/8/2017 Oskar Marie PTA GP 3    08451198738 HC PT THERAPEUTIC ACT EA 15 MIN 10/8/2017 Oskar Marie PTA GP 1          PT G-Codes  PT Professional Judgement Used?: Yes  Outcome Measure Options: AM-PAC 6 Clicks Basic Mobility (PT)  Score: 6  Functional Limitation: Mobility: Walking and moving around  Mobility: Walking and Moving Around Current Status (): 100 percent impaired, limited or restricted  Mobility: Walking and Moving Around Goal Status (): At least 40 percent but less than 60 percent impaired, limited or restricted    Oskar Marie PTA  10/8/2017

## 2017-10-08 NOTE — PROGRESS NOTES
"Anticoagulation by Pharmacy - Warfarin    Anahi Calix is a 77 y.o.female  [Ht: 68\" (172.7 cm); Wt: 186 lb 8.2 oz (84.6 kg)] on Warfarin (currently on HOLD) for indication of VTE prophylaxis s/p ortho procedure and new onset A-fib.    Goal INR: 2-3  Today's INR:   Lab Results   Component Value Date    INR 4.75 (H) 10/08/2017         Lab Results   Component Value Date    INR 4.75 (H) 10/08/2017    INR 4.92 (H) 10/07/2017    INR 3.50 (H) 10/06/2017    PROTIME 45.6 (H) 10/08/2017    PROTIME 46.9 (H) 10/07/2017    PROTIME 35.7 (H) 10/06/2017     Lab Results   Component Value Date    HGB 9.7 (L) 10/08/2017    HGB 8.9 (L) 10/07/2017    HGB 9.0 (L) 10/06/2017     Lab Results   Component Value Date    HCT 29.7 (L) 10/08/2017    HCT 27.7 (L) 10/07/2017    HCT 28.3 (L) 10/06/2017     Assessment/Plan:  INR 4.75, still supratherapeutic. Pt did not receive any warfarin on 10/7, per MAR. Concurrent medications include amiodarone. H&H low, but stable. . Will continue to HOLD warfarin until INR falls below 3. Will consider restarting at reduced dose of 0.5 mg once INR is no longer supratherapeutic. Pharmacy will continue to follow and adjust dose accordingly.    [place] RPH  10/08/17 10:57 AM     "

## 2017-10-08 NOTE — PLAN OF CARE
Problem: Patient Care Overview (Adult)  Goal: Plan of Care Review  Outcome: Ongoing (interventions implemented as appropriate)    10/08/17 1208 10/08/17 1325   Coping/Psychosocial Response Interventions   Plan Of Care Reviewed With patient --    Patient Care Overview   Progress no change --    Outcome Evaluation   Outcome Summary/Follow up Plan --  Pt tyrese tx well. Pt performed stand pivot t/f to R from ortho chair to bedside commode with Max Ax1, stood Max Ax1 for pericare, then t/f bedside commode to EOB via R stand pivot with Max Ax1. Pt t/f sit to supine with Max Ax2.          Problem: Inpatient Physical Therapy  Goal: Bed Mobility Goal LTG- PT  Outcome: Ongoing (interventions implemented as appropriate)    10/06/17 1526 10/07/17 0835   Bed Mobility PT LTG   Bed Mobility PT LTG, Date Established 10/06/17 --    Bed Mobility PT LTG, Time to Achieve by discharge --    Bed Mobility PT LTG, Activity Type all bed mobility --    Bed Mobility PT LTG, [place] Level moderate assist (50% patient effort);minimum assist (75% patient effort) --    Bed Mobility PT LTG, Date Goal Reviewed --  10/07/17   Bed Mobility PT LTG, Outcome --  goal ongoing       Goal: Transfer Training Goal 1 LTG- PT  Outcome: Ongoing (interventions implemented as appropriate)    10/06/17 1526 10/07/17 0835   Transfer Training PT LTG   Transfer Training PT LTG, Date Established 10/06/17 --    Transfer Training PT LTG, Time to Achieve by discharge --    Transfer Training PT LTG, Activity Type bed to chair /chair to bed;sit to stand/stand to sit;toilet --    Transfer Training PT LTG, [place] Level moderate assist (50% patient effort);minimum assist (75% patient effort) --    Transfer Training PT LTG, Assist Device walker, rolling --    Transfer Training PT LTG, Date Goal Reviewed --  10/07/17   Transfer Training PT LTG, Outcome --  goal ongoing       Goal: Gait Training Goal LTG- PT  Outcome: Ongoing (interventions implemented as appropriate)     10/06/17 1526 10/07/17 0835   Gait Training PT LTG   Gait Training Goal PT LTG, Date Established 10/06/17 --    Gait Training Goal PT LTG, Time to Achieve by discharge --    Gait Training Goal PT LTG, [place] Level moderate assist (50% patient effort);minimum assist (75% patient effort) --    Gait Training Goal PT LTG, Distance to Achieve 10ft --    Gait Training Goal PT LTG, Additional Goal 50ftx2 --    Gait Training Goal PT LTG, Outcome --  goal ongoing       Goal: Strength Goal LTG- PT  Outcome: Ongoing (interventions implemented as appropriate)    10/06/17 1526 10/07/17 0835   Strength Goal PT LTG   Strength Goal PT LTG, Date Established 10/06/17 --    Strength Goal PT LTG, Time to Achieve by discharge --    Strength Goal PT LTG, Measure to Achieve 10reps of PROM/AAROM exercises --    Strength Goal PT LTG, Functional Goal 2 sets of 10 reps AROM exercises --    Strength Goal PT LTG, Date Goal Reviewed --  10/07/17   Strength Goal PT LTG, Outcome --  goal partially met       Goal: Patient Education Goal LTG- PT  Outcome: Ongoing (interventions implemented as appropriate)    10/06/17 1526 10/07/17 0835   Patient Education PT LTG   Patient Education PT LTG, Date Established 10/06/17 --    Patient Education PT LTG, Time to Achieve by discharge --    Patient Education PT LTG, Education Type HEP;gait;transfers;home safety --    Patient Education PT LTG, Date Goal Reviewed --  10/07/17   Patient Education PT LTG Outcome --  goal ongoing

## 2017-10-08 NOTE — PROGRESS NOTES
[facility]  INPATIENT PROGRESS NOTE    Length of Stay: 8  Date of Admission: 9/30/2017  Primary Care Physician: Ramiro Gloria MD    Subjective   Chief Complaint:  Leg pain  HPI: Pt s/p surgical fixation for Left femur FX. Complains about fatigue and weakness.    Review of Systems   Constitutional: Positive for fatigue. Negative for fever.   HENT: Negative for tinnitus and voice change.    Eyes: Negative for visual disturbance.   Respiratory: Negative for wheezing and stridor.    Cardiovascular: Negative for palpitations and leg swelling.   Gastrointestinal: Negative for nausea and vomiting.   Neurological: Negative for tremors and weakness.   Psychiatric/Behavioral: Negative for agitation.        All pertinent negatives and positives are as above. All other systems have been reviewed and are negative unless otherwise stated.     Objective    Temp:  [97.5 °F (36.4 °C)-98.8 °F (37.1 °C)] 97.9 °F (36.6 °C)  Heart Rate:  [66-78] 73  Resp:  [16-20] 18  BP: (101-142)/(50-70) 132/59  Physical Exam   Constitutional: She appears well-developed.   Eyes: Pupils are equal, round, and reactive to light.   Cardiovascular: Normal rate and regular rhythm.    Pulmonary/Chest: Effort normal.   Abdominal: Soft.   Neurological: She is alert.   Skin: Skin is warm and dry.   Psychiatric: She has a normal mood and affect.             Results Review:  I have reviewed the labs, radiology results, and diagnostic studies.    Laboratory Data:   Lab Results (last 24 hours)     Procedure Component Value Units Date/Time    CBC Auto Differential [[id number]]  (Abnormal) Collected:  10/08/17 0545    Specimen:  Blood Updated:  10/08/17 0617     WBC 6.91 10*3/mm3      RBC 3.32 (L) 10*6/mm3      Hemoglobin 9.7 (L) g/dL      Hematocrit 29.7 (L) %      MCV 89.5 fL      MCH 29.2 pg      MCHC 32.7 g/dL      RDW 14.1 %      RDW-SD 46.3 fl      MPV 8.7 fL      Platelets 279 10*3/mm3       Neutrophil % 58.3 %      Lymphocyte % 16.6 %      Monocyte % 14.5 (H) %      Eosinophil % 4.1 %      Basophil % 0.6 %      Immature Grans % 5.9 (H) %      Neutrophils, Absolute 4.03 10*3/mm3      Lymphocytes, Absolute 1.15 10*3/mm3      Monocytes, Absolute 1.00 (H) 10*3/mm3      Eosinophils, Absolute 0.28 10*3/mm3      Basophils, Absolute 0.04 10*3/mm3      Immature Grans, Absolute 0.41 (H) 10*3/mm3      nRBC 0.4 (H) /100 WBC     Magnesium [[id number]]  (Normal) Collected:  10/08/17 0545    Specimen:  Blood Updated:  10/08/17 0623     Magnesium 1.9 mg/dL     Comprehensive Metabolic Panel [[id number]]  (Abnormal) Collected:  10/08/17 0545    Specimen:  Blood Updated:  10/08/17 0623     Glucose 110 (H) mg/dL      BUN 11 mg/dL      Creatinine 0.64 mg/dL      Sodium 141 mmol/L      Potassium 3.2 (L) mmol/L      Chloride 102 mmol/L      CO2 31.0 mmol/L      Calcium 8.4 mg/dL      Total Protein 5.2 (L) g/dL      Albumin 2.60 (L) g/dL      ALT (SGPT) 45 U/L      AST (SGOT) 43 (H) U/L      Alkaline Phosphatase 59 U/L      Total Bilirubin 0.7 mg/dL      eGFR Non African Amer 90 mL/min/1.73      Globulin 2.6 gm/dL      A/G Ratio 1.0 (L) g/dL      BUN/Creatinine Ratio 17.2     Anion Gap 8.0 mmol/L     Narrative:       The MDRD GFR formula is only valid for adults with stable renal function between ages 18 and 70.    Protime-INR [[id number]]  (Abnormal) Collected:  10/08/17 0545    Specimen:  Blood Updated:  10/08/17 0629     Protime 45.6 (H) Seconds      INR 4.75 (H)    Narrative:       Therapeutic range for most indications is 2.0-3.0 INR,  or 2.5-3.5 for mechanical heart valves.    CBC & Differential [[id number]] Collected:  10/08/17 0545    Specimen:  Blood Updated:  10/08/17 0805    Narrative:       The following orders were created for panel order CBC & Differential.  Procedure                               Abnormality         Status                     ---------                               -----------         ------                      Manual Differential[[id number]]          Abnormal            Final result               Scan Slide[[id number]]                                                                  CBC Auto Differential[[id number]]        Abnormal            Final result                 Please view results for these tests on the individual orders.    Manual Differential [[id number]]  (Abnormal) Collected:  10/08/17 0545    Specimen:  Blood Updated:  10/08/17 0805     Neutrophil % 70.0 %      Lymphocyte % 14.0 %      Monocyte % 9.0 %      Eosinophil % 2.0 %      Metamyelocyte % 4.0 (H) %      Atypical Lymphocyte % 1.0 %      Neutrophils Absolute 4.84 10*3/mm3      Lymphocytes Absolute 0.97 10*3/mm3      Monocytes Absolute 0.62 10*3/mm3      Eosinophils Absolute 0.14 10*3/mm3      nRBC 1.0 (H) /100 WBC      Anisocytosis Slight/1+     Macrocytes Slight/1+     Ovalocytes --      FEW CELLS        Polychromasia Slight/1+     WBC Morphology Normal     Platelet Estimate Adequate          Culture Data:   No results found for: BLOODCX  No results found for: URINECX  No results found for: RESPCX  No results found for: WOUNDCX  No results found for: STOOLCX  No components found for: BODYFLD    Radiology Data:   Imaging Results (last 24 hours)     ** No results found for the last 24 hours. **          I have reviewed the patient current medications.     Assessment/Plan     Hospital Problem List     * (Principal)Displaced intertrochanteric fracture of left femur, initial encounter for closed fracture    Overview Signed 10/2/2017  8:44 AM by Ata Farris MD     POD# 1.  Management per orthopedics.         Peripheral arterial disease (Chronic)    CAD (coronary artery disease) (Chronic)    Hypertension (Chronic)    Overview Signed 10/2/2017  8:45 AM by Ata Farris MD     Has been hypotensive over the last 24 hours intermittently.  Decreased Lisinopril from 40 mg daily to 2.5 mg daily.  Holding lopressor right now.  On Cardizem drip for atrial  fibrillation.         Chronic bronchitis (Chronic)    Overview Signed 10/2/2017  8:45 AM by Ata Farris MD     Currently intubated.  Unable to be extubated yesterday.           Essential hypertension    Overview Signed 9/30/2017  6:58 PM by Nicola Rich MD     Added automatically from request for surgery [id number]         Fall    Overview Addendum 10/2/2017  8:45 AM by Ata Farris MD     Fracture s/p repair per orthopedics.         Tobacco dependence syndrome    Overview Signed 10/2/2017  8:51 AM by Ata Farris MD     Cessation counseling to be provided once extubated and alert.         Atrial fibrillation    Overview Signed 10/2/2017  8:51 AM by Ata Farris MD     New onset.  Given Cardizem bolus this morning and started on Cardizem drip.  Blood pressure started to drop with drip.  Patient placed back on pressors.  PICC line ordered.  Cardiology consulted.               Plan: Plan is to continue therapy. PT/OT. Will need rehab as she remains max assist with transfers. Replace potassium today. Will see about brush removal tomorrow.        Continues on Warfarin, Pharmacy to dose       Paroxysmal atrial fib. S/p Conversion. Continues on Amiodarone       Moraxella on sputum Cx - Continue Kiara Rubio DO   10/08/17   6:31 PM

## 2017-10-09 LAB
ALBUMIN SERPL-MCNC: 2.7 G/DL (ref 3.4–4.8)
ALBUMIN/GLOB SERPL: 1 G/DL (ref 1.1–1.8)
ALP SERPL-CCNC: 62 U/L (ref 38–126)
ALT SERPL W P-5'-P-CCNC: 41 U/L (ref 9–52)
ANION GAP SERPL CALCULATED.3IONS-SCNC: 10 MMOL/L (ref 5–15)
AST SERPL-CCNC: 38 U/L (ref 14–36)
BASOPHILS # BLD AUTO: 0.04 10*3/MM3 (ref 0–0.2)
BASOPHILS NFR BLD AUTO: 0.4 % (ref 0–2)
BILIRUB SERPL-MCNC: 0.9 MG/DL (ref 0.2–1.3)
BUN BLD-MCNC: 9 MG/DL (ref 7–21)
BUN/CREAT SERPL: 13.8 (ref 7–25)
CALCIUM SPEC-SCNC: 8.5 MG/DL (ref 8.4–10.2)
CHLORIDE SERPL-SCNC: 100 MMOL/L (ref 95–110)
CO2 SERPL-SCNC: 27 MMOL/L (ref 22–31)
CREAT BLD-MCNC: 0.65 MG/DL (ref 0.5–1)
DEPRECATED RDW RBC AUTO: 46.5 FL (ref 36.4–46.3)
EOSINOPHIL # BLD AUTO: 0.32 10*3/MM3 (ref 0–0.7)
EOSINOPHIL NFR BLD AUTO: 3.5 % (ref 0–7)
ERYTHROCYTE [DISTWIDTH] IN BLOOD BY AUTOMATED COUNT: 14.5 % (ref 11.5–14.5)
GFR SERPL CREATININE-BSD FRML MDRD: 89 ML/MIN/1.73 (ref 39–90)
GLOBULIN UR ELPH-MCNC: 2.8 GM/DL (ref 2.3–3.5)
GLUCOSE BLD-MCNC: 103 MG/DL (ref 60–100)
HCT VFR BLD AUTO: 30.5 % (ref 35–45)
HGB BLD-MCNC: 10.2 G/DL (ref 12–15.5)
IMM GRANULOCYTES # BLD: 0.49 10*3/MM3 (ref 0–0.02)
IMM GRANULOCYTES NFR BLD: 5.4 % (ref 0–0.5)
INR PPP: 2.7 (ref 0.8–1.2)
LYMPHOCYTES # BLD AUTO: 1.75 10*3/MM3 (ref 0.6–4.2)
LYMPHOCYTES NFR BLD AUTO: 19.1 % (ref 10–50)
MAGNESIUM SERPL-MCNC: 1.7 MG/DL (ref 1.6–2.3)
MCH RBC QN AUTO: 29.7 PG (ref 26.5–34)
MCHC RBC AUTO-ENTMCNC: 33.4 G/DL (ref 31.4–36)
MCV RBC AUTO: 88.9 FL (ref 80–98)
MONOCYTES # BLD AUTO: 0.98 10*3/MM3 (ref 0–0.9)
MONOCYTES NFR BLD AUTO: 10.7 % (ref 0–12)
NEUTROPHILS # BLD AUTO: 5.57 10*3/MM3 (ref 2–8.6)
NEUTROPHILS NFR BLD AUTO: 60.9 % (ref 37–80)
PLATELET # BLD AUTO: 384 10*3/MM3 (ref 150–450)
PMV BLD AUTO: 8.5 FL (ref 8–12)
POTASSIUM BLD-SCNC: 3.6 MMOL/L (ref 3.5–5.1)
PROT SERPL-MCNC: 5.5 G/DL (ref 6.3–8.6)
PROTHROMBIN TIME: ABNORMAL SECONDS (ref 11.1–15.3)
RBC # BLD AUTO: 3.43 10*6/MM3 (ref 3.77–5.16)
SODIUM BLD-SCNC: 137 MMOL/L (ref 137–145)
WBC NRBC COR # BLD: 9.15 10*3/MM3 (ref 3.2–9.8)

## 2017-10-09 PROCEDURE — 85025 COMPLETE CBC W/AUTO DIFF WBC: CPT | Performed by: INTERNAL MEDICINE

## 2017-10-09 PROCEDURE — 97530 THERAPEUTIC ACTIVITIES: CPT

## 2017-10-09 PROCEDURE — 99024 POSTOP FOLLOW-UP VISIT: CPT | Performed by: ORTHOPAEDIC SURGERY

## 2017-10-09 PROCEDURE — 25010000002 PIPERACILLIN-TAZOBACTAM: Performed by: FAMILY MEDICINE

## 2017-10-09 PROCEDURE — 97150 GROUP THERAPEUTIC PROCEDURES: CPT

## 2017-10-09 PROCEDURE — 97116 GAIT TRAINING THERAPY: CPT

## 2017-10-09 PROCEDURE — 97110 THERAPEUTIC EXERCISES: CPT

## 2017-10-09 PROCEDURE — G8979 MOBILITY GOAL STATUS: HCPCS

## 2017-10-09 PROCEDURE — 85610 PROTHROMBIN TIME: CPT | Performed by: ORTHOPAEDIC SURGERY

## 2017-10-09 PROCEDURE — 80053 COMPREHEN METABOLIC PANEL: CPT | Performed by: INTERNAL MEDICINE

## 2017-10-09 PROCEDURE — 97535 SELF CARE MNGMENT TRAINING: CPT

## 2017-10-09 PROCEDURE — G8978 MOBILITY CURRENT STATUS: HCPCS

## 2017-10-09 PROCEDURE — 94799 UNLISTED PULMONARY SVC/PX: CPT

## 2017-10-09 PROCEDURE — 83735 ASSAY OF MAGNESIUM: CPT | Performed by: INTERNAL MEDICINE

## 2017-10-09 PROCEDURE — 99232 SBSQ HOSP IP/OBS MODERATE 35: CPT | Performed by: INTERNAL MEDICINE

## 2017-10-09 PROCEDURE — 94760 N-INVAS EAR/PLS OXIMETRY 1: CPT

## 2017-10-09 RX ORDER — WARFARIN SODIUM 1 MG
0.5 TABLET ORAL
Status: DISCONTINUED | OUTPATIENT
Start: 2017-10-09 | End: 2017-10-11 | Stop reason: HOSPADM

## 2017-10-09 RX ADMIN — ALBUTEROL SULFATE 2.5 MG: 2.5 SOLUTION RESPIRATORY (INHALATION) at 13:29

## 2017-10-09 RX ADMIN — ACETAMINOPHEN 1000 MG: 500 TABLET ORAL at 18:08

## 2017-10-09 RX ADMIN — ALBUTEROL SULFATE 2.5 MG: 2.5 SOLUTION RESPIRATORY (INHALATION) at 18:50

## 2017-10-09 RX ADMIN — ACETAMINOPHEN 1000 MG: 500 TABLET ORAL at 06:11

## 2017-10-09 RX ADMIN — FAMOTIDINE 20 MG: 20 TABLET ORAL at 08:42

## 2017-10-09 RX ADMIN — Medication 667 MG: at 12:06

## 2017-10-09 RX ADMIN — DEXTROSE MONOHYDRATE 75 ML/HR: 50 INJECTION, SOLUTION INTRAVENOUS at 06:45

## 2017-10-09 RX ADMIN — GABAPENTIN 300 MG: 300 CAPSULE ORAL at 20:39

## 2017-10-09 RX ADMIN — PIPERACILLIN AND TAZOBACTAM 2.25 G: 2; .25 INJECTION, POWDER, LYOPHILIZED, FOR SOLUTION INTRAVENOUS; PARENTERAL at 20:39

## 2017-10-09 RX ADMIN — FAMOTIDINE 20 MG: 20 TABLET ORAL at 20:39

## 2017-10-09 RX ADMIN — GABAPENTIN 300 MG: 300 CAPSULE ORAL at 17:13

## 2017-10-09 RX ADMIN — PIPERACILLIN AND TAZOBACTAM 2.25 G: 2; .25 INJECTION, POWDER, LYOPHILIZED, FOR SOLUTION INTRAVENOUS; PARENTERAL at 14:01

## 2017-10-09 RX ADMIN — METOPROLOL TARTRATE 25 MG: 25 TABLET ORAL at 08:42

## 2017-10-09 RX ADMIN — PIPERACILLIN AND TAZOBACTAM 2.25 G: 2; .25 INJECTION, POWDER, LYOPHILIZED, FOR SOLUTION INTRAVENOUS; PARENTERAL at 08:43

## 2017-10-09 RX ADMIN — DEXTROSE MONOHYDRATE 75 ML/HR: 50 INJECTION, SOLUTION INTRAVENOUS at 20:39

## 2017-10-09 RX ADMIN — ALBUTEROL SULFATE 2.5 MG: 2.5 SOLUTION RESPIRATORY (INHALATION) at 06:42

## 2017-10-09 RX ADMIN — AMIODARONE HYDROCHLORIDE 200 MG: 200 TABLET ORAL at 08:43

## 2017-10-09 RX ADMIN — Medication 0.5 MG: at 17:13

## 2017-10-09 RX ADMIN — ACETAMINOPHEN 1000 MG: 500 TABLET ORAL at 12:06

## 2017-10-09 RX ADMIN — AMIODARONE HYDROCHLORIDE 200 MG: 200 TABLET ORAL at 20:39

## 2017-10-09 RX ADMIN — LISINOPRIL 2.5 MG: 2.5 TABLET ORAL at 08:42

## 2017-10-09 RX ADMIN — METOPROLOL TARTRATE 25 MG: 25 TABLET ORAL at 17:13

## 2017-10-09 RX ADMIN — FUROSEMIDE 20 MG: 20 TABLET ORAL at 08:42

## 2017-10-09 RX ADMIN — GABAPENTIN 300 MG: 300 CAPSULE ORAL at 08:42

## 2017-10-09 RX ADMIN — Medication 10 ML: at 20:39

## 2017-10-09 RX ADMIN — PIPERACILLIN AND TAZOBACTAM 2.25 G: 2; .25 INJECTION, POWDER, LYOPHILIZED, FOR SOLUTION INTRAVENOUS; PARENTERAL at 03:06

## 2017-10-09 RX ADMIN — ALBUTEROL SULFATE 2.5 MG: 2.5 SOLUTION RESPIRATORY (INHALATION) at 00:41

## 2017-10-09 RX ADMIN — PANTOPRAZOLE SODIUM 40 MG: 40 TABLET, DELAYED RELEASE ORAL at 06:11

## 2017-10-09 NOTE — PROGRESS NOTES
"Anticoagulation by Pharmacy - Warfarin    Anahi Calix is a 77 y.o.female  [Ht: 68\" (172.7 cm); Wt: 182 lb 11.2 oz (82.9 kg)] on Warfarin  for indication of VTE prophylaxis s/p ortho procedure and new onset A-fib.       Goal INR: 2-3  Today's INR:   Lab Results   Component Value Date    INR 2.70 (H) 10/09/2017         Lab Results   Component Value Date    INR 2.70 (H) 10/09/2017    INR 4.75 (H) 10/08/2017    INR 4.92 (H) 10/07/2017    PROTIME  10/09/2017      Comment:      fingerstick    PROTIME 45.6 (H) 10/08/2017    PROTIME 46.9 (H) 10/07/2017     Lab Results   Component Value Date    HGB 10.2 (L) 10/09/2017    HGB 9.7 (L) 10/08/2017    HGB 8.9 (L) 10/07/2017     Lab Results   Component Value Date    HCT 30.5 (L) 10/09/2017    HCT 29.7 (L) 10/08/2017    HCT 27.7 (L) 10/07/2017     Lab Results   Component Value Date     10/09/2017     10/08/2017     10/07/2017     Assessment/Plan:  Reviewed above labs. INR is 2.7.  INR is  therapeutic. Pt did not receive dose of warfarin last night as it has been on hold. No changes in medication list. Will increase current dose of warfarin to  0.5 mg. Rx will continue to follow and adjust dose accordingly.      Thalia Winters [facility]  10/09/17 9:54 AM     "

## 2017-10-09 NOTE — PLAN OF CARE
Problem: Patient Care Overview (Adult)  Goal: Plan of Care Review  Outcome: Ongoing (interventions implemented as appropriate)    10/09/17 1450   Coping/Psychosocial Response Interventions   Plan Of Care Reviewed With patient   Outcome Evaluation   Outcome Summary/Follow up Plan In pm pt able to transfer to orthochair with moderate to max assistance of 2 with another to guide hips into chair. Pt scooted feet with RW to step to orthochair. O2 sats dropped from 95 to 91% with transfer, but quickly recovered to 94% once in chair.         Problem: Inpatient Physical Therapy  Goal: Bed Mobility Goal LTG- PT  Outcome: Ongoing (interventions implemented as appropriate)    10/06/17 1526 10/07/17 0835   Bed Mobility PT LTG   Bed Mobility PT LTG, Date Established 10/06/17 --    Bed Mobility PT LTG, Time to Achieve by discharge --    Bed Mobility PT LTG, Activity Type all bed mobility --    Bed Mobility PT LTG, [place] Level moderate assist (50% patient effort);minimum assist (75% patient effort) --    Bed Mobility PT LTG, Date Goal Reviewed --  10/07/17   Bed Mobility PT LTG, Outcome --  goal ongoing       Goal: Transfer Training Goal 1 LTG- PT  Outcome: Ongoing (interventions implemented as appropriate)  Goal: Gait Training Goal LTG- PT    10/06/17 1526 10/07/17 0835   Gait Training PT LTG   Gait Training Goal PT LTG, Date Established 10/06/17 --    Gait Training Goal PT LTG, Time to Achieve by discharge --    Gait Training Goal PT LTG, [place] Level moderate assist (50% patient effort);minimum assist (75% patient effort) --    Gait Training Goal PT LTG, Distance to Achieve 10ft --    Gait Training Goal PT LTG, Additional Goal 50ftx2 --    Gait Training Goal PT LTG, Outcome --  goal ongoing       Goal: Strength Goal LTG- PT    10/06/17 1526 10/09/17 1238   Strength Goal PT LTG   Strength Goal PT LTG, Date Established 10/06/17 --    Strength Goal PT LTG, Time to Achieve by discharge --    Strength Goal PT LTG,  Measure to Achieve 10reps of PROM/AAROM exercises --    Strength Goal PT LTG, Functional Goal 2 sets of 10 reps AROM exercises --    Strength Goal PT LTG, Date Goal Reviewed --  10/09/17   Strength Goal PT LTG, Outcome --  goal partially met  (met 10 reps PROM/AAROM )       Goal: Patient Education Goal LTG- PT    10/06/17 1526 10/07/17 0835   Patient Education PT LTG   Patient Education PT LTG, Date Established 10/06/17 --    Patient Education PT LTG, Time to Achieve by discharge --    Patient Education PT LTG, Education Type HEP;gait;transfers;home safety --    Patient Education PT LTG, Date Goal Reviewed --  10/07/17   Patient Education PT LTG Outcome --  goal ongoing

## 2017-10-09 NOTE — THERAPY TREATMENT NOTE
Acute Care - Physical Therapy Treatment Note  [facility]     Patient Name: Anahi Calix  : 1940  MRN: [id number]  Today's Date: 10/9/2017  Onset of Illness/Injury or Date of Surgery Date: 17  Date of Referral to PT: 10/01/17  Referring Physician: Dr. Nicola Rich    Admit Date: 2017    Visit Dx:    ICD-10-CM ICD-9-CM   1. Displaced intertrochanteric fracture of left femur, initial encounter for closed fracture S72.142A 820.21   2. Closed left hip fracture, initial encounter S72.002A 820.8   3. Fall, initial encounter W19.XXXA E888.9   4. Peripheral arterial disease I73.9 443.9   5. Essential hypertension I10 401.9   6. Gastroesophageal reflux disease, esophagitis presence not specified K21.9 530.81   7. Abdominal aortic aneurysm (AAA) without rupture I71.4 441.4   8. Impaired mobility and activities of daily living Z74.09 799.89   9. Impaired physical mobility Z74.09 781.99     Patient Active Problem List   Diagnosis   • Peripheral arterial disease   • Gastroesophageal reflux disease   • Abdominal aortic aneurysm (AAA) without rupture   • Displaced intertrochanteric fracture of left femur, initial encounter for closed fracture   • CAD (coronary artery disease)   • Hypertension   • Chronic bronchitis   • Essential hypertension   • Fall   • Tobacco dependence syndrome   • Atrial fibrillation               Adult Rehabilitation Note       10/09/17 1305 10/09/17 0954 10/08/17 1400    Rehab Assessment/Intervention    Discipline physical therapist  -BLAIR  occupational therapy assistant  -BL    Document Type progress note  -BLAIR progress note  -BLAIR therapy note (daily note)  -BL    Subjective Information agree to therapy  -BLAIR  agree to therapy  -BL    Patient Effort, Rehab Treatment good  -BLAIR      Precautions/Limitations fall precautions;oxygen therapy device and L/min;other (see comments)   vital signs  -BLAIR  fall precautions;oxygen therapy device and L/min  -BL    Recorded by [BLAIR] Jadyn Nelson, PT  [BLAIR] Jadyn Nelson, PT [BL] MELO Jhaveri/L    Vital Signs    Pre Systolic BP Rehab 115  -BLAIR  120  -BL    Pre Treatment Diastolic BP 55  -BLAIR  64  -BL    Post Systolic BP Rehab 148  -BLAIR      Post Treatment Diastolic BP 66  -BLAIR      Pretreatment Heart Rate (beats/min) 72  -BLAIR  67  -BL    Posttreatment Heart Rate (beats/min) 83  -BLAIR  76  -BL    Pre SpO2 (%) 95  -BLAIR  96  -BL    O2 Delivery Pre Treatment supplemental O2  -BLAIR  supplemental O2  -BL    Intra SpO2 (%) 91  -BLAIR      O2 Delivery Intra Treatment supplemental O2  -BLAIR      Post SpO2 (%) 94  -BLAIR  95  -BL    O2 Delivery Post Treatment supplemental O2  -BLAIR  supplemental O2  -BL    Pre Patient Position Supine  -BLAIR  Supine  -BL    Intra Patient Position Sitting  -BLAIR  Sitting  -BL    Post Patient Position Sitting  -BLAIR  Supine  -BL    Recorded by [BLAIR] Jadyn Nelson, PT  [BL] MELO Jhaveri/SADIA    Pain Assessment    Pain Assessment 0-10  -BLAIR  No/denies pain  -BL    Pain Score 0  -BLAIR      Post Pain Score 2  -BLAIR      Pain Location Hip  -      Pain Orientation Left  -      Pain Intervention(s) Repositioned;Ambulation/increased activity  -BLAIR      Recorded by [BLAIR] Jadyn Nelson, PT  [BL] MELO Jhaveri/L    Cognitive Assessment/Intervention    Current Cognitive/Communication Assessment functional  -BLAIR  impaired  -BL    Orientation Status oriented x 4  -BLAIR  oriented to;person;place  -BL    Follows Commands/Answers Questions 100% of the time  -BLAIR  75% of the time;100% of the time;able to follow single-step instructions;needs cueing;needs increased time;needs repetition  -    Personal Safety mild impairment  -BLAIR  mild impairment  -    Personal Safety Interventions gait belt;nonskid shoes/slippers when out of bed;supervised activity  -BLAIR  gait belt;muscle strengthening facilitated;nonskid shoes/slippers when out of bed  -BL    Recorded by [BLAIR] Jadyn Nelson, PT  [BL] MELO Jhaveri/L    Mobility Assessment/Training    Extremity Weight-Bearing Status left  lower extremity  -BLAIR      Left Lower Extremity Weight-Bearing weight-bearing as tolerated  -BLAIR      Recorded by [BLAIR] Jadyn Nelson, PT      Bed Mobility, Assessment/Treatment    Bed Mobility, Assistive Device bed rails;head of bed elevated  -      Bed Mob, Supine to Sit, [place] moderate assist (50% patient effort);2 person assist required  -  moderate assist (50% patient effort);maximum assist (25% patient effort);nonverbal cues required (demo/gesture)  -    Bed Mob, Sit to Supine, [place] not tested  -  maximum assist (25% patient effort);2 person assist required  -    Bed Mobility, Safety Issues   decreased use of legs for bridging/pushing  -    Bed Mobility, Impairments   ROM decreased;strength decreased;impaired balance;coordination impaired  -    Recorded by [BLAIR] Jadyn Nelson, PT  [BL] ROSALINA JhaveriA/L    Transfer Assessment/Treatment    Transfers, Bed-Chair [place] moderate assist (50% patient effort);maximum assist (25% patient effort);2 person assist required   with third guiding hips as pt slowly turned with walker  -  not tested  -    Transfers, Chair-Bed [place]   not tested  -    Transfers, Bed-Chair-Bed, Assist Device rolling walker  -      Transfers, Sit-Stand [place] moderate assist (50% patient effort);2 person assist required  -  not tested  -    Transfers, Stand-Sit [place] minimum assist (75% patient effort);2 person assist required  -  not tested  -    Transfers, Sit-Stand-Sit, Assist Device rolling walker  -      Transfer, Comment Pt required tactile cues to assist with RW. Pt scooted ft to orthochair. Third person necessary to bring hips around to chair.  -  Pt previously returned to bed from chair and reports she sat up for ~2 hours before being put back to bed.   -BL    Recorded by [BLAIR] Jadyn Nelson, PT  [BL] ROSALINA JhaveriA/L    Gait Assessment/Treatment    Gait, [place] Level moderate assist (50% patient  effort);maximum assist (25% patient effort);2 person assist required  -BLAIR      Gait, Assistive Device rolling walker  -BLAIR      Gait, Distance (Feet) 2   to ortho chair  -BLAIR      Recorded by [BLAIR] Jadyn Nelson, PT      Grooming Assessment/Training    Grooming Assess/Train, Assistive Device   --   simulated task sitting EOB with BUE washing face  -BL    Grooming Assess/Train, Position   sitting  -BL    Grooming Assess/Train, Indepen Level   verbal cues required;minimum assist (75% patient effort)  -BL    Grooming Assess/Train, Impairments   impaired balance;strength decreased;ROM decreased;decreased flexibility;motor control impaired;postural control impaired;coordination impaired  -BL    Grooming Assess/Train, Comment   Pt completed simulated face washing task EOB this date requiring min assist to demo proper way to wash face with BUE and decreased ROM. Discussed possible need for AE during ADL's this date and family was given booklet on AE and DME.   -BL    Recorded by   [BL] Monica L Leonie, ALEJO/L    Motor Skills/Interventions    Additional Documentation   Neuromuscular Re-education (Group);Gross Motor Coordination Training (Group);Fine Motor Coordination Training (Group);Balance Skills Training (Group)  -BL    Recorded by   [BL] LILA Jhaveri    Balance Skills Training    Sitting-Level of Assistance   Contact guard  -BL    Sitting-Balance Support   Feet supported  -BL    Sitting-Balance Activities   Reaching for objects  -BL    Sitting # of Minutes   15  -BL    Recorded by   [BL] LILA Jhaveri    Therapy Exercises    Bilateral Lower Extremities  AAROM:;15 reps;ankle pumps/circles;glut sets;supine;heel slides;quad sets   Pt able to assist more with RLE  -BLAIR     Bilateral Upper Extremity   15 reps;sitting;elbow flexion/extension;hand pumps;pronation/supination;shoulder abduction/adduction;shoulder extension/flexion;shoulder ER/IR;shoulder horizontal abd/add;PROM:;AAROM:  -BL    BUE Resistance   other  (comment);theraband;theraputty  -BL    Recorded by  [BLAIR] Jadny Nelson PT [BL] LILA Jhaveri    Gross Motor Coordination Training    Gross Motor Skill, Impairments Detail   GMC task completed for reaching to target this date x5 reps each arm with 75% accuracy. Pt required vc's and increased time and effort for processing commands.   -BL    Recorded by   [BL] LILA Jhaveri    Fine Motor Coordination Training    Detail (Fine Motor Coordination Training)   FMC training task this date demo'd and educated to pt and family this date. Pt was able to complete putty task this date of yellow and red however pt required increased time and effort mostly for L hand weakness.  -BL    Recorded by   [BL] LILA Jhaveri    Positioning and Restraints    Pre-Treatment Position in bed  -BLAIR in bed  -BLAIR in bed  -BL    Post Treatment Position chair   orthochair  - bed  -BLAIR bed  -BL    In Bed call light within reach;encouraged to call for assist;with family/caregiver;legs elevated  - supine;call light within reach;encouraged to call for assist;exit alarm on;SCD pump applied  - sitting;call light within reach;encouraged to call for assist;with family/caregiver   sitting at 90* in bed  -BL    Recorded by [BLAIR] Jadyn Nelson, PT [BLAIR] Jadyn Nelson, PT [BL] LILA Jhaveri      10/08/17 1128 10/08/17 0849 10/07/17 0835    Rehab Assessment/Intervention    Discipline physical therapy assistant  -EM physical therapy assistant  -EM physical therapy assistant  -SS    Document Type therapy note (daily note)  -EM therapy note (daily note)  -EM therapy note (daily note)  -SS    Subjective Information agree to therapy   nsg req assistance from PT to get pt to bedside commode/bed  -EM agree to therapy   reports its difficult to control her hands since surgery.   -EM agree to therapy;complains of;pain  -SS    Patient Effort, Rehab Treatment   fair  -SS    Symptoms Noted During/After Treatment   other (see comments)   pt  "lethargic and difficult to keep on task  -SS    Precautions/Limitations fall precautions;oxygen therapy device and L/min  -EM fall precautions;oxygen therapy device and L/min  -EM fall precautions;oxygen therapy device and L/min  -SS    Recorded by [EM] Oskar Marie PTA [EM] Oskar Marie PTA [SS] Meme Jenkins PTA    Vital Signs    Pre Systolic BP Rehab  165  -  -SS    Pre Treatment Diastolic BP  81  -EM 77  -SS    Post Systolic BP Rehab   165  -SS    Post Treatment Diastolic BP   72  -SS    Pretreatment Heart Rate (beats/min)  79  -EM 92  -SS    Posttreatment Heart Rate (beats/min)   79  -SS    Pre SpO2 (%)  95  -EM 96  -SS    O2 Delivery Pre Treatment  supplemental O2  -EM supplemental O2  -SS    Post SpO2 (%)   98  -SS    O2 Delivery Post Treatment   supplemental O2  -SS    Pre Patient Position  Supine  -EM Supine  -SS    Post Patient Position   Supine  -SS    Recorded by  [EM] Oskar Marie PTA [SS] Meme Jenkins PTA    Pain Assessment    Pain Assessment 0-10  -EM 0-10  -EM 0-10  -SS    Pain Score 0  -EM 3  -EM 8  -SS    Post Pain Score 0  -EM 0  -EM 8  -SS    Pain Type  Acute pain  -EM Acute pain  -SS    Pain Location  Hip  -EM --   \"tail bone\"  -SS    Pain Orientation  Left  -EM     Pain Intervention(s)   Repositioned  -SS    Recorded by [EM] Oskar Marie PTA [EM] Oskar Marie PTA [SS] Meme Jenkins PTA    Cognitive Assessment/Intervention    Current Cognitive/Communication Assessment impaired  -EM impaired  -EM impaired  -SS    Orientation Status oriented to;person;place;situation;time  -EM oriented to;person;place;situation  -EM oriented to;person;place;situation  -SS    Follows Commands/Answers Questions 100% of the time  -% of the time  -EM 75% of the time  -SS    Personal Safety   mild impairment  -SS    Personal Safety Interventions gait belt;fall prevention program maintained;toileting scheduled;supervised activity;nonskid shoes/slippers when out of bed  -EM fall " prevention program maintained;gait belt;nonskid shoes/slippers when out of bed;supervised activity  -EM supervised activity  -SS    Recorded by [EM] Oskar Marie PTA [EM] Oskar Marie PTA [SS] Meme Jenkins PTA    Mobility Assessment/Training    Left Lower Extremity Weight-Bearing weight-bearing as tolerated  -EM weight-bearing as tolerated  -EM weight-bearing as tolerated  -SS    Recorded by [EM] Oskar Marie PTA [EM] Oskar Marie PTA [SS] Meme Jenkins PTA    Bed Mobility, Assessment/Treatment    Bed Mobility, Assistive Device   bed rails  -SS    Bed Mobility, Roll Left, [place]   maximum assist (25% patient effort)  -SS    Bed Mobility, Roll Right, [place]   maximum assist (25% patient effort)  -SS    Bed Mob, Supine to Sit, [place]  moderate assist (50% patient effort);maximum assist (25% patient effort)  -EM     Bed Mob, Sit to Supine, [place] maximum assist (25% patient effort);2 person assist required  -EM      Bed Mobility, Comment  Mod Ax1 to scoot to EOB due to B UE weakness  -EM pt would keep eyes closed despite frequent cues. Max assistance needed for rolling and to help guide hand to railing. Pt placed on bed pan per pt request and then pt rolled for repositioning. pt able to assist more with rolling towards L using R hand to pull on BR  -SS    Recorded by [EM] Oskar Marie PTA [EM] Oskar Marie PTA [SS] Meme Jenkins PTA    Transfer Assessment/Treatment    Transfers, Bed-Chair [place] maximum assist (25% patient effort)  -EM maximum assist (25% patient effort)  -EM not tested  -SS    Transfers, Chair-Bed [place] maximum assist (25% patient effort)  -EM      Transfers, Sit-Stand [place] maximum assist (25% patient effort)  -EM maximum assist (25% patient effort)  -EM     Transfers, Stand-Sit [place] maximum assist (25% patient effort)  -EM maximum assist (25% patient effort)  -EM     Transfer, Comment Pt t/f ortho chair->bedside  commode->EOB all via stand pivot t/f to R. Pt stood with MaxAx1 for dep pericare from nsg staff.   -EM t/f EOB to ortho chair with mod/max Ax1 via a R stand pivot. Attempted standing with FWRW at EOB-unsuccessful.   -EM     Recorded by [EM] Oskar Marie PTA [EM] Oskar aMrie, PTA [SS] Meme Jenkins PTA    Gait Assessment/Treatment    Gait, [place] Level   not tested  -SS    Recorded by   [SS] Meme Jenkins PTA    Therapy Exercises    Bilateral Lower Extremities  AROM:;20 reps;sitting;ankle pumps/circles;glut sets;LAQ;AAROM:;SLR  -EM AROM:;10 reps;ankle pumps/circles;quad sets;AAROM:;heel slides;hip abduction/adduction  -SS    Recorded by  [EM] Oskar Marie PTA [SS] Meme Jenkins PTA    Positioning and Restraints    Pre-Treatment Position sitting in chair/recliner  -EM in bed  -EM in bed  -SS    Post Treatment Position bed  -EM chair  -EM bed  -SS    In Bed supine;call light within reach;encouraged to call for assist;exit alarm on;with nsg  -EM encouraged to call for assist;call light within reach;with family/caregiver  -EM supine;call light within reach;encouraged to call for assist;patient within staff view;side rails up x3;SCD pump applied;LUE elevated;legs elevated  -SS    Recorded by [EM] Oskar Marie PTA [EM] Oskar Marie, PTA [SS] Meme Jenkins PTA      User Key  (r) = Recorded By, (t) = Taken By, (c) = Cosigned By    Initials Name Effective Dates    EM Oskar Marie, PTA 08/11/15 -     BLAIR Jadyn Nelson, PT 10/17/16 -     SS Meme Jenkins, YONAS 10/17/16 -     BL MELO Jhaveri/SADIA 10/17/16 -                 IP PT Goals       10/09/17 1238 10/07/17 0835 10/06/17 1526    Bed Mobility PT LTG    Bed Mobility PT LTG, Date Established   10/06/17  -BLAIR    Bed Mobility PT LTG, Time to Achieve   by discharge  -    Bed Mobility PT LTG, Activity Type   all bed mobility  -BLAIR    Bed Mobility PT LTG, [place] Level   moderate assist (50% patient effort);minimum assist (75% patient  effort)  -    Bed Mobility PT LTG, Date Goal Reviewed  10/07/17  -     Bed Mobility PT LTG, Outcome  goal ongoing  - goal ongoing  -    Transfer Training PT LTG    Transfer Training PT LTG, Date Established   10/06/17  -    Transfer Training PT LTG, Time to Achieve   by discharge  -    Transfer Training PT LTG, Activity Type   bed to chair /chair to bed;sit to stand/stand to sit;toilet  -    Transfer Training PT LTG, [place] Level   moderate assist (50% patient effort);minimum assist (75% patient effort)  -    Transfer Training PT LTG, Assist Device   walker, rolling  -    Transfer Training PT  LTG, Date Goal Reviewed  10/07/17  -     Transfer Training PT LTG, Outcome  goal ongoing  - goal ongoing  -    Gait Training PT LTG    Gait Training Goal PT LTG, Date Established   10/06/17  -    Gait Training Goal PT LTG, Time to Achieve   by discharge  -    Gait Training Goal PT LTG, [place] Level   moderate assist (50% patient effort);minimum assist (75% patient effort)  -    Gait Training Goal PT LTG, Distance to Achieve   10ft  -    Gait Training Goal PT LTG, Additional Goal   50ftx2  -    Gait Training Goal PT LTG, Outcome  goal ongoing  - goal ongoing  -    Strength Goal PT LTG    Strength Goal PT LTG, Date Established   10/06/17  -    Strength Goal PT LTG, Time to Achieve   by discharge  -    Strength Goal PT LTG, Measure to Achieve   10reps of PROM/AAROM exercises  -    Strength Goal PT LTG, Functional Goal   2 sets of 10 reps AROM exercises  -    Strength Goal PT LTG, Date Goal Reviewed 10/09/17  - 10/07/17  -     Strength Goal PT LTG, Outcome goal partially met   met 10 reps PROM/AAROM   - goal partially met  - goal ongoing  -    Patient Education PT LTG    Patient Education PT LTG, Date Established   10/06/17  -    Patient Education PT LTG, Time to Achieve   by discharge  -    Patient Education PT LTG, Education Type   HEP;gait;transfers;home  safety  -BLAIR    Patient Education PT LTG, Date Goal Reviewed  10/07/17  -     Patient Education PT LTG Outcome  goal ongoing  - goal ongoing  -      User Key  (r) = Recorded By, (t) = Taken By, (c) = Cosigned By    Initials Name Provider Type    BLAIR Nelson, PT Physical Therapist    SS Meme Jenkins, YONAS Physical Therapy Assistant          Physical Therapy Education     Title: PT OT SLP Therapies (Active)     Topic: Physical Therapy (Active)     Point: Mobility training (Active)    Learning Progress Summary    Learner Readiness Method Response Comment Documented by Status   Patient Acceptance E NR  BLAIR 10/09/17 1238 Active               Point: Precautions (Active)    Learning Progress Summary    Learner Readiness Method Response Comment Documented by Status   Patient Acceptance E NR   10/09/17 1238 Active                      User Key     Initials Effective Dates Name Provider Type Discipline     10/17/16 -  Jadyn Nelson, PT Physical Therapist PT                    PT Recommendation and Plan  Anticipated Equipment Needs At Discharge:  (TBD as pt progresses)  Anticipated Discharge Disposition: placement for care (vs ARU if pt progresses to point she could tolerate)  Planned Therapy Interventions: (S) bed mobility training, gait training, home exercise program, patient/family education, strengthening, stair training, transfer training, ROM (Range of Motion) (progress mobility when/as allowed)  PT Frequency:  (5-14 times per wee)  Plan of Care Review  Plan Of Care Reviewed With: patient  Outcome Summary/Follow up Plan: In pm pt able to transfer to orthochair with moderate to max assistance of 2 with another to guide hips into chair. Pt scooted feet with RW to step to orthochair. O2 sats dropped from 95 to 91% with transfer, but quickly recovered to 94% once in chair.          Outcome Measures       10/09/17 1305 10/09/17 0954 10/08/17 1400    How much help from another person do you currently need...     Turning from your back to your side while in flat bed without using bedrails? 3  -BLAIR 2  -BLAIR     Moving from lying on back to sitting on the side of a flat bed without bedrails? 2  -BLAIR 2  -BLAIR     Moving to and from a bed to a chair (including a wheelchair)? 2  -BLAIR 2  -BLAIR     Standing up from a chair using your arms (e.g., wheelchair, bedside chair)? 2  -BLAIR 1  -BLAIR     Climbing 3-5 steps with a railing? 1  -BLAIR 1  -BLAIR     To walk in hospital room? 1  -BLAIR 1  -BLAIR     AM-PAC 6 Clicks Score 11  -BLAIR 9  -BLAIR     How much help from another is currently needed...    Putting on and taking off regular lower body clothing?   1  -BL    Bathing (including washing, rinsing, and drying)   1  -BL    Toileting (which includes using toilet bed pan or urinal)   1  -BL    Putting on and taking off regular upper body clothing   1  -BL    Taking care of personal grooming (such as brushing teeth)   2  -BL    Eating meals   2  -BL    Score   8  -BL    Functional Assessment    Outcome Measure Options AM-PAC 6 Clicks Basic Mobility (PT)  -BLAIR AM-PAC 6 Clicks Basic Mobility (PT)  - AM-PAC 6 Clicks Daily Activity (OT)  -BL      10/08/17 1128 10/08/17 0849 10/07/17 0835    How much help from another person do you currently need...    Turning from your back to your side while in flat bed without using bedrails? 2  -EM 2  -EM 2  -SS    Moving from lying on back to sitting on the side of a flat bed without bedrails? 2  -EM 2  -EM 2  -SS    Moving to and from a bed to a chair (including a wheelchair)? 2  -EM 2  -EM 1  -SS    Standing up from a chair using your arms (e.g., wheelchair, bedside chair)? 1  -EM 1  -EM 1  -SS    Climbing 3-5 steps with a railing? 1  -EM 1  -EM 1  -SS    To walk in hospital room? 1  -EM 1  -EM 1  -SS    AM-PAC 6 Clicks Score 9  -EM 9  -EM 8  -SS    Functional Assessment    Outcome Measure Options AM-PAC 6 Clicks Basic Mobility (PT)  -EM AM-PAC 6 Clicks Basic Mobility (PT)  -EM AM-PAC 6 Clicks Basic Mobility (PT)  -SS       User Key  (r) = Recorded By, (t) = Taken By, (c) = Cosigned By    Initials Name Provider Type    EM Oskar Marie, PTA Physical Therapy Assistant    BLAIR Nelson, PT Physical Therapist    SS Meme Jenkins, PTA Physical Therapy Assistant    HERMINIO Hadley, ALEJO/L Occupational Therapy Assistant           Time Calculation:         PT Charges       10/09/17 1458 10/09/17 1248       Time Calculation    Start Time 1305  -BLAIR 0954  -BLAIR     Stop Time 1329  -BLAIR 1103  -BLAIR     Time Calculation (min) 24 min  -BLAIR 69 min  -BLAIR     PT Received On 10/09/17  -BLAIR 10/09/17  -BLAIR     PT Goal Re-Cert Due Date 10/19/17  -BLAIR 10/19/17  -BLAIR     Time Calculation- PT    Total Timed Code Minutes- PT 24 minute(s)  -BLAIR 69 minute(s)  -BLAIR       User Key  (r) = Recorded By, (t) = Taken By, (c) = Cosigned By    Initials Name Provider Type    BLAIR Nelson, PT Physical Therapist          Therapy Charges for Today     Code Description Service Date Service Provider Modifiers Qty    65111538696 HC PT MOBILITY CURRENT 10/9/2017 Jadyn Nelson, PT GP, CL 1    19090417940 HC PT MOBILITY PROJECTED 10/9/2017 Jadyn Nelson, PT GP, CI 1    06190609148 HC GAIT TRAINING EA 15 MIN 10/9/2017 Jadyn Nelson, PT GP 1    72310228015 HC PT THER PROC GROUP 10/9/2017 Jadyn Nelson, PT GP 1    34433868576 HC PT THERAPEUTIC ACT EA 15 MIN 10/9/2017 Jadyn Nelson, PT GP 3    19033778020 HC PT MOBILITY CURRENT 10/9/2017 Jadyn Nelson, PT GP, CL 1    88529853258 HC PT MOBILITY PROJECTED 10/9/2017 Jadyn Nelson, PT GP, CI 1    06842647819 HC GAIT TRAINING EA 15 MIN 10/9/2017 Jadyn Nelson, PT GP 1    76785970937 HC PT THERAPEUTIC ACT EA 15 MIN 10/9/2017 Jadyn Nelson, PT GP 1          PT G-Codes  PT Professional Judgement Used?: Yes  Outcome Measure Options: AM-PAC 6 Clicks Basic Mobility (PT)  Score: 11  Functional Limitation: Mobility: Walking and moving around  Mobility: Walking and Moving Around Current Status (): At least 60 percent but less than 80 percent  impaired, limited or restricted  Mobility: Walking and Moving Around Goal Status (): At least 1 percent but less than 20 percent impaired, limited or restricted    Jadyn Nelson, PT  10/9/2017

## 2017-10-09 NOTE — PLAN OF CARE
Problem: Inpatient Occupational Therapy  Goal: Eating Self-Feeding Goal LTG- OT  Outcome: Outcome(s) achieved Date Met:  10/09/17    10/06/17 1155 10/09/17 1638   Eating Self-Feeding OT LTG   Eat Self Feeding Goal OT LTG, Date Established 10/06/17 --    Eat Self Feeding Goal OT LTG, Time to Achieve by discharge --    Eat Self Feed Goal OT LTG, [place] Level supervision required;contact guard assist --    Eat Self Feeding Goal OT LTG, Position sitting in chair --    Eat Self Feeding Goal OT LTG, Date Goal Review --  10/09/17   Eat Self Feeding Goal OT LTG, Outcome --  goal met

## 2017-10-09 NOTE — PROGRESS NOTES
"Hospital Follow-up      LOS: 9 days     Ms. Anahi Calix, 77 y.o. female is followed for: Displaced intertrochanteric fracture of left femur, initial encounter for closed fracture   CHIEF COMPLAINT Weakness of legs    Subjective - Interval History     This lady has chronic cigarette bronchitis and had atelectasis post hip pinning recently with prolonged mechanical ventilation.  She is off the ventilator and breathing well.  She still has some cough    The patient's relevant past medical, surgical and social history were reviewed and updated in Epic as appropriate.     Objective   /68  Pulse 87  Temp 98.1 °F (36.7 °C) (Axillary)   Resp 16  Ht 68\" (172.7 cm)  Wt 182 lb 11.2 oz (82.9 kg)  LMP  (LMP Unknown)  SpO2 99%  BMI 27.78 kg/m2      Physical Exam:Alert no respiratory distress with mild cough    General Appearance:       Head:    Normocephalic, without obvious abnormality, atraumatic   Eyes:            Lids and lashes normal, conjunctivae and sclerae normal, no   icterus, no pallor, corneas clear, PERRLA   Ears:    Ears appear intact with no abnormalities noted   Throat:   No oral lesions, no thrush, oral mucosa moist   Neck:   No adenopathy, supple, trachea midline, no thyromegaly, no   carotid bruit, no JVD   Back:     No kyphosis present, no scoliosis present, no skin lesions,      erythema or scars, no tenderness to percussion or                   palpation,   range of motion normal   Lungs:   Gathered rhonchi     Heart:    Regular rhythm and normal rate, normal S1 and S2, no            murmur, no gallop, no rub, no click   Chest Wall:    No abnormalities observed   Abdomen:     Normal bowel sounds, no masses, no organomegaly, soft        non-tender, non-distended, no guarding, no rebound                tenderness   Rectal:     Deferred   Extremities:   Moves all extremities well, no edema, no cyanosis, no             redness   Pulses:   Pulses palpable and equal bilaterally   Skin:   No " bleeding, bruising or rash   Lymph nodes:   No palpable adenopathy     Labs:   pH, Arterial   Date Value Ref Range Status   10/07/2017 7.441 7.350 - 7.450 pH units Final     pCO2, Arterial   Date Value Ref Range Status   10/07/2017 45.2 (H) 35.0 - 45.0 mm Hg Final     pO2, Arterial   Date Value Ref Range Status   10/07/2017 75.5 (L) 80.0 - 105.0 mm Hg Final     Lab Results   Component Value Date    WBC 9.15 10/09/2017    HGB 10.2 (L) 10/09/2017    HCT 30.5 (L) 10/09/2017    MCV 88.9 10/09/2017     10/09/2017     Lab Results   Component Value Date    GLUCOSE 103 (H) 10/09/2017    BUN 9 10/09/2017    CREATININE 0.65 10/09/2017    EGFRIFNONA 89 10/09/2017    BCR 13.8 10/09/2017    CO2 27.0 10/09/2017    CALCIUM 8.5 10/09/2017    ALBUMIN 2.70 (L) 10/09/2017    LABIL2 1.0 (L) 10/09/2017    AST 38 (H) 10/09/2017    ALT 41 10/09/2017         Imaging Results (last 24 hours)     ** No results found for the last 24 hours. **        Images reviewed  Impression      Hospital Problem List     * (Principal)Displaced intertrochanteric fracture of left femur, initial encounter for closed fracture    Overview Signed 10/2/2017  8:44 AM by Ata Farris MD     POD# 1.  Management per orthopedics.         Peripheral arterial disease (Chronic)    CAD (coronary artery disease) (Chronic)    Hypertension (Chronic)    Overview Signed 10/2/2017  8:45 AM by Ata Farris MD     Has been hypotensive over the last 24 hours intermittently.  Decreased Lisinopril from 40 mg daily to 2.5 mg daily.  Holding lopressor right now.  On Cardizem drip for atrial fibrillation.         Chronic bronchitis (Chronic)    Overview Signed 10/2/2017  8:45 AM by Ata Farris MD     Currently intubated.  Unable to be extubated yesterday.           Essential hypertension    Overview Signed 9/30/2017  6:58 PM by Nicola Rich MD     Added automatically from request for surgery [id number]         Fall    Overview Addendum 10/2/2017  8:45 AM by Ata  MD [place]     Fracture s/p repair per orthopedics.         Tobacco dependence syndrome    Overview Signed 10/2/2017  8:51 AM by Ata Farris MD     Cessation counseling to be provided once extubated and alert.         Atrial fibrillation    Overview Signed 10/2/2017  8:51 AM by Ata Farris MD     New onset.  Given Cardizem bolus this morning and started on Cardizem drip.  Blood pressure started to drop with drip.  Patient placed back on pressors.  PICC line ordered.  Cardiology consulted.                    Plan        Assessment chronic cigarette bronchitis, recent hip repair, pulmonary atelectasis improving    Recommendations continue nebulizer treatments, out of bed, follow chest x-ray    I discussed the patient's findings and my recommendations with patient             Please note that portions of this note were completed with a voice recognition program. Efforts were made to edit the dictations, but occasionally words are mistranscribed.

## 2017-10-09 NOTE — PLAN OF CARE
Problem: Inpatient Physical Therapy  Goal: Bed Mobility Goal LTG- PT  Outcome: Ongoing (interventions implemented as appropriate)    10/06/17 1526 10/07/17 0835   Bed Mobility PT LTG   Bed Mobility PT LTG, Date Established 10/06/17 --    Bed Mobility PT LTG, Time to Achieve by discharge --    Bed Mobility PT LTG, Activity Type all bed mobility --    Bed Mobility PT LTG, [place] Level moderate assist (50% patient effort);minimum assist (75% patient effort) --    Bed Mobility PT LTG, Date Goal Reviewed --  10/07/17   Bed Mobility PT LTG, Outcome --  goal ongoing       Goal: Transfer Training Goal 1 LTG- PT  Outcome: Ongoing (interventions implemented as appropriate)    10/06/17 1526 10/07/17 0835   Transfer Training PT LTG   Transfer Training PT LTG, Date Established 10/06/17 --    Transfer Training PT LTG, Time to Achieve by discharge --    Transfer Training PT LTG, Activity Type bed to chair /chair to bed;sit to stand/stand to sit;toilet --    Transfer Training PT LTG, [place] Level moderate assist (50% patient effort);minimum assist (75% patient effort) --    Transfer Training PT LTG, Assist Device walker, rolling --    Transfer Training PT LTG, Date Goal Reviewed --  10/07/17   Transfer Training PT LTG, Outcome --  goal ongoing       Goal: Gait Training Goal LTG- PT  Outcome: Ongoing (interventions implemented as appropriate)    10/06/17 1526 10/07/17 0835   Gait Training PT LTG   Gait Training Goal PT LTG, Date Established 10/06/17 --    Gait Training Goal PT LTG, Time to Achieve by discharge --    Gait Training Goal PT LTG, [place] Level moderate assist (50% patient effort);minimum assist (75% patient effort) --    Gait Training Goal PT LTG, Distance to Achieve 10ft --    Gait Training Goal PT LTG, Additional Goal 50ftx2 --    Gait Training Goal PT LTG, Outcome --  goal ongoing       Goal: Strength Goal LTG- PT  Outcome: Ongoing (interventions implemented as appropriate)  Goal: Patient Education  Goal LTG- PT    10/06/17 1526 10/07/17 0835   Patient Education PT LTG   Patient Education PT LTG, Date Established 10/06/17 --    Patient Education PT LTG, Time to Achieve by discharge --    Patient Education PT LTG, Education Type HEP;gait;transfers;home safety --    Patient Education PT LTG, Date Goal Reviewed --  10/07/17   Patient Education PT LTG Outcome --  goal ongoing

## 2017-10-09 NOTE — CONSULTS
"Adult Nutrition  Assessment    Patient Name:  Anahi Calix  YOB: 1940  MRN: [id number]  Admit Date:  9/30/2017    Assessment Date:  10/9/2017    Comments:  Pt currently doing well with po.  Good po tolerance and intake at this time.  100% noted this am.  No eating difficulties or Gi distress noted.  LAbs reviewed and noted.  Rd will add yogurt and ice cream in an effort to optimize protein intake for healing.  Increased nutrient needs with post op surgical fixation for Hip fx.  Rd will monitor.          Reason for Assessment       10/09/17 1526    Reason for Assessment    Reason For Assessment/Visit follow up protocol                Nutrition/Diet History       10/09/17 1526    Nutrition/Diet History    Typical Food/Fluid Intake Pt eating lunch.  She reports that her appetite is \"pretty good\"  she denies any gi distress.  Eating well pta.  No eating difficulties              Labs/Tests/Procedures/Meds       10/09/17 1527    Labs/Tests/Procedures/Meds    Labs/Tests Review Reviewed;Alb;Glucose    Medication Review Reviewed, pertinent;Other (comment);Diuretic   phoslo                Nutrition Prescription Ordered       10/09/17 1527    Nutrition Prescription PO    Current PO Diet Regular    Fluid Consistency Thin            Evaluation of Received Nutrient/Fluid Intake       10/09/17 1527    PO Evaluation    Number of Days PO Intake Evaluated Insufficient Data    Number of Meals 1    % PO Intake 100% this am            Electronically signed by:  Wendy Gama RD  10/09/17 3:31 PM  "

## 2017-10-09 NOTE — PLAN OF CARE
Problem: Patient Care Overview (Adult)  Goal: Plan of Care Review  Outcome: Ongoing (interventions implemented as appropriate)    10/09/17 1241   Coping/Psychosocial Response Interventions   Plan Of Care Reviewed With patient   Outcome Evaluation   Outcome Summary/Follow up Plan Pt alertness and ability to participate much improved. Pt transferred supine to sit with mod assistance of 1 and sit to supine with max assistance. Pt stood with RW with max assistance of 2. attempted ambulation to HOB, pt still unable to shift weight and take steps. Will emphasize EOB/OOB activity next visit. Anticipate pt will require placement for care prior to return home with continued therapy services.

## 2017-10-09 NOTE — PLAN OF CARE
Problem: Patient Care Overview (Adult)  Goal: Plan of Care Review  Outcome: Ongoing (interventions implemented as appropriate)    10/09/17 3272   Coping/Psychosocial Response Interventions   Plan Of Care Reviewed With patient   Patient Care Overview   Progress improving   Outcome Evaluation   Outcome Summary/Follow up Plan Pt able to feed self; VS stable; pain controlled       Goal: Adult Individualization and Mutuality  Outcome: Ongoing (interventions implemented as appropriate)  Goal: Discharge Needs Assessment  Outcome: Ongoing (interventions implemented as appropriate)    Problem: Orthopaedic Fracture (Adult)  Goal: Signs and Symptoms of Listed Potential Problems Will be Absent or Manageable (Orthopaedic Fracture)  Outcome: Ongoing (interventions implemented as appropriate)    Problem: Fall Risk (Adult)  Goal: Identify Related Risk Factors and Signs and Symptoms  Outcome: Ongoing (interventions implemented as appropriate)  Goal: Absence of Falls  Outcome: Ongoing (interventions implemented as appropriate)    Problem: Pressure Ulcer Risk (Keith Scale) (Adult,Obstetrics,Pediatric)  Goal: Identify Related Risk Factors and Signs and Symptoms  Outcome: Ongoing (interventions implemented as appropriate)  Goal: Skin Integrity  Outcome: Ongoing (interventions implemented as appropriate)

## 2017-10-09 NOTE — PROGRESS NOTES
ORTHOPEDIC PROGRESS NOTE:    Name:  Anahi Calix  Date:    10/9/2017  Date of admission:  9/30/2017    Post op day:  8 Days Post-Op  Procedure:    Procedure(s) (LRB):  HIP INTERTROCHANTERIC NAILING - LEFT - [place] (Left)    Subjective:  No new complaints.  Slid off the bedside commode today but has no increased pain.      Vitals:    Vitals:    10/09/17 0713   BP:    Pulse: 87   Resp:    Temp:    SpO2:        Exam:  Awake, alert, and oriented.  Toes up and down  Incisions clean and dry, no erythema    ASSESSMENT:  Hospital Problem List     * (Principal)Displaced intertrochanteric fracture of left femur, initial encounter for closed fracture    Overview Signed 10/2/2017  8:44 AM by Ata Farris MD     POD# 1.  Management per orthopedics.         Peripheral arterial disease (Chronic)    CAD (coronary artery disease) (Chronic)        Hypertension (Chronic)    Overview Signed 10/2/2017  8:45 AM by Ata Farris MD     Has been hypotensive over the last 24 hours intermittently.  Decreased Lisinopril from 40 mg daily to 2.5 mg daily.  Holding lopressor right now.  On Cardizem drip for atrial fibrillation.             Chronic bronchitis (Chronic)    Overview Signed 10/2/2017  8:45 AM by Ata Farris MD     Currently intubated.  Unable to be extubated yesterday.           Essential hypertension    Overview Signed 9/30/2017  6:58 PM by Nicola Rich MD     Added automatically from request for surgery [id number]             Fall    Overview Addendum 10/2/2017  8:45 AM by Ata Farris MD     Fracture s/p repair per orthopedics.         Tobacco dependence syndrome    Overview Signed 10/2/2017  8:51 AM by Ata Farris MD     Cessation counseling to be provided once extubated and alert.             Atrial fibrillation    Overview Signed 10/2/2017  8:51 AM by Ata Farris MD     New onset.  Given Cardizem bolus this morning and started on Cardizem drip.  Blood pressure started to drop with drip.  Patient placed back  on pressors.  PICC line ordered.  Cardiology consulted.                 PLAN:    Slowly progress motion and activity  No restrictions  Mobilize  activty as tolerated.    10/09/17 at 7:26 AM by Nicola Rich MD

## 2017-10-09 NOTE — THERAPY TREATMENT NOTE
Acute Care - Physical Therapy Treatment Note  [facility]     Patient Name: Anahi Calix  : 1940  MRN: [id number]  Today's Date: 10/9/2017  Onset of Illness/Injury or Date of Surgery Date: 17  Date of Referral to PT: 10/01/17  Referring Physician: Dr. Nicola Rich    Admit Date: 2017    Visit Dx:    ICD-10-CM ICD-9-CM   1. Displaced intertrochanteric fracture of left femur, initial encounter for closed fracture S72.142A 820.21   2. Closed left hip fracture, initial encounter S72.002A 820.8   3. Fall, initial encounter W19.XXXA E888.9   4. Peripheral arterial disease I73.9 443.9   5. Essential hypertension I10 401.9   6. Gastroesophageal reflux disease, esophagitis presence not specified K21.9 530.81   7. Abdominal aortic aneurysm (AAA) without rupture I71.4 441.4   8. Impaired mobility and activities of daily living Z74.09 799.89   9. Impaired physical mobility Z74.09 781.99     Patient Active Problem List   Diagnosis   • Peripheral arterial disease   • Gastroesophageal reflux disease   • Abdominal aortic aneurysm (AAA) without rupture   • Displaced intertrochanteric fracture of left femur, initial encounter for closed fracture   • CAD (coronary artery disease)   • Hypertension   • Chronic bronchitis   • Essential hypertension   • Fall   • Tobacco dependence syndrome   • Atrial fibrillation               Adult Rehabilitation Note       10/09/17 0954 10/08/17 1400 10/08/17 1128    Rehab Assessment/Intervention    Discipline  occupational therapy assistant  -BL physical therapy assistant  -EM    Document Type  therapy note (daily note)  -BL therapy note (daily note)  -EM    Subjective Information  agree to therapy  -BL agree to therapy   nsg req assistance from PT to get pt to bedside commode/bed  -EM    Precautions/Limitations  fall precautions;oxygen therapy device and L/min  -BL fall precautions;oxygen therapy device and L/min  -EM    Recorded by  [BL] MELO Jhaveri/SADIA [EM] Oskar CORTEZ  YONAS Marie    Vital Signs    Pre Systolic BP Rehab  120  -BL     Pre Treatment Diastolic BP  64  -BL     Pretreatment Heart Rate (beats/min)  67  -BL     Posttreatment Heart Rate (beats/min)  76  -BL     Pre SpO2 (%)  96  -BL     O2 Delivery Pre Treatment  supplemental O2  -BL     Post SpO2 (%)  95  -BL     O2 Delivery Post Treatment  supplemental O2  -BL     Pre Patient Position  Supine  -BL     Intra Patient Position  Sitting  -BL     Post Patient Position  Supine  -BL     Recorded by  [BL] MELO Jhaveri/SADIA     Pain Assessment    Pain Assessment  No/denies pain  -BL 0-10  -EM    Pain Score   0  -EM    Post Pain Score   0  -EM    Recorded by  [BL] MELO Jhaveri/SADIA [EM] Oskar Marie PTA    Cognitive Assessment/Intervention    Current Cognitive/Communication Assessment  impaired  -BL impaired  -EM    Orientation Status  oriented to;person;place  -BL oriented to;person;place;situation;time  -EM    Follows Commands/Answers Questions  75% of the time;100% of the time;able to follow single-step instructions;needs cueing;needs increased time;needs repetition  -% of the time  -EM    Personal Safety  mild impairment  -BL     Personal Safety Interventions  gait belt;muscle strengthening facilitated;nonskid shoes/slippers when out of bed  -BL gait belt;fall prevention program maintained;toileting scheduled;supervised activity;nonskid shoes/slippers when out of bed  -EM    Recorded by  [BL] MELO Jhaveri/SADIA [EM] Oskar Marie PTA    Mobility Assessment/Training    Left Lower Extremity Weight-Bearing   weight-bearing as tolerated  -EM    Recorded by   [EM] Oskar Marie PTA    Bed Mobility, Assessment/Treatment    Bed Mob, Supine to Sit, [place]  moderate assist (50% patient effort);maximum assist (25% patient effort);nonverbal cues required (demo/gesture)  -BL     Bed Mob, Sit to Supine, [place]  maximum assist (25% patient effort);2 person assist required  -BL maximum assist (25% patient  effort);2 person assist required  -EM    Bed Mobility, Safety Issues  decreased use of legs for bridging/pushing  -BL     Bed Mobility, Impairments  ROM decreased;strength decreased;impaired balance;coordination impaired  -BL     Recorded by  [BL] LILA Jhaveir [EM] Oskar Marie PTA    Transfer Assessment/Treatment    Transfers, Bed-Chair [place]  not tested  -BL maximum assist (25% patient effort)  -EM    Transfers, Chair-Bed [place]  not tested  -BL maximum assist (25% patient effort)  -EM    Transfers, Sit-Stand [place]  not tested  -BL maximum assist (25% patient effort)  -EM    Transfers, Stand-Sit [place]  not tested  -BL maximum assist (25% patient effort)  -EM    Transfer, Comment  Pt previously returned to bed from chair and reports she sat up for ~2 hours before being put back to bed.   -BL Pt t/f ortho chair->bedside commode->EOB all via stand pivot t/f to R. Pt stood with MaxAx1 for [place] [facility]   -EM    Recorded by  [BL] MELO Jhaveri/SADIA [EM] Oskar Marie PTA    Grooming Assessment/Training    Grooming Assess/Train, Assistive Device  --   simulated task sitting EOB with BUE washing face  -BL     Grooming Assess/Train, Position  sitting  -BL     Grooming Assess/Train, Indepen Level  verbal cues required;minimum assist (75% patient effort)  -BL     Grooming Assess/Train, Impairments  impaired balance;strength decreased;ROM decreased;decreased flexibility;motor control impaired;postural control impaired;coordination impaired  -BL     Grooming Assess/Train, Comment  Pt completed simulated face washing task EOB this date requiring min assist to demo proper way to wash face with BUE and decreased ROM. Discussed possible need for AE during ADL's this date and family was given booklet on AE and DME.   -BL     Recorded by  [BL] MELO Jhaveri/SADIA     Motor Skills/Interventions    Additional Documentation  Neuromuscular Re-education (Group);Gross Motor  Coordination Training (Group);Fine Motor Coordination Training (Group);Balance Skills Training (Group)  -BL     Recorded by  [BL] LILA Jhaveri     Balance Skills Training    Sitting-Level of Assistance  Contact guard  -BL     Sitting-Balance Support  Feet supported  -BL     Sitting-Balance Activities  Reaching for objects  -BL     Sitting # of Minutes  15  -BL     Recorded by  [BL] LILA Jhaveri     Therapy Exercises    Bilateral Lower Extremities AAROM:;15 reps;ankle pumps/circles;glut sets;supine;heel slides;quad sets   Pt able to assist more with RLE  -BLAIR      Bilateral Upper Extremity  15 reps;sitting;elbow flexion/extension;hand pumps;pronation/supination;shoulder abduction/adduction;shoulder extension/flexion;shoulder ER/IR;shoulder horizontal abd/add;PROM:;AAROM:  -BL     BUE Resistance  other (comment);theraband;theraputty  -BL     Recorded by [BLAIR] Jadyn Nelson, PT [BL] LILA Jhaveri     Gross Motor Coordination Training    Gross Motor Skill, Impairments Detail  GMC task completed for reaching to target this date x5 reps each arm with 75% accuracy. Pt required vc's and increased time and effort for processing commands.   -BL     Recorded by  [BL] LILA Jhaveri     Fine Motor Coordination Training    Detail (Fine Motor Coordination Training)  FMC training task this date demo'd and educated to pt and family this date. Pt was able to complete putty task this date of yellow and red however pt required increased time and effort mostly for L hand weakness.  -BL     Recorded by  [BL] LILA Jhaveri     Positioning and Restraints    Pre-Treatment Position in bed  -BLAIR in bed  -BL sitting in chair/recliner  -EM    Post Treatment Position bed  -BLAIR bed  -BL bed  -EM    In Bed supine;call light within reach;encouraged to call for assist;exit alarm on;SCD pump applied  -BLAIR sitting;call light within reach;encouraged to call for assist;with family/caregiver   sitting at 90* in bed   "-BL supine;call light within reach;encouraged to call for assist;exit alarm on;with nsg  -EM    Recorded by [BLAIR] Jadyn Nelson, PT [BL] MELO Jhaveri/SADIA [EM] Oskar Marie PTA      10/08/17 0849 10/07/17 0835       Rehab Assessment/Intervention    Discipline physical therapy assistant  -EM physical therapy assistant  -SS     Document Type therapy note (daily note)  -EM therapy note (daily note)  -SS     Subjective Information agree to therapy   reports its difficult to control her hands since surgery.   -EM agree to therapy;complains of;pain  -SS     Patient Effort, Rehab Treatment  fair  -SS     Symptoms Noted During/After Treatment  other (see comments)   pt lethargic and difficult to keep on task  -SS     Precautions/Limitations fall precautions;oxygen therapy device and L/min  -EM fall precautions;oxygen therapy device and L/min  -SS     Recorded by [EM] Oskar Marie PTA [SS] Meme Jenkins PTA     Vital Signs    Pre Systolic BP Rehab 165  -  -SS     Pre Treatment Diastolic BP 81  -EM 77  -SS     Post Systolic BP Rehab  165  -SS     Post Treatment Diastolic BP  72  -SS     Pretreatment Heart Rate (beats/min) 79  -EM 92  -SS     Posttreatment Heart Rate (beats/min)  79  -SS     Pre SpO2 (%) 95  -EM 96  -SS     O2 Delivery Pre Treatment supplemental O2  -EM supplemental O2  -SS     Post SpO2 (%)  98  -SS     O2 Delivery Post Treatment  supplemental O2  -SS     Pre Patient Position Supine  -EM Supine  -SS     Post Patient Position  Supine  -SS     Recorded by [EM] Oskar Marie PTA [SS] Meme Jenkins PTA     Pain Assessment    Pain Assessment 0-10  -EM 0-10  -SS     Pain Score 3  -EM 8  -SS     Post Pain Score 0  -EM 8  -SS     Pain Type Acute pain  -EM Acute pain  -SS     Pain Location Hip  -EM --   \"tail bone\"  -SS     Pain Orientation Left  -EM      Pain Intervention(s)  Repositioned  -SS     Recorded by [EM] Oskar Marie PTA [SS] Meme Jenkins PTA     Cognitive " Assessment/Intervention    Current Cognitive/Communication Assessment impaired  -EM impaired  -SS     Orientation Status oriented to;person;place;situation  -EM oriented to;person;place;situation  -SS     Follows Commands/Answers Questions 100% of the time  -EM 75% of the time  -SS     Personal Safety  mild impairment  -SS     Personal Safety Interventions fall prevention program maintained;gait belt;nonskid shoes/slippers when out of bed;supervised activity  -EM supervised activity  -SS     Recorded by [EM] Oskar Marie PTA [SS] Meme Jenkins PTA     Mobility Assessment/Training    Left Lower Extremity Weight-Bearing weight-bearing as tolerated  -EM weight-bearing as tolerated  -SS     Recorded by [EM] Oskar Marie PTA [SS] Meme Jenkins PTA     Bed Mobility, Assessment/Treatment    Bed Mobility, Assistive Device  bed rails  -SS     Bed Mobility, Roll Left, [place]  maximum assist (25% patient effort)  -SS     Bed Mobility, Roll Right, [place]  maximum assist (25% patient effort)  -SS     Bed Mob, Supine to Sit, [place] moderate assist (50% patient effort);maximum assist (25% patient effort)  -EM      Bed Mobility, Comment Mod Ax1 to scoot to EOB due to B UE weakness  -EM pt would keep eyes closed despite frequent cues. Max assistance needed for rolling and to help guide hand to railing. Pt placed on bed pan per pt request and then pt rolled for repositioning. pt able to assist more with rolling towards L using R hand to pull on BR  -SS     Recorded by [EM] Oskar Marie PTA [SS] Meme Jenkins PTA     Transfer Assessment/Treatment    Transfers, Bed-Chair [place] maximum assist (25% patient effort)  -EM not tested  -SS     Transfers, Sit-Stand [place] maximum assist (25% patient effort)  -EM      Transfers, Stand-Sit [place] maximum assist (25% patient effort)  -EM      Transfer, Comment t/f EOB to ortho chair with mod/max Ax1 via a R stand pivot. Attempted standing  with FWRW at EOB-unsuccessful.   -EM      Recorded by [EM] Oskar Marie PTA [SS] Meme Jenkins PTA     Gait Assessment/Treatment    Gait, [place] Level  not tested  -SS     Recorded by  [SS] Meme Jenkins PTA     Therapy Exercises    Bilateral Lower Extremities AROM:;20 reps;sitting;ankle pumps/circles;glut sets;LAQ;AAROM:;SLR  -EM AROM:;10 reps;ankle pumps/circles;quad sets;AAROM:;heel slides;hip abduction/adduction  -SS     Recorded by [EM] Oskar Marie PTA [SS] Meme Jenkins PTA     Positioning and Restraints    Pre-Treatment Position in bed  -EM in bed  -SS     Post Treatment Position chair  -EM bed  -SS     In Bed encouraged to call for assist;call light within reach;with family/caregiver  -EM supine;call light within reach;encouraged to call for assist;patient within staff view;side rails up x3;SCD pump applied;LUE elevated;legs elevated  -SS     Recorded by [EM] Oskar Marie PTA [SS] Meme Jenkins PTA       User Key  (r) = Recorded By, (t) = Taken By, (c) = Cosigned By    Initials Name Effective Dates    EM Oskar Marie, PTA 08/11/15 -     BLAIR Jadyn Nelson, PT 10/17/16 -     SS Meme Jenkins, PTA 10/17/16 -     BL MELO Jhaveri/SADIA 10/17/16 -                 IP PT Goals       10/09/17 1238 10/07/17 0835 10/06/17 1526    Bed Mobility PT LTG    Bed Mobility PT LTG, Date Established   10/06/17  -    Bed Mobility PT LTG, Time to Achieve   by discharge  -    Bed Mobility PT LTG, Activity Type   all bed mobility  -    Bed Mobility PT LTG, [place] Level   moderate assist (50% patient effort);minimum assist (75% patient effort)  -    Bed Mobility PT LTG, Date Goal Reviewed  10/07/17  -SS     Bed Mobility PT LTG, Outcome  goal ongoing  - goal ongoing  -    Transfer Training PT LTG    Transfer Training PT LTG, Date Established   10/06/17  -    Transfer Training PT LTG, Time to Achieve   by discharge  -    Transfer Training PT LTG, Activity Type   bed to chair  /chair to bed;sit to stand/stand to sit;toilet  -    Transfer Training PT LTG, [place] Level   moderate assist (50% patient effort);minimum assist (75% patient effort)  -    Transfer Training PT LTG, Assist Device   walker, rolling  -    Transfer Training PT  LTG, Date Goal Reviewed  10/07/17  -     Transfer Training PT LTG, Outcome  goal ongoing  - goal ongoing  -    Gait Training PT LTG    Gait Training Goal PT LTG, Date Established   10/06/17  -    Gait Training Goal PT LTG, Time to Achieve   by discharge  -    Gait Training Goal PT LTG, [place] Level   moderate assist (50% patient effort);minimum assist (75% patient effort)  -    Gait Training Goal PT LTG, Distance to Achieve   10ft  -    Gait Training Goal PT LTG, Additional Goal   50ftx2  -    Gait Training Goal PT LTG, Outcome  goal ongoing  - goal ongoing  [facility]    Strength Goal PT LTG    Strength Goal PT LTG, Date Established   10/06/17  -    Strength Goal PT LTG, Time to Achieve   by discharge  -    Strength Goal PT LTG, Measure to Achieve   10reps of PROM/AAROM exercises  -    Strength Goal PT LTG, Functional Goal   2 sets of 10 reps AROM exercises  -    Strength Goal PT LTG, Date Goal Reviewed 10/09/17  - 10/07/17  -     Strength Goal PT LTG, Outcome goal partially met   met 10 reps PROM/AAROM   - goal partially met  - goal ongoing  -    Patient Education PT LTG    Patient Education PT LTG, Date Established   10/06/17  -    Patient Education PT LTG, Time to Achieve   by discharge  -    Patient Education PT LTG, Education Type   HEP;gait;transfers;home safety  -    Patient Education PT LTG, Date Goal Reviewed  10/07/17  -     Patient Education PT LTG Outcome  goal ongoing  - goal ongoing  [facility]      User Key  (r) = Recorded By, (t) = Taken By, (c) = Cosigned By    Initials Name Provider Type    BLAIR Nelson, PT Physical Therapist    SS Meme Jenkins, PTA Physical Therapy Assistant           Physical Therapy Education     Title: PT OT SLP Therapies (Active)     Topic: Physical Therapy (Active)     Point: Mobility training (Active)    Learning Progress Summary    Learner Readiness Method Response Comment Documented by Status   Patient Acceptance E NR   10/09/17 1238 Active               Point: Precautions (Active)    Learning Progress Summary    Learner Readiness Method Response Comment Documented by Status   Patient Acceptance E NR   10/09/17 1238 Active                      User Key     Initials Effective Dates Name Provider Type Discipline     10/17/16 -  Jadyn Nelosn, PT Physical Therapist PT                    PT Recommendation and Plan  Anticipated Equipment Needs At Discharge:  (TBD as pt progresses)  Anticipated Discharge Disposition: placement for care (vs ARU if pt progresses to point she could tolerate)  Planned Therapy Interventions: (S) bed mobility training, gait training, home exercise program, patient/family education, strengthening, stair training, transfer training, ROM (Range of Motion) (progress mobility when/as allowed)  PT Frequency:  (5-14 times per wee)  Plan of Care Review  Plan Of Care Reviewed With: patient  Outcome Summary/Follow up Plan: Pt alertness and ability to participate much improved. Pt transferred supine to sit with mod assistance of 1 and sit to supine with max assistance. Pt stood with RW with max assistance of 2. attempted ambulation to HOB, pt still unable to shift weight and take steps. Will emphasize EOB/OOB activity next visit. Anticipate pt will require placement for care prior to return home with continued therapy services.          Outcome Measures       10/09/17 0954 10/08/17 1400 10/08/17 1128    How much help from another person do you currently need...    Turning from your back to your side while in flat bed without using bedrails? 2  -BLAIR  2  -EM    Moving from lying on back to sitting on the side of a flat bed without bedrails? 2  -BLAIR  2   -EM    Moving to and from a bed to a chair (including a wheelchair)? 2  -BLAIR  2  -EM    Standing up from a chair using your arms (e.g., wheelchair, bedside chair)? 1  -BLAIR  1  -EM    Climbing 3-5 steps with a railing? 1  -BLAIR  1  -EM    To walk in hospital room? 1  -BLAIR  1  -EM    AM-PAC 6 Clicks Score 9  -BLAIR  9  -EM    How much help from another is currently needed...    Putting on and taking off regular lower body clothing?  1  -BL     Bathing (including washing, rinsing, and drying)  1  -BL     Toileting (which includes using toilet bed pan or urinal)  1  -BL     Putting on and taking off regular upper body clothing  1  -BL     Taking care of personal grooming (such as brushing teeth)  2  -BL     Eating meals  2  -BL     Score  8  -BL     Functional Assessment    Outcome Measure Options AM-PAC 6 Clicks Basic Mobility (PT)  -BLAIR AM-PAC 6 Clicks Daily Activity (OT)  -BL AM-PAC 6 Clicks Basic Mobility (PT)  -EM      10/08/17 0849 10/07/17 0835       How much help from another person do you currently need...    Turning from your back to your side while in flat bed without using bedrails? 2  -EM 2  -SS     Moving from lying on back to sitting on the side of a flat bed without bedrails? 2  -EM 2  -SS     Moving to and from a bed to a chair (including a wheelchair)? 2  -EM 1  -SS     Standing up from a chair using your arms (e.g., wheelchair, bedside chair)? 1  -EM 1  -SS     Climbing 3-5 steps with a railing? 1  -EM 1  -SS     To walk in hospital room? 1  -EM 1  -SS     AM-PAC 6 Clicks Score 9  -EM 8  -SS     Functional Assessment    Outcome Measure Options AM-PAC 6 Clicks Basic Mobility (PT)  -EM AM-PAC 6 Clicks Basic Mobility (PT)  -SS       User Key  (r) = Recorded By, (t) = Taken By, (c) = Cosigned By    Initials Name Provider Type    EM Oskar Marie, PTA Physical Therapy Assistant    BLAIR Jadyn Nelson, PT Physical Therapist    SS Meme Jenkins, PTA Physical Therapy Assistant    BL LILA Jhaveri  Therapy Assistant           Time Calculation:         PT Charges       10/09/17 1248          Time Calculation    Start Time 0954  -      Stop Time 1103  -      Time Calculation (min) 69 min  -BLAIR      PT Received On 10/09/17  -      PT Goal Re-Cert Due Date 10/19/17  -      Time Calculation- PT    Total Timed Code Minutes- PT 69 minute(s)  -        User Key  (r) = Recorded By, (t) = Taken By, (c) = Cosigned By    Initials Name Provider Type     Jadyn Nelson, PT Physical Therapist          Therapy Charges for Today     Code Description Service Date Service Provider Modifiers Qty    41439994522 HC PT MOBILITY CURRENT 10/9/2017 Jadyn Nelson, PT GP, CL 1    52055079725 HC PT MOBILITY PROJECTED 10/9/2017 Jadyn Nelson, PT GP, CI 1    02027908456 HC GAIT TRAINING EA 15 MIN 10/9/2017 Jadyn Nelson, PT GP 1    00949123123 HC PT THER PROC GROUP 10/9/2017 Jadyn Nelson, PT GP 1    27543716729 HC PT THERAPEUTIC ACT EA 15 MIN 10/9/2017 Jadyn Nelson PT GP 3          PT G-Codes  PT Professional Judgement Used?: Yes  Outcome Measure Options: AM-PAC 6 Clicks Basic Mobility (PT)  Score: 9  Functional Limitation: Mobility: Walking and moving around  Mobility: Walking and Moving Around Current Status (): At least 60 percent but less than 80 percent impaired, limited or restricted  Mobility: Walking and Moving Around Goal Status (): At least 1 percent but less than 20 percent impaired, limited or restricted    Jadyn Nelson PT  10/9/2017

## 2017-10-10 LAB
ALBUMIN SERPL-MCNC: 2.2 G/DL (ref 3.4–4.8)
ALBUMIN/GLOB SERPL: 0.9 G/DL (ref 1.1–1.8)
ALP SERPL-CCNC: 53 U/L (ref 38–126)
ALT SERPL W P-5'-P-CCNC: 35 U/L (ref 9–52)
ANION GAP SERPL CALCULATED.3IONS-SCNC: 6 MMOL/L (ref 5–15)
AST SERPL-CCNC: 33 U/L (ref 14–36)
BASOPHILS # BLD AUTO: 0.02 10*3/MM3 (ref 0–0.2)
BASOPHILS NFR BLD AUTO: 0.2 % (ref 0–2)
BILIRUB SERPL-MCNC: 0.6 MG/DL (ref 0.2–1.3)
BUN BLD-MCNC: 9 MG/DL (ref 7–21)
BUN/CREAT SERPL: 13.2 (ref 7–25)
CALCIUM SPEC-SCNC: 8.2 MG/DL (ref 8.4–10.2)
CHLORIDE SERPL-SCNC: 103 MMOL/L (ref 95–110)
CO2 SERPL-SCNC: 31 MMOL/L (ref 22–31)
CREAT BLD-MCNC: 0.68 MG/DL (ref 0.5–1)
DEPRECATED RDW RBC AUTO: 45.6 FL (ref 36.4–46.3)
EOSINOPHIL # BLD AUTO: 0.42 10*3/MM3 (ref 0–0.7)
EOSINOPHIL NFR BLD AUTO: 4.4 % (ref 0–7)
ERYTHROCYTE [DISTWIDTH] IN BLOOD BY AUTOMATED COUNT: 14.4 % (ref 11.5–14.5)
GFR SERPL CREATININE-BSD FRML MDRD: 84 ML/MIN/1.73 (ref 39–90)
GLOBULIN UR ELPH-MCNC: 2.4 GM/DL (ref 2.3–3.5)
GLUCOSE BLD-MCNC: 96 MG/DL (ref 60–100)
HCT VFR BLD AUTO: 26.6 % (ref 35–45)
HGB BLD-MCNC: 8.8 G/DL (ref 12–15.5)
IMM GRANULOCYTES # BLD: 0.38 10*3/MM3 (ref 0–0.02)
IMM GRANULOCYTES NFR BLD: 4 % (ref 0–0.5)
INR PPP: 1.56 (ref 0.8–1.2)
LYMPHOCYTES # BLD AUTO: 1.42 10*3/MM3 (ref 0.6–4.2)
LYMPHOCYTES NFR BLD AUTO: 14.8 % (ref 10–50)
MAGNESIUM SERPL-MCNC: 1.7 MG/DL (ref 1.6–2.3)
MCH RBC QN AUTO: 29.5 PG (ref 26.5–34)
MCHC RBC AUTO-ENTMCNC: 33.1 G/DL (ref 31.4–36)
MCV RBC AUTO: 89.3 FL (ref 80–98)
MONOCYTES # BLD AUTO: 1.07 10*3/MM3 (ref 0–0.9)
MONOCYTES NFR BLD AUTO: 11.1 % (ref 0–12)
NEUTROPHILS # BLD AUTO: 6.29 10*3/MM3 (ref 2–8.6)
NEUTROPHILS NFR BLD AUTO: 65.5 % (ref 37–80)
NRBC BLD MANUAL-RTO: 0 /100 WBC (ref 0–0)
PLATELET # BLD AUTO: 306 10*3/MM3 (ref 150–450)
PMV BLD AUTO: 8.9 FL (ref 8–12)
POTASSIUM BLD-SCNC: 3.2 MMOL/L (ref 3.5–5.1)
PROT SERPL-MCNC: 4.6 G/DL (ref 6.3–8.6)
PROTHROMBIN TIME: 18.7 SECONDS (ref 11.1–15.3)
RBC # BLD AUTO: 2.98 10*6/MM3 (ref 3.77–5.16)
SODIUM BLD-SCNC: 140 MMOL/L (ref 137–145)
WBC NRBC COR # BLD: 9.6 10*3/MM3 (ref 3.2–9.8)

## 2017-10-10 PROCEDURE — 94799 UNLISTED PULMONARY SVC/PX: CPT

## 2017-10-10 PROCEDURE — 94760 N-INVAS EAR/PLS OXIMETRY 1: CPT

## 2017-10-10 PROCEDURE — 85610 PROTHROMBIN TIME: CPT | Performed by: ORTHOPAEDIC SURGERY

## 2017-10-10 PROCEDURE — 85025 COMPLETE CBC W/AUTO DIFF WBC: CPT | Performed by: INTERNAL MEDICINE

## 2017-10-10 PROCEDURE — 25010000002 PIPERACILLIN-TAZOBACTAM: Performed by: FAMILY MEDICINE

## 2017-10-10 PROCEDURE — 97530 THERAPEUTIC ACTIVITIES: CPT

## 2017-10-10 PROCEDURE — 97116 GAIT TRAINING THERAPY: CPT

## 2017-10-10 PROCEDURE — 97110 THERAPEUTIC EXERCISES: CPT

## 2017-10-10 PROCEDURE — 97535 SELF CARE MNGMENT TRAINING: CPT

## 2017-10-10 PROCEDURE — 83735 ASSAY OF MAGNESIUM: CPT | Performed by: INTERNAL MEDICINE

## 2017-10-10 PROCEDURE — 80053 COMPREHEN METABOLIC PANEL: CPT | Performed by: INTERNAL MEDICINE

## 2017-10-10 PROCEDURE — 99024 POSTOP FOLLOW-UP VISIT: CPT | Performed by: ORTHOPAEDIC SURGERY

## 2017-10-10 RX ORDER — POTASSIUM CHLORIDE 750 MG/1
40 CAPSULE, EXTENDED RELEASE ORAL ONCE
Status: COMPLETED | OUTPATIENT
Start: 2017-10-10 | End: 2017-10-10

## 2017-10-10 RX ADMIN — ACETAMINOPHEN 1000 MG: 500 TABLET ORAL at 06:18

## 2017-10-10 RX ADMIN — ALBUTEROL SULFATE 2.5 MG: 2.5 SOLUTION RESPIRATORY (INHALATION) at 18:47

## 2017-10-10 RX ADMIN — GABAPENTIN 300 MG: 300 CAPSULE ORAL at 15:37

## 2017-10-10 RX ADMIN — PIPERACILLIN AND TAZOBACTAM 2.25 G: 2; .25 INJECTION, POWDER, LYOPHILIZED, FOR SOLUTION INTRAVENOUS; PARENTERAL at 08:59

## 2017-10-10 RX ADMIN — AMIODARONE HYDROCHLORIDE 200 MG: 200 TABLET ORAL at 21:11

## 2017-10-10 RX ADMIN — ALBUTEROL SULFATE 2.5 MG: 2.5 SOLUTION RESPIRATORY (INHALATION) at 00:10

## 2017-10-10 RX ADMIN — FAMOTIDINE 20 MG: 20 TABLET ORAL at 08:59

## 2017-10-10 RX ADMIN — ACETAMINOPHEN 1000 MG: 500 TABLET ORAL at 17:49

## 2017-10-10 RX ADMIN — METOPROLOL TARTRATE 25 MG: 25 TABLET ORAL at 17:49

## 2017-10-10 RX ADMIN — ACETAMINOPHEN 1000 MG: 500 TABLET ORAL at 01:32

## 2017-10-10 RX ADMIN — PANTOPRAZOLE SODIUM 40 MG: 40 TABLET, DELAYED RELEASE ORAL at 06:18

## 2017-10-10 RX ADMIN — AMIODARONE HYDROCHLORIDE 200 MG: 200 TABLET ORAL at 08:59

## 2017-10-10 RX ADMIN — GABAPENTIN 300 MG: 300 CAPSULE ORAL at 08:59

## 2017-10-10 RX ADMIN — METOPROLOL TARTRATE 25 MG: 25 TABLET ORAL at 08:59

## 2017-10-10 RX ADMIN — FUROSEMIDE 20 MG: 20 TABLET ORAL at 08:59

## 2017-10-10 RX ADMIN — DEXTROSE MONOHYDRATE 75 ML/HR: 50 INJECTION, SOLUTION INTRAVENOUS at 12:55

## 2017-10-10 RX ADMIN — Medication 667 MG: at 12:52

## 2017-10-10 RX ADMIN — PIPERACILLIN AND TAZOBACTAM 2.25 G: 2; .25 INJECTION, POWDER, LYOPHILIZED, FOR SOLUTION INTRAVENOUS; PARENTERAL at 21:11

## 2017-10-10 RX ADMIN — POTASSIUM CHLORIDE 40 MEQ: 750 CAPSULE, EXTENDED RELEASE ORAL at 10:55

## 2017-10-10 RX ADMIN — FAMOTIDINE 20 MG: 20 TABLET ORAL at 21:11

## 2017-10-10 RX ADMIN — PIPERACILLIN AND TAZOBACTAM 2.25 G: 2; .25 INJECTION, POWDER, LYOPHILIZED, FOR SOLUTION INTRAVENOUS; PARENTERAL at 13:53

## 2017-10-10 RX ADMIN — PIPERACILLIN AND TAZOBACTAM 2.25 G: 2; .25 INJECTION, POWDER, LYOPHILIZED, FOR SOLUTION INTRAVENOUS; PARENTERAL at 01:32

## 2017-10-10 RX ADMIN — LISINOPRIL 2.5 MG: 2.5 TABLET ORAL at 08:59

## 2017-10-10 RX ADMIN — ACETAMINOPHEN 1000 MG: 500 TABLET ORAL at 12:52

## 2017-10-10 RX ADMIN — ALBUTEROL SULFATE 2.5 MG: 2.5 SOLUTION RESPIRATORY (INHALATION) at 07:09

## 2017-10-10 RX ADMIN — Medication 10 ML: at 21:10

## 2017-10-10 RX ADMIN — Medication 0.5 MG: at 17:49

## 2017-10-10 RX ADMIN — ALBUTEROL SULFATE 2.5 MG: 2.5 SOLUTION RESPIRATORY (INHALATION) at 12:39

## 2017-10-10 RX ADMIN — GABAPENTIN 300 MG: 300 CAPSULE ORAL at 21:11

## 2017-10-10 NOTE — PROGRESS NOTES
"Anticoagulation by Pharmacy - Warfarin    Anahi Calix is a 77 y.o.female  [Ht: 68\" (172.7 cm); Wt: 194 lb 1 oz (88 kg)] on Warfarin 0.5 mg PO  for indication of VTE prophylaxis s/p ortho procedure and new onset atrial fibrillation.    Goal INR: 2-3  Today's INR:   Lab Results   Component Value Date    INR 1.56 (H) 10/10/2017         Lab Results   Component Value Date    INR 1.56 (H) 10/10/2017    INR 2.70 (H) 10/09/2017    INR 4.75 (H) 10/08/2017    PROTIME 18.7 (H) 10/10/2017    PROTIME  10/09/2017      Comment:      fingerstick    PROTIME 45.6 (H) 10/08/2017     Lab Results   Component Value Date    HGB 8.8 (L) 10/10/2017    HGB 10.2 (L) 10/09/2017    HGB 9.7 (L) 10/08/2017     Lab Results   Component Value Date    HCT 26.6 (L) 10/10/2017    HCT 30.5 (L) 10/09/2017    HCT 29.7 (L) 10/08/2017     Lab Results   Component Value Date     10/10/2017     10/09/2017     10/08/2017     Assessment/Plan:  Reviewed above labs. INR is 1.56.  INR is subtherapeutic.Patient received 0.5 mg dose last night after several days of warfarin being held. No changes in medication list. Concurrent medications include Amiodarone. Pt has proven to be very sensitive to dose changes. Will continue current dose of  0.5 mg in order to see trend.     H/H and platelets decreased this morning. No mention of bleed in patient notes. Patient did experience a fall 10/8. Rx will continue to follow and adjust dose accordingly.        Cristiane Gloria RPH  10/10/17 9:40 AM     "

## 2017-10-10 NOTE — THERAPY TREATMENT NOTE
Acute Care - Occupational Therapy Treatment Note  [facility]     Patient Name: Anahi Calix  : 1940  MRN: [id number]  Today's Date: 10/10/2017  Onset of Illness/Injury or Date of Surgery Date: 17  Date of Referral to OT: 10/01/17  Referring Physician: Dr. Nicola Rich      Admit Date: 2017    Visit Dx:     ICD-10-CM ICD-9-CM   1. Displaced intertrochanteric fracture of left femur, initial encounter for closed fracture S72.142A 820.21   2. Closed left hip fracture, initial encounter S72.002A 820.8   3. Fall, initial encounter W19.XXXA E888.9   4. Peripheral arterial disease I73.9 443.9   5. Essential hypertension I10 401.9   6. Gastroesophageal reflux disease, esophagitis presence not specified K21.9 530.81   7. Abdominal aortic aneurysm (AAA) without rupture I71.4 441.4   8. Impaired mobility and activities of daily living Z74.09 799.89   9. Impaired physical mobility Z74.09 781.99     Patient Active Problem List   Diagnosis   • Peripheral arterial disease   • Gastroesophageal reflux disease   • Abdominal aortic aneurysm (AAA) without rupture   • Displaced intertrochanteric fracture of left femur, initial encounter for closed fracture   • CAD (coronary artery disease)   • Hypertension   • Chronic bronchitis   • Essential hypertension   • Fall   • Tobacco dependence syndrome   • Atrial fibrillation             Adult Rehabilitation Note       10/10/17 1402 10/10/17 1110 10/10/17 0920    Rehab Assessment/Intervention    Discipline occupational therapy assistant  -CS occupational therapy assistant  -CS physical therapy assistant  -AM    Document Type therapy note (daily note)  -CS therapy note (daily note)  -CS therapy note (daily note)  -AM    Subjective Information agree to therapy  -CS agree to therapy  -CS agree to therapy;complains of;pain  -AM    Patient Effort, Rehab Treatment   good  -AM    Symptoms Noted During/After Treatment fatigue  -CS  fatigue  -AM    Precautions/Limitations fall  precautions  -CS  fall precautions;other (see comments)   WBAT LLE  -AM    Equipment Issued to Patient   gait belt  -AM    Recorded by [CS] LILA Lemus [CS] LILA Lemus [AM] Tree Corrales PTA    Vital Signs    Pre Systolic BP Rehab  129  -  -AM    Pre Treatment Diastolic BP  61  -CS 62  -AM    Post Systolic BP Rehab   123  -AM    Post Treatment Diastolic BP   89  -AM    Pretreatment Heart Rate (beats/min) 87  -CS 66  -CS 75  -AM    Posttreatment Heart Rate (beats/min)   76  -AM    Pre SpO2 (%) 93  -CS 92  -CS 93  -AM    O2 Delivery Pre Treatment room air  -CS room air  -CS room air  -AM    Post SpO2 (%)   93  -AM    O2 Delivery Post Treatment   room air  -AM    Pre Patient Position Sitting  -CS Sitting  -CS Supine  -AM    Intra Patient Position Sitting  -CS Sitting  -CS Standing  -AM    Post Patient Position Sitting  -CS Sitting  -CS Sitting  -AM    Recorded by [CS] LILA Lemus [CS] MELO Lemus/SADIA [AM] Tree Corrales PTA    Pain Assessment    Pain Assessment 0-10  -CS 0-10  -CS 0-10  -AM    Pain Score 1  -CS 2  -CS 0  -AM    Post Pain Score 2  -CS 1  -CS 2  -AM    Pain Type Acute pain;Surgical pain  -CS Acute pain;Surgical pain  -CS Acute pain;Surgical pain  -AM    Pain Location Hip  -CS Hip  -CS Hip  -AM    Pain Orientation Left  -CS Left  -CS Left  -AM    Pain Descriptors   Sore  -AM    Pain Frequency   Intermittent  -AM    Date Pain First Started   09/30/17  -AM    Clinical Progression   Gradually improving  -AM    Patient's Stated Pain Goal   No pain  -AM    Pain Intervention(s)  Medication (See MAR)  -CS Medication (See MAR);Cold applied;Ambulation/increased activity  -AM    Result of Injury   Yes  -AM    Work-Related Injury   No  -AM    Multiple Pain Sites   No  -AM    Recorded by [CS] LILA Lemus [CS] MELO Lemus/SADIA [AM] Tree Corrales PTA    Cognitive Assessment/Intervention    Current Cognitive/Communication Assessment  functional  -CS functional  -CS functional  -AM    Orientation Status oriented x 4  -CS oriented x 4  -CS oriented x 4  -AM    Follows Commands/Answers Questions 100% of the time  -% of the time  -% of the time  -AM    Personal Safety Interventions gait belt;nonskid shoes/slippers when out of bed  -CS gait belt;nonskid shoes/slippers when out of bed  -CS gait belt;nonskid shoes/slippers when out of bed;supervised activity  -AM    Recorded by [CS] MELO Lemus/SADIA [CS] MELO Lemus/SADIA [AM] Tree Corrales PTA    ROM (Range of Motion)    General ROM   lower extremity range of motion deficits identified  -AM    Recorded by   [AM] Tree Corrales PTA    General LE Assessment    ROM   LLE ROM was WFL  -AM    Recorded by   [AM] Tree Corrales PTA    Mobility Assessment/Training    Extremity Weight-Bearing Status   left lower extremity  -AM    Left Lower Extremity Weight-Bearing   weight-bearing as tolerated  -AM    Recorded by   [AM] Tree Corrales PTA    Bed Mobility, Assessment/Treatment    Bed Mobility, Assistive Device   bed rails;head of bed elevated  -AM    Bed Mobility, Roll Left, [place]   not tested  -AM    Bed Mobility, Roll Right, [place]   not tested  -AM    Bed Mobility, Scoot/Bridge, [place]   not tested  -AM    Bed Mob, Supine to Sit, [place]   minimum assist (75% patient effort)  -AM    Bed Mob, Sit to Supine, [place]   not tested  -AM    Bed Mob, Sidelying to Sit, [place]   not tested  -AM    Bed Mob, Sit to Sidelying, [place]   not tested  -AM    Bed Mobility, Safety Issues   decreased use of arms for pushing/pulling;decreased use of legs for bridging/pushing  -AM    Bed Mobility, Impairments   ROM decreased;strength decreased;pain  -AM    Recorded by   [AM] Tree Corrales PTA    Transfer Assessment/Treatment    Transfers, Bed-Chair [place]   moderate assist (50% patient effort);2 person assist required  -AM    Transfers,  Chair-Bed [place]   moderate assist (50% patient effort);2 person assist required  -AM    Transfers, Bed-Chair-Bed, Assist Device   rolling walker  -AM    Transfers, Sit-Stand [place]   moderate assist (50% patient effort);2 person assist required  -AM    Transfers, Stand-Sit [place]   moderate assist (50% patient effort);2 person assist required  -AM    Transfers, Sit-Stand-Sit, Assist Device   rolling walker  -AM    Toilet Transfer, [place]   not tested  -AM    Walk-In Shower Transfer, [place]   not tested  -AM    Bathtub Transfer, [place]   not tested  -AM    Transfer, Maintain Weight Bearing Status   able to maintain weight bearing status  -AM    Transfer, Safety Issues   step length decreased  -AM    Transfer, Impairments   ROM decreased;strength decreased;pain  -AM    Recorded by   [AM] Tree Corrales PTA    Gait Assessment/Treatment    Gait, [place] Level   moderate assist (50% patient effort);maximum assist (25% patient effort);2 person assist required  -AM    Gait, Assistive Device   rolling walker  -AM    Gait, Distance (Feet)   6  -AM    Gait, Gait Pattern Analysis   3-point gait  -AM    Gait, Gait Deviations   bilateral:;martha decreased  -AM    Gait, Maintain Weight Bearing Status   able to maintain weight bearing status  -AM    Gait, Safety Issues   step length decreased  -AM    Gait, Impairments   ROM decreased;strength decreased;pain  -AM    Recorded by   [AM] Tree Corrales PTA    Stairs Assessment/Treatment    Stairs, [place] Level   not tested  -AM    Recorded by   [AM] Tree Corrales PTA    Self-Feeding Assessment/Training    Self-Feeding Assess/Train, Position  supported sitting  -CS     Self-Feeding Assess/Train, [place]  independent  -CS     Recorded by  [CS] MELO Lemus/L     Therapy Exercises    Bilateral Lower Extremities   AROM:;20 reps;supine;ankle pumps/circles;glut sets;heel slides;quad sets  -AM    Bilateral Upper Extremity  AROM:;20 reps;sitting;elbow flexion/extension;hand pumps;pronation/supination;shoulder extension/flexion;shoulder ER/IR   UEE for 10 mins with 1 rest break.  -CS AROM:;20 reps;sitting;elbow flexion/extension;hand pumps;pronation/supination;shoulder extension/flexion  -CS     BUE Resistance manual resistance- minimal  -CS manual resistance- minimal  -CS     Recorded by [CS] MELO Lemus/SADIA [CS] MELO Lemus/SADIA [AM] Tree Corrales PTA    Fine Motor Coordination Training    Detail (Fine Motor Coordination Training) FMC training- Pt completed red putty task this date.  -CS      Recorded by [CS] MELO Lemus/SADIA      Positioning and Restraints    Pre-Treatment Position sitting in chair/recliner  -CS sitting in chair/recliner  -CS in bed  -AM    Post Treatment Position chair  -CS chair  -CS chair  -AM    In Chair reclined;call light within reach;with family/caregiver  -CS reclined;call light within reach  -CS reclined;call light within reach;encouraged to call for assist;legs elevated  -AM    Recorded by [CS] MELO Lemus/SADIA [CS] MELO Lemus/SADIA [AM] Tree Corrales, YONAS      10/09/17 1355 10/09/17 1305 10/09/17 0954    Rehab Assessment/Intervention    Discipline occupational therapist  -SG physical therapist  -BLAIR     Document Type therapy note (daily note)  -SG progress note  -BLAIR progress note  -BLAIR    Subjective Information agree to therapy;complains of;pain   Left side of back from a fall.    -SG agree to therapy  -BLAIR     Patient Effort, Rehab Treatment good  -SG good  -BLAIR     Precautions/Limitations fall precautions;oxygen therapy device and L/min  -SG fall precautions;oxygen therapy device and L/min;other (see comments)   vital signs  -BLAIR     Precautions/Limitations, Vision WFL with corrective lenses  -SG      Recorded by [SG] Anette Peña OT [BLAIR] Jadyn Nelson, PT [BLAIR] Jadyn Nelson, PT    Vital Signs    Pre Systolic BP Rehab 160  -  -BLAIR     Pre Treatment Diastolic  BP 77  -SG 55  -BLAIR     Post Systolic BP Rehab 142  -  -BLAIR     Post Treatment Diastolic BP 63  -SG 66  -BLAIR     Pretreatment Heart Rate (beats/min) 72  -SG 72  -BLAIR     Posttreatment Heart Rate (beats/min) 80  -SG 83  -BLAIR     Pre SpO2 (%) 95  -SG 95  -BLAIR     O2 Delivery Pre Treatment supplemental O2  -SG supplemental O2  -BLAIR     Intra SpO2 (%)  91  -BLAIR     O2 Delivery Intra Treatment  supplemental O2  -BLAIR     Post SpO2 (%) 94  -SG 94  -BLAIR     O2 Delivery Post Treatment supplemental O2  -SG supplemental O2  -BLAIR     Pre Patient Position Sitting  -SG Supine  -BLAIR     Intra Patient Position Sitting  -SG Sitting  -BLAIR     Post Patient Position Sitting  -SG Sitting  -BLAIR     Recorded by [SG] Anette Peña, OT [BLAIR] Jadyn Nelson, PT     Pain Assessment    Pain Assessment 0-10  -SG 0-10  -BLAIR     Pain Score 4  -SG 0  -BLAIR     Post Pain Score 4  -SG 2  -BLAIR     Pain Type Deep somatic pain  -SG      Pain Location Back  -SG Hip  -BLAIR     Pain Orientation Left;Mid;Outer   under shoulder blade, to left of armpit.  Nursing informed.   -SG Left  -BLAIR     Pain Intervention(s)  Repositioned;Ambulation/increased activity  -BLAIR     Recorded by [SG] Anette Peña, OT [BLAIR] Jadyn Nelson, PT     Cognitive Assessment/Intervention    Current Cognitive/Communication Assessment  functional  -BLAIR     Orientation Status  oriented x 4  -BLAIR     Follows Commands/Answers Questions  100% of the time  -BLAIR     Personal Safety  mild impairment  -BLAIR     Personal Safety Interventions  gait belt;nonskid shoes/slippers when out of bed;supervised activity  -BLAIR     Recorded by  [BLAIR] Jadyn Nelson, PT     Mobility Assessment/Training    Extremity Weight-Bearing Status  left lower extremity  -BLAIR     Left Lower Extremity Weight-Bearing  weight-bearing as tolerated  -BLAIR     Recorded by  [BLAIR] Jadyn Nelson, PT     Bed Mobility, Assessment/Treatment    Bed Mobility, Assistive Device  bed rails;head of bed elevated  -BLAIR     Bed Mob, Supine to Sit, [place]  moderate  assist (50% patient effort);2 person assist required  -BLAIR     Bed Mob, Sit to Supine, [place]  not tested  -BLAIR     Recorded by  [BLAIR] Jadyn Nelson PT     Transfer Assessment/Treatment    Transfers, Bed-Chair [place]  moderate assist (50% patient effort);maximum assist (25% patient effort);2 person assist required   with third guiding hips as pt slowly turned with walker  -BLAIR     Transfers, Bed-Chair-Bed, Assist Device  rolling walker  -BLAIR     Transfers, Sit-Stand [place]  moderate assist (50% patient effort);2 person assist required  -BLAIR     Transfers, Stand-Sit [place]  minimum assist (75% patient effort);2 person assist required  -BLAIR     Transfers, Sit-Stand-Sit, Assist Device  rolling walker  -BLAIR     Transfer, Comment  Pt required tactile cues to assist with RW. Pt scooted ft to orthochair. Third person necessary to bring hips around to chair.  -BLAIR     Recorded by  [BLAIR] Jadyn Nelson, PT     Gait Assessment/Treatment    Gait, [place] Level  moderate assist (50% patient effort);maximum assist (25% patient effort);2 person assist required  -BLAIR     Gait, Assistive Device  rolling walker  -BLAIR     Gait, Distance (Feet)  2   to ortho chair  -BLAIR     Recorded by  [BLAIR] Jadyn Nelson, PT     Therapy Exercises    Bilateral Lower Extremities   AAROM:;15 reps;ankle pumps/circles;glut sets;supine;heel slides;quad sets   Pt able to assist more with RLE  -BLAIR    Recorded by   [BLAIR] Jadyn Nelson, PT    Fine Motor Coordination Training    Detail (Fine Motor Coordination Training) Fine motor exercises for increase use and strength for self-feeding, grooming,  ADL's.  Resident using red putty this date.  Grasp exercises working on strength of grasp for holding items to complete ADL's.  Resident with back pain from a fall, when touched, a hardened area was present, Nursing infiormed and showed location of area.    -SG      Recorded by [SG] Anette Peña OT      Positioning and Restraints    Pre-Treatment  Position sitting in chair/recliner  -SG in bed  -BLAIR in bed  -BLAIR    Post Treatment Position chair  -SG chair   orthochair  -BLAIR bed  -BLAIR    In Bed call light within reach;encouraged to call for assist;with family/caregiver  -SG call light within reach;encouraged to call for assist;with family/caregiver;legs elevated  -BLAIR supine;call light within reach;encouraged to call for assist;exit alarm on;SCD pump applied  -BLAIR    Recorded by [SG] Anette Peña, OT [BLAIR] Jadyn Nelson, PT [BLAIR] Jadyn Nelson, PT      10/08/17 1400 10/08/17 1128 10/08/17 0849    Rehab Assessment/Intervention    Discipline occupational therapy assistant  -BL physical therapy assistant  -EM physical therapy assistant  -EM    Document Type therapy note (daily note)  -BL therapy note (daily note)  -EM therapy note (daily note)  -EM    Subjective Information agree to therapy  -BL agree to therapy   nsg req assistance from PT to get pt to bedside commode/bed  -EM agree to therapy   reports its difficult to control her hands since surgery.   -EM    Precautions/Limitations fall precautions;oxygen therapy device and L/min  -BL fall precautions;oxygen therapy device and L/min  -EM fall precautions;oxygen therapy device and L/min  -EM    Recorded by [BL] MELO Jhaveri/SADIA [EM] Oskar Marie PTA [EM] Oskar Marie PTA    Vital Signs    Pre Systolic BP Rehab 120  -BL  165  -EM    Pre Treatment Diastolic BP 64  -BL  81  -EM    Pretreatment Heart Rate (beats/min) 67  -BL  79  -EM    Posttreatment Heart Rate (beats/min) 76  -BL      Pre SpO2 (%) 96  -BL  95  -EM    O2 Delivery Pre Treatment supplemental O2  -BL  supplemental O2  -EM    Post SpO2 (%) 95  -BL      O2 Delivery Post Treatment supplemental O2  -BL      Pre Patient Position Supine  -BL  Supine  -EM    Intra Patient Position Sitting  -BL      Post Patient Position Supine  -BL      Recorded by [BL] MELO Jhaveri/SADIA  [EM] Oskar Marie PTA    Pain Assessment    Pain Assessment No/denies pain  -BL  0-10  -EM 0-10  -EM    Pain Score  0  -EM 3  -EM    Post Pain Score  0  -EM 0  -EM    Pain Type   Acute pain  -EM    Pain Location   Hip  -EM    Pain Orientation   Left  -EM    Recorded by [BL] MELO Jhaveri/SADIA [EM] Oskar Marie PTA [EM] Oskar Marie PTA    Cognitive Assessment/Intervention    Current Cognitive/Communication Assessment impaired  -BL impaired  -EM impaired  -EM    Orientation Status oriented to;person;place  -BL oriented to;person;place;situation;time  -EM oriented to;person;place;situation  -EM    Follows Commands/Answers Questions 75% of the time;100% of the time;able to follow single-step instructions;needs cueing;needs increased time;needs repetition  -% of the time  -% of the time  -EM    Personal Safety mild impairment  -BL      Personal Safety Interventions gait belt;muscle strengthening facilitated;nonskid shoes/slippers when out of bed  -BL gait belt;fall prevention program maintained;toileting scheduled;supervised activity;nonskid shoes/slippers when out of bed  -EM fall prevention program maintained;gait belt;nonskid shoes/slippers when out of bed;supervised activity  -EM    Recorded by [BL] MELO Jhaveri/SADIA [EM] Oskar Marie PTA [EM] Oskar Marie PTA    Mobility Assessment/Training    Left Lower Extremity Weight-Bearing  weight-bearing as tolerated  -EM weight-bearing as tolerated  -EM    Recorded by  [EM] Oskar Marie PTA [EM] Oskar Marie PTA    Bed Mobility, Assessment/Treatment    Bed Mob, Supine to Sit, [place] moderate assist (50% patient effort);maximum assist (25% patient effort);nonverbal cues required (demo/gesture)  -BL  moderate assist (50% patient effort);maximum assist (25% patient effort)  -EM    Bed Mob, Sit to Supine, [place] maximum assist (25% patient effort);2 person assist required  -BL maximum assist (25% patient effort);2 person assist required  -EM     Bed Mobility, Safety Issues decreased use of legs for bridging/pushing   -BL      Bed Mobility, Impairments ROM decreased;strength decreased;impaired balance;coordination impaired  -BL      Bed Mobility, Comment   Mod Ax1 to scoot to EOB due to B UE weakness  -EM    Recorded by [BL] MELO Jhaveri/SADIA [EM] Oskar Marie PTA [EM] Oskar Marie PTA    Transfer Assessment/Treatment    Transfers, Bed-Chair [place] not tested  -BL maximum assist (25% patient effort)  -EM maximum assist (25% patient effort)  -EM    Transfers, Chair-Bed [place] not tested  -BL maximum assist (25% patient effort)  -EM     Transfers, Sit-Stand [place] not tested  -BL maximum assist (25% patient effort)  -EM maximum assist (25% patient effort)  -EM    Transfers, Stand-Sit [place] not tested  -BL maximum assist (25% patient effort)  -EM maximum assist (25% patient effort)  -EM    Transfer, Comment Pt previously returned to bed from chair and reports she sat up for ~2 hours before being put back to bed.   -BL Pt t/f ortho chair->bedside commode->EOB all via stand pivot t/f to R. Pt stood with MaxAx1 for dep pericare from [place] staff.   -EM t/f EOB to ortho chair with mod/max Ax1 via a R stand pivot. Attempted standing with FWRW at EOB-unsuccessful.   -EM    Recorded by [BL] MELO Jhaveri/SADIA [EM] Oskar Marie PTA [EM] Oskar Marie PTA    Grooming Assessment/Training    Grooming Assess/Train, Assistive Device --   simulated task sitting EOB with BUE washing face  -      Grooming Assess/Train, Position sitting  -      Grooming Assess/Train, Indepen Level verbal cues required;minimum assist (75% patient effort)  -      Grooming Assess/Train, Impairments impaired balance;strength decreased;ROM decreased;decreased flexibility;motor control impaired;postural control impaired;coordination impaired  -      Grooming Assess/Train, Comment Pt completed simulated face washing task EOB this date requiring min assist to demo proper way to wash face with BUE and decreased ROM. Discussed  possible need for AE during ADL's this date and family was given booklet on AE and DME.   -BL      Recorded by [BL] LILA Jhaveri      Motor Skills/Interventions    Additional Documentation Neuromuscular Re-education (Group);Gross Motor Coordination Training (Group);Fine Motor Coordination Training (Group);Balance Skills Training (Group)  -BL      Recorded by [BL] LILA Jhaveri      Balance Skills Training    Sitting-Level of Assistance Contact guard  -BL      Sitting-Balance Support Feet supported  -BL      Sitting-Balance Activities Reaching for objects  -BL      Sitting # of Minutes 15  -BL      Recorded by [BL] LILA Jhaveri      Therapy Exercises    Bilateral Lower Extremities   AROM:;20 reps;sitting;ankle pumps/circles;glut sets;LAQ;AAROM:;SLR  -EM    Bilateral Upper Extremity 15 reps;sitting;elbow flexion/extension;hand pumps;pronation/supination;shoulder abduction/adduction;shoulder extension/flexion;shoulder ER/IR;shoulder horizontal abd/add;PROM:;AAROM:  -BL      BUE Resistance other (comment);theraband;theraputty  -BL      Recorded by [BL] LILA Jhaveri  [EM] Oskar Marie, YONAS    Gross Motor Coordination Training    Gross Motor Skill, Impairments Detail GMC task completed for reaching to target this date x5 reps each arm with 75% accuracy. Pt required vc's and increased time and effort for processing commands.   -BL      Recorded by [BL] LILA Jhaveri      Fine Motor Coordination Training    Detail (Fine Motor Coordination Training) FMC training task this date demo'd and educated to pt and family this date. Pt was able to complete putty task this date of yellow and red however pt required increased time and effort mostly for L hand weakness.  -BL      Recorded by [BL] LILA Jhaveri      Positioning and Restraints    Pre-Treatment Position in bed  -BL sitting in chair/recliner  -EM in bed  -EM    Post Treatment Position bed  -BL bed  -EM chair  -EM    In Bed  sitting;call light within reach;encouraged to call for assist;with family/caregiver   sitting at 90* in bed  -BL supine;call light within reach;encouraged to call for assist;exit alarm on;with nsg  -EM encouraged to call for assist;call light within reach;with family/caregiver  -EM    Recorded by [BL] Monica Hadley, ALEJO/L [EM] Oskar Marie, PTA [EM] Oskar Marie, PTA      User Key  (r) = Recorded By, (t) = Taken By, (c) = Cosigned By    Initials Name Effective Dates    EM Oskar Marie, PTA 08/11/15 -     BLAIR Jadyn Nelson, PT 10/17/16 -     AM Tree Corrales, PTA 10/17/16 -     BL Monica Hadley, ALEJO/L 10/17/16 -     CS Liliya Wilkinson, ALEJO/L 10/17/16 -     SG Anette Peña, OT 08/03/17 -                 OT Goals       10/10/17 1332 10/09/17 1638 10/08/17 1553    Transfer Training OT LTG    Transfer Training OT LTG, Date Goal Reviewed 10/10/17  -CS  10/08/17  -BL    Transfer Training OT LTG, Outcome   goal ongoing  -BL    Range of Motion OT LTG    Range of Motion Goal OT LTG, Date Goal Reviewed 10/10/17  -CS  10/08/17  -BL    Range of Motion Goal OT LTG, Outcome   goal ongoing  -BL    Dynamic Sitting Balance OT LTG    Dynamic Sitting Balance OT LTG, Date Goal Reviewed 10/10/17  -CS  10/08/17  -BL    Dynamic Sitting Balance OT LTG, Outcome   goal ongoing  -BL    Eating Self-Feeding OT LTG    Eat Self Feeding Goal OT LTG, Date Goal Review  10/09/17  -SG 10/08/17  -BL    Eat Self Feeding Goal OT LTG, Outcome  goal met  -SG goal ongoing  -BL    ADL OT LTG    ADL OT LTG, Date Goal Reviewed 10/10/17  -CS  10/08/17  -BL    ADL OT LTG, Outcome   goal ongoing  -BL      10/06/17 1155          Transfer Training OT LTG    Transfer Training OT LTG, Date Established 10/06/17  -RB      Transfer Training OT LTG, Time to Achieve by discharge  -RB      Transfer Training OT LTG, Activity Type all transfers  -RB      Transfer Training OT LTG, [place] Level supervision required;contact guard assist  -RB      Transfer Training  OT LTG, Assist Device walker, rolling  -RB      Range of Motion OT LTG    Range of Motion Goal OT LTG, Date Established 10/06/17  -RB      Range of Motion Goal OT LTG, Time to Achieve by discharge  -RB      Range of Motion Goal OT LTG, AROM Measure Full AROM to all UE joints to increase independence with ADLs.  -RB      Dynamic Sitting Balance OT LTG    Dynamic Sitting Balance OT LTG, Date Established 10/06/17  -RB      Dynamic Sitting Balance OT LTG, Time to Achieve by discharge  -RB      Dynamic Sitting Balance OT LTG, [place] Level contact guard assist   15 minutes with functional activity.  -RB      Dynamic Sitting Balance OT LTG, Assist Device bed rails  -RB      Eating Self-Feeding OT LTG    Eat Self Feeding Goal OT LTG, Date Established 10/06/17  -RB      Eat Self Feeding Goal OT LTG, Time to Achieve by discharge  -RB      Eat Self Feed Goal OT LTG, [place] supervision required;contact guard assist  -RB      Eat Self Feeding Goal OT LTG, Position sitting in chair  -RB      ADL OT LTG    ADL OT LTG, Date Established 10/06/17  -RB      ADL OT LTG, Time to Achieve by discharge  -RB      ADL OT LTG, Activity Type ADL skills   Sponge bath and dress when appropriate.  -RB      ADL OT LTG, [place] min assist  -RB        User Key  (r) = Recorded By, (t) = Taken By, (c) = Cosigned By    Initials Name Provider Type    MIESHA Pardo OT Occupational Therapist    MELO Daniel/SADIA Occupational Therapy Assistant    MELO Norton/SADIA Occupational Therapy Assistant    DIAMOND Peña OT Occupational Therapist          Occupational Therapy Education     Title: PT OT SLP Therapies (Active)     Topic: Occupational Therapy (Active)     Point: ADL training (Active)    Description: Instruct learner(s) on proper safety adaptation and remediation techniques during self care or transfers.   Instruct in proper use of assistive devices.    Learning Progress Summary    Learner Readiness  Method Response Comment Documented by Status   Patient Acceptance E,TB,D NR   10/10/17 1332 Active    Eager E VU Safety with movements and strengthening due to back pain. SG 10/09/17 1526 Done    Eager E,TB,D VU,NR,DU  BL 10/08/17 1553 Done   Family Eager E VU Safety with movements and strengthening due to back pain. SG 10/09/17 1526 Done    Eager E,TB,D VU,NR,DU  BL 10/08/17 1553 Done               Point: Home exercise program (Active)    Description: Instruct learner(s) on appropriate technique for monitoring, assisting and/or progressing therapeutic exercises/activities.    Learning Progress Summary    Learner Readiness Method Response Comment Documented by Status   Patient Acceptance E,TB,D NR   10/10/17 1332 Active    Eager E VU Safety with movements and strengthening due to back pain.  10/09/17 1526 Done    Eager E,TB,D VU,NR,DU  BL 10/08/17 1553 Done   Family Eager E VU Safety with movements and strengthening due to back pain.  10/09/17 1526 Done    Eager E,TB,D VU,NR,DU  BL 10/08/17 1553 Done               Point: Precautions (Active)    Description: Instruct learner(s) on prescribed precautions during self-care and functional transfers.    Learning Progress Summary    Learner Readiness Method Response Comment Documented by Status   Patient Acceptance E,TB,D NR   10/10/17 1332 Active    Eager E VU Safety with movements and strengthening due to back pain.  10/09/17 1526 Done    Eager E,TB,D VU,NR,DU  BL 10/08/17 1553 Done    Acceptance E NR,VU Edu pt on no OOB without assist. RB 10/06/17 1152 Done   Family Eager E VU Safety with movements and strengthening due to back pain. SG 10/09/17 1526 Done    Eager E,TB,D VU,NR,DU  BL 10/08/17 1553 Done               Point: Body mechanics (Active)    Description: Instruct learner(s) on proper positioning and spine alignment during self-care, functional mobility activities and/or exercises.    Learning Progress Summary    Learner Readiness Method Response  Comment Documented by Status   Patient Acceptance E,TB,D NR   10/10/17 1332 Active    Eager E VU Safety with movements and strengthening due to back pain. SG 10/09/17 1526 Done    Eager E,TB,D VU,NR,DU  BL 10/08/17 1553 Done   Family Eager E VU Safety with movements and strengthening due to back pain. SG 10/09/17 1526 Done    Eager E,TB,D VU,NR,DU  BL 10/08/17 1553 Done                      User Key     Initials Effective Dates Name Provider Type Discipline     06/15/16 -  Jonny Pardo, OT Occupational Therapist OT     10/17/16 -  LILA Jhaveri Occupational Therapy Assistant OT     10/17/16 -  MELO Lemus/SADIA Occupational Therapy Assistant OT    SG 08/03/17 -  Anette Peña, OT Occupational Therapist OT                  OT Recommendation and Plan  Anticipated Equipment Needs At Discharge: gait belt, front wheeled walker, raised toilet seat, wheelchair  Planned Therapy Interventions: activity intolerance, ADL retraining, balance training, energy conservation, bed mobility training, transfer training, strengthening, ROM (Range of Motion)  Therapy Frequency:  (3-14x/wk)  Plan of Care Review  Plan Of Care Reviewed With: patient  Progress: improving  Outcome Summary/Follow up Plan: Pt tolerated tx well this date. Pt was I with self-feeding. Continue POC.        Outcome Measures       10/10/17 1300 10/10/17 0920 10/09/17 1305    How much help from another person do you currently need...    Turning from your back to your side while in flat bed without using bedrails? 3  -CS 3  -AM 3  -BLAIR    Moving from lying on back to sitting on the side of a flat bed without bedrails? 3  -CS 3  -AM 2  -BLAIR    Moving to and from a bed to a chair (including a wheelchair)? 2  -CS 2  -AM 2  -BLAIR    Standing up from a chair using your arms (e.g., wheelchair, bedside chair)? 2  -CS 2  -AM 2  -BLAIR    Climbing 3-5 steps with a railing? 1  -CS 1  -AM 1  -BLAIR    To walk in hospital room? 2  -CS 2  -AM 1  -BLAIR    AM-PAC 6 Clicks  Score 13  -CS 13  -AM 11  -BLAIR    Functional Assessment    Outcome Measure Options AM-PAC 6 Clicks Daily Activity (OT)  -CS AM-PAC 6 Clicks Basic Mobility (PT)  -AM AM-PAC 6 Clicks Basic Mobility (PT)  -BLAIR      10/09/17 0954 10/08/17 1400 10/08/17 1128    How much help from another person do you currently need...    Turning from your back to your side while in flat bed without using bedrails? 2  -BLAIR  2  -EM    Moving from lying on back to sitting on the side of a flat bed without bedrails? 2  -BLAIR  2  -EM    Moving to and from a bed to a chair (including a wheelchair)? 2  -BLAIR  2  -EM    Standing up from a chair using your arms (e.g., wheelchair, bedside chair)? 1  -BLAIR  1  -EM    Climbing 3-5 steps with a railing? 1  -BLAIR  1  -EM    To walk in hospital room? 1  -BLAIR  1  -EM    AM-PAC 6 Clicks Score 9  -BLAIR  9  -EM    How much help from another is currently needed...    Putting on and taking off regular lower body clothing?  1  -BL     Bathing (including washing, rinsing, and drying)  1  -BL     Toileting (which includes using toilet bed pan or urinal)  1  -BL     Putting on and taking off regular upper body clothing  1  -BL     Taking care of personal grooming (such as brushing teeth)  2  -BL     Eating meals  2  -BL     Score  8  -BL     Functional Assessment    Outcome Measure Options AM-PAC 6 Clicks Basic Mobility (PT)  -BLAIR AM-[facility] 6 Clicks Daily Activity (OT)  -BL AM-[facility] 6 Clicks Basic Mobility (PT)  -EM      10/08/17 0849          How much help from another person do you currently need...    Turning from your back to your side while in flat bed without using bedrails? 2  -EM      Moving from lying on back to sitting on the side of a flat bed without bedrails? 2  -EM      Moving to and from a bed to a chair (including a wheelchair)? 2  -EM      Standing up from a chair using your arms (e.g., wheelchair, bedside chair)? 1  -EM      Climbing 3-5 steps with a railing? 1  -EM      To walk in hospital room? 1  -EM       AM-PAC 6 Clicks Score 9  -EM      Functional Assessment    Outcome Measure Options AM-PAC 6 Clicks Basic Mobility (PT)  -EM        User Key  (r) = Recorded By, (t) = Taken By, (c) = Cosigned By    Initials Name Provider Type    EM Oskar Marie, PTA Physical Therapy Assistant    BLAIR Nelson, PT Physical Therapist    AM Tree Corrales, PTA Physical Therapy Assistant    HERMINIO Hadley, ALEJO/SADIA Occupational Therapy Assistant    CS MELO Lemus/SADIA Occupational Therapy Assistant           Time Calculation:         Time Calculation- OT       10/10/17 1512 10/10/17 1345       Time Calculation- OT    OT Start Time 1401  -CS 1110  -CS     OT Stop Time 1458  -CS 1150  -CS     OT Time Calculation (min) 57 min  -CS 40 min  -CS     Total Timed Code Minutes- OT 57 minute(s)  -CS 40 minute(s)  -CS     OT Received On 10/10/17  -CS 10/10/17  -CS       User Key  (r) = Recorded By, (t) = Taken By, (c) = Cosigned By    Initials Name Provider Type     MELO Lemus/SADIA Occupational Therapy Assistant           Therapy Charges for Today     Code Description Service Date Service Provider Modifiers Qty    21541974217 HC OT SELF CARE/MGMT/TRAIN EA 15 MIN 10/10/2017 MELO Lemus/L GO 2    44529094252 HC OT THER PROC EA 15 MIN 10/10/2017 MELO Lemus/L GO 1    84264473312 HC OT THER PROC EA 15 MIN 10/10/2017 MELO Lemus/L GO 3    18804969096 HC OT THERAPEUTIC ACT EA 15 MIN 10/10/2017 MELO Lemus/L GO 1          OT G-codes  OT Professional Judgement Used?: Yes  OT Functional Scales Options: AM-PAC 6 Clicks Daily Activity (OT)  Score: 6  Functional Limitation: Self care  Self Care Current Status (): 100 percent impaired, limited or restricted  Self Care Goal Status (): At least 80 percent but less than 100 percent impaired, limited or restricted    LILA Lemus  10/10/2017

## 2017-10-10 NOTE — THERAPY TREATMENT NOTE
Acute Care - Occupational Therapy Treatment Note  [facility]     Patient Name: Anahi Calix  : 1940  MRN: [id number]  Today's Date: 10/10/2017  Onset of Illness/Injury or Date of Surgery Date: 17  Date of Referral to OT: 10/01/17  Referring Physician: Dr. Nicola Rich      Admit Date: 2017    Visit Dx:     ICD-10-CM ICD-9-CM   1. Displaced intertrochanteric fracture of left femur, initial encounter for closed fracture S72.142A 820.21   2. Closed left hip fracture, initial encounter S72.002A 820.8   3. Fall, initial encounter W19.XXXA E888.9   4. Peripheral arterial disease I73.9 443.9   5. Essential hypertension I10 401.9   6. Gastroesophageal reflux disease, esophagitis presence not specified K21.9 530.81   7. Abdominal aortic aneurysm (AAA) without rupture I71.4 441.4   8. Impaired mobility and activities of daily living Z74.09 799.89   9. Impaired physical mobility Z74.09 781.99     Patient Active Problem List   Diagnosis   • Peripheral arterial disease   • Gastroesophageal reflux disease   • Abdominal aortic aneurysm (AAA) without rupture   • Displaced intertrochanteric fracture of left femur, initial encounter for closed fracture   • CAD (coronary artery disease)   • Hypertension   • Chronic bronchitis   • Essential hypertension   • Fall   • Tobacco dependence syndrome   • Atrial fibrillation             Adult Rehabilitation Note       10/10/17 1110 10/10/17 0920 10/09/17 1355    Rehab Assessment/Intervention    Discipline occupational therapy assistant  -CS physical therapy assistant  -AM occupational therapist  -SG    Document Type therapy note (daily note)  -CS therapy note (daily note)  -AM therapy note (daily note)  -SG    Subjective Information agree to therapy  -CS agree to therapy;complains of;pain  -AM agree to therapy;complains of;pain   Left side of back from a fall.    -SG    Patient Effort, Rehab Treatment  good  -AM good  -SG    Symptoms Noted During/After Treatment   fatigue  -AM     Precautions/Limitations  fall precautions;other (see comments)   WBAT LLE  -AM fall precautions;oxygen therapy device and L/min  -SG    Precautions/Limitations, Vision   WFL with corrective lenses  -SG    Equipment Issued to Patient  gait belt  -AM     Recorded by [CS] MELO Lemus/SADIA [AM] Tree Corrales PTA [SG] Anette Peña OT    Vital Signs    Pre Systolic BP Rehab 129  -  -  -SG    Pre Treatment Diastolic BP 61  -CS 62  -AM 77  -SG    Post Systolic BP Rehab  123  -  -SG    Post Treatment Diastolic BP  89  -AM 63  -SG    Pretreatment Heart Rate (beats/min) 66  -CS 75  -AM 72  -SG    Posttreatment Heart Rate (beats/min)  76  -AM 80  -SG    Pre SpO2 (%) 92  -CS 93  -AM 95  -SG    O2 Delivery Pre Treatment room air  -CS room air  -AM supplemental O2  -SG    Post SpO2 (%)  93  -AM 94  -SG    O2 Delivery Post Treatment  room air  -AM supplemental O2  -SG    Pre Patient Position Sitting  -CS Supine  -AM Sitting  -SG    Intra Patient Position Sitting  -CS Standing  -AM Sitting  -SG    Post Patient Position Sitting  -CS Sitting  -AM Sitting  -SG    Recorded by [CS] MELO Lemus/SADIA [AM] Tree Corrales PTA [SG] Anette Peña OT    Pain Assessment    Pain Assessment 0-10  -CS 0-10  -AM 0-10  -SG    Pain Score 2  -CS 0  -AM 4  -SG    Post Pain Score 1  -CS 2  -AM 4  -SG    Pain Type Acute pain;Surgical pain  -CS Acute pain;Surgical pain  -AM Deep somatic pain  -SG    Pain Location Hip  -CS Hip  -AM Back  -SG    Pain Orientation Left  -CS Left  -AM Left;Mid;Outer   under shoulder blade, to left of armpit.  Nursing informed.   -SG    Pain Descriptors  Sore  -AM     Pain Frequency  Intermittent  -AM     Date Pain First Started  09/30/17  -AM     Clinical Progression  Gradually improving  -AM     Patient's Stated Pain Goal  No pain  -AM     Pain Intervention(s) Medication (See MAR)  -CS Medication (See MAR);Cold applied;Ambulation/increased activity  -AM     Result of  Injury  Yes  -AM     Work-Related Injury  No  -AM     Multiple Pain Sites  No  -AM     Recorded by [CS] MELO Lemus/SADIA [AM] Tree Corrales PTA [SG] Anette Peña OT    Cognitive Assessment/Intervention    Current Cognitive/Communication Assessment functional  -CS functional  -AM     Orientation Status oriented x 4  -CS oriented x 4  -AM     Follows Commands/Answers Questions 100% of the time  -% of the time  -AM     Personal Safety Interventions gait belt;nonskid shoes/slippers when out of bed  -CS gait belt;nonskid shoes/slippers when out of bed;supervised activity  -AM     Recorded by [CS] MELO Lemus/SADIA [AM] Tree Corrales PTA     ROM (Range of Motion)    General ROM  lower extremity range of motion deficits identified  -AM     Recorded by  [AM] Tree Corrales PTA     General LE Assessment    ROM  LLE ROM was WFL  -AM     Recorded by  [AM] Tree Corrales PTA     Mobility Assessment/Training    Extremity Weight-Bearing Status  left lower extremity  -AM     Left Lower Extremity Weight-Bearing  weight-bearing as tolerated  -AM     Recorded by  [AM] Tree Corrales PTA     Bed Mobility, Assessment/Treatment    Bed Mobility, Assistive Device  bed rails;head of bed elevated  -AM     Bed Mobility, Roll Left, Gem  not tested  -AM     Bed Mobility, Roll Right, Gem  not tested  -AM     Bed Mobility, Scoot/Bridge, Gem  not tested  -AM     Bed Mob, Supine to Sit, Gem  minimum assist (75% patient effort)  -AM     Bed Mob, Sit to Supine, Gem  not tested  -AM     Bed Mob, Sidelying to Sit, Gem  not tested  -AM     Bed Mob, Sit to Sidelying, Gem  not tested  -AM     Bed Mobility, Safety Issues  decreased use of arms for pushing/pulling;decreased use of legs for bridging/pushing  -AM     Bed Mobility, Impairments  ROM decreased;strength decreased;pain  -AM     Recorded by  [AM] Tree Corrales PTA     Transfer Assessment/Treatment     Transfers, Bed-Chair [place]  moderate assist (50% patient effort);2 person assist required  -AM     Transfers, Chair-Bed [place]  moderate assist (50% patient effort);2 person assist required  -AM     Transfers, Bed-Chair-Bed, Assist Device  rolling walker  -AM     Transfers, Sit-Stand [place]  moderate assist (50% patient effort);2 person assist required  -AM     Transfers, Stand-Sit [place]  moderate assist (50% patient effort);2 person assist required  -AM     Transfers, Sit-Stand-Sit, Assist Device  rolling walker  -AM     Toilet Transfer, Leon  not tested  -AM     Walk-In Shower Transfer, Leon  not tested  -AM     Bathtub Transfer, Leon  not tested  -AM     Transfer, Maintain Weight Bearing Status  able to maintain weight bearing status  -AM     Transfer, Safety Issues  step length decreased  -AM     Transfer, Impairments  ROM decreased;strength decreased;pain  -AM     Recorded by  [AM] Tree Corrales PTA     Gait Assessment/Treatment    Gait, [place] Level  moderate assist (50% patient effort);maximum assist (25% patient effort);2 person assist required  -AM     Gait, Assistive Device  rolling walker  -AM     Gait, Distance (Feet)  6  -AM     Gait, Gait Pattern Analysis  3-point gait  -AM     Gait, Gait Deviations  bilateral:;martha decreased  -AM     Gait, Maintain Weight Bearing Status  able to maintain weight bearing status  -AM     Gait, Safety Issues  step length decreased  -AM     Gait, Impairments  ROM decreased;strength decreased;pain  -AM     Recorded by  [AM] Tree Corrales PTA     Stairs Assessment/Treatment    Stairs, Leon Level  not tested  -AM     Recorded by  [AM] Tree Corrales PTA     Self-Feeding Assessment/Training    Self-Feeding Assess/Train, Position supported sitting  -CS      Self-Feeding Assess/Train, Leon independent  -CS      Recorded by [CS] MELO Lemus/L      Therapy Exercises    Bilateral Lower  Extremities  AROM:;20 reps;supine;ankle pumps/circles;glut sets;heel slides;quad sets  -AM     Bilateral Upper Extremity AROM:;20 reps;sitting;elbow flexion/extension;hand pumps;pronation/supination;shoulder extension/flexion  -CS      BUE Resistance manual resistance- minimal  -CS      Recorded by [CS] MELO Lemus/SADIA [AM] Tree Corrales PTA     Fine Motor Coordination Training    Detail (Fine Motor Coordination Training)   Fine motor exercises for increase use and strength for self-feeding, grooming,  ADL's.  Resident using red putty this date.  Grasp exercises working on strength of grasp for holding items to complete ADL's.  Resident with back pain from a fall, when touched, a hardened area was present, Nursing infiormed and showed location of area.    -SG    Recorded by   [SG] Anette Peña OT    Positioning and Restraints    Pre-Treatment Position sitting in chair/recliner  -CS in bed  -AM sitting in chair/recliner  -SG    Post Treatment Position chair  -CS chair  -AM chair  -SG    In Bed   call light within reach;encouraged to call for assist;with family/caregiver  -SG    In Chair reclined;call light within reach  -CS reclined;call light within reach;encouraged to call for assist;legs elevated  -AM     Recorded by [CS] MELO Lemus/SADIA [AM] Tree Corrales PTA [SG] Anette Peña OT      10/09/17 1305 10/09/17 0954 10/08/17 1400    Rehab Assessment/Intervention    Discipline physical therapist  -BLAIR  occupational therapy assistant  -BL    Document Type progress note  -BLAIR progress note  -BLAIR therapy note (daily note)  -BL    Subjective Information agree to therapy  -BLAIR  agree to therapy  -BL    Patient Effort, Rehab Treatment good  -BLAIR      Precautions/Limitations fall precautions;oxygen therapy device and L/min;other (see comments)   vital signs  -BLAIR  fall precautions;oxygen therapy device and L/min  -BL    Recorded by [BLAIR] Jadyn Nelson, PT [BLAIR] Jadyn Nelson, PT [BL] MELO Jhaveri/SADIA     Vital Signs    Pre Systolic BP Rehab 115  -BLAIR  120  -BL    Pre Treatment Diastolic BP 55  -BLAIR  64  -BL    Post Systolic BP Rehab 148  -BLAIR      Post Treatment Diastolic BP 66  -BLAIR      Pretreatment Heart Rate (beats/min) 72  -BLAIR  67  -BL    Posttreatment Heart Rate (beats/min) 83  -BLAIR  76  -BL    Pre SpO2 (%) 95  -BLAIR  96  -BL    O2 Delivery Pre Treatment supplemental O2  -BLAIR  supplemental O2  -BL    Intra SpO2 (%) 91  -BLAIR      O2 Delivery Intra Treatment supplemental O2  -BLAIR      Post SpO2 (%) 94  -BLAIR  95  -BL    O2 Delivery Post Treatment supplemental O2  -BLAIR  supplemental O2  -BL    Pre Patient Position Supine  -BLAIR  Supine  -BL    Intra Patient Position Sitting  -BLAIR  Sitting  -BL    Post Patient Position Sitting  -BLAIR  Supine  -BL    Recorded by [BLAIR] Jadyn Nelson, PT  [BL] MELO Jhaveri/SADIA    Pain Assessment    Pain Assessment 0-10  -BLAIR  No/denies pain  -BL    Pain Score 0  -BLAIR      Post Pain Score 2  -BLAIR      Pain Location Hip  -BLAIR      Pain Orientation Left  -      Pain Intervention(s) Repositioned;Ambulation/increased activity  -BLAIR      Recorded by [BLAIR] Jadyn Nelson, PT  [BL] MELO Jhaveri/L    Cognitive Assessment/Intervention    Current Cognitive/Communication Assessment functional  -BLAIR  impaired  -BL    Orientation Status oriented x 4  -  oriented to;person;place  -    Follows Commands/Answers Questions 100% of the time  -  75% of the time;100% of the time;able to follow single-step instructions;needs cueing;needs increased time;needs repetition  -BL    Personal Safety mild impairment  -BLAIR  mild impairment  -    Personal Safety Interventions gait belt;nonskid shoes/slippers when out of bed;supervised activity  -  gait belt;muscle strengthening facilitated;nonskid shoes/slippers when out of bed  -BL    Recorded by [BLAIR] Jadyn Nelson, PT  [BL] MELO Jhaveri/L    Mobility Assessment/Training    Extremity Weight-Bearing Status left lower extremity  -      Left Lower Extremity  Weight-Bearing weight-bearing as tolerated  -      Recorded by [BLAIR] Jadyn Nelson, PT      Bed Mobility, Assessment/Treatment    Bed Mobility, Assistive Device bed rails;head of bed elevated  -      Bed Mob, Supine to Sit, [place] moderate assist (50% patient effort);2 person assist required  -  moderate assist (50% patient effort);maximum assist (25% patient effort);nonverbal cues required (demo/gesture)  -    Bed Mob, Sit to Supine, [place] not tested  -  maximum assist (25% patient effort);2 person assist required  -    Bed Mobility, Safety Issues   decreased use of legs for bridging/pushing  -    Bed Mobility, Impairments   ROM decreased;strength decreased;impaired balance;coordination impaired  -    Recorded by [BLAIR] Jadyn Nelson, PT  [BL] MELO Jhaveri/L    Transfer Assessment/Treatment    Transfers, Bed-Chair [place] moderate assist (50% patient effort);maximum assist (25% patient effort);2 person assist required   with third guiding hips as pt slowly turned with walker  -  not tested  -    Transfers, Chair-Bed [place]   not tested  -    Transfers, Bed-Chair-Bed, Assist Device rolling walker  -      Transfers, Sit-Stand [place] moderate assist (50% patient effort);2 person assist required  -  not tested  -    Transfers, Stand-Sit [place] minimum assist (75% patient effort);2 person assist required  -  not tested  -    Transfers, Sit-Stand-Sit, Assist Device rolling walker  -      Transfer, Comment Pt required tactile cues to assist with RW. Pt scooted ft to orthochair. Third person necessary to bring hips around to chair.  -  Pt previously returned to bed from chair and reports she sat up for ~2 hours before being put back to bed.   -BL    Recorded by [BLAIR] Jadyn Nelson, PT  [BL] ROSALINA JhaveriA/L    Gait Assessment/Treatment    Gait, [place] Level moderate assist (50% patient effort);maximum assist (25% patient effort);2 person  assist required  -BLAIR      Gait, Assistive Device rolling walker  -BLAIR      Gait, Distance (Feet) 2   to ortho chair  -BLAIR      Recorded by [BLAIR] Jadyn Nelson, PT      Grooming Assessment/Training    Grooming Assess/Train, Assistive Device   --   simulated task sitting EOB with BUE washing face  -BL    Grooming Assess/Train, Position   sitting  -BL    Grooming Assess/Train, Indepen Level   verbal cues required;minimum assist (75% patient effort)  -BL    Grooming Assess/Train, Impairments   impaired balance;strength decreased;ROM decreased;decreased flexibility;motor control impaired;postural control impaired;coordination impaired  -BL    Grooming Assess/Train, Comment   Pt completed simulated face washing task EOB this date requiring min assist to demo proper way to wash face with BUE and decreased ROM. Discussed possible need for AE during ADL's this date and family was given booklet on AE and DME.   -BL    Recorded by   [BL] Monica L Leonie, ALEJO/L    Motor Skills/Interventions    Additional Documentation   Neuromuscular Re-education (Group);Gross Motor Coordination Training (Group);Fine Motor Coordination Training (Group);Balance Skills Training (Group)  -BL    Recorded by   [BL] LILA Jhaveri    Balance Skills Training    Sitting-Level of Assistance   Contact guard  -BL    Sitting-Balance Support   Feet supported  -BL    Sitting-Balance Activities   Reaching for objects  -BL    Sitting # of Minutes   15  -BL    Recorded by   [BL] LILA Jhaveri    Therapy Exercises    Bilateral Lower Extremities  AAROM:;15 reps;ankle pumps/circles;glut sets;supine;heel slides;quad sets   Pt able to assist more with RLE  -BLAIR     Bilateral Upper Extremity   15 reps;sitting;elbow flexion/extension;hand pumps;pronation/supination;shoulder abduction/adduction;shoulder extension/flexion;shoulder ER/IR;shoulder horizontal abd/add;PROM:;AAROM:  -BL    BUE Resistance   other (comment);theraband;theraputty  -BL    Recorded by   [BLAIR] Jadyn Nelson, PT [BL] MELO Jhaveri/L    Gross Motor Coordination Training    Gross Motor Skill, Impairments Detail   GMC task completed for reaching to target this date x5 reps each arm with 75% accuracy. Pt required vc's and increased time and effort for processing commands.   -BL    Recorded by   [BL] MELO Jhaveri/SADIA    Fine Motor Coordination Training    Detail (Fine Motor Coordination Training)   FMC training task this date demo'd and educated to pt and family this date. Pt was able to complete putty task this date of yellow and red however pt required increased time and effort mostly for L hand weakness.  -BL    Recorded by   [BL] LILA Jhaveri    Positioning and Restraints    Pre-Treatment Position in bed  -BLAIR in bed  -BLAIR in bed  -BL    Post Treatment Position chair   orthochair  - bed  -BLAIR bed  -BL    In Bed call light within reach;encouraged to call for assist;with family/caregiver;legs elevated  - supine;call light within reach;encouraged to call for assist;exit alarm on;SCD pump applied  - sitting;call light within reach;encouraged to call for assist;with family/caregiver   sitting at 90* in bed  -BL    Recorded by [BLAIR] Jadyn Nelson, PT [BLAIR] Jadyn Nelson, PT [BL] LILA Jhaveri      10/08/17 1128 10/08/17 0849       Rehab Assessment/Intervention    Discipline physical therapy assistant  -EM physical therapy assistant  -EM     Document Type therapy note (daily note)  -EM therapy note (daily note)  -EM     Subjective Information agree to therapy   nsg req assistance from PT to get pt to bedside commode/bed  -EM agree to therapy   reports its difficult to control her hands since surgery.   -EM     Precautions/Limitations fall precautions;oxygen therapy device and L/min  -EM fall precautions;oxygen therapy device and L/min  -EM     Recorded by [EM] Oskar Marie PTA [EM] Oskar Marie PTA     Vital Signs    Pre Systolic BP Rehab  165  -EM     Pre Treatment Diastolic  BP  81  -EM     Pretreatment Heart Rate (beats/min)  79  -EM     Pre SpO2 (%)  95  -EM     O2 Delivery Pre Treatment  supplemental O2  -EM     Pre Patient Position  Supine  -EM     Recorded by  [EM] Oskar Marie PTA     Pain Assessment    Pain Assessment 0-10  -EM 0-10  -EM     Pain Score 0  -EM 3  -EM     Post Pain Score 0  -EM 0  -EM     Pain Type  Acute pain  -EM     Pain Location  Hip  -EM     Pain Orientation  Left  -EM     Recorded by [EM] Oskar Marie PTA [EM] Oskar Marie PTA     Cognitive Assessment/Intervention    Current Cognitive/Communication Assessment impaired  -EM impaired  -EM     Orientation Status oriented to;person;place;situation;time  -EM oriented to;person;place;situation  -EM     Follows Commands/Answers Questions 100% of the time  -% of the time  -EM     Personal Safety Interventions gait belt;fall prevention program maintained;toileting scheduled;supervised activity;nonskid shoes/slippers when out of bed  -EM fall prevention program maintained;gait belt;nonskid shoes/slippers when out of bed;supervised activity  -EM     Recorded by [EM] Oskar Marie PTA [EM] Oskar Marie PTA     Mobility Assessment/Training    Left Lower Extremity Weight-Bearing weight-bearing as tolerated  -EM weight-bearing as tolerated  -EM     Recorded by [EM] Oskar Marie PTA [EM] Oskar Marie PTA     Bed Mobility, Assessment/Treatment    Bed Mob, Supine to Sit, [place]  moderate assist (50% patient effort);maximum assist (25% patient effort)  -EM     Bed Mob, Sit to Supine, [place] maximum assist (25% patient effort);2 person assist required  -EM      Bed Mobility, Comment  Mod Ax1 to scoot to EOB due to B UE weakness  -EM     Recorded by [EM] Oskar Marie PTA [EM] Oskar Marie PTA     Transfer Assessment/Treatment    Transfers, Bed-Chair [place] maximum assist (25% patient effort)  -EM maximum assist (25% patient effort)  -EM     Transfers, Chair-Bed [place] maximum assist  (25% patient effort)  -EM      Transfers, Sit-Stand [place] maximum assist (25% patient effort)  -EM maximum assist (25% patient effort)  -EM     Transfers, Stand-Sit [place] maximum assist (25% patient effort)  -EM maximum assist (25% patient effort)  -EM     Transfer, Comment Pt t/f ortho chair->bedside commode->EOB all via stand pivot t/f to R. Pt stood with MaxAx1 for dep pericare from nsg staff.   -EM t/f EOB to ortho chair with mod/max Ax1 via a R stand pivot. Attempted standing with FWRW at EOB-unsuccessful.   -EM     Recorded by [EM] Oskar Marie PTA [EM] Oskar Marie PTA     Therapy Exercises    Bilateral Lower Extremities  AROM:;20 reps;sitting;ankle pumps/circles;glut sets;LAQ;AAROM:;SLR  -EM     Recorded by  [EM] Oskar Marie PTA     Positioning and Restraints    Pre-Treatment Position sitting in chair/recliner  -EM in bed  -EM     Post Treatment Position bed  -EM chair  -EM     In Bed supine;call light within reach;encouraged to call for assist;exit alarm on;with nsg  -EM encouraged to call for assist;call light within reach;with family/caregiver  -EM     Recorded by [EM] Oskar Marie PTA [EM] Oskar Marie PTA       User Key  (r) = Recorded By, (t) = Taken By, (c) = Cosigned By    Initials Name Effective Dates    EM Oskar Marie, PTA 08/11/15 -     BLAIR Jadyn Nelson, PT 10/17/16 -     AM Tree Corrales, PTA 10/17/16 -     BL Monica Hadley, ALEJO/L 10/17/16 -     CS Liliya Wilkinson, ALEJO/L 10/17/16 -     SG Anette Peña, OT 08/03/17 -                 OT Goals       10/10/17 1332 10/09/17 1638 10/08/17 1553    Transfer Training OT LTG    Transfer Training OT LTG, Date Goal Reviewed 10/10/17  -CS  10/08/17  -BL    Transfer Training OT LTG, Outcome   goal ongoing  -BL    Range of Motion OT LTG    Range of Motion Goal OT LTG, Date Goal Reviewed 10/10/17  -CS  10/08/17  -BL    Range of Motion Goal OT LTG, Outcome   goal ongoing  -BL    Dynamic Sitting Balance OT LTG    Dynamic Sitting  Balance OT LTG, Date Goal Reviewed 10/10/17  -CS  10/08/17  -BL    Dynamic Sitting Balance OT LTG, Outcome   goal ongoing  -BL    Eating Self-Feeding OT LTG    Eat Self Feeding Goal OT LTG, Date Goal Review  10/09/17  -SG 10/08/17  -BL    Eat Self Feeding Goal OT LTG, Outcome  goal met  -SG goal ongoing  -BL    ADL OT LTG    ADL OT LTG, Date Goal Reviewed 10/10/17  -CS  10/08/17  -BL    ADL OT LTG, Outcome   goal ongoing  -BL      10/06/17 1155          Transfer Training OT LTG    Transfer Training OT LTG, Date Established 10/06/17  -RB      Transfer Training OT LTG, Time to Achieve by discharge  -RB      Transfer Training OT LTG, Activity Type all transfers  -RB      Transfer Training OT LTG, [place] Level supervision required;contact guard assist  -RB      Transfer Training OT LTG, Assist Device walker, rolling  -RB      Range of Motion OT LTG    Range of Motion Goal OT LTG, Date Established 10/06/17  -RB      Range of Motion Goal OT LTG, Time to Achieve by discharge  -RB      Range of Motion Goal OT LTG, AROM Measure Full AROM to all UE joints to increase independence with ADLs.  -RB      Dynamic Sitting Balance OT LTG    Dynamic Sitting Balance OT LTG, Date Established 10/06/17  -RB      Dynamic Sitting Balance OT LTG, Time to Achieve by discharge  -RB      Dynamic Sitting Balance OT LTG, [place] Level contact guard assist   15 minutes with functional activity.  -RB      Dynamic Sitting Balance OT LTG, Assist Device bed rails  -RB      Eating Self-Feeding OT LTG    Eat Self Feeding Goal OT LTG, Date Established 10/06/17  -RB      Eat Self Feeding Goal OT LTG, Time to Achieve by discharge  -RB      Eat Self Feed Goal OT LTG, [place] Level supervision required;contact guard assist  -RB      Eat Self Feeding Goal OT LTG, Position sitting in chair  -RB      ADL OT LTG    ADL OT LTG, Date Established 10/06/17  -RB      ADL OT LTG, Time to Achieve by discharge  -RB      ADL OT LTG, Activity Type ADL  skills   Sponge bath and dress when appropriate.  -RB      ADL OT LTG, [place] Level min assist  -RB        User Key  (r) = Recorded By, (t) = Taken By, (c) = Cosigned By    Initials Name Provider Type    RB Jonny Pardo, OT Occupational Therapist    BL Monica Hadley, ALEJO/SADIA Occupational Therapy Assistant    SATISH Wilkinson, ALEJO/L Occupational Therapy Assistant    SG Anette Peña, OT Occupational Therapist          Occupational Therapy Education     Title: PT OT SLP Therapies (Active)     Topic: Occupational Therapy (Active)     Point: ADL training (Active)    Description: Instruct learner(s) on proper safety adaptation and remediation techniques during self care or transfers.   Instruct in proper use of assistive devices.    Learning Progress Summary    Learner Readiness Method Response Comment Documented by Status   Patient Acceptance E,TB,D NR   10/10/17 1332 Active    Eager E VU Safety with movements and strengthening due to back pain.  10/09/17 1526 Done    Eager E,TB,D VU,NR,DU  BL 10/08/17 1553 Done   Family Eager E VU Safety with movements and strengthening due to back pain.  10/09/17 1526 Done    Eager E,TB,D VU,NR,DU  BL 10/08/17 1553 Done               Point: Home exercise program (Active)    Description: Instruct learner(s) on appropriate technique for monitoring, assisting and/or progressing therapeutic exercises/activities.    Learning Progress Summary    Learner Readiness Method Response Comment Documented by Status   Patient Acceptance E,TB,D NR   10/10/17 1332 Active    Eager E VU Safety with movements and strengthening due to back pain.  10/09/17 1526 Done    Eager E,TB,D VU,NR,DU  BL 10/08/17 1553 Done   Family Eager E VU Safety with movements and strengthening due to back pain.  10/09/17 1526 Done    Eager E,TB,D VU,NR,DU  BL 10/08/17 1553 Done               Point: Precautions (Active)    Description: Instruct learner(s) on prescribed precautions during self-care and  functional transfers.    Learning Progress Summary    Learner Readiness Method Response Comment Documented by Status   Patient Acceptance E,TB,D NR   10/10/17 1332 Active    Eager E VU Safety with movements and strengthening due to back pain.  10/09/17 1526 Done    Eager E,TB,D VU,NR,DU  BL 10/08/17 1553 Done    Acceptance E NR,VU Edu pt on no OOB without assist. RB 10/06/17 1152 Done   Family Eager E VU Safety with movements and strengthening due to back pain.  10/09/17 1526 Done    Eager E,TB,D VU,NR,DU  BL 10/08/17 1553 Done               Point: Body mechanics (Active)    Description: Instruct learner(s) on proper positioning and spine alignment during self-care, functional mobility activities and/or exercises.    Learning Progress Summary    Learner Readiness Method Response Comment Documented by Status   Patient Acceptance E,TB,D NR   10/10/17 1332 Active    Eager E VU Safety with movements and strengthening due to back pain.  10/09/17 1526 Done    Eager E,TB,D VU,NR,DU  BL 10/08/17 1553 Done   Family Eager E VU Safety with movements and strengthening due to back pain.  10/09/17 1526 Done    Eager E,TB,D VU,NR,DU  BL 10/08/17 1553 Done                      User Key     Initials Effective Dates Name Provider Type Discipline     06/15/16 -  Jonny Pardo OT Occupational Therapist OT     10/17/16 -  LILA Jhaveri Occupational Therapy Assistant OT     10/17/16 -  MELO Lemus/SADIA Occupational Therapy Assistant OT     08/03/17 -  Anette Peña OT Occupational Therapist OT                  OT Recommendation and Plan  Anticipated Equipment Needs At Discharge: gait belt, front wheeled walker, raised toilet seat, wheelchair  Planned Therapy Interventions: activity intolerance, ADL retraining, balance training, energy conservation, bed mobility training, transfer training, strengthening, ROM (Range of Motion)  Therapy Frequency:  (3-14x/wk)  Plan of Care Review  Plan Of Care Reviewed  With: patient  Progress: improving  Outcome Summary/Follow up Plan: Pt tolerated tx well this date. Pt was I with self-feeding. Continue POC.        Outcome Measures       10/10/17 1300 10/10/17 0920 10/09/17 1305    How much help from another person do you currently need...    Turning from your back to your side while in flat bed without using bedrails? 3  -CS 3  -AM 3  -BLAIR    Moving from lying on back to sitting on the side of a flat bed without bedrails? 3  -CS 3  -AM 2  -BLAIR    Moving to and from a bed to a chair (including a wheelchair)? 2  -CS 2  -AM 2  -BLAIR    Standing up from a chair using your arms (e.g., wheelchair, bedside chair)? 2  -CS 2  -AM 2  -BLAIR    Climbing 3-5 steps with a railing? 1  -CS 1  -AM 1  -BLAIR    To walk in hospital room? 2  -CS 2  -AM 1  -BLAIR    AM-PAC 6 Clicks Score 13  -CS 13  -AM 11  -BLAIR    Functional Assessment    Outcome Measure Options AM-PAC 6 Clicks Daily Activity (OT)  -CS AM-PAC 6 Clicks Basic Mobility (PT)  -AM AM-PAC 6 Clicks Basic Mobility (PT)  -BLAIR      10/09/17 0954 10/08/17 1400 10/08/17 1128    How much help from another person do you currently need...    Turning from your back to your side while in flat bed without using bedrails? 2  -BLAIR  2  -EM    Moving from lying on back to sitting on the side of a flat bed without bedrails? 2  -BLAIR  2  -EM    Moving to and from a bed to a chair (including a wheelchair)? 2  -BLAIR  2  -EM    Standing up from a chair using your arms (e.g., wheelchair, bedside chair)? 1  -BLAIR  1  -EM    Climbing 3-5 steps with a railing? 1  -BLAIR  1  -EM    To walk in hospital room? 1  -BLAIR  1  -EM    AM-PAC 6 Clicks Score 9  -BLAIR  9  -EM    How much help from another is currently needed...    Putting on and taking off regular lower body clothing?  1  -BL     Bathing (including washing, rinsing, and drying)  1  -BL     Toileting (which includes using toilet bed pan or urinal)  1  -BL     Putting on and taking off regular upper body clothing  1  -BL     Taking care  of personal grooming (such as brushing teeth)  2  -BL     Eating meals  2  -BL     Score  8  -BL     Functional Assessment    Outcome Measure Options AM-PAC 6 Clicks Basic Mobility (PT)  -BLAIR AM-PAC 6 Clicks Daily Activity (OT)  -BL AM-PAC 6 Clicks Basic Mobility (PT)  -EM      10/08/17 0849          How much help from another person do you currently need...    Turning from your back to your side while in flat bed without using bedrails? 2  -EM      Moving from lying on back to sitting on the side of a flat bed without bedrails? 2  -EM      Moving to and from a bed to a chair (including a wheelchair)? 2  -EM      Standing up from a chair using your arms (e.g., wheelchair, bedside chair)? 1  -EM      Climbing 3-5 steps with a railing? 1  -EM      To walk in hospital room? 1  -EM      AM-PAC 6 Clicks Score 9  -EM      Functional Assessment    Outcome Measure Options AM-PAC 6 Clicks Basic Mobility (PT)  -EM        User Key  (r) = Recorded By, (t) = Taken By, (c) = Cosigned By    Initials Name Provider Type    EM Oskar Marie, PTA Physical Therapy Assistant    BLAIR Jadyn Nelson, PT Physical Therapist    AM Tree Corrales, YONAS Physical Therapy Assistant    BL MELO Jhaveri/SADIA Occupational Therapy Assistant     LILA Lemus Occupational Therapy Assistant           Time Calculation:         Time Calculation- OT       10/10/17 1345          Time Calculation- OT    OT Start Time 1110  -CS      OT Stop Time 1150  -CS      OT Time Calculation (min) 40 min  -CS      Total Timed Code Minutes- OT 40 minute(s)  -CS      OT Received On 10/10/17  -        User Key  (r) = Recorded By, (t) = Taken By, (c) = Cosigned By    Initials Name Provider Type    MELO Norton/SADIA Occupational Therapy Assistant           Therapy Charges for Today     Code Description Service Date Service Provider Modifiers Qty    [id number]  OT SELF CARE/MGMT/TRAIN EA 15 MIN 10/10/2017 LILA Lemus GO 2     [id number]  OT THER PROC EA 15 MIN 10/10/2017 LILA Lemus GO 1          OT G-codes  OT Professional Judgement Used?: Yes  OT Functional Scales Options: AM-PAC 6 Clicks Daily Activity (OT)  Score: 6  Functional Limitation: Self care  Self Care Current Status (): 100 percent impaired, limited or restricted  Self Care Goal Status (): At least 80 percent but less than 100 percent impaired, limited or restricted    LILA Lemus  10/10/2017

## 2017-10-10 NOTE — THERAPY TREATMENT NOTE
Acute Care - Physical Therapy Treatment Note  [facility]     Patient Name: Anahi Calix  : 1940  MRN: [id number]  Today's Date: 10/10/2017  Onset of Illness/Injury or Date of Surgery Date: 17  Date of Referral to PT: 10/01/17  Referring Physician: Dr. Nicola Rich    Admit Date: 2017    Visit Dx:    ICD-10-CM ICD-9-CM   1. Displaced intertrochanteric fracture of left femur, initial encounter for closed fracture S72.142A 820.21   2. Closed left hip fracture, initial encounter S72.002A 820.8   3. Fall, initial encounter W19.XXXA E888.9   4. Peripheral arterial disease I73.9 443.9   5. Essential hypertension I10 401.9   6. Gastroesophageal reflux disease, esophagitis presence not specified K21.9 530.81   7. Abdominal aortic aneurysm (AAA) without rupture I71.4 441.4   8. Impaired mobility and activities of daily living Z74.09 799.89   9. Impaired physical mobility Z74.09 781.99     Patient Active Problem List   Diagnosis   • Peripheral arterial disease   • Gastroesophageal reflux disease   • Abdominal aortic aneurysm (AAA) without rupture   • Displaced intertrochanteric fracture of left femur, initial encounter for closed fracture   • CAD (coronary artery disease)   • Hypertension   • Chronic bronchitis   • Essential hypertension   • Fall   • Tobacco dependence syndrome   • Atrial fibrillation               Adult Rehabilitation Note       10/10/17 1455 10/10/17 1402 10/10/17 1110    Rehab Assessment/Intervention    Discipline physical therapy assistant  -AM occupational therapy assistant  -CS occupational therapy assistant  -CS    Document Type therapy note (daily note)  -AM therapy note (daily note)  -CS therapy note (daily note)  -CS    Subjective Information agree to therapy;no complaints  -AM agree to therapy  -CS agree to therapy  -CS    Patient Effort, Rehab Treatment good  -AM      Symptoms Noted During/After Treatment none  -AM fatigue  -CS     Precautions/Limitations fall precautions   -AM fall precautions  -CS     Equipment Issued to Patient gait belt  -AM      Recorded by [AM] Tree Corrales PTA [CS] LILA Lemus [CS] LILA Lemus    Vital Signs    Pre Systolic BP Rehab 114  -AM  129  -CS    Pre Treatment Diastolic BP 58  -AM  61  -CS    Pretreatment Heart Rate (beats/min) 71  -AM 87  -CS 66  -CS    Pre SpO2 (%) 93  -AM 93  -CS 92  -CS    O2 Delivery Pre Treatment room air  -AM room air  -CS room air  -CS    Pre Patient Position Sitting  -AM Sitting  -CS Sitting  -CS    Intra Patient Position Standing  -AM Sitting  -CS Sitting  -CS    Post Patient Position Supine  -AM Sitting  -CS Sitting  -CS    Recorded by [AM] Tree Corrales PTA [CS] MELO Lemus/SADIA [CS] MELO Lemus/SADIA    Pain Assessment    Pain Assessment No/denies pain  -AM 0-10  -CS 0-10  -CS    Pain Score  1  -CS 2  -CS    Post Pain Score  2  -CS 1  -CS    Pain Type  Acute pain;Surgical pain  -CS Acute pain;Surgical pain  -CS    Pain Location  Hip  -CS Hip  -CS    Pain Orientation  Left  -CS Left  -CS    Pain Intervention(s)   Medication (See MAR)  -CS    Recorded by [AM] Tree Corrales PTA [CS] LILA Lemus [CS] MELO Lemus/L    Cognitive Assessment/Intervention    Current Cognitive/Communication Assessment functional  -AM functional  -CS functional  -CS    Orientation Status oriented x 4  -AM oriented x 4  -CS oriented x 4  -CS    Follows Commands/Answers Questions 100% of the time  -% of the time  -% of the time  -CS    Personal Safety Interventions gait belt;nonskid shoes/slippers when out of bed;supervised activity  -AM gait belt;nonskid shoes/slippers when out of bed  -CS gait belt;nonskid shoes/slippers when out of bed  -CS    Recorded by [AM] Tree Corrales PTA [CS] MELO Lemus/SADIA [CS] ROSALINA LemusA/L    ROM (Range of Motion)    General ROM lower extremity range of motion deficits identified  -AM      Recorded by [AM] Tree VILLAGRAN  YONAS Corrales      General LE Assessment    ROM LLE ROM was WFL  -AM      Recorded by [AM] Tree Corrales PTA      Mobility Assessment/Training    Extremity Weight-Bearing Status left lower extremity  -AM      Left Lower Extremity Weight-Bearing weight-bearing as tolerated  -AM      Recorded by [AM] Tree Corrales PTA      Bed Mobility, Assessment/Treatment    Bed Mobility, Assistive Device bed rails;head of bed elevated  -AM      Bed Mobility, Roll Left, [place] not tested  -AM      Bed Mobility, Roll Right, [place] not tested  -AM      Bed Mobility, Scoot/Bridge, [place] not tested  -AM      Bed Mob, Supine to Sit, [place] not tested  -AM      Bed Mob, Sit to Supine, [place] moderate assist (50% patient effort)  -AM      Bed Mob, Sidelying to Sit, [place] not tested  -AM      Bed Mob, Sit to Sidelying, [place] not tested  -AM      Bed Mobility, Safety Issues decreased use of arms for pushing/pulling;decreased use of legs for bridging/pushing  -AM      Bed Mobility, Impairments ROM decreased;strength decreased;pain  -AM      Recorded by [AM] Tree Corrales PTA      Transfer Assessment/Treatment    Transfers, Bed-Chair [place] moderate assist (50% patient effort);2 person assist required  -AM      Transfers, Chair-Bed [place] moderate assist (50% patient effort);2 person assist required  -AM      Transfers, Bed-Chair-Bed, Assist Device rolling walker  -AM      Transfers, Sit-Stand [place] moderate assist (50% patient effort);2 person assist required  -AM      Transfers, Stand-Sit [place] moderate assist (50% patient effort);2 person assist required  -AM      Transfers, Sit-Stand-Sit, Assist Device rolling walker  -AM      Toilet Transfer, [place] not tested  -AM      Walk-In Shower Transfer, [place] not tested  -AM      Bathtub Transfer, [place] not tested  -AM      Transfer, Maintain Weight Bearing Status able to maintain weight bearing  status  -AM      Transfer, Safety Issues step length decreased  -AM      Transfer, Impairments ROM decreased;strength decreased;pain  -AM      Recorded by [AM] Tree Corrales PTA      Gait Assessment/Treatment    Gait, [place] Level moderate assist (50% patient effort);maximum assist (25% patient effort);2 person assist required  -AM      Gait, Assistive Device rolling walker  -AM      Gait, Distance (Feet) 25   15+10  -AM      Gait, Gait Pattern Analysis 3-point gait  -AM      Gait, Gait Deviations bilateral:;martha decreased  -AM      Gait, Maintain Weight Bearing Status able to maintain weight bearing status  -AM      Gait, Safety Issues step length decreased  -AM      Gait, Impairments ROM decreased;strength decreased;pain  -AM      Recorded by [AM] Tree Corrales PTA      Stairs Assessment/Treatment    Stairs, [place] Level not tested  -AM      Recorded by [AM] Tree Corrales PTA      Self-Feeding Assessment/Training    Self-Feeding Assess/Train, Position   supported sitting  -CS    Self-Feeding Assess/Train, [place]   independent  -CS    Recorded by   [CS] LILA Lemus    Therapy Exercises    Bilateral Upper Extremity  AROM:;20 reps;sitting;elbow flexion/extension;hand pumps;pronation/supination;shoulder extension/flexion;shoulder ER/IR   UEE for 10 mins with 1 rest break.  -CS AROM:;20 reps;sitting;elbow flexion/extension;hand pumps;pronation/supination;shoulder extension/flexion  -CS    BUE Resistance  manual resistance- minimal  -CS manual resistance- minimal  -CS    Recorded by  [CS] LILA Lemus [CS] LILA Lemus    Fine Motor Coordination Training    Detail (Fine Motor Coordination Training)  FMC training- Pt completed red putty task this date.  -CS     Recorded by  [CS] LILA Lemus     Positioning and Restraints    Pre-Treatment Position sitting in chair/recliner  -AM sitting in chair/recliner  -CS sitting in chair/recliner  -CS     Post Treatment Position bed  -AM chair  -CS chair  -CS    In Bed supine;call light within reach;encouraged to call for assist;exit alarm on;with family/caregiver  -AM      In Chair  reclined;call light within reach;with family/caregiver  -CS reclined;call light within reach  -CS    Recorded by [AM] Tree Corrales PTA [CS] MELO Lemus/SADIA [CS] MELO Lemus/SADIA      10/10/17 0920 10/09/17 1355 10/09/17 1305    Rehab Assessment/Intervention    Discipline physical therapy assistant  -AM occupational therapist  -SG physical therapist  -BLAIR    Document Type therapy note (daily note)  -AM therapy note (daily note)  -SG progress note  -BLAIR    Subjective Information agree to therapy;complains of;pain  -AM agree to therapy;complains of;pain   Left side of back from a fall.    -SG agree to therapy  -BLAIR    Patient Effort, Rehab Treatment good  -AM good  -SG good  -BLAIR    Symptoms Noted During/After Treatment fatigue  -AM      Precautions/Limitations fall precautions;other (see comments)   WBAT LLE  -AM fall precautions;oxygen therapy device and L/min  -SG fall precautions;oxygen therapy device and L/min;other (see comments)   vital signs  -BLAIR    Precautions/Limitations, Vision  WFL with corrective lenses  -SG     Equipment Issued to Patient gait belt  -AM      Recorded by [AM] Tree Corrales PTA [SG] Anette Peña OT [BLAIR] Jadyn Nelson, PT    Vital Signs    Pre Systolic BP Rehab 128  -  -  -BLAIR    Pre Treatment Diastolic BP 62  -AM 77  -SG 55  -BLAIR    Post Systolic BP Rehab 123  -  -  -BLAIR    Post Treatment Diastolic BP 89  -AM 63  -SG 66  -BLAIR    Pretreatment Heart Rate (beats/min) 75  -AM 72  -SG 72  -BLAIR    Posttreatment Heart Rate (beats/min) 76  -AM 80  -SG 83  -BLAIR    Pre SpO2 (%) 93  -AM 95  -SG 95  -BLAIR    O2 Delivery Pre Treatment room air  -AM supplemental O2  -SG supplemental O2  -BLAIR    Intra SpO2 (%)   91  -BLAIR    O2 Delivery Intra Treatment   supplemental O2  -BLAIR    Post SpO2 (%) 93   -AM 94  -SG 94  -BLAIR    O2 Delivery Post Treatment room air  -AM supplemental O2  -SG supplemental O2  -BLAIR    Pre Patient Position Supine  -AM Sitting  -SG Supine  -BLAIR    Intra Patient Position Standing  -AM Sitting  -SG Sitting  -BLAIR    Post Patient Position Sitting  -AM Sitting  -SG Sitting  -BLAIR    Recorded by [AM] Tree Corrales PTA [SG] Anette Peña, OT [BLAIR] Jadyn Nelsno, PT    Pain Assessment    Pain Assessment 0-10  -AM 0-10  -SG 0-10  -BLAIR    Pain Score 0  -AM 4  -SG 0  -BLAIR    Post Pain Score 2  -AM 4  -SG 2  -BLAIR    Pain Type Acute pain;Surgical pain  -AM Deep somatic pain  -SG     Pain Location Hip  -AM Back  -SG Hip  -BLAIR    Pain Orientation Left  -AM Left;Mid;Outer   under shoulder blade, to left of armpit.  Nursing informed.   -SG Left  -BLAIR    Pain Descriptors Sore  -AM      Pain Frequency Intermittent  -AM      Date Pain First Started 09/30/17  -AM      Clinical Progression Gradually improving  -AM      Patient's Stated Pain Goal No pain  -AM      Pain Intervention(s) Medication (See MAR);Cold applied;Ambulation/increased activity  -AM  Repositioned;Ambulation/increased activity  -BLAIR    Result of Injury Yes  -AM      Work-Related Injury No  -AM      Multiple Pain Sites No  -AM      Recorded by [AM] Tree Corrales PTA [SG] Anette Peña, ANMOL [BLAIR] Jadyn Nelson, PT    Cognitive Assessment/Intervention    Current Cognitive/Communication Assessment functional  -AM  functional  -BLAIR    Orientation Status oriented x 4  -AM  oriented x 4  -BLAIR    Follows Commands/Answers Questions 100% of the time  -AM  100% of the time  -BLAIR    Personal Safety   mild impairment  -BLAIR    Personal Safety Interventions gait belt;nonskid shoes/slippers when out of bed;supervised activity  -AM  gait belt;nonskid shoes/slippers when out of bed;supervised activity  -BLAIR    Recorded by [AM] Tree Corrales PTA  [LBAIR] Jadyn Nelson, PT    ROM (Range of Motion)    General ROM lower extremity range of motion deficits identified  -AM       Recorded by [AM] Tree Corrales PTA      General LE Assessment    ROM LLE ROM was WFL  -AM      Recorded by [AM] Tree Corrales PTA      Mobility Assessment/Training    Extremity Weight-Bearing Status left lower extremity  -AM  left lower extremity  -BLAIR    Left Lower Extremity Weight-Bearing weight-bearing as tolerated  -AM  weight-bearing as tolerated  -BLAIR    Recorded by [AM] Tree Corrales PTA  [BLAIR] Jadyn Nelson, PT    Bed Mobility, Assessment/Treatment    Bed Mobility, Assistive Device bed rails;head of bed elevated  -AM  bed rails;head of bed elevated  -BLAIR    Bed Mobility, Roll Left, [place] not tested  -AM      Bed Mobility, Roll Right, [place] not tested  -AM      Bed Mobility, Scoot/Bridge, [place] not tested  -AM      Bed Mob, Supine to Sit, [place] minimum assist (75% patient effort)  -AM  moderate assist (50% patient effort);2 person assist required  -BLAIR    Bed Mob, Sit to Supine, [place] not tested  -AM  not tested  -BLAIR    Bed Mob, Sidelying to Sit, [place] not tested  -AM      Bed Mob, Sit to Sidelying, [place] not tested  -AM      Bed Mobility, Safety Issues decreased use of arms for pushing/pulling;decreased use of legs for bridging/pushing  -AM      Bed Mobility, Impairments ROM decreased;strength decreased;pain  -AM      Recorded by [AM] Tree Corrales PTA  [BLAIR] Jadyn Nelson, PT    Transfer Assessment/Treatment    Transfers, Bed-Chair [place] moderate assist (50% patient effort);2 person assist required  -AM  moderate assist (50% patient effort);maximum assist (25% patient effort);2 person assist required   with third guiding hips as pt slowly turned with walker  -BLAIR    Transfers, Chair-Bed [place] moderate assist (50% patient effort);2 person assist required  -AM      Transfers, Bed-Chair-Bed, Assist Device rolling walker  -AM  rolling walker  -BLAIR    Transfers, Sit-Stand [place] moderate assist (50% patient effort);2 person assist required   -AM  moderate assist (50% patient effort);2 person assist required  -BLAIR    Transfers, Stand-Sit [place] moderate assist (50% patient effort);2 person assist required  -AM  minimum assist (75% patient effort);2 person assist required  -BLAIR    Transfers, Sit-Stand-Sit, Assist Device rolling walker  -AM  rolling walker  -BLAIR    Toilet Transfer, [place] not tested  -AM      Walk-In Shower Transfer, [place] not tested  -AM      Bathtub Transfer, [place] not tested  -AM      Transfer, Maintain Weight Bearing Status able to maintain weight bearing status  -AM      Transfer, Safety Issues step length decreased  -AM      Transfer, Impairments ROM decreased;strength decreased;pain  -AM      Transfer, Comment   Pt required tactile cues to assist with RW. Pt scooted ft to orthochair. Third person necessary to bring hips around to chair.  -BLAIR    Recorded by [AM] Tree Corrales PTA  [BLAIR] Jadyn Nelson, PT    Gait Assessment/Treatment    Gait, [place] Level moderate assist (50% patient effort);maximum assist (25% patient effort);2 person assist required  -AM  moderate assist (50% patient effort);maximum assist (25% patient effort);2 person assist required  -BLAIR    Gait, Assistive Device rolling walker  -AM  rolling walker  -BLAIR    Gait, Distance (Feet) 6  -AM  2   to ortho chair  -BLAIR    Gait, Gait Pattern Analysis 3-point gait  -AM      Gait, Gait Deviations bilateral:;martha decreased  -AM      Gait, Maintain Weight Bearing Status able to maintain weight bearing status  -AM      Gait, Safety Issues step length decreased  -AM      Gait, Impairments ROM decreased;strength decreased;pain  -AM      Recorded by [AM] Tree Corrales PTA  [BLAIR] Jadyn Nelson, PT    Stairs Assessment/Treatment    Stairs, [place] Level not tested  -AM      Recorded by [AM] Tree Corrales PTA      Therapy Exercises    Bilateral Lower Extremities AROM:;20 reps;supine;ankle pumps/circles;glut sets;heel slides;quad sets  -AM       Recorded by [AM] Tree Corrales PTA      Fine Motor Coordination Training    Detail (Fine Motor Coordination Training)  Fine motor exercises for increase use and strength for self-feeding, grooming,  ADL's.  Resident using red putty this date.  Grasp exercises working on strength of grasp for holding items to complete ADL's.  Resident with back pain from a fall, when touched, a hardened area was present, Nursing infiormed and showed location of area.    -SG     Recorded by  [SG] Anette Peña OT     Positioning and Restraints    Pre-Treatment Position in bed  -AM sitting in chair/recliner  -SG in bed  -BLAIR    Post Treatment Position chair  -AM chair  -SG chair   orthochair  -    In Bed  call light within reach;encouraged to call for assist;with family/caregiver  -SG call light within reach;encouraged to call for assist;with family/caregiver;legs elevated  -    In Chair reclined;call light within reach;encouraged to call for assist;legs elevated  -AM      Recorded by [AM] Tree Corrales PTA [SG] Anette Peña OT [BLAIR] Jadyn Nelson, PT      10/09/17 0954 10/08/17 1400 10/08/17 1128    Rehab Assessment/Intervention    Discipline  occupational therapy assistant  -BL physical therapy assistant  -EM    Document Type progress note  -BLAIR therapy note (daily note)  -BL therapy note (daily note)  -EM    Subjective Information  agree to therapy  -BL agree to therapy   nsg req assistance from PT to get pt to bedside commode/bed  -EM    Precautions/Limitations  fall precautions;oxygen therapy device and L/min  -BL fall precautions;oxygen therapy device and L/min  -EM    Recorded by [BLAIR] Jadyn Nelson, PT [BL] MELO Jhaveri/SADIA [EM] Oskar Marie PTA    Vital Signs    Pre Systolic BP Rehab  120  -BL     Pre Treatment Diastolic BP  64  -BL     Pretreatment Heart Rate (beats/min)  67  -BL     Posttreatment Heart Rate (beats/min)  76  -BL     Pre SpO2 (%)  96  -BL     O2 Delivery Pre Treatment  supplemental O2  -BL      Post SpO2 (%)  95  -BL     O2 Delivery Post Treatment  supplemental O2  -BL     Pre Patient Position  Supine  -BL     Intra Patient Position  Sitting  -BL     Post Patient Position  Supine  -BL     Recorded by  [BL] MELO Jhaveri/SADIA     Pain Assessment    Pain Assessment  No/denies pain  -BL 0-10  -EM    Pain Score   0  -EM    Post Pain Score   0  -EM    Recorded by  [BL] MELO Jhaveri/SADIA [EM] Oskar Marie PTA    Cognitive Assessment/Intervention    Current Cognitive/Communication Assessment  impaired  -BL impaired  -EM    Orientation Status  oriented to;person;place  -BL oriented to;person;place;situation;time  -EM    Follows Commands/Answers Questions  75% of the time;100% of the time;able to follow single-step instructions;needs cueing;needs increased time;needs repetition  -% of the time  -EM    Personal Safety  mild impairment  -BL     Personal Safety Interventions  gait belt;muscle strengthening facilitated;nonskid shoes/slippers when out of bed  -BL gait belt;fall prevention program maintained;toileting scheduled;supervised activity;nonskid shoes/slippers when out of bed  -EM    Recorded by  [BL] MELO Jhaveri/SADIA [EM] Oskar Marie PTA    Mobility Assessment/Training    Left Lower Extremity Weight-Bearing   weight-bearing as tolerated  -EM    Recorded by   [EM] Oskar Marie PTA    Bed Mobility, Assessment/Treatment    Bed Mob, Supine to Sit, [place]  moderate assist (50% patient effort);maximum assist (25% patient effort);nonverbal cues required (demo/gesture)  -BL     Bed Mob, Sit to Supine, [place]  maximum assist (25% patient effort);2 person assist required  -BL maximum assist (25% patient effort);2 person assist required  -EM    Bed Mobility, Safety Issues  decreased use of legs for bridging/pushing  -BL     Bed Mobility, Impairments  ROM decreased;strength decreased;impaired balance;coordination impaired  -BL     Recorded by  [BL] MELO Jhaveri/SADIA [EM] Oskar CORTEZ  Frank PTA    Transfer Assessment/Treatment    Transfers, Bed-Chair [place]  not tested  -BL maximum assist (25% patient effort)  -EM    Transfers, Chair-Bed [place]  not tested  -BL maximum assist (25% patient effort)  -EM    Transfers, Sit-Stand [place]  not tested  -BL maximum assist (25% patient effort)  -EM    Transfers, Stand-Sit [place]  not tested  -BL maximum assist (25% patient effort)  -EM    Transfer, Comment  Pt previously returned to bed from chair and reports she sat up for ~2 hours before being put back to bed.   -BL Pt t/f ortho chair->bedside commode->EOB all via stand pivot t/f to R. Pt stood with MaxAx1 for dep pericare from [facility] staff.   -EM    Recorded by  [BL] LILA Jhaveri [EM] Oskar Marie, PTA    Grooming Assessment/Training    Grooming Assess/Train, Assistive Device  --   simulated task sitting EOB with BUE washing face  -BL     Grooming Assess/Train, Position  sitting  -BL     Grooming Assess/Train, Indepen Level  verbal cues required;minimum assist (75% patient effort)  -BL     Grooming Assess/Train, Impairments  impaired balance;strength decreased;ROM decreased;decreased flexibility;motor control impaired;postural control impaired;coordination impaired  -BL     Grooming Assess/Train, Comment  Pt completed simulated face washing task EOB this date requiring min assist to demo proper way to wash face with BUE and decreased ROM. Discussed possible need for AE during ADL's this date and family was given booklet on AE and DME.   -BL     Recorded by  [BL] MELO Jhaveri/SADIA     Motor Skills/Interventions    Additional Documentation  Neuromuscular Re-education (Group);Gross Motor Coordination Training (Group);Fine Motor Coordination Training (Group);Balance Skills Training (Group)  -BL     Recorded by  [BL] MELO Jhaveri/SADIA     Balance Skills Training    Sitting-Level of Assistance  Contact guard  -BL     Sitting-Balance Support  Feet supported  -BL      Sitting-Balance Activities  Reaching for objects  -     Sitting # of Minutes  15  -BL     Recorded by  [BL] LILA Jhaveri     Therapy Exercises    Bilateral Lower Extremities AAROM:;15 reps;ankle pumps/circles;glut sets;supine;heel slides;quad sets   Pt able to assist more with RLE  -      Bilateral Upper Extremity  15 reps;sitting;elbow flexion/extension;hand pumps;pronation/supination;shoulder abduction/adduction;shoulder extension/flexion;shoulder ER/IR;shoulder horizontal abd/add;PROM:;AAROM:  -BL     BUE Resistance  other (comment);theraband;theraputty  -BL     Recorded by [BLAIR] Jadyn Nelson, PT [BL] LILA Jhaveri     Gross Motor Coordination Training    Gross Motor Skill, Impairments Detail  GMC task completed for reaching to target this date x5 reps each arm with 75% accuracy. Pt required vc's and increased time and effort for processing commands.   -BL     Recorded by  [BL] LILA Jhaveri     Fine Motor Coordination Training    Detail (Fine Motor Coordination Training)  FMC training task this date demo'd and educated to pt and family this date. Pt was able to complete putty task this date of yellow and red however pt required increased time and effort mostly for L hand weakness.  -BL     Recorded by  [BL] LILA Jhaveri     Positioning and Restraints    Pre-Treatment Position in bed  - in bed  - sitting in chair/recliner  -EM    Post Treatment Position bed  - bed  - bed  -EM    In Bed supine;call light within reach;encouraged to call for assist;exit alarm on;SCD pump applied  - sitting;call light within reach;encouraged to call for assist;with family/caregiver   sitting at 90* in bed  - supine;call light within reach;encouraged to call for assist;exit alarm on;with nsg  -EM    Recorded by [BLAIR] Jadyn Nelson, PT [BL] LILA Jhaveri [EM] Oskar Marie, PTA      10/08/17 0849          Rehab Assessment/Intervention    Discipline physical therapy assistant  -EM       Document Type therapy note (daily note)  -EM      Subjective Information agree to therapy   reports its difficult to control her hands since surgery.   -EM      Precautions/Limitations fall precautions;oxygen therapy device and L/min  -EM      Recorded by [EM] Oskar Marie PTA      Vital Signs    Pre Systolic BP Rehab 165  -EM      Pre Treatment Diastolic BP 81  -EM      Pretreatment Heart Rate (beats/min) 79  -EM      Pre SpO2 (%) 95  -EM      O2 Delivery Pre Treatment supplemental O2  -EM      Pre Patient Position Supine  -EM      Recorded by [EM] Oskar Marie PTA      Pain Assessment    Pain Assessment 0-10  -EM      Pain Score 3  -EM      Post Pain Score 0  -EM      Pain Type Acute pain  -EM      Pain Location Hip  -EM      Pain Orientation Left  -EM      Recorded by [EM] Oskar Marie PTA      Cognitive Assessment/Intervention    Current Cognitive/Communication Assessment impaired  -EM      Orientation Status oriented to;person;place;situation  -EM      Follows Commands/Answers Questions 100% of the time  -EM      Personal Safety Interventions fall prevention program maintained;gait belt;nonskid shoes/slippers when out of bed;supervised activity  -EM      Recorded by [EM] Oskar Marie PTA      Mobility Assessment/Training    Left Lower Extremity Weight-Bearing weight-bearing as tolerated  -EM      Recorded by [EM] Oskar Marie PTA      Bed Mobility, Assessment/Treatment    Bed Mob, Supine to Sit, [employer] moderate assist (50% patient effort);maximum assist (25% patient effort)  -EM      Bed Mobility, Comment Mod Ax1 to scoot to EOB due to B UE weakness  -EM      Recorded by [EM] Oskar Marie PTA      Transfer Assessment/Treatment    Transfers, Bed-Chair [employer] maximum assist (25% patient effort)  -EM      Transfers, Sit-Stand [employer] maximum assist (25% patient effort)  -EM      Transfers, Stand-Sit [employer] maximum assist (25% patient effort)  -EM      Transfer, Comment t/f  EOB to ortho chair with mod/max Ax1 via a R stand pivot. Attempted standing with FWRW at EOB-unsuccessful.   -EM      Recorded by [EM] Oskar Marie PTA      Therapy Exercises    Bilateral Lower Extremities AROM:;20 reps;sitting;ankle pumps/circles;glut sets;LAQ;AAROM:;SLR  -EM      Recorded by [EM] Oskar Marie PTA      Positioning and Restraints    Pre-Treatment Position in bed  -EM      Post Treatment Position chair  -EM      In Bed encouraged to call for assist;call light within reach;with family/caregiver  -EM      Recorded by [EM] Oskar Marie PTA        User Key  (r) = Recorded By, (t) = Taken By, (c) = Cosigned By    Initials Name Effective Dates    EM Oskar Marie, PTA 08/11/15 -     BLAIR Jadyn Nelson, PT 10/17/16 -     AM rTee Corrales, PTA 10/17/16 -     BL Monica Hadley, ALEJO/L 10/17/16 -     CS Liliya Wilkinson, ALEJO/L 10/17/16 -     SG Anette Peña, OT 08/03/17 -                 IP PT Goals       10/09/17 1238 10/07/17 0835 10/06/17 1526    Bed Mobility PT LTG    Bed Mobility PT LTG, Date Established   10/06/17  -    Bed Mobility PT LTG, Time to Achieve   by discharge  -    Bed Mobility PT LTG, Activity Type   all bed mobility  -    Bed Mobility PT LTG, [place] Level   moderate assist (50% patient effort);minimum assist (75% patient effort)  -    Bed Mobility PT LTG, Date Goal Reviewed  10/07/17  -     Bed Mobility PT LTG, Outcome  goal ongoing  - goal ongoing  -    Transfer Training PT LTG    Transfer Training PT LTG, Date Established   10/06/17  -    Transfer Training PT LTG, Time to Achieve   by discharge  -    Transfer Training PT LTG, Activity Type   bed to chair /chair to bed;sit to stand/stand to sit;toilet  -    Transfer Training PT LTG, [place] Level   moderate assist (50% patient effort);minimum assist (75% patient effort)  -    Transfer Training PT LTG, Assist Device   walker, rolling  -    Transfer Training PT  LTG, Date Goal Reviewed  10/07/17  -SS      Transfer Training PT LTG, Outcome  goal ongoing  - goal ongoing  -    Gait Training PT LTG    Gait Training Goal PT LTG, Date Established   10/06/17  -    Gait Training Goal PT LTG, Time to Achieve   by discharge  -    Gait Training Goal PT LTG, [place] Level   moderate assist (50% patient effort);minimum assist (75% patient effort)  -    Gait Training Goal PT LTG, Distance to Achieve   10ft  -    Gait Training Goal PT LTG, Additional Goal   50ftx2  -    Gait Training Goal PT LTG, Outcome  goal ongoing  - goal ongoing  -    Strength Goal PT LTG    Strength Goal PT LTG, Date Established   10/06/17  -    Strength Goal PT LTG, Time to Achieve   by discharge  -    Strength Goal PT LTG, Measure to Achieve   10reps of PROM/AAROM exercises  -    Strength Goal PT LTG, Functional Goal   2 sets of 10 reps AROM exercises  -    Strength Goal PT LTG, Date Goal Reviewed 10/09/17  - 10/07/17  -     Strength Goal PT LTG, Outcome goal partially met   met 10 reps PROM/AAROM   - goal partially met  - goal ongoing  -    Patient Education PT LTG    Patient Education PT LTG, Date Established   10/06/17  -    Patient Education PT LTG, Time to Achieve   by discharge  -    Patient Education PT LTG, Education Type   HEP;gait;transfers;home safety  -    Patient Education PT LTG, Date Goal Reviewed  10/07/17  -     Patient Education PT LTG Outcome  goal ongoing  - goal ongoing  [facility]      User Key  (r) = Recorded By, (t) = Taken By, (c) = Cosigned By    Initials Name Provider Type    BLAIR Nelson, PT Physical Therapist    IRMA Jenkins, PTA Physical Therapy Assistant          Physical Therapy Education     Title: PT OT SLP Therapies (Active)     Topic: Physical Therapy (Active)     Point: Mobility training (Active)    Learning Progress Summary    Learner Readiness Method Response Comment Documented by Status   Patient Acceptance E NR  BLAIR 10/09/17 1728 Active               Point:  Precautions (Active)    Learning Progress Summary    Learner Readiness Method Response Comment Documented by Status   Patient Acceptance E NR   10/09/17 1238 Active                      User Key     Initials Effective Dates Name Provider Type Discipline     10/17/16 -  Jadyn Nelson PT Physical Therapist PT                    PT Recommendation and Plan  Anticipated Equipment Needs At Discharge:  (TBD as pt progresses)  Anticipated Discharge Disposition: inpatient rehabilitation facility  Planned Therapy Interventions: (S) bed mobility training, gait training, home exercise program, patient/family education, strengthening, stair training, transfer training, ROM (Range of Motion) (progress mobility when/as allowed)  PT Frequency:  (5-14 times per wee)             Outcome Measures       10/10/17 1455 10/10/17 1300 10/10/17 0920    How much help from another person do you currently need...    Turning from your back to your side while in flat bed without using bedrails? 3  -AM 3  -CS 3  -AM    Moving from lying on back to sitting on the side of a flat bed without bedrails? 2  -AM 3  -CS 3  -AM    Moving to and from a bed to a chair (including a wheelchair)? 2  -AM 2  -CS 2  -AM    Standing up from a chair using your arms (e.g., wheelchair, bedside chair)? 2  -AM 2  -CS 2  -AM    Climbing 3-5 steps with a railing? 1  -AM 1  -CS 1  -AM    To walk in hospital room? 2  -AM 2  -CS 2  -AM    AM-PAC 6 Clicks Score 12  -AM 13  -CS 13  -AM    Functional Assessment    Outcome Measure Options AM-PAC 6 Clicks Basic Mobility (PT)  -AM AM-PAC 6 Clicks Daily Activity (OT)  -CS AM-PAC 6 Clicks Basic Mobility (PT)  -AM      10/09/17 1305 10/09/17 0954 10/08/17 1400    How much help from another person do you currently need...    Turning from your back to your side while in flat bed without using bedrails? 3  -BLAIR 2  -BLAIR     Moving from lying on back to sitting on the side of a flat bed without bedrails? 2  -BLAIR 2  -BLAIR     Moving to  and from a bed to a chair (including a wheelchair)? 2  -BLAIR 2  -BLAIR     Standing up from a chair using your arms (e.g., wheelchair, bedside chair)? 2  -BLAIR 1  -BLAIR     Climbing 3-5 steps with a railing? 1  -BLAIR 1  -BLAIR     To walk in hospital room? 1  -BLAIR 1  -BLAIR     AM-PAC 6 Clicks Score 11  -BLAIR 9  -BLAIR     How much help from another is currently needed...    Putting on and taking off regular lower body clothing?   1  -BL    Bathing (including washing, rinsing, and drying)   1  -BL    Toileting (which includes using toilet bed pan or urinal)   1  -BL    Putting on and taking off regular upper body clothing   1  -BL    Taking care of personal grooming (such as brushing teeth)   2  -BL    Eating meals   2  -BL    Score   8  -BL    Functional Assessment    Outcome Measure Options AM-PAC 6 Clicks Basic Mobility (PT)  -BLAIR AM-PAC 6 Clicks Basic Mobility (PT)  - AM-[facility] 6 Clicks Daily Activity (OT)  -BL      10/08/17 1128 10/08/17 0849       How much help from another person do you currently need...    Turning from your back to your side while in flat bed without using bedrails? 2  -EM 2  -EM     Moving from lying on back to sitting on the side of a flat bed without bedrails? 2  -EM 2  -EM     Moving to and from a bed to a chair (including a wheelchair)? 2  -EM 2  -EM     Standing up from a chair using your arms (e.g., wheelchair, bedside chair)? 1  -EM 1  -EM     Climbing 3-5 steps with a railing? 1  -EM 1  -EM     To walk in hospital room? 1  -EM 1  -EM     AM-PAC 6 Clicks Score 9  -EM 9  -EM     Functional Assessment    Outcome Measure Options AM-PAC 6 Clicks Basic Mobility (PT)  -EM AM-PAC 6 Clicks Basic Mobility (PT)  -EM       User Key  (r) = Recorded By, (t) = Taken By, (c) = Cosigned By    Initials Name Provider Type    AMANDA Marie, PTA Physical Therapy Assistant    BLAIR Nelson, PT Physical Therapist    AM Tree Corrales, YONAS Physical Therapy Assistant    LILA Daniel Occupational Therapy Assistant     LILA Norton Occupational Therapy Assistant           Time Calculation:         PT Charges       10/10/17 1455 10/10/17 0920       Time Calculation    Start Time 1455  -AM 0920  -AM     Stop Time 1520  -AM 1000  -AM     Time Calculation (min) 25 min  -AM 40 min  -AM     PT Received On 10/10/17  -AM 10/10/17  -AM     PT - Next Appointment 10/11/17  -AM 10/10/17  -AM     Time Calculation- PT    Total Timed Code Minutes- PT 25 minute(s)  -AM 40 minute(s)  -AM       User Key  (r) = Recorded By, (t) = Taken By, (c) = Cosigned By    Initials Name Provider Type    AM Tree Corrales PTA Physical Therapy Assistant          Therapy Charges for Today     Code Description Service Date Service Provider Modifiers Qty    63607120534 HC GAIT TRAINING EA 15 MIN 10/10/2017 Tree Corrales PTA GP 1    44744055354 HC PT THER PROC EA 15 MIN 10/10/2017 Tree Corrales PTA GP 1    09660935484 HC PT SELF CARE/MGMT/TRAIN EA 15 MIN 10/10/2017 Tree Corrales PTA GP 1    89413107945 HC GAIT TRAINING EA 15 MIN 10/10/2017 Tree Corrales, PTA GP 1    29082502523 HC PT THER PROC EA 15 MIN 10/10/2017 Tere Corrales, YONAS GP 1          PT G-Codes  PT Professional Judgement Used?: Yes  Outcome Measure Options: AM-PAC 6 Clicks Basic Mobility (PT)  Score: 11  Functional Limitation: Mobility: Walking and moving around  Mobility: Walking and Moving Around Current Status (): At least 60 percent but less than 80 percent impaired, limited or restricted  Mobility: Walking and Moving Around Goal Status (): At least 1 percent but less than 20 percent impaired, limited or restricted    Tree Corrales PTA  10/10/2017

## 2017-10-10 NOTE — PROGRESS NOTES
Cardiology Progress Note:     LOS: 9 days   Patient Care Team:  Ramiro Gloria MD as PCP - General      Subjective:    Chart reviewed , patient seen and examined. Patient denies any chest pain, shortness of breath palpitation.  Patient at the present time denies any shortness of breath or symptoms of palpitation.  Patient has not had any recurrence of atrial fibrillation.  Patient has been feeling well overall but cannot does complain of having leg discomfort.              Objective:     Objective:  Vitals:    10/09/17 2127   BP: 137/63   Pulse: 73   Resp: 18   Temp: 97 °F (36.1 °C)   SpO2: 92%       Intake/Output Summary (Last 24 hours) at 10/09/17 2134  Last data filed at 10/09/17 2127   Gross per 24 hour   Intake             1660 ml   Output             3200 ml   Net            -1540 ml             Physical Exam:   General Appearance:    Alert, oriented, cooperative, in no acute distress   Head:    Normocephalic, atraumatic, without obvious abnormality   Eyes:           SMILEY  Lids and lashes normal, conjunctivae and sclerae normal, no icterus, no pallor   Ears:    Ears appear intact with no abnormalities noted   Throat:   Mucous membranes pink and moist   Neck:   Supple, trachea midline, no carotid bruit, no organomegaly or JVD   Lungs:     Clear to auscultation and percussion, respirations regular, even and Unlabored. No wheezes, rales, rhonchi    Heart:    Regular rhythm and normal rate, normal S1 and S2, no            murmur, no gallop, no rub, no click   Abdomen:     Soft, non-tender, non-distended, no guarding, no rebound tenderness, Normal bowel sounds in all four quadrant, no masses, liver and spleen nonpalpable,    Genitalia:    Deferred   Extremities:   Moves all extremities well, no edema, no cyanosis, no              Redness, no clubbing   Pulses:   Pulses palpable and equal bilaterally   Skin:   Moist and warm. No bleeding, bruising or rash   Neurologic/Psychiatric:   Alert and oriented to person,  place, and time.  Motor, power and tone in upper and lower extremity is grossly intact.  No focal neurological deficits. Normal cognitive function. No psychomotor reaction or tangential thought. No depression, homicidal ideations and suicidal ideations            Results Review:    Lab Results (last 24 hours)     Procedure Component Value Units Date/Time    Magnesium [[id number]]  (Normal) Collected:  10/09/17 0535    Specimen:  Blood Updated:  10/09/17 0621     Magnesium 1.7 mg/dL     CBC Auto Differential [[id number]]  (Abnormal) Collected:  10/09/17 0535    Specimen:  Blood Updated:  10/09/17 0622     WBC 9.15 10*3/mm3      RBC 3.43 (L) 10*6/mm3      Hemoglobin 10.2 (L) g/dL      Hematocrit 30.5 (L) %      MCV 88.9 fL      MCH 29.7 pg      MCHC 33.4 g/dL      RDW 14.5 %      RDW-SD 46.5 (H) fl      MPV 8.5 fL      Platelets 384 10*3/mm3      Neutrophil % 60.9 %      Lymphocyte % 19.1 %      Monocyte % 10.7 %      Eosinophil % 3.5 %      Basophil % 0.4 %      Immature Grans % 5.4 (H) %      Neutrophils, Absolute 5.57 10*3/mm3      Lymphocytes, Absolute 1.75 10*3/mm3      Monocytes, Absolute 0.98 (H) 10*3/mm3      Eosinophils, Absolute 0.32 10*3/mm3      Basophils, Absolute 0.04 10*3/mm3      Immature Grans, Absolute 0.49 (H) 10*3/mm3     Comprehensive Metabolic Panel [[id number]]  (Abnormal) Collected:  10/09/17 0535    Specimen:  Blood Updated:  10/09/17 0623     Glucose 103 (H) mg/dL      BUN 9 mg/dL      Creatinine 0.65 mg/dL      Sodium 137 mmol/L      Potassium 3.6 mmol/L      Chloride 100 mmol/L      CO2 27.0 mmol/L      Calcium 8.5 mg/dL      Total Protein 5.5 (L) g/dL      Albumin 2.70 (L) g/dL      ALT (SGPT) 41 U/L      AST (SGOT) 38 (H) U/L      Alkaline Phosphatase 62 U/L      Total Bilirubin 0.9 mg/dL      eGFR Non African Amer 89 mL/min/1.73      Globulin 2.8 gm/dL      A/G Ratio 1.0 (L) g/dL      BUN/Creatinine Ratio 13.8     Anion Gap 10.0 mmol/L     Narrative:       The MDRD GFR formula is only valid  for adults with stable renal function between ages 18 and 70.    Protime-INR [[id number]]  (Abnormal) Collected:  10/09/17 0633    Specimen:  Blood Updated:  10/09/17 0638     Protime -- Seconds       fingerstick        INR 2.70 (H)      fingerstick       Narrative:       Therapeutic range for most indications is 2.0-3.0 INR,  or 2.5-3.5 for mechanical heart valves.    CBC & Differential [[id number]] Collected:  10/09/17 0535    Specimen:  Blood Updated:  10/09/17 0749    Narrative:       The following orders were created for panel order CBC & Differential.  Procedure                               Abnormality         Status                     ---------                               -----------         ------                     Scan Slide[[id number]]                                                                  CBC Auto Differential[[id number]]        Abnormal            Final result                 Please view results for these tests on the individual orders.           Medication Review:   Current Facility-Administered Medications   Medication Dose Route Frequency Provider Last Rate Last Dose   • acetaminophen (TYLENOL) tablet 1,000 mg  1,000 mg Oral Q6H Nicola Rich MD   1,000 mg at 10/09/17 1808   • albuterol (PROVENTIL) nebulizer solution 0.083% 2.5 mg/3mL  2.5 mg Nebulization Q6H - RT Lenin Padgett MD   2.5 mg at 10/09/17 1850   • amiodarone (PACERONE) tablet 200 mg  200 mg Oral Q12H Lacho Courtney MD   200 mg at 10/09/17 2039   • calcium acetate (PHOSLO) tablet 667 mg  667 mg Oral Daily Cipriano Leigh MD   667 mg at 10/09/17 1206   • dextrose (D5W) 5 % infusion  75 mL/hr Intravenous Continuous Sukhjinder Mendez MD 75 mL/hr at 10/09/17 2039 75 mL/hr at 10/09/17 2039   • famotidine (PEPCID) tablet 20 mg  20 mg Oral BID Sukhjinder Mendez MD   20 mg at 10/09/17 2039   • furosemide (LASIX) tablet 20 mg  20 mg Oral Daily Cipriano Leigh MD   20 mg at 10/09/17 0842   • gabapentin (NEURONTIN) capsule  300 mg  300 mg Oral TID Cipriano Leigh MD   300 mg at 10/09/17 2039   • lisinopril (PRINIVIL,ZESTRIL) tablet 2.5 mg  2.5 mg Oral Daily Ata Farris MD   2.5 mg at 10/09/17 0842   • metoprolol tartrate (LOPRESSOR) tablet 25 mg  25 mg Oral BID Lacho Courtney MD   25 mg at 10/09/17 1713   • midazolam (VERSED) 50 mg in sodium chloride 0.9 % 100 mL (0.5 mg/mL) infusion  1 mg/hr Intravenous Titrated Vivian Banuelos MD   Stopped at 10/05/17 0855   • Morphine (PF) injection 4 mg  4 mg Intravenous Q2H PRN Nicola Rich MD   4 mg at 10/05/17 1612   • norepinephrine (LEVOPHED) 8,000 mcg in sodium chloride 0.9 % 250 mL (32 mcg/mL) infusion  0.02-0.3 mcg/kg/min Intravenous Titrated Vivian Banuelos MD   Stopped at 10/03/17 1705   • ondansetron (ZOFRAN) injection 4 mg  4 mg Intravenous Q6H PRN Cipriano Leigh MD   4 mg at 10/07/17 1442   • oxyCODONE (ROXICODONE) immediate release tablet 5 mg  5 mg Oral Q4H PRN Nicola Rich MD   5 mg at 10/07/17 1019   • pantoprazole (PROTONIX) EC tablet 40 mg  40 mg Oral Q AM Vivian Banuelos MD   40 mg at 10/09/17 0611   • Pharmacy to dose warfarin   Does not apply Continuous PRN Nicola Rich MD       • piperacillin-tazobactam (ZOSYN) 2.25 g/100 mL 0.9% NS IVPB (mbp)  2.25 g Intravenous Q6H Ata Farris MD 0 mL/hr at 10/03/17 1435 2.25 g at 10/09/17 2039   • polyethylene glycol (MIRALAX) powder 17 g  17 g Oral BID Cipriano Leigh MD   17 g at 10/08/17 0925   • sodium chloride 0.9 % flush 1-10 mL  1-10 mL Intravenous PRN Cipriano Leigh MD   10 mL at 10/03/17 1006   • sodium chloride 0.9 % flush 10 mL  10 mL Intravenous PRN Barrei Rodríguez MD       • sodium chloride 0.9 % flush 10 mL  10 mL Intracatheter Q12H Ata Farris MD   10 mL at 10/09/17 2039   • sodium chloride 0.9 % flush 10 mL  10 mL Intracatheter PRN Ata Farris MD       • warfarin (COUMADIN) half tablet 0.5 mg  0.5 mg Oral Daily Diego Mary MD   0.5 mg at 10/09/17 2241        Assessment and Plan:    Principal Problem:    Displaced intertrochanteric fracture of left femur, initial encounter for closed fracture  Active Problems:    Peripheral arterial disease    CAD (coronary artery disease)    Hypertension    Chronic bronchitis    Essential hypertension    Fall    Tobacco dependence syndrome    Atrial fibrillation  1.  Paroxysmal atrial fibrillation.  Patient has been maintained in sinus rhythm and would continue with the present dose of the amiodarone and metoprolol.  Patient is currently on anticoagulation with Coumadin.  2.  Arterial hypertension.  Patient blood pressure is currently well-controlled and would continue with the present dose of the metoprolol.  3.  Bilateral carotid bruit.  Patient was to undergo ultrasound of the carotids.  Patient has had some symptoms of dizziness.  Patient prior to this hospitalization had not complained of having syncopal episode but did sustain a fall.  Patient would benefit from carotid ultrasound.  4.  Chronic anticoagulation with Coumadin.  Patient had not complained of having any episodes or bleeding diastasis of hemoptysis hematuria.            Lacho Courtney MD  10/09/17  9:34 PM      Time: Spent on face-to-face interaction 20 minutes    Some of this note may be an electronic transcription/translation of spoken language to printed text. The electronic translation of spoken language may permit erroneous, or at times, nonsensical words or phrases to be inadvertently transcribed; Although I have reviewed the note for such errors, some may still exist.

## 2017-10-10 NOTE — PLAN OF CARE
Problem: Patient Care Overview (Adult)  Goal: Plan of Care Review  Outcome: Ongoing (interventions implemented as appropriate)    10/10/17 1325   Coping/Psychosocial Response Interventions   Plan Of Care Reviewed With patient   Patient Care Overview   Progress improving   Outcome Evaluation   Outcome Summary/Follow up Plan VS stable; pain controlled; small BM; working with PT       Goal: Adult Individualization and Mutuality  Outcome: Ongoing (interventions implemented as appropriate)  Goal: Discharge Needs Assessment  Outcome: Ongoing (interventions implemented as appropriate)    Problem: Orthopaedic Fracture (Adult)  Goal: Signs and Symptoms of Listed Potential Problems Will be Absent or Manageable (Orthopaedic Fracture)  Outcome: Ongoing (interventions implemented as appropriate)    Problem: Fall Risk (Adult)  Goal: Identify Related Risk Factors and Signs and Symptoms  Outcome: Ongoing (interventions implemented as appropriate)  Goal: Absence of Falls  Outcome: Ongoing (interventions implemented as appropriate)    Problem: Pressure Ulcer Risk (Keith Scale) (Adult,Obstetrics,Pediatric)  Goal: Identify Related Risk Factors and Signs and Symptoms  Outcome: Ongoing (interventions implemented as appropriate)  Goal: Skin Integrity  Outcome: Ongoing (interventions implemented as appropriate)

## 2017-10-10 NOTE — PLAN OF CARE
Problem: Patient Care Overview (Adult)  Goal: Plan of Care Review  Outcome: Ongoing (interventions implemented as appropriate)    10/10/17 1455   Coping/Psychosocial Response Interventions   Plan Of Care Reviewed With patient   Patient Care Overview   Progress progress toward functional goals as expected   Outcome Evaluation   Outcome Summary/Follow up Plan Pt met 1 PT goal this tx. Pt able to amb 6+15+10 ft w/RW Mod A 2. Pt would benefit from ARU once discharged from this facilty.          Problem: Inpatient Physical Therapy  Goal: Strength Goal LTG- PT  Outcome: Outcome(s) achieved Date Met:  10/10/17    10/06/17 1526 10/10/17 1455   Strength Goal PT LTG   Strength Goal PT LTG, Date Established 10/06/17 --    Strength Goal PT LTG, Time to Achieve by discharge --    Strength Goal PT LTG, Measure to Achieve 10reps of PROM/AAROM exercises --    Strength Goal PT LTG, Functional Goal 2 sets of 10 reps AROM exercises --    Strength Goal PT LTG, Date Goal Reviewed --  10/10/17   Strength Goal PT LTG, Outcome --  goal met

## 2017-10-10 NOTE — PLAN OF CARE
Problem: Patient Care Overview (Adult)  Goal: Plan of Care Review  Outcome: Ongoing (interventions implemented as appropriate)    10/10/17 1332   Coping/Psychosocial Response Interventions   Plan Of Care Reviewed With patient   Patient Care Overview   Progress improving   Outcome Evaluation   Outcome Summary/Follow up Plan Pt tolerated tx well this date. Pt was I with self-feeding. Continue POC.         Problem: Inpatient Occupational Therapy  Goal: Transfer Training Goal 1 LTG- OT  Outcome: Ongoing (interventions implemented as appropriate)    10/06/17 1155 10/08/17 1553 10/10/17 1332   Transfer Training OT LTG   Transfer Training OT LTG, Date Established 10/06/17 --  --    Transfer Training OT LTG, Time to Achieve by discharge --  --    Transfer Training OT LTG, Activity Type all transfers --  --    Transfer Training OT LTG, [place] Level supervision required;contact guard assist --  --    Transfer Training OT LTG, Assist Device walker, rolling --  --    Transfer Training OT LTG, Date Goal Reviewed --  --  10/10/17   Transfer Training OT LTG, Outcome --  goal ongoing --        Goal: Range of Motion Goal LTG- OT  Outcome: Ongoing (interventions implemented as appropriate)    10/06/17 1155 10/08/17 1553 10/10/17 1332   Range of Motion OT LTG   Range of Motion Goal OT LTG, Date Established 10/06/17 --  --    Range of Motion Goal OT LTG, Time to Achieve by discharge --  --    Range of Motion Goal OT LTG, AROM Measure Full AROM to all UE joints to increase independence with ADLs. --  --    Range of Motion Goal OT LTG, Date Goal Reviewed --  --  10/10/17   Range of Motion Goal OT LTG, Outcome --  goal ongoing --        Goal: Dynamic Sitting Balance Goal LTG- OT  Outcome: Ongoing (interventions implemented as appropriate)    10/06/17 1155 10/08/17 1553 10/10/17 1332   Dynamic Sitting Balance OT LTG   Dynamic Sitting Balance OT LTG, Date Established 10/06/17 --  --    Dynamic Sitting Balance OT LTG, Time to Achieve  by discharge --  --    Dynamic Sitting Balance OT LTG, [place] Level contact guard assist  (15 minutes with functional activity.) --  --    Dynamic Sitting Balance OT LTG, Assist Device bed rails --  --    Dynamic Sitting Balance OT LTG, Date Goal Reviewed --  --  10/10/17   Dynamic Sitting Balance OT LTG, Outcome --  goal ongoing --        Goal: ADL Goal LTG- OT  Outcome: Ongoing (interventions implemented as appropriate)    10/06/17 1155 10/08/17 1553 10/10/17 1332   ADL OT LTG   ADL OT LTG, Date Established 10/06/17 --  --    ADL OT LTG, Time to Achieve by discharge --  --    ADL OT LTG, Activity Type ADL skills  (Sponge bath and dress when appropriate.) --  --    ADL OT LTG, [place] Level min assist --  --    ADL OT LTG, Date Goal Reviewed --  --  10/10/17   ADL OT LTG, Outcome --  goal ongoing --

## 2017-10-10 NOTE — PROGRESS NOTES
[facility] Medicine Services  INPATIENT PROGRESS NOTE    Length of Stay: 10  Date of Admission: 9/30/2017  Primary Care Physician: Ramiro Gloria MD    Subjective     Chief Complaint   Patient presents with   • Fall   • Hip Injury     HPI:  Pt s/p surgical fixation for left femur fracture. Pt had a fall yesterday, while she was going to chair from bedside commode, she fell to her knees, even though there were two people holding her but she was unable to keep her balance.     10/10/2017: doing well today. Pain is better control. Working with PT/OT     Review of Systems   Constitutional: Negative for chills and fever.   Cardiovascular: Negative for chest pain, palpitations and leg swelling.   Gastrointestinal: Negative for abdominal pain and nausea.   Genitourinary: Negative for dysuria.   Musculoskeletal: Positive for joint swelling and myalgias.   Neurological: Negative for dizziness and light-headedness.   Psychiatric/Behavioral: Negative for agitation and behavioral problems.        All pertinent negatives and positives are as above. All other systems have been reviewed and are negative unless otherwise stated.     Objective      Vital Sign Min/Max for last 24 hours  Temp  Min: 96.9 °F (36.1 °C)  Max: 99.6 °F (37.6 °C)   BP  Min: 114/58  Max: 144/68   Pulse  Min: 62  Max: 89   Resp  Min: 16  Max: 20   SpO2  Min: 91 %  Max: 97 %   Flow (L/min)  Min: 1  Max: 1   Weight  Min: 194 lb 1 oz (88 kg)  Max: 194 lb 1 oz (88 kg)         Physical Exam   Constitutional: She is oriented to person, place, and time. She appears well-developed and well-nourished.   HENT:   Head: Normocephalic.   Eyes: EOM are normal. Pupils are equal, round, and reactive to light.   Neck: Normal range of motion.   Cardiovascular: Normal rate, regular rhythm and normal heart sounds.    Pulmonary/Chest: Effort normal and breath sounds normal.   Abdominal: Soft. Bowel sounds are normal.   Musculoskeletal:         Right knee: She exhibits normal range of motion and no swelling.        Left knee: She exhibits normal range of motion and no swelling.        Legs:  Neurological: She is alert and oriented to person, place, and time.   Skin: Skin is warm.   Bruises present on the back from the initial fall pt had at home.    Psychiatric: She has a normal mood and affect. Her behavior is normal.   Vitals reviewed.      Current Facility-Administered Medications   Medication Dose Route Frequency Provider Last Rate Last Dose   • acetaminophen (TYLENOL) tablet 1,000 mg  1,000 mg Oral Q6H Nicola Rich MD   1,000 mg at 10/10/17 1252   • albuterol (PROVENTIL) nebulizer solution 0.083% 2.5 mg/3mL  2.5 mg Nebulization Q6H - RT Lenin CHRISTIANO Padgett MD   2.5 mg at 10/10/17 1239   • amiodarone (PACERONE) tablet 200 mg  200 mg Oral Q12H Lacho Courtney MD   200 mg at 10/10/17 0859   • calcium acetate (PHOSLO) tablet 667 mg  667 mg Oral Daily Cipriano Leigh MD   667 mg at 10/10/17 1252   • dextrose (D5W) 5 % infusion  75 mL/hr Intravenous Continuous Sukhjinder Mendez MD 75 mL/hr at 10/10/17 1255 75 mL/hr at 10/10/17 1255   • famotidine (PEPCID) tablet 20 mg  20 mg Oral BID Sukhjinder Mendez MD   20 mg at 10/10/17 0859   • furosemide (LASIX) tablet 20 mg  20 mg Oral Daily Cipriano Leigh MD   20 mg at 10/10/17 0859   • gabapentin (NEURONTIN) capsule 300 mg  300 mg Oral TID Cipriano Leigh MD   300 mg at 10/10/17 1537   • lisinopril (PRINIVIL,ZESTRIL) tablet 2.5 mg  2.5 mg Oral Daily Ata Farris MD   2.5 mg at 10/10/17 0859   • metoprolol tartrate (LOPRESSOR) tablet 25 mg  25 mg Oral BID Lacho Courtney MD   25 mg at 10/10/17 0859   • midazolam (VERSED) 50 mg in sodium chloride 0.9 % 100 mL (0.5 mg/mL) infusion  1 mg/hr Intravenous Titrated Gunnerul Phong Banuelos MD   Stopped at 10/05/17 0855   • Morphine (PF) injection 4 mg  4 mg Intravenous Q2H PRN Niocla Rich MD   4 mg at 10/05/17 1612   • norepinephrine (LEVOPHED)  8,000 mcg in sodium chloride 0.9 % 250 mL (32 mcg/mL) infusion  0.02-0.3 mcg/kg/min Intravenous Titrated Vivian Banuelos MD   Stopped at 10/03/17 1705   • ondansetron (ZOFRAN) injection 4 mg  4 mg Intravenous Q6H PRN Cipriano Leigh MD   4 mg at 10/07/17 1442   • oxyCODONE (ROXICODONE) immediate release tablet 5 mg  5 mg Oral Q4H PRN Nicola Rich MD   5 mg at 10/07/17 1019   • pantoprazole (PROTONIX) EC tablet 40 mg  40 mg Oral Q AM Vivian Banuelos MD   40 mg at 10/10/17 0618   • Pharmacy to dose warfarin   Does not apply Continuous PRN Nicola Rich MD       • piperacillin-tazobactam (ZOSYN) 2.25 g/100 mL 0.9% NS IVPB (mbp)  2.25 g Intravenous Q6H Ata Farris MD 0 mL/hr at 10/03/17 1435 2.25 g at 10/10/17 1353   • polyethylene glycol (MIRALAX) powder 17 g  17 g Oral BID Cipriano Leigh MD   17 g at 10/08/17 0925   • sodium chloride 0.9 % flush 1-10 mL  1-10 mL Intravenous PRN Cipriano Leigh MD   10 mL at 10/03/17 1006   • sodium chloride 0.9 % flush 10 mL  10 mL Intravenous PRN Barrie Rodríguez MD       • sodium chloride 0.9 % flush 10 mL  10 mL Intracatheter Q12H Ata Farris MD   10 mL at 10/09/17 2039   • sodium chloride 0.9 % flush 10 mL  10 mL Intracatheter PRN Ata Farris MD       • warfarin (COUMADIN) half tablet 0.5 mg  0.5 mg Oral Daily Diego Mary MD   0.5 mg at 10/09/17 1713           Results Review:  I have reviewed the labs, radiology results, and diagnostic studies.    Laboratory Data:     Results from last 7 days  Lab Units 10/10/17  0517 10/09/17  0535 10/08/17  0545   SODIUM mmol/L 140 137 141   POTASSIUM mmol/L 3.2* 3.6 3.2*   CHLORIDE mmol/L 103 100 102   CO2 mmol/L 31.0 27.0 31.0   BUN mg/dL 9 9 11   CREATININE mg/dL 0.68 0.65 0.64   GLUCOSE mg/dL 96 103* 110*   CALCIUM mg/dL 8.2* 8.5 8.4   BILIRUBIN mg/dL 0.6 0.9 0.7   ALK PHOS U/L 53 62 59   ALT (SGPT) U/L 35 41 45   AST (SGOT) U/L 33 38* 43*   ANION GAP mmol/L 6.0 10.0 8.0     Estimated Creatinine  Clearance: 69.4 mL/min (by C-G formula based on Cr of 0.68).    Results from last 7 days  Lab Units 10/10/17  0517 10/09/17  0535 10/08/17  0545   MAGNESIUM mg/dL 1.7 1.7 1.9           Results from last 7 days  Lab Units 10/10/17  0517 10/09/17  0535 10/08/17  0545 10/07/17  0300 10/06/17  0448   WBC 10*3/mm3 9.60 9.15 6.91 7.19 7.96   HEMOGLOBIN g/dL 8.8* 10.2* 9.7* 8.9* 9.0*   HEMATOCRIT % 26.6* 30.5* 29.7* 27.7* 28.3*   PLATELETS 10*3/mm3 306 384 279 254 236       Results from last 7 days  Lab Units 10/10/17  0517 10/09/17  0633 10/08/17  0545 10/07/17  0300 10/06/17  0448   INR  1.56* 2.70* 4.75* 4.92* 3.50*           Culture Data:   No results found for: BLOODCX  No results found for: URINECX  No results found for: RESPCX  No results found for: WOUNDCX  No results found for: STOOLCX  No components found for: BODYFLD      Radiology Data:   Imaging Results (last 24 hours)     ** No results found for the last 24 hours. **          I have reviewed the patient current medications.     Assessment/Plan     Active Hospital Problems (** Indicates Principal Problem)    Diagnosis Date Noted   • **Displaced intertrochanteric fracture of left femur, initial encounter for closed fracture [S72.142A] 09/30/2017     POD# 8.  Management per orthopedics.     • Atrial fibrillation [I48.91] 10/02/2017     New onset.  S/p cardioversion   On coumadin now.   Amiodarone      • Tobacco dependence syndrome [F17.200] 10/01/2017     Nicotine patch.   Counseling provided       • CAD (coronary artery disease) [I25.10] 09/30/2017   • Hypertension [I10] 09/30/2017     Has been hypotensive over the last 24 hours intermittently.  Decreased Lisinopril from 40 mg daily to 2.5 mg daily.  Holding lopressor right now.  On Cardizem drip for atrial fibrillation.     • Chronic bronchitis [J42] 09/30/2017     Currently intubated.  Unable to be extubated yesterday.       • Essential hypertension [I10] 09/30/2017     Added automatically from request for  surgery [id number]     • Fall [W19.XXXA] 09/30/2017     Fracture s/p repair per orthopedics.     • Peripheral arterial disease [I73.9] 10/31/2016      Resolved Hospital Problems    Diagnosis Date Noted Date Resolved   No resolved problems to display.     Moraxella on sputum: on zosyn.     Pending on disposition.    Discharge Planning: I expect patient to be discharged to home vs rehab in 1-2 days.      DVT Prophylaxis: coumadin               This document has been electronically signed by Diego Mary MD on October 10, 2017 3:48 PM

## 2017-10-10 NOTE — NURSING NOTE
Spoke with pt and she is agreeable to aru admission.admission folder given.talked to Surekha [facility]ITE[facility] that Dr Horton would accept on 10/11/2017

## 2017-10-10 NOTE — PROGRESS NOTES
ORTHO:    No ortho changes.  Wounds clean and dry  Continue to mobilize.  Activity as tolerated.    Plan per primary team      Patient seen 0645 today.    Nicola Rich MD

## 2017-10-11 ENCOUNTER — HOSPITAL ENCOUNTER (INPATIENT)
Facility: HOSPITAL | Age: 77
LOS: 13 days | Discharge: HOME HEALTH | End: 2017-10-24
Attending: FAMILY MEDICINE | Admitting: FAMILY MEDICINE

## 2017-10-11 VITALS
HEIGHT: 68 IN | SYSTOLIC BLOOD PRESSURE: 122 MMHG | WEIGHT: 194.06 LBS | RESPIRATION RATE: 18 BRPM | OXYGEN SATURATION: 90 % | DIASTOLIC BLOOD PRESSURE: 54 MMHG | HEART RATE: 93 BPM | TEMPERATURE: 97.5 F | BODY MASS INDEX: 29.41 KG/M2

## 2017-10-11 DIAGNOSIS — J41.0 SIMPLE CHRONIC BRONCHITIS (HCC): Chronic | ICD-10-CM

## 2017-10-11 DIAGNOSIS — S72.002S: Primary | ICD-10-CM

## 2017-10-11 DIAGNOSIS — R48.9 SYMBOLIC DYSFUNCTION: ICD-10-CM

## 2017-10-11 DIAGNOSIS — Z74.09 IMPAIRED MOBILITY AND ADLS: ICD-10-CM

## 2017-10-11 DIAGNOSIS — Z78.9 IMPAIRED MOBILITY AND ADLS: ICD-10-CM

## 2017-10-11 DIAGNOSIS — J96.21 ACUTE ON CHRONIC RESPIRATORY FAILURE WITH HYPOXIA (HCC): ICD-10-CM

## 2017-10-11 DIAGNOSIS — Z74.09 IMPAIRED FUNCTIONAL MOBILITY, BALANCE, GAIT, AND ENDURANCE: ICD-10-CM

## 2017-10-11 PROBLEM — S72.002A HIP FRACTURE, LEFT (HCC): Status: ACTIVE | Noted: 2017-10-11

## 2017-10-11 LAB
ALBUMIN SERPL-MCNC: 2.2 G/DL (ref 3.4–4.8)
ALBUMIN/GLOB SERPL: 0.9 G/DL (ref 1.1–1.8)
ALP SERPL-CCNC: 58 U/L (ref 38–126)
ALT SERPL W P-5'-P-CCNC: 38 U/L (ref 9–52)
ANION GAP SERPL CALCULATED.3IONS-SCNC: 9 MMOL/L (ref 5–15)
AST SERPL-CCNC: 40 U/L (ref 14–36)
BASOPHILS # BLD AUTO: 0.02 10*3/MM3 (ref 0–0.2)
BASOPHILS NFR BLD AUTO: 0.2 % (ref 0–2)
BILIRUB SERPL-MCNC: 0.5 MG/DL (ref 0.2–1.3)
BUN BLD-MCNC: 10 MG/DL (ref 7–21)
BUN/CREAT SERPL: 13 (ref 7–25)
CALCIUM SPEC-SCNC: 7.9 MG/DL (ref 8.4–10.2)
CHLORIDE SERPL-SCNC: 103 MMOL/L (ref 95–110)
CO2 SERPL-SCNC: 30 MMOL/L (ref 22–31)
CREAT BLD-MCNC: 0.77 MG/DL (ref 0.5–1)
DEPRECATED RDW RBC AUTO: 46.2 FL (ref 36.4–46.3)
EOSINOPHIL # BLD AUTO: 0.35 10*3/MM3 (ref 0–0.7)
EOSINOPHIL NFR BLD AUTO: 3.9 % (ref 0–7)
ERYTHROCYTE [DISTWIDTH] IN BLOOD BY AUTOMATED COUNT: 14.8 % (ref 11.5–14.5)
GFR SERPL CREATININE-BSD FRML MDRD: 73 ML/MIN/1.73 (ref 60–90)
GLOBULIN UR ELPH-MCNC: 2.5 GM/DL (ref 2.3–3.5)
GLUCOSE BLD-MCNC: 98 MG/DL (ref 60–100)
HCT VFR BLD AUTO: 26.1 % (ref 35–45)
HEMOCCULT STL QL: NEGATIVE
HGB BLD-MCNC: 8.5 G/DL (ref 12–15.5)
IMM GRANULOCYTES # BLD: 0.18 10*3/MM3 (ref 0–0.02)
IMM GRANULOCYTES NFR BLD: 2 % (ref 0–0.5)
INR PPP: 1.35 (ref 0.8–1.2)
LYMPHOCYTES # BLD AUTO: 1.63 10*3/MM3 (ref 0.6–4.2)
LYMPHOCYTES NFR BLD AUTO: 18.2 % (ref 10–50)
MAGNESIUM SERPL-MCNC: 1.6 MG/DL (ref 1.6–2.3)
MCH RBC QN AUTO: 29.2 PG (ref 26.5–34)
MCHC RBC AUTO-ENTMCNC: 32.6 G/DL (ref 31.4–36)
MCV RBC AUTO: 89.7 FL (ref 80–98)
MONOCYTES # BLD AUTO: 0.94 10*3/MM3 (ref 0–0.9)
MONOCYTES NFR BLD AUTO: 10.5 % (ref 0–12)
NEUTROPHILS # BLD AUTO: 5.84 10*3/MM3 (ref 2–8.6)
NEUTROPHILS NFR BLD AUTO: 65.2 % (ref 37–80)
PLATELET # BLD AUTO: 278 10*3/MM3 (ref 150–450)
PMV BLD AUTO: 8.5 FL (ref 8–12)
POTASSIUM BLD-SCNC: 2.8 MMOL/L (ref 3.5–5.1)
PROT SERPL-MCNC: 4.7 G/DL (ref 6.3–8.6)
PROTHROMBIN TIME: 16.7 SECONDS (ref 11.1–15.3)
RBC # BLD AUTO: 2.91 10*6/MM3 (ref 3.77–5.16)
SODIUM BLD-SCNC: 142 MMOL/L (ref 137–145)
WBC NRBC COR # BLD: 8.96 10*3/MM3 (ref 3.2–9.8)

## 2017-10-11 PROCEDURE — 25010000002 PIPERACILLIN-TAZOBACTAM: Performed by: FAMILY MEDICINE

## 2017-10-11 PROCEDURE — 85025 COMPLETE CBC W/AUTO DIFF WBC: CPT | Performed by: INTERNAL MEDICINE

## 2017-10-11 PROCEDURE — 97530 THERAPEUTIC ACTIVITIES: CPT

## 2017-10-11 PROCEDURE — 85610 PROTHROMBIN TIME: CPT | Performed by: ORTHOPAEDIC SURGERY

## 2017-10-11 PROCEDURE — 97535 SELF CARE MNGMENT TRAINING: CPT

## 2017-10-11 PROCEDURE — 82272 OCCULT BLD FECES 1-3 TESTS: CPT | Performed by: FAMILY MEDICINE

## 2017-10-11 PROCEDURE — 94799 UNLISTED PULMONARY SVC/PX: CPT

## 2017-10-11 PROCEDURE — 97110 THERAPEUTIC EXERCISES: CPT

## 2017-10-11 PROCEDURE — 94760 N-INVAS EAR/PLS OXIMETRY 1: CPT

## 2017-10-11 PROCEDURE — 99024 POSTOP FOLLOW-UP VISIT: CPT | Performed by: ORTHOPAEDIC SURGERY

## 2017-10-11 PROCEDURE — 83735 ASSAY OF MAGNESIUM: CPT | Performed by: INTERNAL MEDICINE

## 2017-10-11 PROCEDURE — 97116 GAIT TRAINING THERAPY: CPT

## 2017-10-11 PROCEDURE — 80053 COMPREHEN METABOLIC PANEL: CPT | Performed by: INTERNAL MEDICINE

## 2017-10-11 RX ORDER — SODIUM CHLORIDE 0.9 % (FLUSH) 0.9 %
10 SYRINGE (ML) INJECTION EVERY 12 HOURS SCHEDULED
Status: CANCELLED | OUTPATIENT
Start: 2017-10-11

## 2017-10-11 RX ORDER — WARFARIN SODIUM 1 MG
0.5 TABLET ORAL
Status: CANCELLED | OUTPATIENT
Start: 2017-10-11

## 2017-10-11 RX ORDER — CALCIUM ACETATE 667 MG/1
667 TABLET ORAL
Status: CANCELLED | OUTPATIENT
Start: 2017-10-11

## 2017-10-11 RX ORDER — CETIRIZINE HYDROCHLORIDE 10 MG/1
10 TABLET ORAL DAILY
Status: DISCONTINUED | OUTPATIENT
Start: 2017-10-11 | End: 2017-10-24 | Stop reason: HOSPADM

## 2017-10-11 RX ORDER — ACETAMINOPHEN 500 MG
1000 TABLET ORAL EVERY 6 HOURS
Status: DISCONTINUED | OUTPATIENT
Start: 2017-10-11 | End: 2017-10-13

## 2017-10-11 RX ORDER — SODIUM CHLORIDE 0.9 % (FLUSH) 0.9 %
1-10 SYRINGE (ML) INJECTION AS NEEDED
Status: CANCELLED | OUTPATIENT
Start: 2017-10-11

## 2017-10-11 RX ORDER — AMIODARONE HYDROCHLORIDE 200 MG/1
200 TABLET ORAL EVERY 12 HOURS SCHEDULED
Status: CANCELLED | OUTPATIENT
Start: 2017-10-11

## 2017-10-11 RX ORDER — ONDANSETRON 2 MG/ML
4 INJECTION INTRAMUSCULAR; INTRAVENOUS EVERY 6 HOURS PRN
Status: CANCELLED | OUTPATIENT
Start: 2017-10-11

## 2017-10-11 RX ORDER — ACETAMINOPHEN 500 MG
1000 TABLET ORAL EVERY 6 HOURS
Status: CANCELLED | OUTPATIENT
Start: 2017-10-11

## 2017-10-11 RX ORDER — FUROSEMIDE 20 MG/1
20 TABLET ORAL DAILY
Status: CANCELLED | OUTPATIENT
Start: 2017-10-12

## 2017-10-11 RX ORDER — POTASSIUM CHLORIDE 750 MG/1
40 CAPSULE, EXTENDED RELEASE ORAL 2 TIMES DAILY WITH MEALS
Status: DISCONTINUED | OUTPATIENT
Start: 2017-10-11 | End: 2017-10-12

## 2017-10-11 RX ORDER — ALBUTEROL SULFATE 2.5 MG/3ML
2.5 SOLUTION RESPIRATORY (INHALATION)
Status: DISCONTINUED | OUTPATIENT
Start: 2017-10-11 | End: 2017-10-24 | Stop reason: HOSPADM

## 2017-10-11 RX ORDER — GABAPENTIN 300 MG/1
300 CAPSULE ORAL 3 TIMES DAILY
Status: DISCONTINUED | OUTPATIENT
Start: 2017-10-11 | End: 2017-10-11

## 2017-10-11 RX ORDER — OXYCODONE HYDROCHLORIDE 5 MG/1
5 TABLET ORAL EVERY 4 HOURS PRN
Status: DISPENSED | OUTPATIENT
Start: 2017-10-11 | End: 2017-10-11

## 2017-10-11 RX ORDER — ONDANSETRON 2 MG/ML
4 INJECTION INTRAMUSCULAR; INTRAVENOUS EVERY 6 HOURS PRN
Status: DISCONTINUED | OUTPATIENT
Start: 2017-10-11 | End: 2017-10-16

## 2017-10-11 RX ORDER — SODIUM CHLORIDE 0.9 % (FLUSH) 0.9 %
1-10 SYRINGE (ML) INJECTION AS NEEDED
Status: DISCONTINUED | OUTPATIENT
Start: 2017-10-11 | End: 2017-10-12

## 2017-10-11 RX ORDER — FAMOTIDINE 20 MG/1
20 TABLET, FILM COATED ORAL 2 TIMES DAILY
Status: CANCELLED | OUTPATIENT
Start: 2017-10-11

## 2017-10-11 RX ORDER — POLYETHYLENE GLYCOL 3350 17 G/17G
17 POWDER, FOR SOLUTION ORAL 2 TIMES DAILY
Status: CANCELLED | OUTPATIENT
Start: 2017-10-11

## 2017-10-11 RX ORDER — AMOXICILLIN AND CLAVULANATE POTASSIUM 500; 125 MG/1; MG/1
1 TABLET, FILM COATED ORAL 2 TIMES DAILY
Qty: 10 TABLET | Refills: 0 | Status: SHIPPED | OUTPATIENT
Start: 2017-10-11 | End: 2017-10-24 | Stop reason: HOSPADM

## 2017-10-11 RX ORDER — PANTOPRAZOLE SODIUM 40 MG/1
40 TABLET, DELAYED RELEASE ORAL EVERY MORNING
Status: DISCONTINUED | OUTPATIENT
Start: 2017-10-12 | End: 2017-10-24 | Stop reason: HOSPADM

## 2017-10-11 RX ORDER — ATORVASTATIN CALCIUM 40 MG/1
80 TABLET, FILM COATED ORAL DAILY
Status: CANCELLED | OUTPATIENT
Start: 2017-10-11

## 2017-10-11 RX ORDER — OXYCODONE HYDROCHLORIDE 5 MG/1
5 TABLET ORAL EVERY 4 HOURS PRN
Status: CANCELLED | OUTPATIENT
Start: 2017-10-11 | End: 2017-10-11

## 2017-10-11 RX ORDER — LISINOPRIL 2.5 MG/1
2.5 TABLET ORAL DAILY
Status: CANCELLED | OUTPATIENT
Start: 2017-10-12

## 2017-10-11 RX ORDER — SODIUM CHLORIDE 0.9 % (FLUSH) 0.9 %
10 SYRINGE (ML) INJECTION AS NEEDED
Status: CANCELLED | OUTPATIENT
Start: 2017-10-11

## 2017-10-11 RX ORDER — ACETAMINOPHEN 325 MG/1
650 TABLET ORAL EVERY 4 HOURS PRN
Status: DISCONTINUED | OUTPATIENT
Start: 2017-10-11 | End: 2017-10-12

## 2017-10-11 RX ORDER — SODIUM CHLORIDE 0.9 % (FLUSH) 0.9 %
10 SYRINGE (ML) INJECTION AS NEEDED
Status: DISCONTINUED | OUTPATIENT
Start: 2017-10-11 | End: 2017-10-12

## 2017-10-11 RX ORDER — ALBUTEROL SULFATE 2.5 MG/3ML
2.5 SOLUTION RESPIRATORY (INHALATION)
Status: CANCELLED | OUTPATIENT
Start: 2017-10-11

## 2017-10-11 RX ORDER — METOPROLOL TARTRATE 50 MG/1
50 TABLET, FILM COATED ORAL 2 TIMES DAILY
Status: CANCELLED | OUTPATIENT
Start: 2017-10-11

## 2017-10-11 RX ORDER — CALCIUM ACETATE 667 MG/1
667 TABLET ORAL
Status: DISCONTINUED | OUTPATIENT
Start: 2017-10-12 | End: 2017-10-13

## 2017-10-11 RX ORDER — CALCIUM ACETATE 667 MG/1
667 TABLET ORAL
Status: DISCONTINUED | OUTPATIENT
Start: 2017-10-11 | End: 2017-10-11

## 2017-10-11 RX ORDER — FAMOTIDINE 20 MG/1
20 TABLET, FILM COATED ORAL 2 TIMES DAILY
Status: DISCONTINUED | OUTPATIENT
Start: 2017-10-11 | End: 2017-10-24 | Stop reason: HOSPADM

## 2017-10-11 RX ORDER — AMIODARONE HYDROCHLORIDE 200 MG/1
200 TABLET ORAL EVERY 12 HOURS SCHEDULED
Status: DISCONTINUED | OUTPATIENT
Start: 2017-10-11 | End: 2017-10-24 | Stop reason: HOSPADM

## 2017-10-11 RX ORDER — GABAPENTIN 300 MG/1
300 CAPSULE ORAL 2 TIMES DAILY
Status: DISCONTINUED | OUTPATIENT
Start: 2017-10-11 | End: 2017-10-24 | Stop reason: HOSPADM

## 2017-10-11 RX ORDER — ATORVASTATIN CALCIUM 40 MG/1
80 TABLET, FILM COATED ORAL DAILY
Status: DISCONTINUED | OUTPATIENT
Start: 2017-10-11 | End: 2017-10-24 | Stop reason: HOSPADM

## 2017-10-11 RX ORDER — CLOPIDOGREL BISULFATE 75 MG/1
75 TABLET ORAL DAILY
Status: CANCELLED | OUTPATIENT
Start: 2017-10-11

## 2017-10-11 RX ORDER — POLYETHYLENE GLYCOL 3350 17 G/17G
17 POWDER, FOR SOLUTION ORAL 2 TIMES DAILY
Status: DISCONTINUED | OUTPATIENT
Start: 2017-10-11 | End: 2017-10-24 | Stop reason: HOSPADM

## 2017-10-11 RX ORDER — PANTOPRAZOLE SODIUM 40 MG/1
40 TABLET, DELAYED RELEASE ORAL EVERY MORNING
Status: CANCELLED | OUTPATIENT
Start: 2017-10-11

## 2017-10-11 RX ORDER — LISINOPRIL 2.5 MG/1
2.5 TABLET ORAL DAILY
Status: DISCONTINUED | OUTPATIENT
Start: 2017-10-12 | End: 2017-10-24 | Stop reason: HOSPADM

## 2017-10-11 RX ORDER — WARFARIN SODIUM 1 MG
0.5 TABLET ORAL
Status: DISCONTINUED | OUTPATIENT
Start: 2017-10-11 | End: 2017-10-12

## 2017-10-11 RX ORDER — POTASSIUM CHLORIDE 750 MG/1
40 CAPSULE, EXTENDED RELEASE ORAL 2 TIMES DAILY WITH MEALS
Status: CANCELLED | OUTPATIENT
Start: 2017-10-11 | End: 2017-10-13

## 2017-10-11 RX ORDER — CALCIUM CARBONATE 200(500)MG
2 TABLET,CHEWABLE ORAL 2 TIMES DAILY PRN
Status: DISCONTINUED | OUTPATIENT
Start: 2017-10-11 | End: 2017-10-24 | Stop reason: HOSPADM

## 2017-10-11 RX ORDER — CLOPIDOGREL BISULFATE 75 MG/1
75 TABLET ORAL DAILY
Status: DISCONTINUED | OUTPATIENT
Start: 2017-10-11 | End: 2017-10-24 | Stop reason: HOSPADM

## 2017-10-11 RX ORDER — METOPROLOL TARTRATE 50 MG/1
50 TABLET, FILM COATED ORAL 2 TIMES DAILY
Status: DISCONTINUED | OUTPATIENT
Start: 2017-10-11 | End: 2017-10-24 | Stop reason: HOSPADM

## 2017-10-11 RX ORDER — CETIRIZINE HYDROCHLORIDE 10 MG/1
10 TABLET ORAL DAILY
Status: CANCELLED | OUTPATIENT
Start: 2017-10-11

## 2017-10-11 RX ORDER — GABAPENTIN 300 MG/1
300 CAPSULE ORAL 3 TIMES DAILY
Status: CANCELLED | OUTPATIENT
Start: 2017-10-11

## 2017-10-11 RX ORDER — POTASSIUM CHLORIDE 750 MG/1
40 CAPSULE, EXTENDED RELEASE ORAL 2 TIMES DAILY WITH MEALS
Status: DISCONTINUED | OUTPATIENT
Start: 2017-10-11 | End: 2017-10-11 | Stop reason: HOSPADM

## 2017-10-11 RX ORDER — FUROSEMIDE 20 MG/1
20 TABLET ORAL DAILY
Status: DISCONTINUED | OUTPATIENT
Start: 2017-10-12 | End: 2017-10-24 | Stop reason: HOSPADM

## 2017-10-11 RX ORDER — SODIUM CHLORIDE 0.9 % (FLUSH) 0.9 %
10 SYRINGE (ML) INJECTION EVERY 12 HOURS SCHEDULED
Status: DISCONTINUED | OUTPATIENT
Start: 2017-10-11 | End: 2017-10-12

## 2017-10-11 RX ORDER — GABAPENTIN 300 MG/1
300 CAPSULE ORAL 2 TIMES DAILY
Status: CANCELLED | OUTPATIENT
Start: 2017-10-11

## 2017-10-11 RX ADMIN — FAMOTIDINE 20 MG: 20 TABLET ORAL at 08:40

## 2017-10-11 RX ADMIN — FAMOTIDINE 20 MG: 20 TABLET ORAL at 20:27

## 2017-10-11 RX ADMIN — METOPROLOL TARTRATE 50 MG: 50 TABLET ORAL at 18:49

## 2017-10-11 RX ADMIN — PIPERACILLIN AND TAZOBACTAM 2.25 G: 2; .25 INJECTION, POWDER, LYOPHILIZED, FOR SOLUTION INTRAVENOUS; PARENTERAL at 08:41

## 2017-10-11 RX ADMIN — METOPROLOL TARTRATE 25 MG: 25 TABLET ORAL at 08:40

## 2017-10-11 RX ADMIN — ALBUTEROL SULFATE 2.5 MG: 2.5 SOLUTION RESPIRATORY (INHALATION) at 00:35

## 2017-10-11 RX ADMIN — ACETAMINOPHEN 1000 MG: 500 TABLET ORAL at 06:14

## 2017-10-11 RX ADMIN — AMIODARONE HYDROCHLORIDE 200 MG: 200 TABLET ORAL at 20:27

## 2017-10-11 RX ADMIN — PIPERACILLIN AND TAZOBACTAM 2.25 G: 2; .25 INJECTION, POWDER, LYOPHILIZED, FOR SOLUTION INTRAVENOUS; PARENTERAL at 01:47

## 2017-10-11 RX ADMIN — AMIODARONE HYDROCHLORIDE 200 MG: 200 TABLET ORAL at 08:40

## 2017-10-11 RX ADMIN — ACETAMINOPHEN 1000 MG: 500 TABLET ORAL at 00:17

## 2017-10-11 RX ADMIN — POTASSIUM CHLORIDE 40 MEQ: 750 CAPSULE, EXTENDED RELEASE ORAL at 10:20

## 2017-10-11 RX ADMIN — LISINOPRIL 2.5 MG: 2.5 TABLET ORAL at 08:40

## 2017-10-11 RX ADMIN — Medication 10 ML: at 20:29

## 2017-10-11 RX ADMIN — ALBUTEROL SULFATE 2.5 MG: 2.5 SOLUTION RESPIRATORY (INHALATION) at 19:07

## 2017-10-11 RX ADMIN — ALBUTEROL SULFATE 2.5 MG: 2.5 SOLUTION RESPIRATORY (INHALATION) at 14:19

## 2017-10-11 RX ADMIN — ALBUTEROL SULFATE 2.5 MG: 2.5 SOLUTION RESPIRATORY (INHALATION) at 07:27

## 2017-10-11 RX ADMIN — DEXTROSE MONOHYDRATE 75 ML/HR: 50 INJECTION, SOLUTION INTRAVENOUS at 02:10

## 2017-10-11 RX ADMIN — POTASSIUM CHLORIDE 40 MEQ: 750 CAPSULE, EXTENDED RELEASE ORAL at 18:04

## 2017-10-11 RX ADMIN — FUROSEMIDE 20 MG: 20 TABLET ORAL at 08:40

## 2017-10-11 RX ADMIN — PANTOPRAZOLE SODIUM 40 MG: 40 TABLET, DELAYED RELEASE ORAL at 06:14

## 2017-10-11 RX ADMIN — GABAPENTIN 300 MG: 300 CAPSULE ORAL at 16:24

## 2017-10-11 RX ADMIN — GABAPENTIN 300 MG: 300 CAPSULE ORAL at 08:40

## 2017-10-11 RX ADMIN — ACETAMINOPHEN 1000 MG: 500 TABLET ORAL at 11:58

## 2017-10-11 RX ADMIN — Medication 0.5 MG: at 18:04

## 2017-10-11 RX ADMIN — ACETAMINOPHEN 1000 MG: 500 TABLET ORAL at 18:00

## 2017-10-11 NOTE — DISCHARGE SUMMARY
[address] [address]  T - [phone]     DISCHARGE SUMMARY         PATIENT  DEMOGRAPHICS   PATIENT NAME: Anahi Calix                      PHYSICIAN: Diego Mary MD  : 1940  MRN: [id number]    ADMISSION/DISCHARGE INFO   ADMISSION DATE: 2017   DISCHARGE DATE: 10/11/2017    ADMISSION DIAGNOSES: Essential hypertension [I10]  Peripheral arterial disease [I73.9]  Fall, initial encounter [W19.XXXA]  Closed left hip fracture, initial encounter [S72.002A]  Gastroesophageal reflux disease, esophagitis presence not specified [K21.9]  Abdominal aortic aneurysm (AAA) without rupture [I71.4]  Displaced intertrochanteric fracture of left femur, initial encounter for closed fracture [S72.142A]  DISCHARGE DIAGNOSES:     Principal Problem:    Displaced intertrochanteric fracture of left femur, initial encounter for closed fracture  Active Problems:    Peripheral arterial disease    CAD (coronary artery disease)    Hypertension    Chronic bronchitis    Essential hypertension    Fall    Tobacco dependence syndrome    Atrial fibrillation      SERVICE:  Medicine       CONSULTS   Consult Orders (all)     Start     Ordered    10/10/17 0919  Inpatient Consult to Rehab Admission  Once     Provider:  (Not yet assigned)    10/10/17 0918    10/04/17 1013  Inpatient Consult to Nutrition Services  Once     Provider:  (Not yet assigned)    10/04/17 1014    10/02/17 1206  Inpatient Consult to Case Management   Once     Provider:  (Not yet assigned)    10/02/17 1206    10/02/17 0858  Inpatient Consult to Cardiology  Once     Specialty:  Cardiology  Provider:  Ajit Calvert MD    10/02/17 0858    10/02/17 0848  Inpatient Consult to Pulmonology  Once     Specialty:  Pulmonary Disease  Provider:  Lenin Padgett MD    10/02/17 0847    17 1915  Inpatient Consult to Orthopedic Surgery  Once     Specialty:  Orthopedic Surgery  Provider:  Nicola  Marietta Rich MD    09/30/17 1914 09/30/17 1727  Hospitalist (on-call MD unless specified)  Once     Specialty:  Hospitalist  Provider:  Cipriano Leigh MD    09/30/17 1727    Signed and Held  Inpatient Consult to Rehab Admission  Once     Provider:  (Not yet assigned)    Signed and Held          PROCEDURES     Imaging Results (last 24 hours)     ** No results found for the last 24 hours. **          Xr Elbow 3+ View Left    Result Date: 9/30/2017  Narrative: Patient Name:  MRS. ALIYA VARGAS Patient ID:  [id number] Ordering:  TAMMY OMER Attending:  TAMMY OMER Referring:  TAMMY OMER ------------------------------------------------ Three view left elbow HISTORY: Pain after falling AP, lateral and oblique views obtained. COMPARISON: None No acute fracture or dislocation. No joint effusion. No other osseous or articular abnormality.     Impression: CONCLUSION: No acute fracture or dislocation [id number] Electronically signed by:  Sammy Marie MD  9/30/2017 5:04 PM CDT Workstation: [id number]    Us Guided Vascular Access    Result Date: 10/2/2017  Narrative: This procedure was auto-finalized with no dictation required.    Xr Chest 1 View    Result Date: 10/7/2017  Narrative: PROCEDURE: Single chest view AP REASON FOR EXAM:Atelectasis, S72.142A Displaced intertrochanteric fracture of left femur, initial encounter for closed fracture S72.002A Fracture of unspecified part of neck of left femur, initial encounter for closed fracture W19.XXXA Unspecified fall, initial encounter I73.9 Peripheral vascular disease, unspecified I10 Essential (primary) hypertension K21.9 Gastro-esophageal reflux disease without esophagitis I FINDINGS: Comparison exam dated October 5, 2017. Interval removal of ET tube and NG tube. Right PICC line appears stable with tip overlying the SVC. Cardiac and pulmonary vasculature are normal. Left lung base small linear opacity. Lungs otherwise clear. Pleural spaces are normal. No acute osseous  abnormality.     Impression: 1.  Left lung base small linear opacities suspicious for discoid atelectasis versus less likely early pneumonia. 2.  Otherwise negative chest. Electronically signed by:  Eliseo Smith MD  10/7/2017 10:19 AM CDT Workstation: [id number]    Xr Chest 1 View    Result Date: 10/5/2017  Narrative: Exam: AP portable chest INDICATION: On ventilator COMPARISON: 10/2/2015 FINDINGS: Endotracheal tube NG tube and right PICC line remain in place. Heart size is normal. Persistent hazy opacity left lung compatible pleural effusion with atelectasis and/or infiltrate. Right lung remains clear.     Impression: No significant change. Electronically signed by:  Donny Meneses MD  10/5/2017 6:12 AM CDT Workstation: [id number]    Xr Chest 1 View    Result Date: 9/30/2017  Narrative: Patient Name:  MRS. ALIYA VARGAS Patient ID:  [id number] Ordering:  TAMMY OMER Attending:  TAMMY OMER Referring:  TAMMY OMER ------------------------------------------------ PORTABLE CHEST HISTORY: Fell. Left hip fracture. Portable AP supine film of the chest was obtained at 4:48 PM. COMPARISON: September 9, 2016 EKG leads. The lungs are clear of an acute process. The heart is not enlarged. The pulmonary vasculature is not increased. No pleural effusion. No pneumothorax. No acute osseous abnormality. Hypertrophic change right acromioclavicular joint.     Impression: CONCLUSION: No Acute Disease [id number] Electronically signed by:  Sammy Marie MD  9/30/2017 5:12 PM CDT Workstation: [id number]    Xr Hip With Or Without Pelvis 2 - 3 View Left    Result Date: 9/30/2017  Narrative: [facility] Patient Name: ALIYA VARGAS ORDERING: TAMMY OMER ATTENDING: TAMMY OMER REFERRING: TAMMY OMER Two view left hip with single view pelvis HISTORY: Fell AP and stable lateral views of the left hip and AP film of the pelvis obtained. COMPARISON: None Comminuted displaced intertrochanteric and subtrochanteric fracture  of the left hip. No dislocation of the femoral head. Degenerative changes and degenerative disc disease lumbar spine. Degenerative change greater trochanter each hip. Previously demonstrated abdominal aortic aneurysm. Surgical clips overlie the lumbar spine. Pelvic phleboliths.     Impression: CONCLUSION: Comminuted displaced intertrochanteric and subtrochanteric fracture of the left hip. Previously demonstrated abdominal aortic aneurysm. [id number] Electronically signed by:  Sammy Marie MD  9/30/2017 5:07 PM CDT Workstation: [email or web address]    Xr Chest Post Cva Port    Result Date: 10/2/2017  Narrative: PROCEDURE: Single chest view AP REASON FOR EXAM:Post PICC placement., S72.142A Displaced intertrochanteric fracture of left femur, initial encounter for closed fracture S72.002A Fracture of unspecified part of neck of left femur, initial encounter for closed fracture W19.XXXA Unspecified fall, initial encounter I73.9 Peripheral vascular disease, unspecified I10 Essential (primary) hypertension K21.9 Gastro-esophageal reflux disease without esop FINDINGS: Comparison exam dated September 30, 2017. ET tube with tip 5.6 cm above keo. NG tube with tip below level diaphragm. Right PICC line with tip overlying the SVC. Cardiac size appears within normal limits. Vague left infrahilar lung base perihilar haziness. Lungs otherwise clear. Pleural spaces are normal. No acute osseous abnormality.     Impression: 1.  Tubes and lines as described above. 2.  Vague left infrahilar lung base perihilar haziness. This may represent very early atypical pulmonary edema versus subsegmental atelectasis or pneumonia. Electronically signed by:  Eliseo Smith MD  10/2/2017 10:54 AM CDT Workstation: [id number]    Ir Picc Wo Fluoro Guidance    Result Date: 10/2/2017  Narrative: This procedure was auto-finalized with no dictation required.      HISTORY OF PRESENT ILLNESS   Anahi Calix is a 77 y.o. female admitted for left-sided hip pain.  Patient  tripped fell and injured her left hip.  She sustained a left hip fracture requiring surgical fixation.    DIAGNOSTIC DATA   X-ray    HOSPITAL COURSE   Patient was found to have subtrochanteric fracture of the left hip, she was then subsequently taken to the OR for fixation which was performed by Dr. Rich.  Patient tolerated procedure very well she did was transferred to ICU.  Patient required ventilation therefore Dr. Padgett was consulted.  Patient will also found to have new onset of A. fib after Ben was also consulted.  Patient required electrical cardioversion for atrial fibrillation with RVR associated with hypotension.  Patient continued to be hypertension in the ICU and required further pressors. Pt was further evaluated for pneumonia, patient's sputum culture was positive for gram negative diplococci therefore patient was started on Zosyn.  Patient was extubated on 10/6/2017.  Patient was then subsequently transferred to medical floor.  On medical floor patient worked with PT OT she continued tolerate her activity.  Patient did have some bruises on her back secondary to her fall that she had taken at home.  Patient is currently doing very well.  Patient will be going to the RTU for further rehabilitation.  She is mildly hypokalemic to visit today we'll be replacing potassium today.  No discharge patient denies any shortness of air, abdominal pain, diarrhea, or constipation.  Patient denies any chest pain blurry vision, headache, or dizziness.      Physical Exam   Constitutional: She is oriented to person, place, and time. She appears well-developed and well-nourished.   HENT:   Head: Normocephalic.   Eyes: EOM are normal. Pupils are equal, round, and reactive to light.   Neck: Normal range of motion.   Cardiovascular: Normal rate, regular rhythm and normal heart sounds.    Pulmonary/Chest: Effort normal and breath sounds normal.   Abdominal: Soft. Bowel sounds are normal.   Neurological: She is alert and  oriented to person, place, and time.   Skin: Skin is warm.   Psychiatric: She has a normal mood and affect. Her behavior is normal.   Vitals reviewed.       DISCHARGE CONDITION   Stable    DISPOSITION   To ARU     DISCHARGE MEDICATIONS      Anahi Calix   Home Medication Instructions OWEN:[id number]    Printed on:10/11/17 9583   Medication Information                      amoxicillin-clavulanate (AUGMENTIN) 500-125 MG per tablet  Take 1 tablet by mouth 2 (Two) Times a Day.             aspirin 81 MG EC tablet  Take 81 mg by mouth daily.             atorvastatin (LIPITOR) 80 MG tablet  Take 1 tablet by mouth Daily.             azithromycin (ZITHROMAX) 250 MG tablet  Take 2 tablets the first day, then 1 tablet daily for 4 days.             calcium acetate (PHOSLO) 667 MG tablet  Take 667 mg by mouth every day.             Cholecalciferol (GNP VITAMIN D SUPER STRENGTH) 5000 UNITS tablet  Take  by mouth.             clopidogrel (PLAVIX) 75 MG tablet  Take 1 tablet by mouth Daily.             furosemide (LASIX) 20 MG tablet  Take 1 tablet by mouth Daily. As needed for edema             gabapentin (NEURONTIN) 300 MG capsule  Take 1 capsule by mouth 3 (Three) Times a Day.             hydrochlorothiazide (MICROZIDE) 12.5 MG capsule  Take 12.5 mg by mouth Daily.             ibuprofen (ADVIL,MOTRIN) 400 MG tablet  Take 1 tablet by mouth Every 6 (Six) Hours As Needed for mild pain (1-3).             lisinopril (PRINIVIL,ZESTRIL) 40 MG tablet  Take 1 tablet by mouth Daily.             loratadine (CLARITIN) 10 MG tablet  Take 10 mg by mouth daily.             metoprolol tartrate (LOPRESSOR) 50 MG tablet  Take 1 tablet by mouth 2 (Two) Times a Day.             Multiple Vitamins-Minerals (VISION FORMULA PO)  Take 1 tablet by mouth every day.             omeprazole (PriLOSEC) 20 MG capsule  Take 1 capsule by mouth Daily.             polyethylene glycol (MIRALAX) powder  ONE CAPFUL IN 8 OUNCES OF LIQUID ONCE DAILY UP TO THREE  TIMES DAILY AS NEEDED.             vitamin D (ERGOCALCIFEROL) 58487 UNITS capsule capsule  Take 50,000 Units by mouth daily.                   INSTRUCTIONS   Activity: as tolerated     Diet: cardiac    Special Instructions: Patient instructed to call M.D. or return to ED with worsening shortness of breath, chest pain, fever greater than 100.4°F or any other medical concerns.    FOLLOW UP   Follow-up Information     Follow up with [facility] .    Specialty:  Physical Medicine and Rehabilitation    Contact information:    [address]  [facility] [address] [phone]        Follow up with Nicola Horton MD .    Specialty:  Orthopedic Surgery    Why:  in rehab.     Contact information:    [address]  [address]  [place] [id number] [phone]          Follow up with Gamal Lara MD Follow up in 1 week(s).    Specialty:  Orthopedic Surgery    Why:  Follow up.     Contact information:    [address]  [address]  [place] [id number] [phone]          Follow up with Ramiro Gloria MD .    Specialties:  Family Medicine, Emergency Medicine    Contact information:    [address] [id number] [phone]           PENDING TEST RESULTS AT DISCHARGE      TIME   Time: 45 minutes were spent planning this discharge.                      This document has been electronically signed by Diego Mary MD on October 11, 2017 2:23 PM

## 2017-10-11 NOTE — PROGRESS NOTES
ORTHOPEDIC PROGRESS NOTE:    Name:  Anahi Calix  Date:    10/11/2017  Date of admission:  9/30/2017    Post op day:  10 Days Post-Op  Procedure:    Procedure(s) (LRB):  HIP INTERTROCHANTERIC NAILING - LEFT - [place] (Left)    Subjective:    No new complaints, improving with PT.    Vitals:    Vitals:    10/11/17 0300   BP: 135/64   Pulse: 50   Resp: 18   Temp: 96.7 °F (35.9 °C)   SpO2: 98%       Exam:    Awake, alert, oriented.  Toes up and down  Good distal pulses and sensation.  Incisions clean and dry    ASSESSMENT:  Hospital Problem List     * (Principal)Displaced intertrochanteric fracture of left femur, initial encounter for closed fracture    Overview Addendum 10/9/2017  2:54 PM by Diego Mary MD     POD# 8.  Management per orthopedics.         Peripheral arterial disease (Chronic)    CAD (coronary artery disease) (Chronic)        Hypertension (Chronic)    Overview Signed 10/2/2017  8:45 AM by Ata Farris MD     Has been hypotensive over the last 24 hours intermittently.  Decreased Lisinopril from 40 mg daily to 2.5 mg daily.  Holding lopressor right now.  On Cardizem drip for atrial fibrillation.             Chronic bronchitis (Chronic)    Overview Signed 10/2/2017  8:45 AM by Ata Farris MD     Currently intubated.  Unable to be extubated yesterday.           Essential hypertension    Overview Signed 9/30/2017  6:58 PM by Nicola Rich MD     Added automatically from request for surgery [id number]             Fall    Overview Addendum 10/2/2017  8:45 AM by Ata Farris MD     Fracture s/p repair per orthopedics.         Tobacco dependence syndrome    Overview Addendum 10/9/2017  2:55 PM by Diego Mary MD     Nicotine patch.   Counseling provided               Atrial fibrillation    Overview Addendum 10/9/2017  2:54 PM by Diego Mary MD     New onset.  S/p cardioversion   On coumadin now.   Amiodarone                  PLAN:    Continue to mobilize  Continue to use dvt prophylaxis  ARU  great option.      10/11/17 at 6:59 AM by Nicola Rich MD

## 2017-10-11 NOTE — PLAN OF CARE
Problem: Patient Care Overview (Adult)  Goal: Plan of Care Review  Outcome: Ongoing (interventions implemented as appropriate)    10/11/17 0416   Coping/Psychosocial Response Interventions   Plan Of Care Reviewed With patient   Patient Care Overview   Progress improving       Goal: Adult Individualization and Mutuality  Outcome: Ongoing (interventions implemented as appropriate)  Goal: Discharge Needs Assessment  Outcome: Ongoing (interventions implemented as appropriate)    Problem: Orthopaedic Fracture (Adult)  Goal: Signs and Symptoms of Listed Potential Problems Will be Absent or Manageable (Orthopaedic Fracture)  Outcome: Ongoing (interventions implemented as appropriate)    Problem: Fall Risk (Adult)  Goal: Identify Related Risk Factors and Signs and Symptoms  Outcome: Ongoing (interventions implemented as appropriate)  Goal: Absence of Falls  Outcome: Ongoing (interventions implemented as appropriate)    Problem: Pressure Ulcer Risk (Keith Scale) (Adult,Obstetrics,Pediatric)  Goal: Identify Related Risk Factors and Signs and Symptoms  Outcome: Outcome(s) achieved Date Met:  10/11/17  Goal: Skin Integrity  Outcome: Ongoing (interventions implemented as appropriate)

## 2017-10-11 NOTE — PLAN OF CARE
Problem: Inpatient Occupational Therapy  Goal: Transfer Training Goal 1 LTG- OT  Outcome: Unable to achieve outcome(s) by discharge Date Met:  10/11/17    10/06/17 1155 10/11/17 1621   Transfer Training OT LTG   Transfer Training OT LTG, Date Established 10/06/17 --    Transfer Training OT LTG, Time to Achieve by discharge --    Transfer Training OT LTG, Activity Type all transfers --    Transfer Training OT LTG, [place] Level supervision required;contact guard assist --    Transfer Training OT LTG, Assist Device walker, rolling --    Transfer Training OT LTG, Date Goal Reviewed --  10/11/17   Transfer Training OT LTG, Outcome --  goal not met   Transfer Training OT LTG, Reason Goal Not Met --  discharged from facility       Goal: Range of Motion Goal LTG- OT  Outcome: Unable to achieve outcome(s) by discharge Date Met:  10/11/17    10/06/17 1155 10/11/17 1621   Range of Motion OT LTG   Range of Motion Goal OT LTG, Date Established 10/06/17 --    Range of Motion Goal OT LTG, Time to Achieve by discharge --    Range of Motion Goal OT LTG, AROM Measure Full AROM to all UE joints to increase independence with ADLs. --    Range of Motion Goal OT LTG, Date Goal Reviewed --  10/11/17   Range of Motion Goal OT LTG, Outcome --  goal not met   Reason Goal Not Met (ROM) OT LTG --  discharged from facility       Goal: Dynamic Sitting Balance Goal LTG- OT  Outcome: Unable to achieve outcome(s) by discharge Date Met:  10/11/17    10/06/17 1155 10/11/17 1621   Dynamic Sitting Balance OT LTG   Dynamic Sitting Balance OT LTG, Date Established 10/06/17 --    Dynamic Sitting Balance OT LTG, Time to Achieve by discharge --    Dynamic Sitting Balance OT LTG, [place] Level contact guard assist  (15 minutes with functional activity.) --    Dynamic Sitting Balance OT LTG, Assist Device bed rails --    Dynamic Sitting Balance OT LTG, Date Goal Reviewed --  10/11/17   Dynamic Sitting Balance OT LTG, Outcome --  goal not met    Dynamic Sitting Balance OT LTG, Reason Goal Not Met --  discharged from facility       Goal: ADL Goal LTG- OT  Outcome: Unable to achieve outcome(s) by discharge Date Met:  10/11/17    10/06/17 1155 10/11/17 1621   ADL OT LTG   ADL OT LTG, Date Established 10/06/17 --    ADL OT LTG, Time to Achieve by discharge --    ADL OT LTG, Activity Type ADL skills  (Sponge bath and dress when appropriate.) --    ADL OT LTG, [place] Level min assist --    ADL OT LTG, Date Goal Reviewed --  10/11/17   ADL OT LTG, Outcome --  goal not met   ADL OT LTG, Reason Goal Not Met --  discharged from facility

## 2017-10-11 NOTE — THERAPY TREATMENT NOTE
Acute Care - Occupational Therapy Treatment Note  [facility]     Patient Name: Anahi Calix  : 1940  MRN: [id number]  Today's Date: 10/11/2017  Onset of Illness/Injury or Date of Surgery Date: 17  Date of Referral to OT: 10/01/17  Referring Physician: Dr. Nicola Rich      Admit Date: 2017    Visit Dx:     ICD-10-CM ICD-9-CM   1. Displaced intertrochanteric fracture of left femur, initial encounter for closed fracture S72.142A 820.21   2. Closed left hip fracture, initial encounter S72.002A 820.8   3. Fall, initial encounter W19.XXXA E888.9   4. Peripheral arterial disease I73.9 443.9   5. Essential hypertension I10 401.9   6. Gastroesophageal reflux disease, esophagitis presence not specified K21.9 530.81   7. Abdominal aortic aneurysm (AAA) without rupture I71.4 441.4   8. Impaired mobility and activities of daily living Z74.09 799.89   9. Impaired physical mobility Z74.09 781.99     Patient Active Problem List   Diagnosis   • Peripheral arterial disease   • Gastroesophageal reflux disease   • Abdominal aortic aneurysm (AAA) without rupture   • Displaced intertrochanteric fracture of left femur, initial encounter for closed fracture   • CAD (coronary artery disease)   • Hypertension   • Chronic bronchitis   • Essential hypertension   • Fall   • Tobacco dependence syndrome   • Atrial fibrillation             Adult Rehabilitation Note       10/11/17 1015 10/11/17 0836 10/10/17 1455    Rehab Assessment/Intervention    Discipline physical therapy assistant  -AM occupational therapy assistant  -CS physical therapy assistant  -AM    Document Type therapy note (daily note)  -AM therapy note (daily note)  -CS therapy note (daily note)  -AM    Subjective Information agree to therapy;complains of;pain  -AM agree to therapy  -CS agree to therapy;no complaints  -AM    Patient Effort, Rehab Treatment good  -AM good  -CS good  -AM    Symptoms Noted During/After Treatment increased pain  -AM  none  -AM     Precautions/Limitations fall precautions  -AM  fall precautions  -AM    Equipment Issued to Patient gait belt  -AM  gait belt  -AM    Recorded by [AM] Tree Corrales PTA [CS] MELO Lemus/SADIA [AM] Tree Corrales PTA    Vital Signs    Pre Systolic BP Rehab 161  -  -  -AM    Pre Treatment Diastolic BP 71  -AM 54  -CS 58  -AM    Post Systolic BP Rehab 127  -AM      Post Treatment Diastolic BP 58  -AM      Pretreatment Heart Rate (beats/min) 87  -  -CS 71  -AM    Posttreatment Heart Rate (beats/min) 78  -AM      Pre SpO2 (%) 95  -AM 91  -CS 93  -AM    O2 Delivery Pre Treatment room air  -AM room air  -CS room air  -AM    Post SpO2 (%) 95  -AM      O2 Delivery Post Treatment room air  -AM      Pre Patient Position Sitting  -AM Supine  -CS Sitting  -AM    Intra Patient Position Standing  -AM Standing  -CS Standing  -AM    Post Patient Position Sitting  -AM Sitting  -CS Supine  -AM    Recorded by [AM] Tree Corrales PTA [CS] MELO Lemus/SADIA [AM] Tree Corrales PTA    Pain Assessment    Pain Assessment 0-10  -AM 0-10  -CS No/denies pain  -AM    Pain Score 0  -AM 2  -CS     Post Pain Score 5  -AM 3  -CS     Pain Type Acute pain;Surgical pain  -AM Acute pain;Surgical pain  -CS     Pain Location Hip  -AM Hip  -CS     Pain Orientation Left  -AM Left  -CS     Pain Descriptors Sore  -AM      Pain Frequency Constant/continuous  -AM      Date Pain First Started 09/30/17  -AM      Clinical Progression Gradually improving  -AM      Patient's Stated Pain Goal No pain  -AM      Pain Intervention(s) Medication (See MAR);Cold applied;Ambulation/increased activity  -AM      Result of Injury Yes  -AM      Work-Related Injury No  -AM      Multiple Pain Sites No  -AM      Recorded by [AM] Tree Corrales PTA [CS] MELO Lemus/SADIA [AM] Tree Corrales PTA    Cognitive Assessment/Intervention    Current Cognitive/Communication Assessment functional  -AM functional  -CS functional  -AM     Orientation Status oriented x 4  -AM oriented x 4  -CS oriented x 4  -AM    Follows Commands/Answers Questions 100% of the time  -% of the time  -% of the time  -AM    Personal Safety Interventions gait belt;nonskid shoes/slippers when out of bed;supervised activity  -AM gait belt;nonskid shoes/slippers when out of bed  -CS gait belt;nonskid shoes/slippers when out of bed;supervised activity  -AM    Recorded by [AM] Tree Corrales PTA [CS] LILA Lemus [AM] Tree Corrales PTA    ROM (Range of Motion)    General ROM lower extremity range of motion deficits identified  -AM  lower extremity range of motion deficits identified  -AM    Recorded by [AM] Tree Corrales PTA  [AM] Tree Corrales PTA    General LE Assessment    ROM LLE ROM was WFL  -AM  LLE ROM was WFL  -AM    Recorded by [AM] Tree Corrales PTA  [AM] Tree Corrales PTA    Mobility Assessment/Training    Extremity Weight-Bearing Status left lower extremity  -AM  left lower extremity  -AM    Left Lower Extremity Weight-Bearing weight-bearing as tolerated  -AM  weight-bearing as tolerated  -AM    Recorded by [AM] Tree Corrales PTA  [AM] Tree Corrales PTA    Bed Mobility, Assessment/Treatment    Bed Mobility, Assistive Device   bed rails;head of bed elevated  -AM    Bed Mobility, Roll Left, [place] not tested  -AM  not tested  -AM    Bed Mobility, Roll Right, [place] not tested  -AM  not tested  -AM    Bed Mobility, Scoot/Bridge, [place] not tested  -AM  not tested  -AM    Bed Mob, Supine to Sit, [place] not tested  -AM  not tested  -AM    Bed Mob, Sit to Supine, [place] not tested  -AM minimum assist (75% patient effort)  -CS moderate assist (50% patient effort)  -AM    Bed Mob, Sidelying to Sit, [place] not tested  -AM  not tested  -AM    Bed Mob, Sit to Sidelying, [place] not tested  -AM  not tested  -AM    Bed Mobility, Safety Issues   decreased use of arms for  pushing/pulling;decreased use of legs for bridging/pushing  -AM    Bed Mobility, Impairments   ROM decreased;strength decreased;pain  -AM    Recorded by [AM] Tree Corrales PTA [CS] LILA Lemus [AM] Tree Corrales PTA    Transfer Assessment/Treatment    Transfers, Bed-Chair [place] minimum assist (75% patient effort);moderate assist (50% patient effort)  -AM moderate assist (50% patient effort);2 person assist required  -CS moderate assist (50% patient effort);2 person assist required  -AM    Transfers, Chair-Bed [place] minimum assist (75% patient effort);moderate assist (50% patient effort)  -AM  moderate assist (50% patient effort);2 person assist required  -AM    Transfers, Bed-Chair-Bed, Assist Device rolling walker  -AM sliding board  -CS rolling walker  -AM    Transfers, Sit-Stand [place] moderate assist (50% patient effort)  -AM moderate assist (50% patient effort);2 person assist required  -CS moderate assist (50% patient effort);2 person assist required  -AM    Transfers, Stand-Sit [place] moderate assist (50% patient effort)  -AM moderate assist (50% patient effort);2 person assist required  -CS moderate assist (50% patient effort);2 person assist required  -AM    Transfers, Sit-Stand-Sit, Assist Device rolling walker  -AM rolling walker  -CS rolling walker  -AM    Toilet Transfer, [place] minimum assist (75% patient effort);moderate assist (50% patient effort)  -AM moderate assist (50% patient effort);2 person assist required  -CS not tested  -AM    Toilet Transfer, Assistive Device bedside commode without drop arms;rolling walker  -AM rolling walker  -CS     Walk-In Shower Transfer, [place] not tested  -AM  not tested  -AM    Bathtub Transfer, [place] not tested  -AM  not tested  -AM    Transfer, Maintain Weight Bearing Status able to maintain weight bearing status  -AM  able to maintain weight bearing status  -AM    Transfer, Safety Issues step length  decreased  -AM  step length decreased  -AM    Transfer, Impairments ROM decreased;strength decreased;pain  -AM  ROM decreased;strength decreased;pain  -AM    Recorded by [AM] Tree Corrales PTA [CS] LILA Lemus [AM] Tree Corrales PTA    Gait Assessment/Treatment    Gait, [place] Level minimum assist (75% patient effort);moderate assist (50% patient effort)  -AM  moderate assist (50% patient effort);maximum assist (25% patient effort);2 person assist required  -AM    Gait, Assistive Device rolling walker  -AM  rolling walker  -AM    Gait, Distance (Feet) 10  -AM  25   15+10  -AM    Gait, Gait Pattern Analysis 3-point gait  -AM  3-point gait  -AM    Gait, Gait Deviations bilateral:;martha decreased  -AM  bilateral:;martha decreased  -AM    Gait, Maintain Weight Bearing Status able to maintain weight bearing status  -AM  able to maintain weight bearing status  -AM    Gait, Safety Issues step length decreased  -AM  step length decreased  -AM    Gait, Impairments ROM decreased;strength decreased;pain  -AM  ROM decreased;strength decreased;pain  -AM    Recorded by [AM] Tree Corrales PTA  [AM] Tree Corrales PTA    Stairs Assessment/Treatment    Stairs, [place] Level not tested  -AM  not tested  -AM    Recorded by [AM] Tree Corrales PTA  [AM] Tree Corrales PTA    Functional Mobility    Functional Mobility- [place] Level  moderate assist (50% patient effort);2 person assist required  -CS     Functional Mobility- Device  rolling walker  -CS     Functional Mobility-Distance (Feet)  20  -CS     Recorded by  [CS] MELO Lemus/L     Upper Body Bathing Assessment/Training    UB Bathing Assess/Train Assistive Device  bath mitt  -CS     UB Bathing Assess/Train, Position  sitting  -CS     UB Bathing Assess/Train, [place] Level  set up required  -CS     Recorded by  [CS] MELO Lemus/L     Lower Body Bathing Assessment/Training    LB Bathing Assess/Train Assistive Device   bath mitt  -CS     LB Bathing Assess/Train, Position  sitting;standing  -CS     LB Bathing Assess/Train, [place] Level  moderate assist (50% patient effort)  -CS     Recorded by  [CS] LILA Lemus     Upper Body Dressing Assessment/Training    UB Dressing Assess/Train, Clothing Type  doffing:;donning:;hospital gown  -CS     UB Dressing Assess/Train, Position  sitting  -CS     UB Dressing Assess/Train, [place]  set up required  -CS     Recorded by  [CS] LILA Lemus     Lower Body Dressing Assessment/Training    LB Dressing Assess/Train, Clothing Type  doffing:;donning:;slipper socks  -CS     LB Dressing Assess/Train, Assist Device  reacher;sock-aid;dressing stick  -CS     LB Dressing Assess/Train, Position  sitting  -CS     LB Dressing Assess/Train, [place]  minimum assist (75% patient effort)  -CS     Recorded by  [CS] LILA Lemus     Toileting Assessment/Training    Toileting Assess/Train, Assistive Device  bedside commode  -CS     Toileting Assess/Train, Position  sitting;standing  -CS     Toileting Assess/Train, [place] Level  moderate assist (50% patient effort)  -CS     Recorded by  [CS] LILA Lemus     Grooming Assessment/Training    Grooming Assess/Train, Position  sitting  -CS     Grooming Assess/Train, [place] Level  set up required  -CS     Recorded by  [CS] LILA Lemus     Therapy Exercises    Bilateral Lower Extremities AROM:;20 reps;supine;ankle pumps/circles;glut sets;quad sets  -AM      Bilateral Upper Extremity  AROM:;20 reps;sitting;elbow flexion/extension;hand pumps;pronation/supination;shoulder extension/flexion;shoulder ER/IR  -CS     BUE Resistance  manual resistance- minimal  -CS     Recorded by [AM] Tree Corrales PTA [CS] LILA Lemus     Positioning and Restraints    Pre-Treatment Position sitting in chair/recliner  -AM in bed  -CS sitting in chair/recliner  -AM    Post Treatment Position chair  -AM  chair  -CS bed  -AM    In Bed   supine;call light within reach;encouraged to call for assist;exit alarm on;with family/caregiver  -AM    In Chair reclined;call light within reach;encouraged to call for assist;legs elevated  -AM sitting;call light within reach  -CS     Recorded by [AM] Tree Corrales PTA [CS] LILA Lemus [AM] Tree Corrales PTA      10/10/17 1402 10/10/17 1110 10/10/17 0920    Rehab Assessment/Intervention    Discipline occupational therapy assistant  -SATISH occupational therapy assistant  -CS physical therapy assistant  -AM    Document Type therapy note (daily note)  -SATISH therapy note (daily note)  -CS therapy note (daily note)  -AM    Subjective Information agree to therapy  -CS agree to therapy  -CS agree to therapy;complains of;pain  -AM    Patient Effort, Rehab Treatment   good  -AM    Symptoms Noted During/After Treatment fatigue  -CS  fatigue  -AM    Precautions/Limitations fall precautions  -CS  fall precautions;other (see comments)   WBAT LLE  -AM    Equipment Issued to Patient   gait belt  -AM    Recorded by [CS] MELO Lemus/SADIA [CS] MELO Lemus/SADIA [AM] Tree Corrales PTA    Vital Signs    Pre Systolic BP Rehab  129  -  -AM    Pre Treatment Diastolic BP  61  -CS 62  -AM    Post Systolic BP Rehab   123  -AM    Post Treatment Diastolic BP   89  -AM    Pretreatment Heart Rate (beats/min) 87  -CS 66  -CS 75  -AM    Posttreatment Heart Rate (beats/min)   76  -AM    Pre SpO2 (%) 93  -CS 92  -CS 93  -AM    O2 Delivery Pre Treatment room air  -CS room air  -CS room air  -AM    Post SpO2 (%)   93  -AM    O2 Delivery Post Treatment   room air  -AM    Pre Patient Position Sitting  -CS Sitting  -CS Supine  -AM    Intra Patient Position Sitting  -CS Sitting  -CS Standing  -AM    Post Patient Position Sitting  -CS Sitting  -CS Sitting  -AM    Recorded by [CS] LILA Lemus [CS] LILA Lemus [AM] Tree Corrales PTA    Pain Assessment    Pain  Assessment 0-10  -CS 0-10  -CS 0-10  -AM    Pain Score 1  -CS 2  -CS 0  -AM    Post Pain Score 2  -CS 1  -CS 2  -AM    Pain Type Acute pain;Surgical pain  -CS Acute pain;Surgical pain  -CS Acute pain;Surgical pain  -AM    Pain Location Hip  -CS Hip  -CS Hip  -AM    Pain Orientation Left  -CS Left  -CS Left  -AM    Pain Descriptors   Sore  -AM    Pain Frequency   Intermittent  -AM    Date Pain First Started   09/30/17  -AM    Clinical Progression   Gradually improving  -AM    Patient's Stated Pain Goal   No pain  -AM    Pain Intervention(s)  Medication (See MAR)  -CS Medication (See MAR);Cold applied;Ambulation/increased activity  -AM    Result of Injury   Yes  -AM    Work-Related Injury   No  -AM    Multiple Pain Sites   No  -AM    Recorded by [CS] MELO Lemus/SADIA [CS] MELO Lemus/SADIA [AM] Tree Corrales PTA    Cognitive Assessment/Intervention    Current Cognitive/Communication Assessment functional  -CS functional  -CS functional  -AM    Orientation Status oriented x 4  -CS oriented x 4  -CS oriented x 4  -AM    Follows Commands/Answers Questions 100% of the time  -% of the time  -% of the time  -AM    Personal Safety Interventions gait belt;nonskid shoes/slippers when out of bed  -CS gait belt;nonskid shoes/slippers when out of bed  -CS gait belt;nonskid shoes/slippers when out of bed;supervised activity  -AM    Recorded by [CS] MELO Lemus/SADIA [CS] MELO Lemus/SADIA [AM] Tree Corrales PTA    ROM (Range of Motion)    General ROM   lower extremity range of motion deficits identified  -AM    Recorded by   [AM] Tree Corrales PTA    General LE Assessment    ROM   LLE ROM was WFL  -AM    Recorded by   [AM] Tree Corrales PTA    Mobility Assessment/Training    Extremity Weight-Bearing Status   left lower extremity  -AM    Left Lower Extremity Weight-Bearing   weight-bearing as tolerated  -AM    Recorded by   [AM] Tree Corrales PTA    Bed Mobility,  Assessment/Treatment    Bed Mobility, Assistive Device   bed rails;head of bed elevated  -AM    Bed Mobility, Roll Left, [place]   not tested  -AM    Bed Mobility, Roll Right, [place]   not tested  -AM    Bed Mobility, Scoot/Bridge, [place]   not tested  -AM    Bed Mob, Supine to Sit, [place]   minimum assist (75% patient effort)  -AM    Bed Mob, Sit to Supine, [place]   not tested  -AM    Bed Mob, Sidelying to Sit, [place]   not tested  -AM    Bed Mob, Sit to Sidelying, [place]   not tested  -AM    Bed Mobility, Safety Issues   decreased use of arms for pushing/pulling;decreased use of legs for bridging/pushing  -AM    Bed Mobility, Impairments   ROM decreased;strength decreased;pain  -AM    Recorded by   [AM] Tree Corrales PTA    Transfer Assessment/Treatment    Transfers, Bed-Chair [place]   moderate assist (50% patient effort);2 person assist required  -AM    Transfers, Chair-Bed [place]   moderate assist (50% patient effort);2 person assist required  -AM    Transfers, Bed-Chair-Bed, Assist Device   rolling walker  -AM    Transfers, Sit-Stand [place]   moderate assist (50% patient effort);2 person assist required  -AM    Transfers, Stand-Sit [place]   moderate assist (50% patient effort);2 person assist required  -AM    Transfers, Sit-Stand-Sit, Assist Device   rolling walker  -AM    Toilet Transfer, [place]   not tested  -AM    Walk-In Shower Transfer, [place]   not tested  -AM    Bathtub Transfer, [place]   not tested  -AM    Transfer, Maintain Weight Bearing Status   able to maintain weight bearing status  -AM    Transfer, Safety Issues   step length decreased  -AM    Transfer, Impairments   ROM decreased;strength decreased;pain  -AM    Recorded by   [AM] Tree Corrales PTA    Gait Assessment/Treatment    Gait, [place] Level   moderate assist (50% patient effort);maximum assist (25% patient effort);2 person assist required  -AM     Gait, Assistive Device   rolling walker  -AM    Gait, Distance (Feet)   6  -AM    Gait, Gait Pattern Analysis   3-point gait  -AM    Gait, Gait Deviations   bilateral:;[place] decreased  -AM    Gait, Maintain Weight Bearing Status   able to maintain weight bearing status  -AM    Gait, Safety Issues   step length decreased  -AM    Gait, Impairments   ROM decreased;strength decreased;pain  -AM    Recorded by   [AM] Tree Corrales PTA    Stairs Assessment/Treatment    Stairs, [place] Level   not tested  -AM    Recorded by   [AM] Tree Corrales PTA    Self-Feeding Assessment/Training    Self-Feeding Assess/Train, Position  supported sitting  -CS     Self-Feeding Assess/Train, [place]  independent  -CS     Recorded by  [CS] LILA Lemus     Therapy Exercises    Bilateral Lower Extremities   AROM:;20 reps;supine;ankle pumps/circles;glut sets;heel slides;quad sets  -AM    Bilateral Upper Extremity AROM:;20 reps;sitting;elbow flexion/extension;hand pumps;pronation/supination;shoulder extension/flexion;shoulder ER/IR   UEE for 10 mins with 1 rest break.  -CS AROM:;20 reps;sitting;elbow flexion/extension;hand pumps;pronation/supination;shoulder extension/flexion  -CS     BUE Resistance manual resistance- minimal  -CS manual resistance- minimal  -CS     Recorded by [CS] LILA Lemus [CS] LILA Lemus [AM] Tree Corrales PTA    Fine Motor Coordination Training    Detail (Fine Motor Coordination Training) [facility] training- Pt completed red putty task this date.  -CS      Recorded by [CS] LILA Lemus      Positioning and Restraints    Pre-Treatment Position sitting in chair/recliner  -CS sitting in chair/recliner  -CS in bed  -AM    Post Treatment Position chair  -CS chair  -CS chair  -AM    In Chair reclined;call light within reach;with family/caregiver  -CS reclined;call light within reach  -CS reclined;call light within reach;encouraged to call for assist;legs  elevated  -AM    Recorded by [CS] LILA Lemus [CS] MELO Lemus/SADIA [AM] Tree Corrales, PTA      10/09/17 1355 10/09/17 1305 10/09/17 0954    Rehab Assessment/Intervention    Discipline occupational therapist  -SG physical therapist  -BLAIR     Document Type therapy note (daily note)  -SG progress note  -BLAIR progress note  -BLAIR    Subjective Information agree to therapy;complains of;pain   Left side of back from a fall.    -SG agree to therapy  -BLAIR     Patient Effort, Rehab Treatment good  -SG good  -BLAIR     Precautions/Limitations fall precautions;oxygen therapy device and L/min  -SG fall precautions;oxygen therapy device and L/min;other (see comments)   vital signs  -BLAIR     Precautions/Limitations, Vision WFL with corrective lenses  -SG      Recorded by [SG] Anette Peña, OT [BLAIR] Jadyn Nelson, PT [BLAIR] Jadyn Nelson, PT    Vital Signs    Pre Systolic BP Rehab 160  -  -BLAIR     Pre Treatment Diastolic BP 77  -SG 55  -BLAIR     Post Systolic BP Rehab 142  -  -BLAIR     Post Treatment Diastolic BP 63  -SG 66  -BLAIR     Pretreatment Heart Rate (beats/min) 72  -SG 72  -BLAIR     Posttreatment Heart Rate (beats/min) 80  -SG 83  -BLAIR     Pre SpO2 (%) 95  -SG 95  -BLAIR     O2 Delivery Pre Treatment supplemental O2  -SG supplemental O2  -BLAIR     Intra SpO2 (%)  91  -BLAIR     O2 Delivery Intra Treatment  supplemental O2  -BLAIR     Post SpO2 (%) 94  -SG 94  -BLAIR     O2 Delivery Post Treatment supplemental O2  -SG supplemental O2  -BLAIR     Pre Patient Position Sitting  -SG Supine  -BLAIR     Intra Patient Position Sitting  -SG Sitting  -BLAIR     Post Patient Position Sitting  -SG Sitting  -BLAIR     Recorded by [SG] Anette Peña OT [BLAIR] Jadyn Nelson, PT     Pain Assessment    Pain Assessment 0-10  -SG 0-10  -BLAIR     Pain Score 4  -SG 0  -BLAIR     Post Pain Score 4  -SG 2  -BLAIR     Pain Type Deep somatic pain  -SG      Pain Location Back  -SG Hip  -BLAIR     Pain Orientation Left;Mid;Outer   under shoulder blade, to left of armpit.   Nursing informed.   -SG Left  -BLAIR     Pain Intervention(s)  Repositioned;Ambulation/increased activity  -BLAIR     Recorded by [SG] Anette Peña, OT [BLAIR] Jadyn Nelson PT     Cognitive Assessment/Intervention    Current Cognitive/Communication Assessment  functional  -BLAIR     Orientation Status  oriented x 4  -BLAIR     Follows Commands/Answers Questions  100% of the time  -BLAIR     Personal Safety  mild impairment  -BLAIR     Personal Safety Interventions  gait belt;nonskid shoes/slippers when out of bed;supervised activity  -BLAIR     Recorded by  [BLAIR] Jadyn Nelson PT     Mobility Assessment/Training    Extremity Weight-Bearing Status  left lower extremity  -BLAIR     Left Lower Extremity Weight-Bearing  weight-bearing as tolerated  -LBAIR     Recorded by  [BLAIR] Jadyn Nelson PT     Bed Mobility, Assessment/Treatment    Bed Mobility, Assistive Device  bed rails;head of bed elevated  -BLAIR     Bed Mob, Supine to Sit, [place]  moderate assist (50% patient effort);2 person assist required  -BLAIR     Bed Mob, Sit to Supine, [place]  not tested  -BLAIR     Recorded by  [BLAIR] Jadyn Nelson PT     Transfer Assessment/Treatment    Transfers, Bed-Chair [place]  moderate assist (50% patient effort);maximum assist (25% patient effort);2 person assist required   with third guiding hips as pt slowly turned with walker  -BLAIR     Transfers, Bed-Chair-Bed, Assist Device  rolling walker  -BLAIR     Transfers, Sit-Stand [place]  moderate assist (50% patient effort);2 person assist required  -BLAIR     Transfers, Stand-Sit [place]  minimum assist (75% patient effort);2 person assist required  -BLAIR     Transfers, Sit-Stand-Sit, Assist Device  rolling walker  -BLAIR     Transfer, Comment  Pt required tactile cues to assist with RW. Pt scooted ft to orthochair. Third person necessary to bring hips around to chair.  -BLAIR     Recorded by  [BLAIR] Jadyn Nelson, PT     Gait Assessment/Treatment    Gait, [place] Level  moderate assist (50% patient  effort);maximum assist (25% patient effort);2 person assist required  -BLAIR     Gait, Assistive Device  rolling walker  -     Gait, Distance (Feet)  2   to ortho chair  -BLAIR     Recorded by  [BLAIR] Jadyn Nelson, PT     Therapy Exercises    Bilateral Lower Extremities   AAROM:;15 reps;ankle pumps/circles;glut sets;supine;heel slides;quad sets   Pt able to assist more with RLE  -BLAIR    Recorded by   [BLAIR] Jadyn Nelson PT    Fine Motor Coordination Training    Detail (Fine Motor Coordination Training) Fine motor exercises for increase use and strength for self-feeding, grooming,  ADL's.  Resident using red putty this date.  Grasp exercises working on strength of grasp for holding items to complete ADL's.  Resident with back pain from a fall, when touched, a hardened area was present, Nursing infiormed and showed location of area.    -SG      Recorded by [SG] Anette Peña OT      Positioning and Restraints    Pre-Treatment Position sitting in chair/recliner  - in bed  - in bed  -    Post Treatment Position chair  - chair   orthochair  - bed  -BLAIR    In Bed call light within reach;encouraged to call for assist;with family/caregiver  - call light within reach;encouraged to call for assist;with family/caregiver;legs elevated  - supine;call light within reach;encouraged to call for assist;exit alarm on;SCD pump applied  -BLAIR    Recorded by [SG] Anette Peña OT [BLAIR] Jadyn Nelson, PT [BLAIR] Jadyn Nelson PT      10/08/17 1400          Rehab Assessment/Intervention    Discipline occupational therapy assistant  -BL      Document Type therapy note (daily note)  -BL      Subjective Information agree to therapy  -BL      Precautions/Limitations fall precautions;oxygen therapy device and L/min  -BL      Recorded by [BL] LILA Jhaveri      Vital Signs    Pre Systolic BP Rehab 120  -BL      Pre Treatment Diastolic BP 64  -BL      Pretreatment Heart Rate (beats/min) 67  -BL      Posttreatment Heart Rate (beats/min)  76  -BL      Pre SpO2 (%) 96  -BL      O2 Delivery Pre Treatment supplemental O2  -BL      Post SpO2 (%) 95  -BL      O2 Delivery Post Treatment supplemental O2  -BL      Pre Patient Position Supine  -BL      Intra Patient Position Sitting  -BL      Post Patient Position Supine  -BL      Recorded by [BL] LILA Jhaveri      Pain Assessment    Pain Assessment No/denies pain  -BL      Recorded by [BL] LILA Jhaveri      Cognitive Assessment/Intervention    Current Cognitive/Communication Assessment impaired  -BL      Orientation Status oriented to;person;place  -BL      Follows Commands/Answers Questions 75% of the time;100% of the time;able to follow single-step instructions;needs cueing;needs increased time;needs repetition  -BL      Personal Safety mild impairment  -BL      Personal Safety Interventions gait belt;muscle strengthening facilitated;nonskid shoes/slippers when out of bed  -BL      Recorded by [BL] LILA Jhaveri      Bed Mobility, Assessment/Treatment    Bed Mob, Supine to Sit, [place] moderate assist (50% patient effort);maximum assist (25% patient effort);nonverbal cues required (demo/gesture)  -BL      Bed Mob, Sit to Supine, [place] maximum assist (25% patient effort);2 person assist required  -BL      Bed Mobility, Safety Issues decreased use of legs for bridging/pushing  -BL      Bed Mobility, Impairments ROM decreased;strength decreased;impaired balance;coordination impaired  -BL      Recorded by [BL] LILA Jhaveri      Transfer Assessment/Treatment    Transfers, Bed-Chair [place] not tested  -BL      Transfers, Chair-Bed [place] not tested  -BL      Transfers, Sit-Stand [place] not tested  -BL      Transfers, Stand-Sit [place] not tested  -BL      Transfer, Comment Pt previously returned to bed from chair and reports she sat up for ~2 hours before being put back to bed.   -BL      Recorded by [BL] LILA Jhaveri      Grooming  Assessment/Training    Grooming Assess/Train, Assistive Device --   simulated task sitting EOB with BUE washing face  -BL      Grooming Assess/Train, Position sitting  -BL      Grooming Assess/Train, [place]ep[place] Level verbal cues required;minimum assist (75% patient effort)  -BL      Grooming Assess/Train, Impairments impaired balance;strength decreased;ROM decreased;decreased flexibility;motor control impaired;postural control impaired;coordination impaired  -BL      Grooming Assess/Train, Comment Pt completed simulated face washing task EOB this date requiring min assist to demo proper way to wash face with BUE and decreased ROM. Discussed possible need for AE during ADL's this date and family was given booklet on AE and DME.   -BL      Recorded by [BL] LILA Jhaveri      Motor Skills/Interventions    Additional Documentation Neuromuscular Re-education (Group);Gross Motor Coordination Training (Group);Fine Motor Coordination Training (Group);Balance Skills Training (Group)  -BL      Recorded by [BL] LILA Jhaveri      Balance Skills Training    Sitting-Level of Assistance Contact guard  -BL      Sitting-Balance Support Feet supported  -BL      Sitting-Balance Activities Reaching for objects  -BL      Sitting # of Minutes 15  -BL      Recorded by [BL] LILA Jhaveri      Therapy Exercises    Bilateral Upper Extremity 15 reps;sitting;elbow flexion/extension;hand pumps;pronation/supination;shoulder abduction/adduction;shoulder extension/flexion;shoulder ER/IR;shoulder horizontal abd/add;PROM:;AAROM:  -BL      BUE Resistance other (comment);theraband;theraputty  -BL      Recorded by [BL] LILA Jhaveri      Gross Motor Coordination Training    Gross Motor Skill, Impairments Detail GMC task completed for reaching to target this date x5 reps each arm with 75% accuracy. Pt required vc's and increased time and effort for processing commands.   -BL      Recorded by [BL] LILA Jhaveri       Fine Motor Coordination Training    Detail (Fine Motor Coordination Training) FMC training task this date demo'd and educated to pt and family this date. Pt was able to complete putty task this date of yellow and red however pt required increased time and effort mostly for L hand weakness.  -BL      Recorded by [BL] ROSALINA JhaveriA/SADIA      Positioning and Restraints    Pre-Treatment Position in bed  -BL      Post Treatment Position bed  -BL      In Bed sitting;call light within reach;encouraged to call for assist;with family/caregiver   sitting at 90* in bed  -BL      Recorded by [BL] Monica Hadley ALEJO/L        User Key  (r) = Recorded By, (t) = Taken By, (c) = Cosigned By    Initials Name Effective Dates    BLAIR Jadyn Nelson, PT 10/17/16 -     AM Tree Corrales, PTA 10/17/16 -     BL Monica Hadley, ALEJO/L 10/17/16 -     CS Liliya Wilkinson, ALEJO/L 10/17/16 -     SG Anette Peña, OT 08/03/17 -                 OT Goals       10/11/17 1138 10/10/17 1332 10/09/17 1638    Transfer Training OT LTG    Transfer Training OT LTG, Date Goal Reviewed 10/11/17  -CS 10/10/17  -CS     Range of Motion OT LTG    Range of Motion Goal OT LTG, Date Goal Reviewed 10/11/17  -CS 10/10/17  -CS     Dynamic Sitting Balance OT LTG    Dynamic Sitting Balance OT LTG, Date Goal Reviewed 10/11/17  -CS 10/10/17  -CS     Dynamic Sitting Balance OT LTG, Outcome goal ongoing  -CS      Eating Self-Feeding OT LTG    Eat Self Feeding Goal OT LTG, Date Goal Review   10/09/17  -SG    Eat Self Feeding Goal OT LTG, Outcome   goal met  -SG    ADL OT LTG    ADL OT LTG, Date Goal Reviewed 10/11/17  -CS 10/10/17  -CS       10/08/17 1553 10/06/17 1155       Transfer Training OT LTG    Transfer Training OT LTG, Date Established  10/06/17  -RB     Transfer Training OT LTG, Time to Achieve  by discharge  -RB     Transfer Training OT LTG, Activity Type  all transfers  -RB     Transfer Training OT LTG, [place] Level  supervision required;contact guard  assist  -RB     Transfer Training OT LTG, Assist Device  walker, rolling  -RB     Transfer Training OT LTG, Date Goal Reviewed 10/08/17  -BL      Transfer Training OT LTG, Outcome goal ongoing  -BL      Range of Motion OT LTG    Range of Motion Goal OT LTG, Date Established  10/06/17  -RB     Range of Motion Goal OT LTG, Time to Achieve  by discharge  -RB     Range of Motion Goal OT LTG, AROM Measure  Full AROM to all UE joints to increase independence with ADLs.  -RB     Range of Motion Goal OT LTG, Date Goal Reviewed 10/08/17  -BL      Range of Motion Goal OT LTG, Outcome goal ongoing  -BL      Dynamic Sitting Balance OT LTG    Dynamic Sitting Balance OT LTG, Date Established  10/06/17  -RB     Dynamic Sitting Balance OT LTG, Time to Achieve  by discharge  -RB     Dynamic Sitting Balance OT LTG, [place] Level  contact guard assist   15 minutes with functional activity.  -RB     Dynamic Sitting Balance OT LTG, Assist Device  bed rails  -RB     Dynamic Sitting Balance OT LTG, Date Goal Reviewed 10/08/17  -BL      Dynamic Sitting Balance OT LTG, Outcome goal ongoing  -BL      Eating Self-Feeding OT LTG    Eat Self Feeding Goal OT LTG, Date Established  10/06/17  -RB     Eat Self Feeding Goal OT LTG, Time to Achieve  by discharge  -RB     Eat Self Feed Goal OT LTG, [place] Level  supervision required;contact guard assist  -RB     Eat Self Feeding Goal OT LTG, Position  sitting in chair  -RB     Eat Self Feeding Goal OT LTG, Date Goal Review 10/08/17  -BL      Eat Self Feeding Goal OT LTG, Outcome goal ongoing  -BL      ADL OT LTG    ADL OT LTG, Date Established  10/06/17  -RB     ADL OT LTG, Time to Achieve  by discharge  -RB     ADL OT LTG, Activity Type  ADL skills   Sponge bath and dress when appropriate.  -RB     ADL OT LTG, [place] Level  min assist  -RB     ADL OT LTG, Date Goal Reviewed 10/08/17  -BL      ADL OT LTG, Outcome goal ongoing  -BL        User Key  (r) = Recorded By, (t) = Taken By,  (c) = Cosigned By    Initials Name Provider Type    RB Jonny Pardo, OT Occupational Therapist     MELO Jhaveri/SADIA Occupational Therapy Assistant    CS MELO Lemus/SADIA Occupational Therapy Assistant    DIAMOND Peña, OT Occupational Therapist          Occupational Therapy Education     Title: PT OT SLP Therapies (Active)     Topic: Occupational Therapy (Active)     Point: ADL training (Active)    Description: Instruct learner(s) on proper safety adaptation and remediation techniques during self care or transfers.   Instruct in proper use of assistive devices.    Learning Progress Summary    Learner Readiness Method Response Comment Documented by Status   Patient Acceptance E,TB,D NR   10/11/17 1137 Active    Acceptance E,TB,D NR   10/10/17 1332 Active    Eager E VU Safety with movements and strengthening due to back pain.  10/09/17 1526 Done    Eager E,TB,D VU,NR,DU  BL 10/08/17 1553 Done   Family Eager E VU Safety with movements and strengthening due to back pain.  10/09/17 1526 Done    Eager E,TB,D VU,NR,DU  BL 10/08/17 1553 Done               Point: Home exercise program (Active)    Description: Instruct learner(s) on appropriate technique for monitoring, assisting and/or progressing therapeutic exercises/activities.    Learning Progress Summary    Learner Readiness Method Response Comment Documented by Status   Patient Acceptance E,TB,D NR   10/11/17 1137 Active    Acceptance E,TB,D NR   10/10/17 1332 Active    Eager E VU Safety with movements and strengthening due to back pain.  10/09/17 1526 Done    Eager E,TB,D VU,NR,DU  BL 10/08/17 1553 Done   Family Eager E VU Safety with movements and strengthening due to back pain.  10/09/17 1526 Done    Eager E,TB,D VU,NR,DU  BL 10/08/17 1553 Done               Point: Precautions (Active)    Description: Instruct learner(s) on prescribed precautions during self-care and functional transfers.    Learning Progress Summary    Learner  Readiness Method Response Comment Documented by Status   Patient Acceptance E,TB,D NR  CS 10/11/17 1137 Active    Acceptance E,TB,D NR  CS 10/10/17 1332 Active    Eager E VU Safety with movements and strengthening due to back pain.  10/09/17 1526 Done    Eager E,TB,D VU,NR,DU  BL 10/08/17 1553 Done    Acceptance E NR,VU Edu pt on no OOB without assist. RB 10/06/17 1152 Done   Family Eager E VU Safety with movements and strengthening due to back pain.  10/09/17 1526 Done    Eager E,TB,D VU,NR,DU  BL 10/08/17 1553 Done               Point: Body mechanics (Active)    Description: Instruct learner(s) on proper positioning and spine alignment during self-care, functional mobility activities and/or exercises.    Learning Progress Summary    Learner Readiness Method Response Comment Documented by Status   Patient Acceptance E,TB,D NR  CS 10/11/17 1137 Active    Acceptance E,TB,D NR   10/10/17 1332 Active    Eager E VU Safety with movements and strengthening due to back pain.  10/09/17 1526 Done    Eager E,TB,D VU,NR,DU  BL 10/08/17 1553 Done   Family Eager E VU Safety with movements and strengthening due to back pain.  10/09/17 1526 Done    Eager E,TB,D VU,NR,DU  BL 10/08/17 1553 Done                      User Key     Initials Effective Dates Name Provider Type Discipline    RB 06/15/16 -  Jonny Pardo OT Occupational Therapist OT     10/17/16 -  MELO Jhaveri/SADIA Occupational Therapy Assistant OT     10/17/16 -  MELO Lemus/SADIA Occupational Therapy Assistant OT    SG 08/03/17 -  Anette Peña OT Occupational Therapist OT                  OT Recommendation and Plan  Anticipated Equipment Needs At Discharge: gait belt, front wheeled walker, raised toilet seat, wheelchair  Planned Therapy Interventions: activity intolerance, ADL retraining, balance training, energy conservation, bed mobility training, transfer training, strengthening, ROM (Range of Motion)  Therapy Frequency:  (3-14x/wk)  Plan of  Care Review  Plan Of Care Reviewed With: patient  Progress: improving  Outcome Summary/Follow up Plan: Pt tolerated tx well this date. Pt was I with self-feeding. Continue POC.        Outcome Measures       10/11/17 1100 10/11/17 1015 10/10/17 1455    How much help from another person do you currently need...    Turning from your back to your side while in flat bed without using bedrails? 3  -CS 3  -AM 3  -AM    Moving from lying on back to sitting on the side of a flat bed without bedrails? 2  -CS 2  -AM 2  -AM    Moving to and from a bed to a chair (including a wheelchair)? 2  -CS 2  -AM 2  -AM    Standing up from a chair using your arms (e.g., wheelchair, bedside chair)? 2  -CS 2  -AM 2  -AM    Climbing 3-5 steps with a railing? 1  -CS 1  -AM 1  -AM    To walk in hospital room? 2  -CS 2  -AM 2  -AM    AM-PAC 6 Clicks Score 12  -CS 12  -AM 12  -AM    Functional Assessment    Outcome Measure Options AM-PAC 6 Clicks Daily Activity (OT)  -CS AM-PAC 6 Clicks Basic Mobility (PT)  -AM AM-PAC 6 Clicks Basic Mobility (PT)  -AM      10/10/17 1300 10/10/17 0920 10/09/17 1305    How much help from another person do you currently need...    Turning from your back to your side while in flat bed without using bedrails? 3  -CS 3  -AM 3  -BLAIR    Moving from lying on back to sitting on the side of a flat bed without bedrails? 3  -CS 3  -AM 2  -BLAIR    Moving to and from a bed to a chair (including a wheelchair)? 2  -CS 2  -AM 2  -BLAIR    Standing up from a chair using your arms (e.g., wheelchair, bedside chair)? 2  -CS 2  -AM 2  -BLAIR    Climbing 3-5 steps with a railing? 1  -CS 1  -AM 1  -BLAIR    To walk in hospital room? 2  -CS 2  -AM 1  -BLAIR    AM-PAC 6 Clicks Score 13  -CS 13  -AM 11  -BLAIR    Functional Assessment    Outcome Measure Options AM-PAC 6 Clicks Daily Activity (OT)  -CS AM-PAC 6 Clicks Basic Mobility (PT)  -AM AM-PAC 6 Clicks Basic Mobility (PT)  -BLAIR      10/09/17 0954 10/08/17 1400       How much help from another person do  you currently need...    Turning from your back to your side while in flat bed without using bedrails? 2  -BLAIR      Moving from lying on back to sitting on the side of a flat bed without bedrails? 2  -BLAIR      Moving to and from a bed to a chair (including a wheelchair)? 2  -BLAIR      Standing up from a chair using your arms (e.g., wheelchair, bedside chair)? 1  -BLAIR      Climbing 3-5 steps with a railing? 1  -BLAIR      To walk in hospital room? 1  -BLAIR      AM-PAC 6 Clicks Score 9  -BLAIR      How much help from another is currently needed...    Putting on and taking off regular lower body clothing?  1  -BL     Bathing (including washing, rinsing, and drying)  1  -BL     Toileting (which includes using toilet bed pan or urinal)  1  -BL     Putting on and taking off regular upper body clothing  1  -BL     Taking care of personal grooming (such as brushing teeth)  2  -BL     Eating meals  2  -BL     Score  8  -BL     Functional Assessment    Outcome Measure Options AM-PAC 6 Clicks Basic Mobility (PT)  -BLAIR AM-PAC 6 Clicks Daily Activity (OT)  -BL       User Key  (r) = Recorded By, (t) = Taken By, (c) = Cosigned By    Initials Name Provider Type    BLAIR Jadyn Nleson, PT Physical Therapist    AM Tree Corrales, YONAS Physical Therapy Assistant    BL MELO Jhaveri/SADIA Occupational Therapy Assistant    LILA Norton Occupational Therapy Assistant           Time Calculation:         Time Calculation- OT       10/11/17 1139          Time Calculation- OT    OT Start Time 0836  -CS      OT Stop Time 1005  -      OT Time Calculation (min) 89 min  -CS      Total Timed Code Minutes- OT 89 minute(s)  -CS      OT Received On 10/11/17  -        User Key  (r) = Recorded By, (t) = Taken By, (c) = Cosigned By    Initials Name Provider Type    LILA Norton Occupational Therapy Assistant           Therapy Charges for Today     Code Description Service Date Service Provider Modifiers Qty    52549929609  OT SELF  CARE/MGMT/TRAIN EA 15 MIN 10/10/2017 ROSALINA LemusA/L GO 2    07431098499 HC OT THER PROC EA 15 MIN 10/10/2017 Liliya Wilkinson ALEJO/L GO 1    88054444987 HC OT THER PROC EA 15 MIN 10/10/2017 Liliya Wilkinson ALEJO/L GO 3    35821512813 HC OT THERAPEUTIC ACT EA 15 MIN 10/10/2017 MELO Lemus/L GO 1    76065195556 HC OT SELF CARE/MGMT/TRAIN EA 15 MIN 10/11/2017 Liliya Wilkinson ALEJO/L GO 4    29947099119 HC OT THER PROC EA 15 MIN 10/11/2017 Liliya Wilkinson ALEJO/L GO 1    16356381461 HC OT THERAPEUTIC ACT EA 15 MIN 10/11/2017 ROSALINA LemusA/L GO 1          OT G-codes  OT Professional Judgement Used?: Yes  OT Functional Scales Options: AM-PAC 6 Clicks Daily Activity (OT)  Score: 6  Functional Limitation: Self care  Self Care Current Status (): 100 percent impaired, limited or restricted  Self Care Goal Status (): At least 80 percent but less than 100 percent impaired, limited or restricted    MELO Lemus/L  10/11/2017

## 2017-10-11 NOTE — PLAN OF CARE
Problem: Inpatient Occupational Therapy  Goal: Transfer Training Goal 1 LTG- OT  Outcome: Ongoing (interventions implemented as appropriate)    10/06/17 1155 10/08/17 1553 10/11/17 1138   Transfer Training OT LTG   Transfer Training OT LTG, Date Established 10/06/17 --  --    Transfer Training OT LTG, Time to Achieve by discharge --  --    Transfer Training OT LTG, Activity Type all transfers --  --    Transfer Training OT LTG, [place] Level supervision required;contact guard assist --  --    Transfer Training OT LTG, Assist Device walker, rolling --  --    Transfer Training OT LTG, Date Goal Reviewed --  --  10/11/17   Transfer Training OT LTG, Outcome --  goal ongoing --        Goal: Range of Motion Goal LTG- OT  Outcome: Ongoing (interventions implemented as appropriate)    10/06/17 1155 10/08/17 1553 10/11/17 1138   Range of Motion OT LTG   Range of Motion Goal OT LTG, Date Established 10/06/17 --  --    Range of Motion Goal OT LTG, Time to Achieve by discharge --  --    Range of Motion Goal OT LTG, AROM Measure Full AROM to all UE joints to increase independence with ADLs. --  --    Range of Motion Goal OT LTG, Date Goal Reviewed --  --  10/11/17   Range of Motion Goal OT LTG, Outcome --  goal ongoing --        Goal: Dynamic Sitting Balance Goal LTG- OT  Outcome: Ongoing (interventions implemented as appropriate)    10/06/17 1155 10/11/17 1138   Dynamic Sitting Balance OT LTG   Dynamic Sitting Balance OT LTG, Date Established 10/06/17 --    Dynamic Sitting Balance OT LTG, Time to Achieve by discharge --    Dynamic Sitting Balance OT LTG, [place] Level contact guard assist  (15 minutes with functional activity.) --    Dynamic Sitting Balance OT LTG, Assist Device bed rails --    Dynamic Sitting Balance OT LTG, Date Goal Reviewed --  10/11/17   Dynamic Sitting Balance OT LTG, Outcome --  goal ongoing       Goal: ADL Goal LTG- OT  Outcome: Ongoing (interventions implemented as appropriate)    10/06/17  1155 10/08/17 1553 10/11/17 1138   ADL OT LTG   ADL OT LTG, Date Established 10/06/17 --  --    ADL OT LTG, Time to Achieve by discharge --  --    ADL OT LTG, Activity Type ADL skills  (Sponge bath and dress when appropriate.) --  --    ADL OT LTG, [place] Level min assist --  --    ADL OT LTG, Date Goal Reviewed --  --  10/11/17   ADL OT LTG, Outcome --  goal ongoing --

## 2017-10-11 NOTE — THERAPY DISCHARGE NOTE
Acute Care - Physical Therapy Treatment Note/Discharge  [facility] [place]     Patient Name: Anahi Calix  : 1940  MRN: [id number]  Today's Date: 10/11/2017  Onset of Illness/Injury or Date of Surgery Date: 17  Date of Referral to PT: 10/01/17  Referring Physician: Dr. Nicola Rich    Admit Date: 2017    Visit Dx:    ICD-10-CM ICD-9-CM   1. Displaced intertrochanteric fracture of left femur, initial encounter for closed fracture S72.142A 820.21   2. Closed left hip fracture, initial encounter S72.002A 820.8   3. Fall, initial encounter W19.XXXA E888.9   4. Peripheral arterial disease I73.9 443.9   5. Essential hypertension I10 401.9   6. Gastroesophageal reflux disease, esophagitis presence not specified K21.9 530.81   7. Abdominal aortic aneurysm (AAA) without rupture I71.4 441.4   8. Impaired mobility and activities of daily living Z74.09 799.89   9. Impaired physical mobility Z74.09 781.99     Patient Active Problem List   Diagnosis   • Peripheral arterial disease   • Gastroesophageal reflux disease   • Abdominal aortic aneurysm (AAA) without rupture   • Displaced intertrochanteric fracture of left femur, initial encounter for closed fracture   • CAD (coronary artery disease)   • Hypertension   • Chronic bronchitis   • Essential hypertension   • Fall   • Tobacco dependence syndrome   • Atrial fibrillation       Physical Therapy Education     Title: PT OT SLP Therapies (Active)     Topic: Physical Therapy (Active)     Point: Mobility training (Active)    Learning Progress Summary    Learner Readiness Method Response Comment Documented by Status   Patient Acceptance E NR   10/09/17 1238 Active               Point: Precautions (Active)    Learning Progress Summary    Learner Readiness Method Response Comment Documented by Status   Patient Acceptance E NR   10/09/17 1238 Active                      User Key     Initials Effective Dates Name Provider Type Discipline    BLAIR 10/17/16 -  Jadyn DIXON  Omar, PT Physical Therapist PT                    IP PT Goals       10/10/17 1455 10/09/17 1238 10/07/17 0835    Bed Mobility PT LTG    Bed Mobility PT LTG, Date Goal Reviewed   10/07/17  -    Bed Mobility PT LTG, Outcome   goal ongoing  -    Transfer Training PT LTG    Transfer Training PT  LTG, Date Goal Reviewed   10/07/17  -    Transfer Training PT LTG, Outcome   goal ongoing  -    Gait Training PT LTG    Gait Training Goal PT LTG, Outcome   goal ongoing  -    Strength Goal PT LTG    Strength Goal PT LTG, Date Goal Reviewed 10/10/17  -AM 10/09/17  -BLAIR 10/07/17  -    Strength Goal PT LTG, Outcome goal met  -AM goal partially met   met 10 reps PROM/AAROM   - goal partially met  -SS    Patient Education PT LTG    Patient Education PT LTG, Date Goal Reviewed   10/07/17  -    Patient Education PT LTG Outcome   goal ongoing  -      10/06/17 1526          Bed Mobility PT LTG    Bed Mobility PT LTG, Date Established 10/06/17  -      Bed Mobility PT LTG, Time to Achieve by discharge  -      Bed Mobility PT LTG, Activity Type all bed mobility  -      Bed Mobility PT LTG, [place] Level moderate assist (50% patient effort);minimum assist (75% patient effort)  -      Bed Mobility PT LTG, Outcome goal ongoing  -      Transfer Training PT LTG    Transfer Training PT LTG, Date Established 10/06/17  -      Transfer Training PT LTG, Time to Achieve by discharge  -      Transfer Training PT LTG, Activity Type bed to chair /chair to bed;sit to stand/stand to sit;toilet  -      Transfer Training PT LTG, [place] Level moderate assist (50% patient effort);minimum assist (75% patient effort)  -      Transfer Training PT LTG, Assist Device walker, rolling  -      Transfer Training PT LTG, Outcome goal ongoing  -      Gait Training PT LTG    Gait Training Goal PT LTG, Date Established 10/06/17  -      Gait Training Goal PT LTG, Time to Achieve by discharge  -      Gait Training  Goal PT LTG, [place] Level moderate assist (50% patient effort);minimum assist (75% patient effort)  -      Gait Training Goal PT LTG, Distance to Achieve 10ft  -      Gait Training Goal PT LTG, Additional Goal 50ftx2  -      Gait Training Goal PT LTG, Outcome goal ongoing  -      Strength Goal PT LTG    Strength Goal PT LTG, Date Established 10/06/17  -      Strength Goal PT LTG, Time to Achieve by discharge  -      Strength Goal PT LTG, Measure to Achieve 10reps of PROM/AAROM exercises  -      Strength Goal PT LTG, Functional Goal 2 sets of 10 reps AROM exercises  -      Strength Goal PT LTG, Outcome goal ongoing  -      Patient Education PT LTG    Patient Education PT LTG, Date Established 10/06/17  -      Patient Education PT LTG, Time to Achieve by discharge  -      Patient Education PT LTG, Education Type HEP;gait;transfers;home safety  -      Patient Education PT LTG Outcome goal ongoing  [facility]        User Key  (r) = Recorded By, (t) = Taken By, (c) = Cosigned By    Initials Name Provider Type    BLAIR Nelson, PT Physical Therapist    AM Tree Corrales, PTA Physical Therapy Assistant     Meme Jenkins, \[facility] [place] [facility]\"" Physical Therapy Assistant              Adult Rehabilitation Note       10/11/17 1015 10/10/17 1455 10/10/17 1402    Rehab Assessment/Intervention    Discipline physical therapy assistant  -AM physical therapy assistant  -AM occupational therapy assistant  -    Document Type therapy note (daily note)  -AM therapy note (daily note)  -AM therapy note (daily note)  -CS    Subjective Information agree to therapy;complains of;pain  -AM agree to therapy;no complaints  -AM agree to therapy  -CS    Patient Effort, Rehab Treatment good  -AM good  -AM     Symptoms Noted During/After Treatment increased pain  -AM none  -AM fatigue  -CS    Precautions/Limitations fall precautions  -AM fall precautions  -AM fall precautions  -CS    Equipment Issued to Patient gait belt  -AM gait  belt  -AM     Recorded by [AM] Tree Corrales PTA [AM] Tree Corrales PTA [CS] ROSALINA LemusA/L    Vital Signs    Pre Systolic BP Rehab 161  -  -AM     Pre Treatment Diastolic BP 71  -AM 58  -AM     Post Systolic BP Rehab 127  -AM      Post Treatment Diastolic BP 58  -AM      Pretreatment Heart Rate (beats/min) 87  -AM 71  -AM 87  -CS    Posttreatment Heart Rate (beats/min) 78  -AM      Pre SpO2 (%) 95  -AM 93  -AM 93  -CS    O2 Delivery Pre Treatment room air  -AM room air  -AM room air  -CS    Post SpO2 (%) 95  -AM      O2 Delivery Post Treatment room air  -AM      Pre Patient Position Sitting  -AM Sitting  -AM Sitting  -CS    Intra Patient Position Standing  -AM Standing  -AM Sitting  -CS    Post Patient Position Sitting  -AM Supine  -AM Sitting  -CS    Recorded by [AM] Tree Corrales PTA [AM] Tree Corrales PTA [CS] ROSALINA LemusA/L    Pain Assessment    Pain Assessment 0-10  -AM No/denies pain  -AM 0-10  -CS    Pain Score 0  -AM  1  -CS    Post Pain Score 5  -AM  2  -CS    Pain Type Acute pain;Surgical pain  -AM  Acute pain;Surgical pain  -CS    Pain Location Hip  -AM  Hip  -CS    Pain Orientation Left  -AM  Left  -CS    Pain Descriptors Sore  -AM      Pain Frequency Constant/continuous  -AM      Date Pain First Started 09/30/17  -AM      Clinical Progression Gradually improving  -AM      Patient's Stated Pain Goal No pain  -AM      Pain Intervention(s) Medication (See MAR);Cold applied;Ambulation/increased activity  -AM      Result of Injury Yes  -AM      Work-Related Injury No  -AM      Multiple Pain Sites No  -AM      Recorded by [AM] Tree Corrales PTA [AM] Tree Corrales PTA [CS] ROSALINA LemusA/L    Cognitive Assessment/Intervention    Current Cognitive/Communication Assessment functional  -AM functional  -AM functional  -CS    Orientation Status oriented x 4  -AM oriented x 4  -AM oriented x 4  -CS    Follows Commands/Answers Questions 100% of the time  -%  of the time  -% of the time  -CS    Personal Safety Interventions gait belt;nonskid shoes/slippers when out of bed;supervised activity  -AM gait belt;nonskid shoes/slippers when out of bed;supervised activity  -AM gait belt;nonskid shoes/slippers when out of bed  -CS    Recorded by [AM] Tree Corrales PTA [AM] Tree Corrales PTA [CS] Liliya Wilkinson, ALEJO/L    ROM (Range of Motion)    General ROM lower extremity range of motion deficits identified  -AM lower extremity range of motion deficits identified  -AM     Recorded by [AM] Tree Corrales PTA [AM] Tree Corrales PTA     General LE Assessment    ROM LLE ROM was WFL  -AM LLE ROM was WFL  -AM     Recorded by [AM] Tree Corrales PTA [AM] Tree Corrales PTA     Mobility Assessment/Training    Extremity Weight-Bearing Status left lower extremity  -AM left lower extremity  -AM     Left Lower Extremity Weight-Bearing weight-bearing as tolerated  -AM weight-bearing as tolerated  -AM     Recorded by [AM] Tree Corrales PTA [AM] Tree Corrales PTA     Bed Mobility, Assessment/Treatment    Bed Mobility, Assistive Device  bed rails;head of bed elevated  -AM     Bed Mobility, Roll Left, [place] not tested  -AM not tested  -AM     Bed Mobility, Roll Right, [place] not tested  -AM not tested  -AM     Bed Mobility, Scoot/Bridge, [place] not tested  -AM not tested  -AM     Bed Mob, Supine to Sit, [place] not tested  -AM not tested  -AM     Bed Mob, Sit to Supine, [place] not tested  -AM moderate assist (50% patient effort)  -AM     Bed Mob, Sidelying to Sit, [place] not tested  -AM not tested  -AM     Bed Mob, Sit to Sidelying, [place] not tested  -AM not tested  -AM     Bed Mobility, Safety Issues  decreased use of arms for pushing/pulling;decreased use of legs for bridging/pushing  -AM     Bed Mobility, Impairments  ROM decreased;strength decreased;pain  -AM     Recorded by [AM] Tree Corrales PTA [AM] Tree VILLAGRAN  YONAS Corrales     Transfer Assessment/Treatment    Transfers, Bed-Chair [place] minimum assist (75% patient effort);moderate assist (50% patient effort)  -AM moderate assist (50% patient effort);2 person assist required  -AM     Transfers, Chair-Bed [place] minimum assist (75% patient effort);moderate assist (50% patient effort)  -AM moderate assist (50% patient effort);2 person assist required  -AM     Transfers, Bed-Chair-Bed, Assist Device rolling walker  -AM rolling walker  -AM     Transfers, Sit-Stand [place] moderate assist (50% patient effort)  -AM moderate assist (50% patient effort);2 person assist required  -AM     Transfers, Stand-Sit [place] moderate assist (50% patient effort)  -AM moderate assist (50% patient effort);2 person assist required  -AM     Transfers, Sit-Stand-Sit, Assist Device rolling walker  -AM rolling walker  -AM     Toilet Transfer, [place] minimum assist (75% patient effort);moderate assist (50% patient effort)  -AM not tested  -AM     Toilet Transfer, Assistive Device bedside commode without drop arms;rolling walker  -AM      Walk-In Shower Transfer, [place] not tested  -AM not tested  -AM     Bathtub Transfer, [place] not tested  -AM not tested  -AM     Transfer, Maintain Weight Bearing Status able to maintain weight bearing status  -AM able to maintain weight bearing status  -AM     Transfer, Safety Issues step length decreased  -AM step length decreased  -AM     Transfer, Impairments ROM decreased;strength decreased;pain  -AM ROM decreased;strength decreased;pain  -AM     Recorded by [AM] Tree Corrales PTA [AM] Tree Corrales PTA     Gait Assessment/Treatment    Gait, [place] Level minimum assist (75% patient effort);moderate assist (50% patient effort)  -AM moderate assist (50% patient effort);maximum assist (25% patient effort);2 person assist required  -AM     Gait, Assistive Device rolling walker  -AM rolling walker  -AM     Gait,  Distance (Feet) 10  -AM 25   15+10  -AM     Gait, Gait Pattern Analysis 3-point gait  -AM 3-point gait  -AM     Gait, Gait Deviations bilateral:;martha decreased  -AM bilateral:;martha decreased  -AM     Gait, Maintain Weight Bearing Status able to maintain weight bearing status  -AM able to maintain weight bearing status  -AM     Gait, Safety Issues step length decreased  -AM step length decreased  -AM     Gait, Impairments ROM decreased;strength decreased;pain  -AM ROM decreased;strength decreased;pain  -AM     Recorded by [AM] Tree Corrales PTA [AM] Tree Corrales PTA     Stairs Assessment/Treatment    Stairs, [place] Level not tested  -AM not tested  -AM     Recorded by [AM] Tree Corrales PTA [AM] Tree Corrales PTA     Therapy Exercises    Bilateral Lower Extremities AROM:;20 reps;supine;ankle pumps/circles;glut sets;quad sets  -AM      Bilateral Upper Extremity   AROM:;20 reps;sitting;elbow flexion/extension;hand pumps;pronation/supination;shoulder extension/flexion;shoulder ER/IR   UEE for 10 mins with 1 rest break.  -CS    BUE Resistance   manual resistance- minimal  -CS    Recorded by [AM] Tree Corrales PTA  [CS] LILA Lemus    Fine Motor Coordination Training    Detail (Fine Motor Coordination Training)   FMC training- Pt completed red putty task this date.  -CS    Recorded by   [CS] LILA Lemus    Positioning and Restraints    Pre-Treatment Position sitting in chair/recliner  -AM sitting in chair/recliner  -AM sitting in chair/recliner  -CS    Post Treatment Position chair  -AM bed  -AM chair  -CS    In Bed  supine;call light within reach;encouraged to call for assist;exit alarm on;with family/caregiver  -AM     In Chair reclined;call light within reach;encouraged to call for assist;legs elevated  -AM  reclined;call light within reach;with family/caregiver  -CS    Recorded by [AM] Tree Corrales PTA [AM] Tree Corrales PTA [CS] LILA Lemus       10/10/17 1110 10/10/17 0920 10/09/17 1355    Rehab Assessment/Intervention    Discipline occupational therapy assistant  -CS physical therapy assistant  -AM occupational therapist  -SG    Document Type therapy note (daily note)  -CS therapy note (daily note)  -AM therapy note (daily note)  -SG    Subjective Information agree to therapy  -CS agree to therapy;complains of;pain  -AM agree to therapy;complains of;pain   Left side of back from a fall.    -SG    Patient Effort, Rehab Treatment  good  -AM good  -SG    Symptoms Noted During/After Treatment  fatigue  -AM     Precautions/Limitations  fall precautions;other (see comments)   WBAT LLE  -AM fall precautions;oxygen therapy device and L/min  -SG    Precautions/Limitations, Vision   WFL with corrective lenses  -SG    Equipment Issued to Patient  gait belt  -AM     Recorded by [CS] MELO Lemus/SADIA [AM] Tree Corrales PTA [SG] Anette Peña OT    Vital Signs    Pre Systolic BP Rehab 129  -  -  -SG    Pre Treatment Diastolic BP 61  -CS 62  -AM 77  -SG    Post Systolic BP Rehab  123  -  -SG    Post Treatment Diastolic BP  89  -AM 63  -SG    Pretreatment Heart Rate (beats/min) 66  -CS 75  -AM 72  -SG    Posttreatment Heart Rate (beats/min)  76  -AM 80  -SG    Pre SpO2 (%) 92  -CS 93  -AM 95  -SG    O2 Delivery Pre Treatment room air  -CS room air  -AM supplemental O2  -SG    Post SpO2 (%)  93  -AM 94  -SG    O2 Delivery Post Treatment  room air  -AM supplemental O2  -SG    Pre Patient Position Sitting  -CS Supine  -AM Sitting  -SG    Intra Patient Position Sitting  -CS Standing  -AM Sitting  -SG    Post Patient Position Sitting  -CS Sitting  -AM Sitting  -SG    Recorded by [CS] MELO Lemus/L [AM] Tree Corrales PTA [SG] Anette Peña OT    Pain Assessment    Pain Assessment 0-10  -CS 0-10  -AM 0-10  -SG    Pain Score 2  -CS 0  -AM 4  -SG    Post Pain Score 1  -CS 2  -AM 4  -SG    Pain Type Acute pain;Surgical pain  -CS Acute  pain;Surgical pain  -AM Deep somatic pain  -SG    Pain Location Hip  -CS Hip  -AM Back  -SG    Pain Orientation Left  -CS Left  -AM Left;Mid;Outer   under shoulder blade, to left of armpit.  Nursing informed.   -SG    Pain Descriptors  Sore  -AM     Pain Frequency  Intermittent  -AM     Date Pain First Started  09/30/17  -AM     Clinical Progression  Gradually improving  -AM     Patient's Stated Pain Goal  No pain  -AM     Pain Intervention(s) Medication (See MAR)  -CS Medication (See MAR);Cold applied;Ambulation/increased activity  -AM     Result of Injury  Yes  -AM     Work-Related Injury  No  -AM     Multiple Pain Sites  No  -AM     Recorded by [CS] MELO Lemus/SADIA [AM] Tree Corrales PTA [SG] Anette Peña OT    Cognitive Assessment/Intervention    Current Cognitive/Communication Assessment functional  -CS functional  -AM     Orientation Status oriented x 4  -CS oriented x 4  -AM     Follows Commands/Answers Questions 100% of the time  -% of the time  -AM     Personal Safety Interventions gait belt;nonskid shoes/slippers when out of bed  -CS gait belt;nonskid shoes/slippers when out of bed;supervised activity  -AM     Recorded by [CS] MELO Lemus/SADIA [AM] Tree Corrales PTA     ROM (Range of Motion)    General ROM  lower extremity range of motion deficits identified  -AM     Recorded by  [AM] Tree Corrales PTA     General LE Assessment    ROM  LLE ROM was WFL  -AM     Recorded by  [AM] Tree Corrales PTA     Mobility Assessment/Training    Extremity Weight-Bearing Status  left lower extremity  -AM     Left Lower Extremity Weight-Bearing  weight-bearing as tolerated  -AM     Recorded by  [AM] Tree Corrales PTA     Bed Mobility, Assessment/Treatment    Bed Mobility, Assistive Device  bed rails;head of bed elevated  -AM     Bed Mobility, Roll Left, [place]  not tested  -AM     Bed Mobility, Roll Right, [place]  not tested  -AM     Bed Mobility, Scoot/Bridge, [place]   not tested  -AM     Bed Mob, Supine to Sit, [place]  minimum assist (75% patient effort)  -AM     Bed Mob, Sit to Supine, [place]  not tested  -AM     Bed Mob, Sidelying to Sit, [place]  not tested  -AM     Bed Mob, Sit to Sidelying, [place]  not tested  -AM     Bed Mobility, Safety Issues  decreased use of arms for pushing/pulling;decreased use of legs for bridging/pushing  -AM     Bed Mobility, Impairments  ROM decreased;strength decreased;pain  -AM     Recorded by  [AM] Tree Corrales PTA     Transfer Assessment/Treatment    Transfers, Bed-Chair [place]  moderate assist (50% patient effort);2 person assist required  -AM     Transfers, Chair-Bed [place]  moderate assist (50% patient effort);2 person assist required  -AM     Transfers, Bed-Chair-Bed, Assist Device  rolling walker  -AM     Transfers, Sit-Stand [place]  moderate assist (50% patient effort);2 person assist required  -AM     Transfers, Stand-Sit [place]  moderate assist (50% patient effort);2 person assist required  -AM     Transfers, Sit-Stand-Sit, Assist Device  rolling walker  -AM     Toilet Transfer, [place]  not tested  -AM     Walk-In Shower Transfer, [place]  not tested  -AM     Bathtub Transfer, [place]  not tested  -AM     Transfer, Maintain Weight Bearing Status  able to maintain weight bearing status  -AM     Transfer, Safety Issues  step length decreased  -AM     Transfer, Impairments  ROM decreased;strength decreased;pain  -AM     Recorded by  [AM] Tree Corrales PTA     Gait Assessment/Treatment    Gait, [place] Level  moderate assist (50% patient effort);maximum assist (25% patient effort);2 person assist required  -AM     Gait, Assistive Device  rolling walker  -AM     Gait, Distance (Feet)  6  -AM     Gait, Gait Pattern Analysis  3-point gait  -AM     Gait, Gait Deviations  bilateral:;martha decreased  -AM     Gait, Maintain Weight Bearing Status  able to maintain weight  bearing status  -AM     Gait, Safety Issues  step length decreased  -AM     Gait, Impairments  ROM decreased;strength decreased;pain  -AM     Recorded by  [AM] Tree Corrales PTA     Stairs Assessment/Treatment    Stairs, [place] Level  not tested  -AM     Recorded by  [AM] Tree Corrales PTA     Self-Feeding Assessment/Training    Self-Feeding Assess/Train, Position supported sitting  -CS      Self-Feeding Assess/Train, [place] independent  -CS      Recorded by [CS] MELO Leums/L      Therapy Exercises    Bilateral Lower Extremities  AROM:;20 reps;supine;ankle pumps/circles;glut sets;heel slides;quad sets  -AM     Bilateral Upper Extremity AROM:;20 reps;sitting;elbow flexion/extension;hand pumps;pronation/supination;shoulder extension/flexion  -CS      BUE Resistance manual resistance- minimal  -CS      Recorded by [CS] MELO Lemus/SADIA [AM] Tree Corrales PTA     Fine Motor Coordination Training    Detail (Fine Motor Coordination Training)   Fine motor exercises for increase use and strength for self-feeding, grooming,  ADL's.  Resident using red putty this date.  Grasp exercises working on strength of grasp for holding items to complete ADL's.  Resident with back pain from a fall, when touched, a hardened area was present, Nursing infiormed and showed location of area.    -SG    Recorded by   [SG] Anette Peña OT    Positioning and Restraints    Pre-Treatment Position sitting in chair/recliner  -CS in bed  -AM sitting in chair/recliner  -SG    Post Treatment Position chair  -CS chair  -AM chair  -SG    In Bed   call light within reach;encouraged to call for assist;with family/caregiver  -SG    In Chair reclined;call light within reach  -CS reclined;call light within reach;encouraged to call for assist;legs elevated  -AM     Recorded by [CS] MELO Lemus/SADIA [AM] Tree Corrales PTA [SG] Anette Peña OT      10/09/17 1305 10/09/17 0954 10/08/17 1400    Rehab  Assessment/Intervention    Discipline physical therapist  -BLAIR  occupational therapy assistant  -BL    Document Type progress note  -BLAIR progress note  -BLAIR therapy note (daily note)  -BL    Subjective Information agree to therapy  -BLAIR  agree to therapy  -BL    Patient Effort, Rehab Treatment good  -BLAIR      Precautions/Limitations fall precautions;oxygen therapy device and L/min;other (see comments)   vital signs  -BLAIR  fall precautions;oxygen therapy device and L/min  -BL    Recorded by [BLAIR] Jadyn Nelson, PT [BLAIR] Jadyn Nelson, PT [BL] MELO Jhaveri/L    Vital Signs    Pre Systolic BP Rehab 115  -BLAIR  120  -BL    Pre Treatment Diastolic BP 55  -BLAIR  64  -BL    Post Systolic BP Rehab 148  -BLAIR      Post Treatment Diastolic BP 66  -BLAIR      Pretreatment Heart Rate (beats/min) 72  -BLAIR  67  -BL    Posttreatment Heart Rate (beats/min) 83  -BLAIR  76  -BL    Pre SpO2 (%) 95  -BLAIR  96  -BL    O2 Delivery Pre Treatment supplemental O2  -BLAIR  supplemental O2  -BL    Intra SpO2 (%) 91  -BLAIR      O2 Delivery Intra Treatment supplemental O2  -BLAIR      Post SpO2 (%) 94  -BLAIR  95  -BL    O2 Delivery Post Treatment supplemental O2  -BLAIR  supplemental O2  -BL    Pre Patient Position Supine  -BLAIR  Supine  -BL    Intra Patient Position Sitting  -BLAIR  Sitting  -BL    Post Patient Position Sitting  -BLAIR  Supine  -BL    Recorded by [BLAIR] Jadyn Nelson, PT  [BL] MELO Jhaveri/SADIA    Pain Assessment    Pain Assessment 0-10  -BLAIR  No/denies pain  -BL    Pain Score 0  -BLAIR      Post Pain Score 2  -BLAIR      Pain Location Hip  -BLAIR      Pain Orientation Left  -BLAIR      Pain Intervention(s) Repositioned;Ambulation/increased activity  -BLAIR      Recorded by [BLAIR] Jadyn Nelson, PT  [BL] MELO Jhaveri/L    Cognitive Assessment/Intervention    Current Cognitive/Communication Assessment functional  -BLAIR  impaired  -BL    Orientation Status oriented x 4  -BLIAR  oriented to;person;place  -BL    Follows Commands/Answers Questions 100% of the time  -BLAIR  75% of the  time;100% of the time;able to follow single-step instructions;needs cueing;needs increased time;needs repetition  -    Personal Safety mild impairment  -  mild impairment  -    Personal Safety Interventions gait belt;nonskid shoes/slippers when out of bed;supervised activity  -  gait belt;muscle strengthening facilitated;nonskid shoes/slippers when out of bed  -    Recorded by [BLAIR] Jadyn Nelson PT  [BL] LILA Jhaveri    Mobility Assessment/Training    Extremity Weight-Bearing Status left lower extremity  -      Left Lower Extremity Weight-Bearing weight-bearing as tolerated  -      Recorded by [BLAIR] Jadyn Nelson PT      Bed Mobility, Assessment/Treatment    Bed Mobility, Assistive Device bed rails;head of bed elevated  -      Bed Mob, Supine to Sit, Start moderate assist (50% patient effort);2 person assist required  -  moderate assist (50% patient effort);maximum assist (25% patient effort);nonverbal cues required (demo/gesture)  -    Bed Mob, Sit to Supine, Start not tested  -  maximum assist (25% patient effort);2 person assist required  -    Bed Mobility, Safety Issues   decreased use of legs for bridging/pushing  -    Bed Mobility, Impairments   ROM decreased;strength decreased;impaired balance;coordination impaired  -    Recorded by [BLAIR] Jadyn Nelson PT  [BL] LILA Jhaveri    Transfer Assessment/Treatment    Transfers, Bed-Chair Start moderate assist (50% patient effort);maximum assist (25% patient effort);2 person assist required   with third guiding hips as pt slowly turned with walker  -  not tested  -    Transfers, Chair-Bed Start   not tested  -    Transfers, Bed-Chair-Bed, Assist Device rolling walker  -      Transfers, Sit-Stand Start moderate assist (50% patient effort);2 person assist required  -  not tested  -    Transfers, Stand-Sit Start minimum assist (75% patient effort);2 person assist required   -BLAIR  not tested  -BL    Transfers, Sit-Stand-Sit, Assist Device rolling walker  -BLAIR      Transfer, Comment Pt required tactile cues to assist with RW. Pt scooted ft to orthochair. Third person necessary to bring hips around to chair.  -BLAIR  Pt previously returned to bed from chair and reports she sat up for ~2 hours before being put back to bed.   -BL    Recorded by [BLAIR] Jadyn Nelson, PT  [BL] LILA Jhaveri    Gait Assessment/Treatment    Gait, [place] Level moderate assist (50% patient effort);maximum assist (25% patient effort);2 person assist required  -BLAIR      Gait, Assistive Device rolling walker  -BLAIR      Gait, Distance (Feet) 2   to ortho chair  -BLAIR      Recorded by [BLAIR] Jadyn Nelson, PT      Grooming Assessment/Training    Grooming Assess/Train, Assistive Device   --   simulated task sitting EOB with BUE washing face  -BL    Grooming Assess/Train, Position   sitting  -BL    Grooming Assess/Train, Indepen Level   verbal cues required;minimum assist (75% patient effort)  -BL    Grooming Assess/Train, Impairments   impaired balance;strength decreased;ROM decreased;decreased flexibility;motor control impaired;postural control impaired;coordination impaired  -BL    Grooming Assess/Train, Comment   Pt completed simulated face washing task EOB this date requiring min assist to demo proper way to wash face with BUE and decreased ROM. Discussed possible need for AE during ADL's this date and family was given booklet on AE and DME.   -BL    Recorded by   [BL] Monica L Leonie, ALEJO/L    Motor Skills/Interventions    Additional Documentation   Neuromuscular Re-education (Group);Gross Motor Coordination Training (Group);Fine Motor Coordination Training (Group);Balance Skills Training (Group)  -BL    Recorded by   [BL] LILA Jhaveri    Balance Skills Training    Sitting-Level of Assistance   Contact guard  -BL    Sitting-Balance Support   Feet supported  -BL    Sitting-Balance Activities   Reaching  for objects  -BL    Sitting # of Minutes   15  -BL    Recorded by   [BL] LILA Jhaveri    Therapy Exercises    Bilateral Lower Extremities  AAROM:;15 reps;ankle pumps/circles;glut sets;supine;heel slides;quad sets   Pt able to assist more with RLE  -     Bilateral Upper Extremity   15 reps;sitting;elbow flexion/extension;hand pumps;pronation/supination;shoulder abduction/adduction;shoulder extension/flexion;shoulder ER/IR;shoulder horizontal abd/add;PROM:;AAROM:  -BL    BUE Resistance   other (comment);theraband;theraputty  -BL    Recorded by  [BLAIR] Jadyn Nelson PT [BL] LILA Jhaveri    Gross Motor Coordination Training    Gross Motor Skill, Impairments Detail   GMC task completed for reaching to target this date x5 reps each arm with 75% accuracy. Pt required vc's and increased time and effort for processing commands.   -BL    Recorded by   [BL] LILA Jhaveri    Fine Motor Coordination Training    Detail (Fine Motor Coordination Training)   FMC training task this date demo'd and educated to pt and family this date. Pt was able to complete putty task this date of yellow and red however pt required increased time and effort mostly for L hand weakness.  -BL    Recorded by   [BL] LILA Jhaveri    Positioning and Restraints    Pre-Treatment Position in bed  - in bed  - in bed  -    Post Treatment Position chair   orthochair  - bed  - bed  -    In Bed call light within reach;encouraged to call for assist;with family/caregiver;legs elevated  - supine;call light within reach;encouraged to call for assist;exit alarm on;SCD pump applied  - sitting;call light within reach;encouraged to call for assist;with family/caregiver   sitting at 90* in bed  -BL    Recorded by [BLAIR] Jadyn Nelson, PT [BLAIR] Jadyn Nelson, PT [BL] LILA Jhaveri      User Key  (r) = Recorded By, (t) = Taken By, (c) = Cosigned By    Initials Name Effective Dates     Jadyn Nelson PT 10/17/16 -      AM Tree Corrales, PTA 10/17/16 -     BL Monica Hadley, ALEJO/L 10/17/16 -     CS Liliya Wilkinson, ALEJO/L 10/17/16 -     SG Anette Peña, ANMOL 08/03/17 -           PT Recommendation and Plan  Anticipated Equipment Needs At Discharge:  (TBD as pt progresses)  Anticipated Discharge Disposition: inpatient rehabilitation facility  Planned Therapy Interventions: (S) bed mobility training, gait training, home exercise program, patient/family education, strengthening, stair training, transfer training, ROM (Range of Motion) (progress mobility when/as allowed)  PT Frequency:  (5-14 times per wee)  Plan of Care Review  Plan Of Care Reviewed With: patient  Progress: progress toward functional goals as expected  Outcome Summary/Follow up Plan: Pt met 1 PT goal this tx. Pt able to amb 6+15+10 ft w/RW Mod A 2. Pt would benefit from ARU once discharged from this facilty.           Outcome Measures       10/11/17 1015 10/10/17 1455 10/10/17 1300    How much help from another person do you currently need...    Turning from your back to your side while in flat bed without using bedrails? 3  -AM 3  -AM 3  -CS    Moving from lying on back to sitting on the side of a flat bed without bedrails? 2  -AM 2  -AM 3  -CS    Moving to and from a bed to a chair (including a wheelchair)? 2  -AM 2  -AM 2  -CS    Standing up from a chair using your arms (e.g., wheelchair, bedside chair)? 2  -AM 2  -AM 2  -CS    Climbing 3-5 steps with a railing? 1  -AM 1  -AM 1  -CS    To walk in hospital room? 2  -AM 2  -AM 2  -CS    AM-PAC 6 Clicks Score 12  -AM 12  -AM 13  -CS    Functional Assessment    Outcome Measure Options AM-PAC 6 Clicks Basic Mobility (PT)  -AM AM-PAC 6 Clicks Basic Mobility (PT)  -AM AM-PAC 6 Clicks Daily Activity (OT)  -CS      10/10/17 0920 10/09/17 1305 10/09/17 0954    How much help from another person do you currently need...    Turning from your back to your side while in flat bed without using bedrails? 3  -AM 3  -BLAIR 2  -BLAIR     Moving from lying on back to sitting on the side of a flat bed without bedrails? 3  -AM 2  -BLAIR 2  -BLAIR    Moving to and from a bed to a chair (including a wheelchair)? 2  -AM 2  -BLAIR 2  -BLAIR    Standing up from a chair using your arms (e.g., wheelchair, bedside chair)? 2  -AM 2  -BLAIR 1  -BLAIR    Climbing 3-5 steps with a railing? 1  -AM 1  -BLAIR 1  -BLAIR    To walk in hospital room? 2  -AM 1  -BLAIR 1  -BLAIR    AM-PAC 6 Clicks Score 13  -AM 11  -BLAIR 9  -BLAIR    Functional Assessment    Outcome Measure Options AM-PAC 6 Clicks Basic Mobility (PT)  -AM AM-PAC 6 Clicks Basic Mobility (PT)  -BLAIR AM-PAC 6 Clicks Basic Mobility (PT)  -BLAIR      10/08/17 1400          How much help from another is currently needed...    Putting on and taking off regular lower body clothing? 1  -BL      Bathing (including washing, rinsing, and drying) 1  -BL      Toileting (which includes using toilet bed pan or urinal) 1  -BL      Putting on and taking off regular upper body clothing 1  -BL      Taking care of personal grooming (such as brushing teeth) 2  -BL      Eating meals 2  -BL      Score 8  -BL      Functional Assessment    Outcome Measure Options AM-PAC 6 Clicks Daily Activity (OT)  -BL        User Key  (r) = Recorded By, (t) = Taken By, (c) = Cosigned By    Initials Name Provider Type    BLAIR Nelson, PT Physical Therapist    AM Tree Corrales PTA Physical Therapy Assistant    BL MELO Jhaveri/SADIA Occupational Therapy Assistant     MELO Lemus/SADIA Occupational Therapy Assistant           Time Calculation:         PT Charges       10/11/17 1015          Time Calculation    Start Time 1015  -AM      Stop Time 1055  -AM      Time Calculation (min) 40 min  -AM      PT Received On 10/11/17  -AM      PT - Next Appointment 10/11/17  -AM      Time Calculation- PT    Total Timed Code Minutes- PT 40 minute(s)  -AM        User Key  (r) = Recorded By, (t) = Taken By, (c) = Cosigned By    Initials Name Provider Type    AM Tree Corrales PTA  Physical Therapy Assistant          Therapy Charges for Today     Code Description Service Date Service Provider Modifiers Qty    66838194537 HC GAIT TRAINING EA 15 MIN 10/10/2017 Tree Corrales, PTA GP 1    94243114074 HC PT THER PROC EA 15 MIN 10/10/2017 Tree Corrales, PTA GP 1    70779499636 HC PT SELF CARE/MGMT/TRAIN EA 15 MIN 10/10/2017 Tree Corarles, PTA GP 1    56140811698 HC GAIT TRAINING EA 15 MIN 10/10/2017 Tree Corrales PTA GP 1    37969971005 HC PT THER PROC EA 15 MIN 10/10/2017 Tree Corrales PTA GP 1    18530069977 HC GAIT TRAINING EA 15 MIN 10/11/2017 Tree Corrales, PTA GP 1    50772653142 HC PT THER PROC EA 15 MIN 10/11/2017 Tree Corrales PTA GP 1    21071987180 HC PT SELF CARE/MGMT/TRAIN EA 15 MIN 10/11/2017 Tree Corrales, PTA GP 1          PT G-Codes  PT Professional Judgement Used?: Yes  Outcome Measure Options: AM-PAC 6 Clicks Basic Mobility (PT)  Score: 11  Functional Limitation: Mobility: Walking and moving around  Mobility: Walking and Moving Around Current Status (): At least 60 percent but less than 80 percent impaired, limited or restricted  Mobility: Walking and Moving Around Goal Status (): At least 1 percent but less than 20 percent impaired, limited or restricted    PT Discharge Summary  Anticipated Discharge Disposition: inpatient rehabilitation facility  Reason for Discharge: Discharge from facility, Per MD order  Outcomes Achieved: Patient able to partially acheive established goals  Discharge Destination: Inpatient rehabilitation facility    Tree Corrales PTA  10/11/2017

## 2017-10-11 NOTE — THERAPY DISCHARGE NOTE
Acute Care - Occupational Therapy Discharge Summary  [facility]     Patient Name: Anahi Calix  : 1940  MRN: [id number]    Today's Date: 10/11/2017  Onset of Illness/Injury or Date of Surgery Date: 17    Date of Referral to OT: 10/01/17  Referring Physician: Dr. Nicola Rich      Admit Date: 2017        OT Recommendation and Plan    Visit Dx:    ICD-10-CM ICD-9-CM   1. Displaced intertrochanteric fracture of left femur, initial encounter for closed fracture S72.142A 820.21   2. Closed left hip fracture, initial encounter S72.002A 820.8   3. Fall, initial encounter W19.XXXA E888.9   4. Peripheral arterial disease I73.9 443.9   5. Essential hypertension I10 401.9   6. Gastroesophageal reflux disease, esophagitis presence not specified K21.9 530.81   7. Abdominal aortic aneurysm (AAA) without rupture I71.4 441.4   8. Impaired mobility and activities of daily living Z74.09 799.89   9. Impaired physical mobility Z74.09 781.99               Time Calculation- OT       10/11/17 1139          Time Calculation- OT    OT Start Time 0836  -CS      OT Stop Time 1005  -CS      OT Time Calculation (min) 89 min  -CS      Total Timed Code Minutes- OT 89 minute(s)  -CS      OT Received On 10/11/17  -CS        User Key  (r) = Recorded By, (t) = Taken By, (c) = Cosigned By    Initials Name Provider Type    LILA Norton Occupational Therapy Assistant                  OT Goals       10/11/17 1621 10/11/17 1138 10/10/17 1332    Transfer Training OT LTG    Transfer Training OT LTG, Date Goal Reviewed 10/11/17  -SG 10/11/17  -CS 10/10/17  -CS    Transfer Training OT LTG, Outcome goal not met  -SG      Transfer Training OT LTG, Reason Goal Not Met discharged from facility  -SG      Range of Motion OT LTG    Range of Motion Goal OT LTG, Date Goal Reviewed 10/11/17  -SG 10/11/17  -CS 10/10/17  -CS    Range of Motion Goal OT LTG, Outcome goal not met  -SG      Reason Goal Not Met (ROM) OT LTG discharged  from facility  -SG      Dynamic Sitting Balance OT LTG    Dynamic Sitting Balance OT LTG, Date Goal Reviewed 10/11/17  -SG 10/11/17  -CS 10/10/17  -CS    Dynamic Sitting Balance OT LTG, Outcome goal not met  -SG goal ongoing  -CS     Dynamic Sitting Balance OT LTG, Reason Goal Not Met discharged from facility  -SG      ADL OT LTG    ADL OT LTG, Date Goal Reviewed 10/11/17  -SG 10/11/17  -CS 10/10/17  -CS    ADL OT LTG, Outcome goal not met  -SG      ADL OT LTG, Reason Goal Not Met discharged from facility  -SG        10/09/17 1638 10/08/17 1553 10/06/17 1155    Transfer Training OT LTG    Transfer Training OT LTG, Date Established   10/06/17  -RB    Transfer Training OT LTG, Time to Achieve   by discharge  -RB    Transfer Training OT LTG, Activity Type   all transfers  -RB    Transfer Training OT LTG, [place] Level   supervision required;contact guard assist  -RB    Transfer Training OT LTG, Assist Device   walker, rolling  -RB    Transfer Training OT LTG, Date Goal Reviewed  10/08/17  -BL     Transfer Training OT LTG, Outcome  goal ongoing  -BL     Range of Motion OT LTG    Range of Motion Goal OT LTG, Date Established   10/06/17  -RB    Range of Motion Goal OT LTG, Time to Achieve   by discharge  -RB    Range of Motion Goal OT LTG, AROM Measure   Full AROM to all UE joints to increase independence with ADLs.  -RB    Range of Motion Goal OT LTG, Date Goal Reviewed  10/08/17  -BL     Range of Motion Goal OT LTG, Outcome  goal ongoing  -BL     Dynamic Sitting Balance OT LTG    Dynamic Sitting Balance OT LTG, Date Established   10/06/17  -RB    Dynamic Sitting Balance OT LTG, Time to Achieve   by discharge  -RB    Dynamic Sitting Balance OT LTG, [place] Level   contact guard assist   15 minutes with functional activity.  -RB    Dynamic Sitting Balance OT LTG, Assist Device   bed rails  -RB    Dynamic Sitting Balance OT LTG, Date Goal Reviewed  10/08/17  -BL     Dynamic Sitting Balance OT LTG, Outcome  goal  ongoing  -BL     Eating Self-Feeding OT LTG    Eat Self Feeding Goal OT LTG, Date Established   10/06/17  -RB    Eat Self Feeding Goal OT LTG, Time to Achieve   by discharge  -RB    Eat Self Feed Goal OT LTG, [place] Level   supervision required;contact guard assist  -RB    Eat Self Feeding Goal OT LTG, Position   sitting in chair  -RB    Eat Self Feeding Goal OT LTG, Date Goal Review 10/09/17  -SG 10/08/17  -BL     Eat Self Feeding Goal OT LTG, Outcome goal met  -SG goal ongoing  -BL     ADL OT LTG    ADL OT LTG, Date Established   10/06/17  -RB    ADL OT LTG, Time to Achieve   by discharge  -RB    ADL OT LTG, Activity Type   ADL skills   Sponge bath and dress when appropriate.  -RB    ADL OT LTG, [place] Level   min assist  -RB    ADL OT LTG, Date Goal Reviewed  10/08/17  -BL     ADL OT LTG, Outcome  goal ongoing  -       User Key  (r) = Recorded By, (t) = Taken By, (c) = Cosigned By    Initials Name Provider Type    RB Jonny Pardo, OT Occupational Therapist    BL MELO Jhaveri/SADIA Occupational Therapy Assistant    MELO Norton/SADIA Occupational Therapy Assistant    SG Anette Peña OT Occupational Therapist                Outcome Measures       10/11/17 1100 10/11/17 1015 10/10/17 1455    How much help from another person do you currently need...    Turning from your back to your side while in flat bed without using bedrails? 3  -CS 3  -AM 3  -AM    Moving from lying on back to sitting on the side of a flat bed without bedrails? 2  -CS 2  -AM 2  -AM    Moving to and from a bed to a chair (including a wheelchair)? 2  -CS 2  -AM 2  -AM    Standing up from a chair using your arms (e.g., wheelchair, bedside chair)? 2  -CS 2  -AM 2  -AM    Climbing 3-5 steps with a railing? 1  -CS 1  -AM 1  -AM    To walk in hospital room? 2  -CS 2  -AM 2  -AM    AM-PAC 6 Clicks Score 12  -CS 12  -AM 12  -AM    Functional Assessment    Outcome Measure Options AM-PAC 6 Clicks Daily Activity (OT)  -CS AM-PAC 6  Clicks Basic Mobility (PT)  -AM AM-PAC 6 Clicks Basic Mobility (PT)  -AM      10/10/17 1300 10/10/17 0920 10/09/17 1305    How much help from another person do you currently need...    Turning from your back to your side while in flat bed without using bedrails? 3  -CS 3  -AM 3  -BLAIR    Moving from lying on back to sitting on the side of a flat bed without bedrails? 3  -CS 3  -AM 2  -BLAIR    Moving to and from a bed to a chair (including a wheelchair)? 2  -CS 2  -AM 2  -BLAIR    Standing up from a chair using your arms (e.g., wheelchair, bedside chair)? 2  -CS 2  -AM 2  -BLAIR    Climbing 3-5 steps with a railing? 1  -CS 1  -AM 1  -BLAIR    To walk in hospital room? 2  -CS 2  -AM 1  -BLAIR    AM-PAC 6 Clicks Score 13  -CS 13  -AM 11  -BLAIR    Functional Assessment    Outcome Measure Options AM-PAC 6 Clicks Daily Activity (OT)  -CS AM-PAC 6 Clicks Basic Mobility (PT)  -AM AM-PAC 6 Clicks Basic Mobility (PT)  -BLAIR      10/09/17 0954          How much help from another person do you currently need...    Turning from your back to your side while in flat bed without using bedrails? 2  -BLAIR      Moving from lying on back to sitting on the side of a flat bed without bedrails? 2  -BLAIR      Moving to and from a bed to a chair (including a wheelchair)? 2  -BLAIR      Standing up from a chair using your arms (e.g., wheelchair, bedside chair)? 1  -BLAIR      Climbing 3-5 steps with a railing? 1  -BLAIR      To walk in hospital room? 1  -BLAIR      AM-PAC 6 Clicks Score 9  -BLAIR      Functional Assessment    Outcome Measure Options AM-PAC 6 Clicks Basic Mobility (PT)  -BLAIR        User Key  (r) = Recorded By, (t) = Taken By, (c) = Cosigned By    Initials Name Provider Type    BLAIR Nelson, PT Physical Therapist    AM Tree Corralse, YONAS Physical Therapy Assistant    MELO Norton/SADIA Occupational Therapy Assistant              OT Discharge Summary  Anticipated Discharge Disposition: inpatient rehabilitation facility  Reason for Discharge: Discharge  from facility, Per MD order  Outcomes Achieved: Refer to plan of care for updates on goals achieved  Discharge Destination: Inpatient rehabilitation facility      Anette Peña OT  10/11/2017

## 2017-10-12 PROBLEM — I48.91 ATRIAL FIBRILLATION WITH RAPID VENTRICULAR RESPONSE (HCC): Status: ACTIVE | Noted: 2017-10-12

## 2017-10-12 PROBLEM — J96.21 ACUTE ON CHRONIC RESPIRATORY FAILURE WITH HYPOXIA (HCC): Status: ACTIVE | Noted: 2017-10-12

## 2017-10-12 PROBLEM — Z51.89 ENCOUNTER FOR REHABILITATION: Status: ACTIVE | Noted: 2017-10-12

## 2017-10-12 PROBLEM — D62 ANEMIA DUE TO ACUTE BLOOD LOSS: Status: ACTIVE | Noted: 2017-10-12

## 2017-10-12 LAB
ANION GAP SERPL CALCULATED.3IONS-SCNC: 7 MMOL/L (ref 5–15)
BASOPHILS # BLD AUTO: 0.02 10*3/MM3 (ref 0–0.2)
BASOPHILS NFR BLD AUTO: 0.3 % (ref 0–2)
BUN BLD-MCNC: 11 MG/DL (ref 7–21)
BUN/CREAT SERPL: 16.7 (ref 7–25)
CALCIUM SPEC-SCNC: 8 MG/DL (ref 8.4–10.2)
CHLORIDE SERPL-SCNC: 104 MMOL/L (ref 95–110)
CO2 SERPL-SCNC: 31 MMOL/L (ref 22–31)
CREAT BLD-MCNC: 0.66 MG/DL (ref 0.5–1)
DEPRECATED RDW RBC AUTO: 48.5 FL (ref 36.4–46.3)
EOSINOPHIL # BLD AUTO: 0.25 10*3/MM3 (ref 0–0.7)
EOSINOPHIL NFR BLD AUTO: 3.3 % (ref 0–7)
ERYTHROCYTE [DISTWIDTH] IN BLOOD BY AUTOMATED COUNT: 15.7 % (ref 11.5–14.5)
GFR SERPL CREATININE-BSD FRML MDRD: 87 ML/MIN/1.73 (ref 60–90)
GLUCOSE BLD-MCNC: 93 MG/DL (ref 60–100)
HCT VFR BLD AUTO: 27.7 % (ref 35–45)
HGB BLD-MCNC: 8.9 G/DL (ref 12–15.5)
IMM GRANULOCYTES # BLD: 0.12 10*3/MM3 (ref 0–0.02)
IMM GRANULOCYTES NFR BLD: 1.6 % (ref 0–0.5)
INR PPP: 1.15 (ref 0.8–1.2)
LYMPHOCYTES # BLD AUTO: 1.49 10*3/MM3 (ref 0.6–4.2)
LYMPHOCYTES NFR BLD AUTO: 19.6 % (ref 10–50)
MCH RBC QN AUTO: 29.3 PG (ref 26.5–34)
MCHC RBC AUTO-ENTMCNC: 32.1 G/DL (ref 31.4–36)
MCV RBC AUTO: 91.1 FL (ref 80–98)
MONOCYTES # BLD AUTO: 0.76 10*3/MM3 (ref 0–0.9)
MONOCYTES NFR BLD AUTO: 10 % (ref 0–12)
NEUTROPHILS # BLD AUTO: 4.96 10*3/MM3 (ref 2–8.6)
NEUTROPHILS NFR BLD AUTO: 65.2 % (ref 37–80)
PLATELET # BLD AUTO: 297 10*3/MM3 (ref 150–450)
PMV BLD AUTO: 8.9 FL (ref 8–12)
POTASSIUM BLD-SCNC: 3.5 MMOL/L (ref 3.5–5.1)
PROTHROMBIN TIME: 14.7 SECONDS (ref 11.1–15.3)
RBC # BLD AUTO: 3.04 10*6/MM3 (ref 3.77–5.16)
SODIUM BLD-SCNC: 142 MMOL/L (ref 137–145)
WBC NRBC COR # BLD: 7.6 10*3/MM3 (ref 3.2–9.8)

## 2017-10-12 PROCEDURE — 97110 THERAPEUTIC EXERCISES: CPT

## 2017-10-12 PROCEDURE — 94760 N-INVAS EAR/PLS OXIMETRY 1: CPT

## 2017-10-12 PROCEDURE — 97162 PT EVAL MOD COMPLEX 30 MIN: CPT

## 2017-10-12 PROCEDURE — 99232 SBSQ HOSP IP/OBS MODERATE 35: CPT | Performed by: FAMILY MEDICINE

## 2017-10-12 PROCEDURE — 94799 UNLISTED PULMONARY SVC/PX: CPT

## 2017-10-12 PROCEDURE — 87581 M.PNEUMON DNA AMP PROBE: CPT | Performed by: FAMILY MEDICINE

## 2017-10-12 PROCEDURE — 80048 BASIC METABOLIC PNL TOTAL CA: CPT | Performed by: FAMILY MEDICINE

## 2017-10-12 PROCEDURE — 97166 OT EVAL MOD COMPLEX 45 MIN: CPT

## 2017-10-12 PROCEDURE — 97530 THERAPEUTIC ACTIVITIES: CPT

## 2017-10-12 PROCEDURE — G8987 SELF CARE CURRENT STATUS: HCPCS

## 2017-10-12 PROCEDURE — 92523 SPEECH SOUND LANG COMPREHEN: CPT | Performed by: SPEECH-LANGUAGE PATHOLOGIST

## 2017-10-12 PROCEDURE — 97542 WHEELCHAIR MNGMENT TRAINING: CPT

## 2017-10-12 PROCEDURE — 85610 PROTHROMBIN TIME: CPT | Performed by: FAMILY MEDICINE

## 2017-10-12 PROCEDURE — 85025 COMPLETE CBC W/AUTO DIFF WBC: CPT | Performed by: FAMILY MEDICINE

## 2017-10-12 PROCEDURE — G8979 MOBILITY GOAL STATUS: HCPCS

## 2017-10-12 PROCEDURE — G8988 SELF CARE GOAL STATUS: HCPCS

## 2017-10-12 PROCEDURE — G8978 MOBILITY CURRENT STATUS: HCPCS

## 2017-10-12 PROCEDURE — 97116 GAIT TRAINING THERAPY: CPT

## 2017-10-12 PROCEDURE — 97535 SELF CARE MNGMENT TRAINING: CPT

## 2017-10-12 RX ORDER — WARFARIN SODIUM 1 MG/1
1 TABLET ORAL
Status: DISCONTINUED | OUTPATIENT
Start: 2017-10-13 | End: 2017-10-14 | Stop reason: DRUGHIGH

## 2017-10-12 RX ORDER — FERROUS SULFATE TAB EC 324 MG (65 MG FE EQUIVALENT) 324 (65 FE) MG
324 TABLET DELAYED RESPONSE ORAL 2 TIMES DAILY WITH MEALS
Status: DISCONTINUED | OUTPATIENT
Start: 2017-10-12 | End: 2017-10-18

## 2017-10-12 RX ORDER — WARFARIN SODIUM 2 MG/1
2 TABLET ORAL
Status: COMPLETED | OUTPATIENT
Start: 2017-10-12 | End: 2017-10-12

## 2017-10-12 RX ORDER — POTASSIUM CHLORIDE 750 MG/1
20 CAPSULE, EXTENDED RELEASE ORAL 2 TIMES DAILY WITH MEALS
Status: DISCONTINUED | OUTPATIENT
Start: 2017-10-12 | End: 2017-10-16

## 2017-10-12 RX ORDER — HYDROCODONE BITARTRATE AND ACETAMINOPHEN 5; 325 MG/1; MG/1
1 TABLET ORAL EVERY 4 HOURS PRN
Status: DISCONTINUED | OUTPATIENT
Start: 2017-10-12 | End: 2017-10-13

## 2017-10-12 RX ADMIN — Medication 10 ML: at 08:09

## 2017-10-12 RX ADMIN — AMIODARONE HYDROCHLORIDE 200 MG: 200 TABLET ORAL at 08:09

## 2017-10-12 RX ADMIN — ALBUTEROL SULFATE 2.5 MG: 2.5 SOLUTION RESPIRATORY (INHALATION) at 12:34

## 2017-10-12 RX ADMIN — ALBUTEROL SULFATE 2.5 MG: 2.5 SOLUTION RESPIRATORY (INHALATION) at 00:00

## 2017-10-12 RX ADMIN — WARFARIN SODIUM 2 MG: 2 TABLET ORAL at 17:09

## 2017-10-12 RX ADMIN — ACETAMINOPHEN 1000 MG: 500 TABLET ORAL at 00:28

## 2017-10-12 RX ADMIN — ACETAMINOPHEN 1000 MG: 500 TABLET ORAL at 11:29

## 2017-10-12 RX ADMIN — ATORVASTATIN CALCIUM 80 MG: 40 TABLET, FILM COATED ORAL at 08:08

## 2017-10-12 RX ADMIN — Medication 1 TABLET: at 11:29

## 2017-10-12 RX ADMIN — FAMOTIDINE 20 MG: 20 TABLET ORAL at 20:59

## 2017-10-12 RX ADMIN — METOPROLOL TARTRATE 50 MG: 50 TABLET ORAL at 17:09

## 2017-10-12 RX ADMIN — CLOPIDOGREL BISULFATE 75 MG: 75 TABLET ORAL at 08:08

## 2017-10-12 RX ADMIN — ALBUTEROL SULFATE 2.5 MG: 2.5 SOLUTION RESPIRATORY (INHALATION) at 05:22

## 2017-10-12 RX ADMIN — ACETAMINOPHEN 1000 MG: 500 TABLET ORAL at 06:07

## 2017-10-12 RX ADMIN — ALBUTEROL SULFATE 2.5 MG: 2.5 SOLUTION RESPIRATORY (INHALATION) at 20:41

## 2017-10-12 RX ADMIN — AMIODARONE HYDROCHLORIDE 200 MG: 200 TABLET ORAL at 20:59

## 2017-10-12 RX ADMIN — GABAPENTIN 300 MG: 300 CAPSULE ORAL at 17:09

## 2017-10-12 RX ADMIN — GABAPENTIN 300 MG: 300 CAPSULE ORAL at 08:08

## 2017-10-12 RX ADMIN — POTASSIUM CHLORIDE 20 MEQ: 750 CAPSULE, EXTENDED RELEASE ORAL at 17:09

## 2017-10-12 RX ADMIN — FAMOTIDINE 20 MG: 20 TABLET ORAL at 08:08

## 2017-10-12 RX ADMIN — ACETAMINOPHEN 1000 MG: 500 TABLET ORAL at 17:10

## 2017-10-12 RX ADMIN — FUROSEMIDE 20 MG: 20 TABLET ORAL at 08:08

## 2017-10-12 RX ADMIN — LISINOPRIL 2.5 MG: 2.5 TABLET ORAL at 08:08

## 2017-10-12 RX ADMIN — POTASSIUM CHLORIDE 40 MEQ: 750 CAPSULE, EXTENDED RELEASE ORAL at 08:08

## 2017-10-12 RX ADMIN — PANTOPRAZOLE SODIUM 40 MG: 40 TABLET, DELAYED RELEASE ORAL at 06:06

## 2017-10-12 RX ADMIN — CETIRIZINE HYDROCHLORIDE 10 MG: 10 TABLET, FILM COATED ORAL at 08:08

## 2017-10-12 RX ADMIN — METOPROLOL TARTRATE 50 MG: 50 TABLET ORAL at 08:08

## 2017-10-12 RX ADMIN — FERROUS SULFATE TAB EC 324 MG (65 MG FE EQUIVALENT) 324 MG: 324 (65 FE) TABLET DELAYED RESPONSE at 17:09

## 2017-10-12 RX ADMIN — FERROUS SULFATE TAB EC 324 MG (65 MG FE EQUIVALENT) 324 MG: 324 (65 FE) TABLET DELAYED RESPONSE at 11:29

## 2017-10-12 RX ADMIN — CALCIUM ACETATE 667 MG: 667 TABLET ORAL at 11:29

## 2017-10-12 NOTE — PAYOR COMM NOTE
"Aliya Calix (77 y.o. Female)     Date of Birth Social Security Number Address Home Phone MRN    1940  [address] [place] [id number] [phone] [id number]    Worship Marital Status          Presybeterian        Admission Date Admission Type Admitting Provider Attending Provider Department, Room/Bed    9/30/17 Emergency Cipriano Leigh MD  [address], 361/1    Discharge Date Discharge Disposition Discharge Destination        10/11/2017 Rehab Facility or Unit ([facility])             Attending Provider: (none)    Allergies:  No Known Allergies    Isolation:  None   Infection:  None   Code Status:  Prior    Ht:  68\" (172.7 cm)   Wt:  194 lb 1 oz (88 kg)    Admission Cmt:  None   Principal Problem:  Displaced intertrochanteric fracture of left femur, initial encounter for closed fracture [S72.142A] More...                 Active Insurance as of 9/30/2017     Primary Coverage     Payor Plan Insurance Group Employer/Plan Group    MEDICARE MEDICARE A & B      Payor Plan Address Payor Plan Phone Number Effective From Effective To    [address] [phone] 9/1/2017     [place] [address]       Subscriber Name Subscriber Birth Date Member ID       ALIYA CALIX 1940 [id number]           Secondary Coverage     Payor Plan Insurance Group Employer/Plan Group    [facility]     Payor Plan Address Payor Plan Phone Number Effective From Effective To    [address] [address]  1/1/2016     [place] [phone]       Subscriber Name Subscriber Birth Date Member ID       ALIYA CALIX RASHEED 1940 [id number]           Tertiary Coverage     Payor Plan Insurance Group Employer/Plan Group    [facility]     Payor Plan Address Payor Plan Phone Number Effective From Effective To    [address] [address]  1/1/2017     [place] [phone]       Subscriber Name Subscriber Birth Date Member ID       ALIYA CALIX 1940 [id number]                 Emergency " Contacts      (Rel.) Home Phone Work Phone [place] Phone    Aris Calix (Son) -- -- [phone]    Isamar Linton (Daughter) -- -- [phone]    Paco Calix (Son) -- -- [phone]               Discharge Summary      Diego Mary MD at 10/11/2017 10:29 AM           [address] [address]  T - [phone]     DISCHARGE SUMMARY         PATIENT  DEMOGRAPHICS   PATIENT NAME: Anahi Centeno Yogi                      PHYSICIAN: Diego Mary MD  : 1940  MRN: [id number]    ADMISSION/DISCHARGE INFO   ADMISSION DATE: 2017   DISCHARGE DATE: 10/11/2017    ADMISSION DIAGNOSES: Essential hypertension [I10]  Peripheral arterial disease [I73.9]  Fall, initial encounter [W19.XXXA]  Closed left hip fracture, initial encounter [S72.002A]  Gastroesophageal reflux disease, esophagitis presence not specified [K21.9]  Abdominal aortic aneurysm (AAA) without rupture [I71.4]  Displaced intertrochanteric fracture of left femur, initial encounter for closed fracture [S72.142A]  DISCHARGE DIAGNOSES:     Principal Problem:    Displaced intertrochanteric fracture of left femur, initial encounter for closed fracture  Active Problems:    Peripheral arterial disease    CAD (coronary artery disease)    Hypertension    Chronic bronchitis    Essential hypertension    Fall    Tobacco dependence syndrome    Atrial fibrillation      SERVICE:  Medicine       CONSULTS   Consult Orders (all)     Start     Ordered    10/10/17 0919  Inpatient Consult to Rehab Admission  Once     Provider:  (Not yet assigned)    10/10/17 0918    10/04/17 1013  Inpatient Consult to Nutrition Services  Once     Provider:  (Not yet assigned)    10/04/17 1014    10/02/17 1206  Inpatient Consult to Case Management   Once     Provider:  (Not yet assigned)    10/02/17 1206    10/02/17 0858  Inpatient Consult to Cardiology  Once     Specialty:  Cardiology  Provider:  Ajit Gallardo  MD Nehal    10/02/17 0858    10/02/17 0848  Inpatient Consult to Pulmonology  Once     Specialty:  Pulmonary Disease  Provider:  Lenin Padgett MD    10/02/17 0847    09/30/17 1915  Inpatient Consult to Orthopedic Surgery  Once     Specialty:  Orthopedic Surgery  Provider:  Nicola Rich MD    09/30/17 1914 09/30/17 1727  Hospitalist (on-call MD unless specified)  Once     Specialty:  Hospitalist  Provider:  Cipriano Leigh MD    09/30/17 1727    Signed and Held  Inpatient Consult to Rehab Admission  Once     Provider:  (Not yet assigned)    Signed and Held          PROCEDURES     Imaging Results (last 24 hours)     ** No results found for the last 24 hours. **          Xr Elbow 3+ View Left    Result Date: 9/30/2017  Narrative: Patient Name:  MRS. ALIYA VARGAS Patient ID:  [id number] Ordering:  TAMMY OMER Attending:  TAMMY OMER Referring:  TAMMY OMER ------------------------------------------------ Three view left elbow HISTORY: Pain after falling AP, lateral and oblique views obtained. COMPARISON: None No acute fracture or dislocation. No joint effusion. No other osseous or articular abnormality.     Impression: CONCLUSION: No acute fracture or dislocation [id number] Electronically signed by:  Sammy Marie MD  9/30/2017 5:04 PM CDT Workstation: Narrative    Us Guided Vascular Access    Result Date: 10/2/2017  Narrative: This procedure was auto-finalized with no dictation required.    Xr Chest 1 View    Result Date: 10/7/2017  Narrative: PROCEDURE: Single chest view AP REASON FOR EXAM:Atelectasis, S72.142A Displaced intertrochanteric fracture of left femur, initial encounter for closed fracture S72.002A Fracture of unspecified part of neck of left femur, initial encounter for closed fracture W19.XXXA Unspecified fall, initial encounter I73.9 Peripheral vascular disease, unspecified I10 Essential (primary) hypertension K21.9 Gastro-esophageal reflux disease without esophagitis I FINDINGS:  Comparison exam dated October 5, 2017. Interval removal of ET tube and NG tube. Right PICC line appears stable with tip overlying the SVC. Cardiac and pulmonary vasculature are normal. Left lung base small linear opacity. Lungs otherwise clear. Pleural spaces are normal. No acute osseous abnormality.     Impression: 1.  Left lung base small linear opacities suspicious for discoid atelectasis versus less likely early pneumonia. 2.  Otherwise negative chest. Electronically signed by:  Eliseo Smith MD  10/7/2017 10:19 AM CDT Workstation: [id number]    Xr Chest 1 View    Result Date: 10/5/2017  Narrative: Exam: AP portable chest INDICATION: On ventilator COMPARISON: 10/2/2015 FINDINGS: Endotracheal tube NG tube and right PICC line remain in place. Heart size is normal. Persistent hazy opacity left lung compatible pleural effusion with atelectasis and/or infiltrate. Right lung remains clear.     Impression: No significant change. Electronically signed by:  Donny Meneses MD  10/5/2017 6:12 AM CDT Workstation: [id number]    Xr Chest 1 View    Result Date: 9/30/2017  Narrative: Patient Name:  MRS. ALIYA VARGAS Patient ID:  [id number] Ordering:  TAMMY MOER Attending:  TAMMY OMER Referring:  TAMMY OMER ------------------------------------------------ PORTABLE CHEST HISTORY: Fell. Left hip fracture. Portable AP supine film of the chest was obtained at 4:48 PM. COMPARISON: September 9, 2016 EKG leads. The lungs are clear of an acute process. The heart is not enlarged. The pulmonary vasculature is not increased. No pleural effusion. No pneumothorax. No acute osseous abnormality. Hypertrophic change right acromioclavicular joint.     Impression: CONCLUSION: No Acute Disease [id number] Electronically signed by:  Sammy Marie MD  9/30/2017 5:12 PM CDT Workstation: [id number]    Xr Hip With Or Without Pelvis 2 - 3 View Left    Result Date: 9/30/2017  Narrative: [facility] [place] Patient Name: ALIYA VARGAS  ORDERING: TAMMY OMER ATTENDING: TAMMY OMER REFERRING: TAMMY OMER Two view left hip with single view pelvis HISTORY: Fell AP and stable lateral views of the left hip and AP film of the pelvis obtained. COMPARISON: None Comminuted displaced intertrochanteric and subtrochanteric fracture of the left hip. No dislocation of the femoral head. Degenerative changes and degenerative disc disease lumbar spine. Degenerative change greater trochanter each hip. Previously demonstrated abdominal aortic aneurysm. Surgical clips overlie the lumbar spine. Pelvic phleboliths.     Impression: CONCLUSION: Comminuted displaced intertrochanteric and subtrochanteric fracture of the left hip. Previously demonstrated abdominal aortic aneurysm. [id number] Electronically signed by:  Sammy Marie MD  9/30/2017 5:07 PM CDT Workstation: [employer] Chest Post Cva Port    Result Date: 10/2/2017  Narrative: PROCEDURE: Single chest view AP REASON FOR EXAM:Post PICC placement., S72.142A Displaced intertrochanteric fracture of left femur, initial encounter for closed fracture S72.002A Fracture of unspecified part of neck of left femur, initial encounter for closed fracture W19.XXXA Unspecified fall, initial encounter I73.9 Peripheral vascular disease, unspecified I10 Essential (primary) hypertension K21.9 Gastro-esophageal reflux disease without esop FINDINGS: Comparison exam dated September 30, 2017. ET tube with tip 5.6 cm above keo. NG tube with tip below level diaphragm. Right PICC line with tip overlying the SVC. Cardiac size appears within normal limits. Vague left infrahilar lung base perihilar haziness. Lungs otherwise clear. Pleural spaces are normal. No acute osseous abnormality.     Impression: 1.  Tubes and lines as described above. 2.  Vague left infrahilar lung base perihilar haziness. This may represent very early atypical pulmonary edema versus subsegmental atelectasis or pneumonia. Electronically signed by:  Eliseo Smith  MD  10/2/2017 10:54 AM CDT Workstation: [id number]    Ir Picc Wo Fluoro Guidance    Result Date: 10/2/2017  Narrative: This procedure was auto-finalized with no dictation required.      HISTORY OF PRESENT ILLNESS   Anahi Calix is a 77 y.o. female admitted for left-sided hip pain.  Patient tripped fell and injured her left hip.  She sustained a left hip fracture requiring surgical fixation.    DIAGNOSTIC DATA   X-ray    HOSPITAL COURSE   Patient was found to have subtrochanteric fracture of the left hip, she was then subsequently taken to the OR for fixation which was performed by Dr. Rich.  Patient tolerated procedure very well she did was transferred to ICU.  Patient required ventilation therefore Dr. Padgett was consulted.  Patient will also found to have new onset of A. fib after Ben was also consulted.  Patient required electrical cardioversion for atrial fibrillation with RVR associated with hypotension.  Patient continued to be hypertension in the ICU and required further pressors. Pt was further evaluated for pneumonia, patient's sputum culture was positive for gram negative diplococci therefore patient was started on Zosyn.  Patient was extubated on 10/6/2017.  Patient was then subsequently transferred to medical floor.  On medical floor patient worked with PT OT she continued tolerate her activity.  Patient did have some bruises on her back secondary to her fall that she had taken at home.  Patient is currently doing very well.  Patient will be going to the RTU for further rehabilitation.  She is mildly hypokalemic to visit today we'll be replacing potassium today.  No discharge patient denies any shortness of air, abdominal pain, diarrhea, or constipation.  Patient denies any chest pain blurry vision, headache, or dizziness.      Physical Exam   Constitutional: She is oriented to person, place, and time. She appears well-developed and well-nourished.   HENT:   Head: Normocephalic.   Eyes: EOM are  normal. Pupils are equal, round, and reactive to light.   Neck: Normal range of motion.   Cardiovascular: Normal rate, regular rhythm and normal heart sounds.    Pulmonary/Chest: Effort normal and breath sounds normal.   Abdominal: Soft. Bowel sounds are normal.   Neurological: She is alert and oriented to person, place, and time.   Skin: Skin is warm.   Psychiatric: She has a normal mood and affect. Her behavior is normal.   Vitals reviewed.       DISCHARGE CONDITION   Stable    DISPOSITION   To ARU     DISCHARGE MEDICATIONS      Anahi Calix   Home Medication Instructions OWEN:[id number]    Printed on:10/11/17 7964   Medication Information                      amoxicillin-clavulanate (AUGMENTIN) 500-125 MG per tablet  Take 1 tablet by mouth 2 (Two) Times a Day.             aspirin 81 MG EC tablet  Take 81 mg by mouth daily.             atorvastatin (LIPITOR) 80 MG tablet  Take 1 tablet by mouth Daily.             azithromycin (ZITHROMAX) 250 MG tablet  Take 2 tablets the first day, then 1 tablet daily for 4 days.             calcium acetate (PHOSLO) 667 MG tablet  Take 667 mg by mouth every day.             Cholecalciferol (GNP VITAMIN D SUPER STRENGTH) 5000 UNITS tablet  Take  by mouth.             clopidogrel (PLAVIX) 75 MG tablet  Take 1 tablet by mouth Daily.             furosemide (LASIX) 20 MG tablet  Take 1 tablet by mouth Daily. As needed for edema             gabapentin (NEURONTIN) 300 MG capsule  Take 1 capsule by mouth 3 (Three) Times a Day.             hydrochlorothiazide (MICROZIDE) 12.5 MG capsule  Take 12.5 mg by mouth Daily.             ibuprofen (ADVIL,MOTRIN) 400 MG tablet  Take 1 tablet by mouth Every 6 (Six) Hours As Needed for mild pain (1-3).             lisinopril (PRINIVIL,ZESTRIL) 40 MG tablet  Take 1 tablet by mouth Daily.             loratadine (CLARITIN) 10 MG tablet  Take 10 mg by mouth daily.             metoprolol tartrate (LOPRESSOR) 50 MG tablet  Take 1 tablet by mouth 2  (Two) Times a Day.             Multiple Vitamins-Minerals (VISION FORMULA PO)  Take 1 tablet by mouth every day.             omeprazole (PriLOSEC) 20 MG capsule  Take 1 capsule by mouth Daily.             polyethylene glycol (MIRALAX) powder  ONE CAPFUL IN 8 OUNCES OF LIQUID ONCE DAILY UP TO THREE TIMES DAILY AS NEEDED.             vitamin D (ERGOCALCIFEROL) 95842 UNITS capsule capsule  Take 50,000 Units by mouth daily.                   INSTRUCTIONS   Activity: as tolerated     Diet: cardiac    Special Instructions: Patient instructed to call M.D. or return to ED with worsening shortness of breath, chest pain, fever greater than 100.4°F or any other medical concerns.    FOLLOW UP   Follow-up Information     Follow up with [facility] .    Specialty:  Physical Medicine and Rehabilitation    Contact information:    [address]  [facility] [address] [phone]        Follow up with Nicola Horotn MD .    Specialty:  Orthopedic Surgery    Why:  in rehab.     Contact information:    [address]  [address] [place] [address] [phone]          Follow up with Gamal Lara MD Follow up in 1 week(s).    Specialty:  Orthopedic Surgery    Why:  Follow up.     Contact information:    [address]  [place] [address] [place] [address] [phone]          Follow up with Ramiro Gloria MD .    Specialties:  Family Medicine, Emergency Medicine    Contact information:    [address] [facility] [address] [phone]           PENDING TEST RESULTS AT DISCHARGE      TIME   Time: 45 minutes were spent planning this discharge.                      This document has been electronically signed by Diego Mary MD on October 11, 2017 2:23 PM              Electronically signed by Diego Mary MD at 10/11/2017  2:42 PM

## 2017-10-13 LAB
ALBUMIN SERPL-MCNC: 2.5 G/DL (ref 3.4–4.8)
ANION GAP SERPL CALCULATED.3IONS-SCNC: 9 MMOL/L (ref 5–15)
BASOPHILS # BLD AUTO: 0.03 10*3/MM3 (ref 0–0.2)
BASOPHILS NFR BLD AUTO: 0.3 % (ref 0–2)
BUN BLD-MCNC: 13 MG/DL (ref 7–21)
BUN/CREAT SERPL: 17.6 (ref 7–25)
CALCIUM SPEC-SCNC: 8.2 MG/DL (ref 8.4–10.2)
CHLORIDE SERPL-SCNC: 103 MMOL/L (ref 95–110)
CO2 SERPL-SCNC: 30 MMOL/L (ref 22–31)
CREAT BLD-MCNC: 0.74 MG/DL (ref 0.5–1)
DEPRECATED RDW RBC AUTO: 49.9 FL (ref 36.4–46.3)
EOSINOPHIL # BLD AUTO: 0.22 10*3/MM3 (ref 0–0.7)
EOSINOPHIL NFR BLD AUTO: 2.3 % (ref 0–7)
ERYTHROCYTE [DISTWIDTH] IN BLOOD BY AUTOMATED COUNT: 16.3 % (ref 11.5–14.5)
GFR SERPL CREATININE-BSD FRML MDRD: 76 ML/MIN/1.73 (ref 60–90)
GLUCOSE BLD-MCNC: 89 MG/DL (ref 60–100)
HCT VFR BLD AUTO: 27.9 % (ref 35–45)
HGB BLD-MCNC: 8.9 G/DL (ref 12–15.5)
IMM GRANULOCYTES # BLD: 0.11 10*3/MM3 (ref 0–0.02)
IMM GRANULOCYTES NFR BLD: 1.2 % (ref 0–0.5)
INR PPP: 1.15 (ref 0.8–1.2)
IRON 24H UR-MRATE: 28 MCG/DL (ref 37–170)
IRON SATN MFR SERPL: 12 % (ref 15–50)
LYMPHOCYTES # BLD AUTO: 1.69 10*3/MM3 (ref 0.6–4.2)
LYMPHOCYTES NFR BLD AUTO: 17.8 % (ref 10–50)
MAGNESIUM SERPL-MCNC: 1.8 MG/DL (ref 1.6–2.3)
MCH RBC QN AUTO: 29.4 PG (ref 26.5–34)
MCHC RBC AUTO-ENTMCNC: 31.9 G/DL (ref 31.4–36)
MCV RBC AUTO: 92.1 FL (ref 80–98)
MONOCYTES # BLD AUTO: 0.92 10*3/MM3 (ref 0–0.9)
MONOCYTES NFR BLD AUTO: 9.7 % (ref 0–12)
MYCOPLASMAE PNEUMONIAE BY PCR: NEGATIVE
NEUTROPHILS # BLD AUTO: 6.52 10*3/MM3 (ref 2–8.6)
NEUTROPHILS NFR BLD AUTO: 68.7 % (ref 37–80)
PHOSPHATE SERPL-MCNC: 3.8 MG/DL (ref 2.4–4.4)
PLATELET # BLD AUTO: 325 10*3/MM3 (ref 150–450)
PMV BLD AUTO: 8.9 FL (ref 8–12)
POTASSIUM BLD-SCNC: 4 MMOL/L (ref 3.5–5.1)
PROTHROMBIN TIME: 14.6 SECONDS (ref 11.1–15.3)
RBC # BLD AUTO: 3.03 10*6/MM3 (ref 3.77–5.16)
SODIUM BLD-SCNC: 142 MMOL/L (ref 137–145)
TIBC SERPL-MCNC: 229 MCG/DL (ref 265–497)
WBC NRBC COR # BLD: 9.49 10*3/MM3 (ref 3.2–9.8)

## 2017-10-13 PROCEDURE — 97116 GAIT TRAINING THERAPY: CPT

## 2017-10-13 PROCEDURE — 97530 THERAPEUTIC ACTIVITIES: CPT

## 2017-10-13 PROCEDURE — 80069 RENAL FUNCTION PANEL: CPT | Performed by: FAMILY MEDICINE

## 2017-10-13 PROCEDURE — 97110 THERAPEUTIC EXERCISES: CPT

## 2017-10-13 PROCEDURE — 97535 SELF CARE MNGMENT TRAINING: CPT

## 2017-10-13 PROCEDURE — 97542 WHEELCHAIR MNGMENT TRAINING: CPT

## 2017-10-13 PROCEDURE — 83540 ASSAY OF IRON: CPT | Performed by: FAMILY MEDICINE

## 2017-10-13 PROCEDURE — 83550 IRON BINDING TEST: CPT | Performed by: FAMILY MEDICINE

## 2017-10-13 PROCEDURE — 99232 SBSQ HOSP IP/OBS MODERATE 35: CPT | Performed by: FAMILY MEDICINE

## 2017-10-13 PROCEDURE — 83735 ASSAY OF MAGNESIUM: CPT | Performed by: FAMILY MEDICINE

## 2017-10-13 PROCEDURE — 85610 PROTHROMBIN TIME: CPT | Performed by: FAMILY MEDICINE

## 2017-10-13 PROCEDURE — 85025 COMPLETE CBC W/AUTO DIFF WBC: CPT | Performed by: FAMILY MEDICINE

## 2017-10-13 PROCEDURE — 94760 N-INVAS EAR/PLS OXIMETRY 1: CPT

## 2017-10-13 PROCEDURE — 94799 UNLISTED PULMONARY SVC/PX: CPT

## 2017-10-13 RX ORDER — ACETAMINOPHEN 500 MG
1000 TABLET ORAL EVERY 6 HOURS PRN
Status: DISCONTINUED | OUTPATIENT
Start: 2017-10-13 | End: 2017-10-24 | Stop reason: HOSPADM

## 2017-10-13 RX ORDER — HYDROCODONE BITARTRATE AND ACETAMINOPHEN 7.5; 325 MG/1; MG/1
1 TABLET ORAL EVERY 6 HOURS PRN
Status: DISCONTINUED | OUTPATIENT
Start: 2017-10-13 | End: 2017-10-20

## 2017-10-13 RX ADMIN — HYDROCODONE BITARTRATE AND ACETAMINOPHEN 1 TABLET: 7.5; 325 TABLET ORAL at 20:50

## 2017-10-13 RX ADMIN — FAMOTIDINE 20 MG: 20 TABLET ORAL at 20:47

## 2017-10-13 RX ADMIN — GABAPENTIN 300 MG: 300 CAPSULE ORAL at 17:09

## 2017-10-13 RX ADMIN — Medication 1 TABLET: at 08:10

## 2017-10-13 RX ADMIN — POTASSIUM CHLORIDE 20 MEQ: 750 CAPSULE, EXTENDED RELEASE ORAL at 08:09

## 2017-10-13 RX ADMIN — FAMOTIDINE 20 MG: 20 TABLET ORAL at 08:10

## 2017-10-13 RX ADMIN — WARFARIN SODIUM 1 MG: 1 TABLET ORAL at 17:07

## 2017-10-13 RX ADMIN — HYDROCODONE BITARTRATE AND ACETAMINOPHEN 1 TABLET: 7.5; 325 TABLET ORAL at 12:20

## 2017-10-13 RX ADMIN — POTASSIUM CHLORIDE 20 MEQ: 750 CAPSULE, EXTENDED RELEASE ORAL at 17:07

## 2017-10-13 RX ADMIN — METOPROLOL TARTRATE 50 MG: 50 TABLET ORAL at 17:07

## 2017-10-13 RX ADMIN — PANTOPRAZOLE SODIUM 40 MG: 40 TABLET, DELAYED RELEASE ORAL at 06:02

## 2017-10-13 RX ADMIN — HYDROCODONE BITARTRATE AND ACETAMINOPHEN 1 TABLET: 5; 325 TABLET ORAL at 07:18

## 2017-10-13 RX ADMIN — FUROSEMIDE 20 MG: 20 TABLET ORAL at 08:08

## 2017-10-13 RX ADMIN — AMIODARONE HYDROCHLORIDE 200 MG: 200 TABLET ORAL at 20:47

## 2017-10-13 RX ADMIN — ATORVASTATIN CALCIUM 80 MG: 40 TABLET, FILM COATED ORAL at 08:08

## 2017-10-13 RX ADMIN — AMIODARONE HYDROCHLORIDE 200 MG: 200 TABLET ORAL at 08:09

## 2017-10-13 RX ADMIN — ACETAMINOPHEN 1000 MG: 500 TABLET ORAL at 00:33

## 2017-10-13 RX ADMIN — GABAPENTIN 300 MG: 300 CAPSULE ORAL at 08:10

## 2017-10-13 RX ADMIN — LISINOPRIL 2.5 MG: 2.5 TABLET ORAL at 08:08

## 2017-10-13 RX ADMIN — ALBUTEROL SULFATE 2.5 MG: 2.5 SOLUTION RESPIRATORY (INHALATION) at 06:30

## 2017-10-13 RX ADMIN — CLOPIDOGREL BISULFATE 75 MG: 75 TABLET ORAL at 08:08

## 2017-10-13 RX ADMIN — ALBUTEROL SULFATE 2.5 MG: 2.5 SOLUTION RESPIRATORY (INHALATION) at 00:40

## 2017-10-13 RX ADMIN — ALBUTEROL SULFATE 2.5 MG: 2.5 SOLUTION RESPIRATORY (INHALATION) at 16:37

## 2017-10-13 RX ADMIN — ACETAMINOPHEN 1000 MG: 500 TABLET ORAL at 05:58

## 2017-10-13 RX ADMIN — METOPROLOL TARTRATE 50 MG: 50 TABLET ORAL at 08:08

## 2017-10-13 RX ADMIN — FERROUS SULFATE TAB EC 324 MG (65 MG FE EQUIVALENT) 324 MG: 324 (65 FE) TABLET DELAYED RESPONSE at 08:08

## 2017-10-13 RX ADMIN — FERROUS SULFATE TAB EC 324 MG (65 MG FE EQUIVALENT) 324 MG: 324 (65 FE) TABLET DELAYED RESPONSE at 17:07

## 2017-10-13 RX ADMIN — CETIRIZINE HYDROCHLORIDE 10 MG: 10 TABLET, FILM COATED ORAL at 08:09

## 2017-10-14 LAB
ALBUMIN SERPL-MCNC: 2.4 G/DL (ref 3.4–4.8)
ALBUMIN/GLOB SERPL: 1 G/DL (ref 1.1–1.8)
ALP SERPL-CCNC: 79 U/L (ref 38–126)
ALT SERPL W P-5'-P-CCNC: 37 U/L (ref 9–52)
ANION GAP SERPL CALCULATED.3IONS-SCNC: 8 MMOL/L (ref 5–15)
AST SERPL-CCNC: 25 U/L (ref 14–36)
BILIRUB SERPL-MCNC: 0.7 MG/DL (ref 0.2–1.3)
BUN BLD-MCNC: 18 MG/DL (ref 7–21)
BUN/CREAT SERPL: 19.6 (ref 7–25)
CALCIUM SPEC-SCNC: 8.7 MG/DL (ref 8.4–10.2)
CHLORIDE SERPL-SCNC: 102 MMOL/L (ref 95–110)
CO2 SERPL-SCNC: 28 MMOL/L (ref 22–31)
CREAT BLD-MCNC: 0.92 MG/DL (ref 0.5–1)
DEPRECATED RDW RBC AUTO: 52.8 FL (ref 36.4–46.3)
ERYTHROCYTE [DISTWIDTH] IN BLOOD BY AUTOMATED COUNT: 17.1 % (ref 11.5–14.5)
GFR SERPL CREATININE-BSD FRML MDRD: 59 ML/MIN/1.73 (ref 39–90)
GLOBULIN UR ELPH-MCNC: 2.4 GM/DL (ref 2.3–3.5)
GLUCOSE BLD-MCNC: 95 MG/DL (ref 60–100)
HCT VFR BLD AUTO: 27 % (ref 35–45)
HGB BLD-MCNC: 8.7 G/DL (ref 12–15.5)
INR PPP: 1.11 (ref 0.8–1.2)
MCH RBC QN AUTO: 29.8 PG (ref 26.5–34)
MCHC RBC AUTO-ENTMCNC: 32.2 G/DL (ref 31.4–36)
MCV RBC AUTO: 92.5 FL (ref 80–98)
PLATELET # BLD AUTO: 349 10*3/MM3 (ref 150–450)
PMV BLD AUTO: 9 FL (ref 8–12)
POTASSIUM BLD-SCNC: 4.3 MMOL/L (ref 3.5–5.1)
PROT SERPL-MCNC: 4.8 G/DL (ref 6.3–8.6)
PROTHROMBIN TIME: 14.2 SECONDS (ref 11.1–15.3)
RBC # BLD AUTO: 2.92 10*6/MM3 (ref 3.77–5.16)
SODIUM BLD-SCNC: 138 MMOL/L (ref 137–145)
WBC NRBC COR # BLD: 10.41 10*3/MM3 (ref 3.2–9.8)

## 2017-10-14 PROCEDURE — 97116 GAIT TRAINING THERAPY: CPT

## 2017-10-14 PROCEDURE — 97530 THERAPEUTIC ACTIVITIES: CPT

## 2017-10-14 PROCEDURE — 85027 COMPLETE CBC AUTOMATED: CPT | Performed by: FAMILY MEDICINE

## 2017-10-14 PROCEDURE — 97110 THERAPEUTIC EXERCISES: CPT

## 2017-10-14 PROCEDURE — 85610 PROTHROMBIN TIME: CPT | Performed by: FAMILY MEDICINE

## 2017-10-14 PROCEDURE — 94799 UNLISTED PULMONARY SVC/PX: CPT

## 2017-10-14 PROCEDURE — 80053 COMPREHEN METABOLIC PANEL: CPT | Performed by: FAMILY MEDICINE

## 2017-10-14 RX ORDER — WARFARIN SODIUM 2 MG/1
2 TABLET ORAL
Status: DISCONTINUED | OUTPATIENT
Start: 2017-10-14 | End: 2017-10-16 | Stop reason: DRUGHIGH

## 2017-10-14 RX ADMIN — HYDROCODONE BITARTRATE AND ACETAMINOPHEN 1 TABLET: 7.5; 325 TABLET ORAL at 23:47

## 2017-10-14 RX ADMIN — FAMOTIDINE 20 MG: 20 TABLET ORAL at 20:58

## 2017-10-14 RX ADMIN — ALBUTEROL SULFATE 2.5 MG: 2.5 SOLUTION RESPIRATORY (INHALATION) at 05:15

## 2017-10-14 RX ADMIN — FERROUS SULFATE TAB EC 324 MG (65 MG FE EQUIVALENT) 324 MG: 324 (65 FE) TABLET DELAYED RESPONSE at 17:05

## 2017-10-14 RX ADMIN — POTASSIUM CHLORIDE 20 MEQ: 750 CAPSULE, EXTENDED RELEASE ORAL at 17:05

## 2017-10-14 RX ADMIN — METOPROLOL TARTRATE 50 MG: 50 TABLET ORAL at 08:00

## 2017-10-14 RX ADMIN — CLOPIDOGREL BISULFATE 75 MG: 75 TABLET ORAL at 08:00

## 2017-10-14 RX ADMIN — METOPROLOL TARTRATE 50 MG: 50 TABLET ORAL at 17:05

## 2017-10-14 RX ADMIN — POTASSIUM CHLORIDE 20 MEQ: 750 CAPSULE, EXTENDED RELEASE ORAL at 08:00

## 2017-10-14 RX ADMIN — CETIRIZINE HYDROCHLORIDE 10 MG: 10 TABLET, FILM COATED ORAL at 08:00

## 2017-10-14 RX ADMIN — AMIODARONE HYDROCHLORIDE 200 MG: 200 TABLET ORAL at 20:58

## 2017-10-14 RX ADMIN — PANTOPRAZOLE SODIUM 40 MG: 40 TABLET, DELAYED RELEASE ORAL at 06:11

## 2017-10-14 RX ADMIN — ALBUTEROL SULFATE 2.5 MG: 2.5 SOLUTION RESPIRATORY (INHALATION) at 18:20

## 2017-10-14 RX ADMIN — ACETAMINOPHEN 1000 MG: 325 TABLET ORAL at 10:09

## 2017-10-14 RX ADMIN — ALBUTEROL SULFATE 2.5 MG: 2.5 SOLUTION RESPIRATORY (INHALATION) at 00:35

## 2017-10-14 RX ADMIN — HYDROCODONE BITARTRATE AND ACETAMINOPHEN 1 TABLET: 7.5; 325 TABLET ORAL at 18:11

## 2017-10-14 RX ADMIN — AMIODARONE HYDROCHLORIDE 200 MG: 200 TABLET ORAL at 08:00

## 2017-10-14 RX ADMIN — GABAPENTIN 300 MG: 300 CAPSULE ORAL at 08:00

## 2017-10-14 RX ADMIN — HYDROCODONE BITARTRATE AND ACETAMINOPHEN 1 TABLET: 7.5; 325 TABLET ORAL at 07:21

## 2017-10-14 RX ADMIN — ALBUTEROL SULFATE 2.5 MG: 2.5 SOLUTION RESPIRATORY (INHALATION) at 23:38

## 2017-10-14 RX ADMIN — ATORVASTATIN CALCIUM 80 MG: 40 TABLET, FILM COATED ORAL at 08:00

## 2017-10-14 RX ADMIN — GABAPENTIN 300 MG: 300 CAPSULE ORAL at 17:07

## 2017-10-14 RX ADMIN — Medication 1 TABLET: at 08:01

## 2017-10-14 RX ADMIN — FERROUS SULFATE TAB EC 324 MG (65 MG FE EQUIVALENT) 324 MG: 324 (65 FE) TABLET DELAYED RESPONSE at 08:01

## 2017-10-14 RX ADMIN — LISINOPRIL 2.5 MG: 2.5 TABLET ORAL at 08:00

## 2017-10-14 RX ADMIN — FUROSEMIDE 20 MG: 20 TABLET ORAL at 08:00

## 2017-10-14 RX ADMIN — FAMOTIDINE 20 MG: 20 TABLET ORAL at 08:00

## 2017-10-14 RX ADMIN — WARFARIN SODIUM 2 MG: 2 TABLET ORAL at 17:05

## 2017-10-15 LAB
ALBUMIN SERPL-MCNC: 2.5 G/DL (ref 3.4–4.8)
ALBUMIN/GLOB SERPL: 0.9 G/DL (ref 1.1–1.8)
ALP SERPL-CCNC: 78 U/L (ref 38–126)
ALT SERPL W P-5'-P-CCNC: 31 U/L (ref 9–52)
ANION GAP SERPL CALCULATED.3IONS-SCNC: 7 MMOL/L (ref 5–15)
AST SERPL-CCNC: 45 U/L (ref 14–36)
BILIRUB SERPL-MCNC: 0.7 MG/DL (ref 0.2–1.3)
BUN BLD-MCNC: 18 MG/DL (ref 7–21)
BUN/CREAT SERPL: 21.2 (ref 7–25)
CALCIUM SPEC-SCNC: 8.5 MG/DL (ref 8.4–10.2)
CHLORIDE SERPL-SCNC: 103 MMOL/L (ref 95–110)
CO2 SERPL-SCNC: 29 MMOL/L (ref 22–31)
CREAT BLD-MCNC: 0.85 MG/DL (ref 0.5–1)
DEPRECATED RDW RBC AUTO: 53.3 FL (ref 36.4–46.3)
ERYTHROCYTE [DISTWIDTH] IN BLOOD BY AUTOMATED COUNT: 17.2 % (ref 11.5–14.5)
GFR SERPL CREATININE-BSD FRML MDRD: 65 ML/MIN/1.73 (ref 60–90)
GLOBULIN UR ELPH-MCNC: 2.8 GM/DL (ref 2.3–3.5)
GLUCOSE BLD-MCNC: 92 MG/DL (ref 60–100)
HCT VFR BLD AUTO: 26.1 % (ref 35–45)
HGB BLD-MCNC: 8.7 G/DL (ref 12–15.5)
INR PPP: 1.18 (ref 0.8–1.2)
MCH RBC QN AUTO: 30.7 PG (ref 26.5–34)
MCHC RBC AUTO-ENTMCNC: 33.3 G/DL (ref 31.4–36)
MCV RBC AUTO: 92.2 FL (ref 80–98)
PLATELET # BLD AUTO: 350 10*3/MM3 (ref 150–450)
PMV BLD AUTO: 9.2 FL (ref 8–12)
POTASSIUM BLD-SCNC: 4.2 MMOL/L (ref 3.5–5.1)
PROT SERPL-MCNC: 5.3 G/DL (ref 6.3–8.6)
PROTHROMBIN TIME: 15 SECONDS (ref 11.1–15.3)
RBC # BLD AUTO: 2.83 10*6/MM3 (ref 3.77–5.16)
SODIUM BLD-SCNC: 139 MMOL/L (ref 137–145)
WBC NRBC COR # BLD: 8.46 10*3/MM3 (ref 3.2–9.8)

## 2017-10-15 PROCEDURE — 85610 PROTHROMBIN TIME: CPT | Performed by: FAMILY MEDICINE

## 2017-10-15 PROCEDURE — 85027 COMPLETE CBC AUTOMATED: CPT | Performed by: FAMILY MEDICINE

## 2017-10-15 PROCEDURE — 80053 COMPREHEN METABOLIC PANEL: CPT | Performed by: FAMILY MEDICINE

## 2017-10-15 PROCEDURE — 94760 N-INVAS EAR/PLS OXIMETRY 1: CPT

## 2017-10-15 PROCEDURE — 94799 UNLISTED PULMONARY SVC/PX: CPT

## 2017-10-15 RX ADMIN — CETIRIZINE HYDROCHLORIDE 10 MG: 10 TABLET, FILM COATED ORAL at 08:02

## 2017-10-15 RX ADMIN — LISINOPRIL 2.5 MG: 2.5 TABLET ORAL at 08:02

## 2017-10-15 RX ADMIN — FUROSEMIDE 20 MG: 20 TABLET ORAL at 08:02

## 2017-10-15 RX ADMIN — HYDROCODONE BITARTRATE AND ACETAMINOPHEN 1 TABLET: 7.5; 325 TABLET ORAL at 08:19

## 2017-10-15 RX ADMIN — FERROUS SULFATE TAB EC 324 MG (65 MG FE EQUIVALENT) 324 MG: 324 (65 FE) TABLET DELAYED RESPONSE at 08:01

## 2017-10-15 RX ADMIN — GABAPENTIN 300 MG: 300 CAPSULE ORAL at 17:11

## 2017-10-15 RX ADMIN — ALBUTEROL SULFATE 2.5 MG: 2.5 SOLUTION RESPIRATORY (INHALATION) at 05:10

## 2017-10-15 RX ADMIN — AMIODARONE HYDROCHLORIDE 200 MG: 200 TABLET ORAL at 21:01

## 2017-10-15 RX ADMIN — POTASSIUM CHLORIDE 20 MEQ: 750 CAPSULE, EXTENDED RELEASE ORAL at 08:01

## 2017-10-15 RX ADMIN — HYDROCODONE BITARTRATE AND ACETAMINOPHEN 1 TABLET: 7.5; 325 TABLET ORAL at 13:37

## 2017-10-15 RX ADMIN — FAMOTIDINE 20 MG: 20 TABLET ORAL at 08:02

## 2017-10-15 RX ADMIN — Medication 1 TABLET: at 08:01

## 2017-10-15 RX ADMIN — POTASSIUM CHLORIDE 20 MEQ: 750 CAPSULE, EXTENDED RELEASE ORAL at 17:11

## 2017-10-15 RX ADMIN — METOPROLOL TARTRATE 50 MG: 50 TABLET ORAL at 17:11

## 2017-10-15 RX ADMIN — POLYETHYLENE GLYCOL 3350 17 G: 17 POWDER, FOR SOLUTION ORAL at 08:04

## 2017-10-15 RX ADMIN — ALBUTEROL SULFATE 2.5 MG: 2.5 SOLUTION RESPIRATORY (INHALATION) at 19:49

## 2017-10-15 RX ADMIN — PANTOPRAZOLE SODIUM 40 MG: 40 TABLET, DELAYED RELEASE ORAL at 06:39

## 2017-10-15 RX ADMIN — ACETAMINOPHEN 1000 MG: 325 TABLET ORAL at 05:55

## 2017-10-15 RX ADMIN — GABAPENTIN 300 MG: 300 CAPSULE ORAL at 08:01

## 2017-10-15 RX ADMIN — METOPROLOL TARTRATE 50 MG: 50 TABLET ORAL at 08:01

## 2017-10-15 RX ADMIN — AMIODARONE HYDROCHLORIDE 200 MG: 200 TABLET ORAL at 08:02

## 2017-10-15 RX ADMIN — FERROUS SULFATE TAB EC 324 MG (65 MG FE EQUIVALENT) 324 MG: 324 (65 FE) TABLET DELAYED RESPONSE at 17:11

## 2017-10-15 RX ADMIN — FAMOTIDINE 20 MG: 20 TABLET ORAL at 21:02

## 2017-10-15 RX ADMIN — HYDROCODONE BITARTRATE AND ACETAMINOPHEN 1 TABLET: 7.5; 325 TABLET ORAL at 21:02

## 2017-10-15 RX ADMIN — ATORVASTATIN CALCIUM 80 MG: 40 TABLET, FILM COATED ORAL at 08:02

## 2017-10-15 RX ADMIN — ACETAMINOPHEN 1000 MG: 325 TABLET ORAL at 17:14

## 2017-10-15 RX ADMIN — WARFARIN SODIUM 2 MG: 2 TABLET ORAL at 17:11

## 2017-10-15 RX ADMIN — CLOPIDOGREL BISULFATE 75 MG: 75 TABLET ORAL at 08:02

## 2017-10-15 RX ADMIN — ALBUTEROL SULFATE 2.5 MG: 2.5 SOLUTION RESPIRATORY (INHALATION) at 13:33

## 2017-10-16 LAB
ALBUMIN SERPL-MCNC: 2.7 G/DL (ref 3.4–4.8)
ALBUMIN/GLOB SERPL: 1 G/DL (ref 1.1–1.8)
ALP SERPL-CCNC: 88 U/L (ref 38–126)
ALT SERPL W P-5'-P-CCNC: 33 U/L (ref 9–52)
ANION GAP SERPL CALCULATED.3IONS-SCNC: 10 MMOL/L (ref 5–15)
AST SERPL-CCNC: 32 U/L (ref 14–36)
BILIRUB SERPL-MCNC: 0.6 MG/DL (ref 0.2–1.3)
BUN BLD-MCNC: 19 MG/DL (ref 7–21)
BUN/CREAT SERPL: 22.6 (ref 7–25)
CALCIUM SPEC-SCNC: 8.5 MG/DL (ref 8.4–10.2)
CHLORIDE SERPL-SCNC: 101 MMOL/L (ref 95–110)
CO2 SERPL-SCNC: 28 MMOL/L (ref 22–31)
CREAT BLD-MCNC: 0.84 MG/DL (ref 0.5–1)
DEPRECATED RDW RBC AUTO: 57.8 FL (ref 36.4–46.3)
ERYTHROCYTE [DISTWIDTH] IN BLOOD BY AUTOMATED COUNT: 17.8 % (ref 11.5–14.5)
GFR SERPL CREATININE-BSD FRML MDRD: 66 ML/MIN/1.73 (ref 60–90)
GLOBULIN UR ELPH-MCNC: 2.7 GM/DL (ref 2.3–3.5)
GLUCOSE BLD-MCNC: 91 MG/DL (ref 60–100)
HCT VFR BLD AUTO: 27.6 % (ref 35–45)
HGB BLD-MCNC: 8.7 G/DL (ref 12–15.5)
INR PPP: 1.14 (ref 0.8–1.2)
MCH RBC QN AUTO: 29.6 PG (ref 26.5–34)
MCHC RBC AUTO-ENTMCNC: 31.5 G/DL (ref 31.4–36)
MCV RBC AUTO: 93.9 FL (ref 80–98)
PLATELET # BLD AUTO: 365 10*3/MM3 (ref 150–450)
PMV BLD AUTO: 9.2 FL (ref 8–12)
POTASSIUM BLD-SCNC: 5 MMOL/L (ref 3.5–5.1)
PROT SERPL-MCNC: 5.4 G/DL (ref 6.3–8.6)
PROTHROMBIN TIME: 14.5 SECONDS (ref 11.1–15.3)
RBC # BLD AUTO: 2.94 10*6/MM3 (ref 3.77–5.16)
SODIUM BLD-SCNC: 139 MMOL/L (ref 137–145)
WBC NRBC COR # BLD: 8.13 10*3/MM3 (ref 3.2–9.8)

## 2017-10-16 PROCEDURE — 97535 SELF CARE MNGMENT TRAINING: CPT

## 2017-10-16 PROCEDURE — 94799 UNLISTED PULMONARY SVC/PX: CPT

## 2017-10-16 PROCEDURE — 85027 COMPLETE CBC AUTOMATED: CPT | Performed by: FAMILY MEDICINE

## 2017-10-16 PROCEDURE — 99232 SBSQ HOSP IP/OBS MODERATE 35: CPT | Performed by: FAMILY MEDICINE

## 2017-10-16 PROCEDURE — 94640 AIRWAY INHALATION TREATMENT: CPT

## 2017-10-16 PROCEDURE — 80053 COMPREHEN METABOLIC PANEL: CPT | Performed by: FAMILY MEDICINE

## 2017-10-16 PROCEDURE — 97530 THERAPEUTIC ACTIVITIES: CPT

## 2017-10-16 PROCEDURE — 94760 N-INVAS EAR/PLS OXIMETRY 1: CPT

## 2017-10-16 PROCEDURE — 85610 PROTHROMBIN TIME: CPT | Performed by: FAMILY MEDICINE

## 2017-10-16 PROCEDURE — 97116 GAIT TRAINING THERAPY: CPT

## 2017-10-16 PROCEDURE — 97110 THERAPEUTIC EXERCISES: CPT

## 2017-10-16 RX ORDER — POTASSIUM CHLORIDE 750 MG/1
10 CAPSULE, EXTENDED RELEASE ORAL DAILY
Status: DISCONTINUED | OUTPATIENT
Start: 2017-10-17 | End: 2017-10-17

## 2017-10-16 RX ORDER — WARFARIN SODIUM 2.5 MG/1
2.5 TABLET ORAL
Status: DISCONTINUED | OUTPATIENT
Start: 2017-10-16 | End: 2017-10-19

## 2017-10-16 RX ADMIN — ALBUTEROL SULFATE 2.5 MG: 2.5 SOLUTION RESPIRATORY (INHALATION) at 00:36

## 2017-10-16 RX ADMIN — ALBUTEROL SULFATE 2.5 MG: 2.5 SOLUTION RESPIRATORY (INHALATION) at 06:17

## 2017-10-16 RX ADMIN — AMIODARONE HYDROCHLORIDE 200 MG: 200 TABLET ORAL at 08:24

## 2017-10-16 RX ADMIN — FERROUS SULFATE TAB EC 324 MG (65 MG FE EQUIVALENT) 324 MG: 324 (65 FE) TABLET DELAYED RESPONSE at 17:01

## 2017-10-16 RX ADMIN — GABAPENTIN 300 MG: 300 CAPSULE ORAL at 08:23

## 2017-10-16 RX ADMIN — ALBUTEROL SULFATE 2.5 MG: 2.5 SOLUTION RESPIRATORY (INHALATION) at 23:48

## 2017-10-16 RX ADMIN — GABAPENTIN 300 MG: 300 CAPSULE ORAL at 17:01

## 2017-10-16 RX ADMIN — HYDROCODONE BITARTRATE AND ACETAMINOPHEN 1 TABLET: 7.5; 325 TABLET ORAL at 12:23

## 2017-10-16 RX ADMIN — MAGNESIUM OXIDE TAB 400 MG (241.3 MG ELEMENTAL MG) 400 MG: 400 (241.3 MG) TAB at 11:19

## 2017-10-16 RX ADMIN — HYDROCODONE BITARTRATE AND ACETAMINOPHEN 1 TABLET: 7.5; 325 TABLET ORAL at 07:04

## 2017-10-16 RX ADMIN — POLYETHYLENE GLYCOL 3350 17 G: 17 POWDER, FOR SOLUTION ORAL at 18:00

## 2017-10-16 RX ADMIN — FAMOTIDINE 20 MG: 20 TABLET ORAL at 20:05

## 2017-10-16 RX ADMIN — FERROUS SULFATE TAB EC 324 MG (65 MG FE EQUIVALENT) 324 MG: 324 (65 FE) TABLET DELAYED RESPONSE at 07:04

## 2017-10-16 RX ADMIN — PANTOPRAZOLE SODIUM 40 MG: 40 TABLET, DELAYED RELEASE ORAL at 06:15

## 2017-10-16 RX ADMIN — POTASSIUM CHLORIDE 20 MEQ: 750 CAPSULE, EXTENDED RELEASE ORAL at 07:03

## 2017-10-16 RX ADMIN — CLOPIDOGREL BISULFATE 75 MG: 75 TABLET ORAL at 08:23

## 2017-10-16 RX ADMIN — CETIRIZINE HYDROCHLORIDE 10 MG: 10 TABLET, FILM COATED ORAL at 08:24

## 2017-10-16 RX ADMIN — METOPROLOL TARTRATE 50 MG: 50 TABLET ORAL at 08:24

## 2017-10-16 RX ADMIN — HYDROCODONE BITARTRATE AND ACETAMINOPHEN 1 TABLET: 7.5; 325 TABLET ORAL at 17:35

## 2017-10-16 RX ADMIN — ALBUTEROL SULFATE 2.5 MG: 2.5 SOLUTION RESPIRATORY (INHALATION) at 16:27

## 2017-10-16 RX ADMIN — POLYETHYLENE GLYCOL 3350 17 G: 17 POWDER, FOR SOLUTION ORAL at 08:23

## 2017-10-16 RX ADMIN — Medication 1 TABLET: at 08:24

## 2017-10-16 RX ADMIN — LISINOPRIL 2.5 MG: 2.5 TABLET ORAL at 08:23

## 2017-10-16 RX ADMIN — AMIODARONE HYDROCHLORIDE 200 MG: 200 TABLET ORAL at 20:04

## 2017-10-16 RX ADMIN — METOPROLOL TARTRATE 50 MG: 50 TABLET ORAL at 17:01

## 2017-10-16 RX ADMIN — WARFARIN SODIUM 2.5 MG: 2.5 TABLET ORAL at 17:01

## 2017-10-16 RX ADMIN — ATORVASTATIN CALCIUM 80 MG: 40 TABLET, FILM COATED ORAL at 08:23

## 2017-10-16 RX ADMIN — FAMOTIDINE 20 MG: 20 TABLET ORAL at 08:24

## 2017-10-16 RX ADMIN — FUROSEMIDE 20 MG: 20 TABLET ORAL at 08:23

## 2017-10-17 ENCOUNTER — APPOINTMENT (OUTPATIENT)
Dept: MRI IMAGING | Facility: HOSPITAL | Age: 77
End: 2017-10-17

## 2017-10-17 ENCOUNTER — DOCUMENTATION (OUTPATIENT)
Dept: CARDIAC SURGERY | Facility: CLINIC | Age: 77
End: 2017-10-17

## 2017-10-17 ENCOUNTER — APPOINTMENT (OUTPATIENT)
Dept: ULTRASOUND IMAGING | Facility: HOSPITAL | Age: 77
End: 2017-10-17

## 2017-10-17 LAB
HOLD SPECIMEN: NORMAL
INR PPP: 1.25 (ref 0.8–1.2)
PROTHROMBIN TIME: 15.7 SECONDS (ref 11.1–15.3)
WHOLE BLOOD HOLD SPECIMEN: NORMAL

## 2017-10-17 PROCEDURE — 97530 THERAPEUTIC ACTIVITIES: CPT

## 2017-10-17 PROCEDURE — 94760 N-INVAS EAR/PLS OXIMETRY 1: CPT

## 2017-10-17 PROCEDURE — 97535 SELF CARE MNGMENT TRAINING: CPT

## 2017-10-17 PROCEDURE — 73718 MRI LOWER EXTREMITY W/O DYE: CPT

## 2017-10-17 PROCEDURE — 99232 SBSQ HOSP IP/OBS MODERATE 35: CPT | Performed by: FAMILY MEDICINE

## 2017-10-17 PROCEDURE — 85610 PROTHROMBIN TIME: CPT | Performed by: FAMILY MEDICINE

## 2017-10-17 PROCEDURE — 97110 THERAPEUTIC EXERCISES: CPT

## 2017-10-17 PROCEDURE — 93970 EXTREMITY STUDY: CPT

## 2017-10-17 PROCEDURE — 94799 UNLISTED PULMONARY SVC/PX: CPT

## 2017-10-17 PROCEDURE — 97116 GAIT TRAINING THERAPY: CPT

## 2017-10-17 RX ADMIN — ALBUTEROL SULFATE 2.5 MG: 2.5 SOLUTION RESPIRATORY (INHALATION) at 04:45

## 2017-10-17 RX ADMIN — METOPROLOL TARTRATE 50 MG: 50 TABLET ORAL at 07:15

## 2017-10-17 RX ADMIN — WARFARIN SODIUM 2.5 MG: 2.5 TABLET ORAL at 17:39

## 2017-10-17 RX ADMIN — FERROUS SULFATE TAB EC 324 MG (65 MG FE EQUIVALENT) 324 MG: 324 (65 FE) TABLET DELAYED RESPONSE at 07:12

## 2017-10-17 RX ADMIN — FAMOTIDINE 20 MG: 20 TABLET ORAL at 07:15

## 2017-10-17 RX ADMIN — GABAPENTIN 300 MG: 300 CAPSULE ORAL at 07:16

## 2017-10-17 RX ADMIN — HYDROCODONE BITARTRATE AND ACETAMINOPHEN 1 TABLET: 7.5; 325 TABLET ORAL at 15:27

## 2017-10-17 RX ADMIN — FAMOTIDINE 20 MG: 20 TABLET ORAL at 21:05

## 2017-10-17 RX ADMIN — PANTOPRAZOLE SODIUM 40 MG: 40 TABLET, DELAYED RELEASE ORAL at 06:13

## 2017-10-17 RX ADMIN — CLOPIDOGREL BISULFATE 75 MG: 75 TABLET ORAL at 07:16

## 2017-10-17 RX ADMIN — MAGNESIUM OXIDE TAB 400 MG (241.3 MG ELEMENTAL MG) 400 MG: 400 (241.3 MG) TAB at 07:16

## 2017-10-17 RX ADMIN — ALBUTEROL SULFATE 2.5 MG: 2.5 SOLUTION RESPIRATORY (INHALATION) at 23:29

## 2017-10-17 RX ADMIN — FUROSEMIDE 20 MG: 20 TABLET ORAL at 07:15

## 2017-10-17 RX ADMIN — GABAPENTIN 300 MG: 300 CAPSULE ORAL at 17:39

## 2017-10-17 RX ADMIN — ATORVASTATIN CALCIUM 80 MG: 40 TABLET, FILM COATED ORAL at 07:14

## 2017-10-17 RX ADMIN — LISINOPRIL 2.5 MG: 2.5 TABLET ORAL at 07:15

## 2017-10-17 RX ADMIN — AMIODARONE HYDROCHLORIDE 200 MG: 200 TABLET ORAL at 07:14

## 2017-10-17 RX ADMIN — Medication 1 TABLET: at 07:13

## 2017-10-17 RX ADMIN — POLYETHYLENE GLYCOL 3350 17 G: 17 POWDER, FOR SOLUTION ORAL at 07:16

## 2017-10-17 RX ADMIN — HYDROCODONE BITARTRATE AND ACETAMINOPHEN 1 TABLET: 7.5; 325 TABLET ORAL at 07:10

## 2017-10-17 RX ADMIN — POLYETHYLENE GLYCOL 3350 17 G: 17 POWDER, FOR SOLUTION ORAL at 17:39

## 2017-10-17 RX ADMIN — METOPROLOL TARTRATE 50 MG: 50 TABLET ORAL at 17:39

## 2017-10-17 RX ADMIN — CETIRIZINE HYDROCHLORIDE 10 MG: 10 TABLET, FILM COATED ORAL at 07:16

## 2017-10-17 RX ADMIN — POTASSIUM CHLORIDE 10 MEQ: 750 CAPSULE, EXTENDED RELEASE ORAL at 07:15

## 2017-10-17 RX ADMIN — ALBUTEROL SULFATE 2.5 MG: 2.5 SOLUTION RESPIRATORY (INHALATION) at 15:54

## 2017-10-17 RX ADMIN — AMIODARONE HYDROCHLORIDE 200 MG: 200 TABLET ORAL at 21:05

## 2017-10-17 NOTE — PROGRESS NOTES
77 woman has had chronic wound anterior surface left leg for over a year.  The chronic wound followed an injury sustained when she was getting out of bed one year prior and a t that time she s truck anterior surface left leg and produced an avulsing laceration which has been in various stages of healing but during convalescence from left hip fracture (ORIF 10-1-17) Venous duplex scan (10-17-17) NO DVT.Physical examination finds easily palpable dorsalis pedis pulses bilateral and with hand held doppler wave sounds are triphasic.  Would suggest MRI left tibia to liminate possibility of osteomyelitis or foreign body..  If MRI negative for osteomyelitis, believe series of external wraps would be in order to control edema and allow healing.

## 2017-10-18 LAB
ALBUMIN SERPL-MCNC: 2.7 G/DL (ref 3.4–4.8)
ANION GAP SERPL CALCULATED.3IONS-SCNC: 8 MMOL/L (ref 5–15)
BASOPHILS # BLD AUTO: 0.02 10*3/MM3 (ref 0–0.2)
BASOPHILS NFR BLD AUTO: 0.3 % (ref 0–2)
BUN BLD-MCNC: 19 MG/DL (ref 7–21)
BUN/CREAT SERPL: 20.4 (ref 7–25)
CALCIUM SPEC-SCNC: 8.5 MG/DL (ref 8.4–10.2)
CHLORIDE SERPL-SCNC: 100 MMOL/L (ref 95–110)
CO2 SERPL-SCNC: 30 MMOL/L (ref 22–31)
CREAT BLD-MCNC: 0.93 MG/DL (ref 0.5–1)
DEPRECATED RDW RBC AUTO: 60.8 FL (ref 36.4–46.3)
EOSINOPHIL # BLD AUTO: 0.18 10*3/MM3 (ref 0–0.7)
EOSINOPHIL NFR BLD AUTO: 2.3 % (ref 0–7)
ERYTHROCYTE [DISTWIDTH] IN BLOOD BY AUTOMATED COUNT: 17.8 % (ref 11.5–14.5)
GFR SERPL CREATININE-BSD FRML MDRD: 59 ML/MIN/1.73 (ref 60–90)
GLUCOSE BLD-MCNC: 92 MG/DL (ref 60–100)
HCT VFR BLD AUTO: 26.6 % (ref 35–45)
HGB BLD-MCNC: 8.5 G/DL (ref 12–15.5)
IMM GRANULOCYTES # BLD: 0.05 10*3/MM3 (ref 0–0.02)
IMM GRANULOCYTES NFR BLD: 0.7 % (ref 0–0.5)
INR PPP: 1.29 (ref 0.8–1.2)
LYMPHOCYTES # BLD AUTO: 1.01 10*3/MM3 (ref 0.6–4.2)
LYMPHOCYTES NFR BLD AUTO: 13.2 % (ref 10–50)
MCH RBC QN AUTO: 29.7 PG (ref 26.5–34)
MCHC RBC AUTO-ENTMCNC: 32 G/DL (ref 31.4–36)
MCV RBC AUTO: 93 FL (ref 80–98)
MONOCYTES # BLD AUTO: 0.95 10*3/MM3 (ref 0–0.9)
MONOCYTES NFR BLD AUTO: 12.4 % (ref 0–12)
NEUTROPHILS # BLD AUTO: 5.45 10*3/MM3 (ref 2–8.6)
NEUTROPHILS NFR BLD AUTO: 71.1 % (ref 37–80)
PHOSPHATE SERPL-MCNC: 3.8 MG/DL (ref 2.4–4.4)
PLATELET # BLD AUTO: 361 10*3/MM3 (ref 150–450)
PMV BLD AUTO: 9 FL (ref 8–12)
POTASSIUM BLD-SCNC: 4.3 MMOL/L (ref 3.5–5.1)
PROTHROMBIN TIME: 16.1 SECONDS (ref 11.1–15.3)
RBC # BLD AUTO: 2.86 10*6/MM3 (ref 3.77–5.16)
SODIUM BLD-SCNC: 138 MMOL/L (ref 137–145)
WBC NRBC COR # BLD: 7.66 10*3/MM3 (ref 3.2–9.8)

## 2017-10-18 PROCEDURE — 85025 COMPLETE CBC W/AUTO DIFF WBC: CPT | Performed by: FAMILY MEDICINE

## 2017-10-18 PROCEDURE — 94799 UNLISTED PULMONARY SVC/PX: CPT

## 2017-10-18 PROCEDURE — 80069 RENAL FUNCTION PANEL: CPT | Performed by: FAMILY MEDICINE

## 2017-10-18 PROCEDURE — 97535 SELF CARE MNGMENT TRAINING: CPT

## 2017-10-18 PROCEDURE — 97530 THERAPEUTIC ACTIVITIES: CPT

## 2017-10-18 PROCEDURE — 97110 THERAPEUTIC EXERCISES: CPT

## 2017-10-18 PROCEDURE — 99232 SBSQ HOSP IP/OBS MODERATE 35: CPT | Performed by: FAMILY MEDICINE

## 2017-10-18 PROCEDURE — 94760 N-INVAS EAR/PLS OXIMETRY 1: CPT

## 2017-10-18 PROCEDURE — 97116 GAIT TRAINING THERAPY: CPT

## 2017-10-18 PROCEDURE — 85610 PROTHROMBIN TIME: CPT | Performed by: FAMILY MEDICINE

## 2017-10-18 RX ORDER — DOCUSATE SODIUM 100 MG/1
100 CAPSULE, LIQUID FILLED ORAL 2 TIMES DAILY
Status: DISCONTINUED | OUTPATIENT
Start: 2017-10-18 | End: 2017-10-24 | Stop reason: HOSPADM

## 2017-10-18 RX ORDER — FERROUS SULFATE TAB EC 324 MG (65 MG FE EQUIVALENT) 324 (65 FE) MG
324 TABLET DELAYED RESPONSE ORAL
Status: DISCONTINUED | OUTPATIENT
Start: 2017-10-19 | End: 2017-10-24 | Stop reason: HOSPADM

## 2017-10-18 RX ADMIN — Medication 1 TABLET: at 08:35

## 2017-10-18 RX ADMIN — CLOPIDOGREL BISULFATE 75 MG: 75 TABLET ORAL at 08:36

## 2017-10-18 RX ADMIN — FAMOTIDINE 20 MG: 20 TABLET ORAL at 08:36

## 2017-10-18 RX ADMIN — ALBUTEROL SULFATE 2.5 MG: 2.5 SOLUTION RESPIRATORY (INHALATION) at 19:37

## 2017-10-18 RX ADMIN — AMIODARONE HYDROCHLORIDE 200 MG: 200 TABLET ORAL at 08:35

## 2017-10-18 RX ADMIN — FAMOTIDINE 20 MG: 20 TABLET ORAL at 20:35

## 2017-10-18 RX ADMIN — POLYETHYLENE GLYCOL 3350 17 G: 17 POWDER, FOR SOLUTION ORAL at 22:00

## 2017-10-18 RX ADMIN — CETIRIZINE HYDROCHLORIDE 10 MG: 10 TABLET, FILM COATED ORAL at 08:36

## 2017-10-18 RX ADMIN — METOPROLOL TARTRATE 50 MG: 50 TABLET ORAL at 17:15

## 2017-10-18 RX ADMIN — METOPROLOL TARTRATE 50 MG: 50 TABLET ORAL at 08:35

## 2017-10-18 RX ADMIN — POLYETHYLENE GLYCOL 3350 17 G: 17 POWDER, FOR SOLUTION ORAL at 08:42

## 2017-10-18 RX ADMIN — MAGNESIUM OXIDE TAB 400 MG (241.3 MG ELEMENTAL MG) 400 MG: 400 (241.3 MG) TAB at 08:35

## 2017-10-18 RX ADMIN — ALBUTEROL SULFATE 2.5 MG: 2.5 SOLUTION RESPIRATORY (INHALATION) at 11:10

## 2017-10-18 RX ADMIN — FUROSEMIDE 20 MG: 20 TABLET ORAL at 08:35

## 2017-10-18 RX ADMIN — DOCUSATE SODIUM 100 MG: 100 CAPSULE, LIQUID FILLED ORAL at 18:00

## 2017-10-18 RX ADMIN — ATORVASTATIN CALCIUM 80 MG: 40 TABLET, FILM COATED ORAL at 08:35

## 2017-10-18 RX ADMIN — PANTOPRAZOLE SODIUM 40 MG: 40 TABLET, DELAYED RELEASE ORAL at 06:12

## 2017-10-18 RX ADMIN — AMIODARONE HYDROCHLORIDE 200 MG: 200 TABLET ORAL at 20:35

## 2017-10-18 RX ADMIN — GABAPENTIN 300 MG: 300 CAPSULE ORAL at 08:36

## 2017-10-18 RX ADMIN — WARFARIN SODIUM 2.5 MG: 2.5 TABLET ORAL at 17:15

## 2017-10-18 RX ADMIN — DOCUSATE SODIUM 100 MG: 100 CAPSULE, LIQUID FILLED ORAL at 08:39

## 2017-10-18 RX ADMIN — LISINOPRIL 2.5 MG: 2.5 TABLET ORAL at 08:36

## 2017-10-18 RX ADMIN — HYDROCODONE BITARTRATE AND ACETAMINOPHEN 1 TABLET: 7.5; 325 TABLET ORAL at 07:15

## 2017-10-18 RX ADMIN — HYDROCODONE BITARTRATE AND ACETAMINOPHEN 1 TABLET: 7.5; 325 TABLET ORAL at 12:16

## 2017-10-18 RX ADMIN — HYDROCODONE BITARTRATE AND ACETAMINOPHEN 1 TABLET: 7.5; 325 TABLET ORAL at 17:15

## 2017-10-18 RX ADMIN — GABAPENTIN 300 MG: 300 CAPSULE ORAL at 17:15

## 2017-10-18 RX ADMIN — ALBUTEROL SULFATE 2.5 MG: 2.5 SOLUTION RESPIRATORY (INHALATION) at 04:54

## 2017-10-19 LAB
INR PPP: 1.36 (ref 0.8–1.2)
PROTHROMBIN TIME: 16.8 SECONDS (ref 11.1–15.3)

## 2017-10-19 PROCEDURE — 99232 SBSQ HOSP IP/OBS MODERATE 35: CPT | Performed by: FAMILY MEDICINE

## 2017-10-19 PROCEDURE — 97530 THERAPEUTIC ACTIVITIES: CPT

## 2017-10-19 PROCEDURE — 85610 PROTHROMBIN TIME: CPT | Performed by: FAMILY MEDICINE

## 2017-10-19 PROCEDURE — 97110 THERAPEUTIC EXERCISES: CPT

## 2017-10-19 PROCEDURE — 94760 N-INVAS EAR/PLS OXIMETRY 1: CPT

## 2017-10-19 PROCEDURE — 97116 GAIT TRAINING THERAPY: CPT

## 2017-10-19 PROCEDURE — 97535 SELF CARE MNGMENT TRAINING: CPT

## 2017-10-19 PROCEDURE — 94799 UNLISTED PULMONARY SVC/PX: CPT

## 2017-10-19 RX ORDER — WARFARIN SODIUM 5 MG/1
5 TABLET ORAL
Status: COMPLETED | OUTPATIENT
Start: 2017-10-19 | End: 2017-10-19

## 2017-10-19 RX ADMIN — ALBUTEROL SULFATE 2.5 MG: 2.5 SOLUTION RESPIRATORY (INHALATION) at 05:09

## 2017-10-19 RX ADMIN — POLYETHYLENE GLYCOL 3350 17 G: 17 POWDER, FOR SOLUTION ORAL at 08:04

## 2017-10-19 RX ADMIN — LISINOPRIL 2.5 MG: 2.5 TABLET ORAL at 08:04

## 2017-10-19 RX ADMIN — METOPROLOL TARTRATE 50 MG: 50 TABLET ORAL at 08:04

## 2017-10-19 RX ADMIN — ALBUTEROL SULFATE 2.5 MG: 2.5 SOLUTION RESPIRATORY (INHALATION) at 00:08

## 2017-10-19 RX ADMIN — ALBUTEROL SULFATE 2.5 MG: 2.5 SOLUTION RESPIRATORY (INHALATION) at 12:58

## 2017-10-19 RX ADMIN — HYDROCODONE BITARTRATE AND ACETAMINOPHEN 1 TABLET: 7.5; 325 TABLET ORAL at 11:56

## 2017-10-19 RX ADMIN — FAMOTIDINE 20 MG: 20 TABLET ORAL at 20:28

## 2017-10-19 RX ADMIN — CLOPIDOGREL BISULFATE 75 MG: 75 TABLET ORAL at 08:04

## 2017-10-19 RX ADMIN — WARFARIN SODIUM 5 MG: 5 TABLET ORAL at 17:14

## 2017-10-19 RX ADMIN — PANTOPRAZOLE SODIUM 40 MG: 40 TABLET, DELAYED RELEASE ORAL at 06:19

## 2017-10-19 RX ADMIN — GABAPENTIN 300 MG: 300 CAPSULE ORAL at 08:04

## 2017-10-19 RX ADMIN — FAMOTIDINE 20 MG: 20 TABLET ORAL at 08:04

## 2017-10-19 RX ADMIN — HYDROCODONE BITARTRATE AND ACETAMINOPHEN 1 TABLET: 7.5; 325 TABLET ORAL at 17:14

## 2017-10-19 RX ADMIN — DOCUSATE SODIUM 100 MG: 100 CAPSULE, LIQUID FILLED ORAL at 08:04

## 2017-10-19 RX ADMIN — CETIRIZINE HYDROCHLORIDE 10 MG: 10 TABLET, FILM COATED ORAL at 08:04

## 2017-10-19 RX ADMIN — AMIODARONE HYDROCHLORIDE 200 MG: 200 TABLET ORAL at 08:04

## 2017-10-19 RX ADMIN — Medication 1 TABLET: at 08:04

## 2017-10-19 RX ADMIN — FUROSEMIDE 20 MG: 20 TABLET ORAL at 08:04

## 2017-10-19 RX ADMIN — DOCUSATE SODIUM 100 MG: 100 CAPSULE, LIQUID FILLED ORAL at 17:15

## 2017-10-19 RX ADMIN — FERROUS SULFATE TAB EC 324 MG (65 MG FE EQUIVALENT) 324 MG: 324 (65 FE) TABLET DELAYED RESPONSE at 08:04

## 2017-10-19 RX ADMIN — HYDROCODONE BITARTRATE AND ACETAMINOPHEN 1 TABLET: 7.5; 325 TABLET ORAL at 06:35

## 2017-10-19 RX ADMIN — MAGNESIUM OXIDE TAB 400 MG (241.3 MG ELEMENTAL MG) 400 MG: 400 (241.3 MG) TAB at 08:04

## 2017-10-19 RX ADMIN — METOPROLOL TARTRATE 50 MG: 50 TABLET ORAL at 17:14

## 2017-10-19 RX ADMIN — GABAPENTIN 300 MG: 300 CAPSULE ORAL at 17:14

## 2017-10-19 RX ADMIN — AMIODARONE HYDROCHLORIDE 200 MG: 200 TABLET ORAL at 20:28

## 2017-10-19 RX ADMIN — ATORVASTATIN CALCIUM 80 MG: 40 TABLET, FILM COATED ORAL at 08:04

## 2017-10-19 RX ADMIN — ALBUTEROL SULFATE 2.5 MG: 2.5 SOLUTION RESPIRATORY (INHALATION) at 18:41

## 2017-10-19 RX ADMIN — POLYETHYLENE GLYCOL 3350 17 G: 17 POWDER, FOR SOLUTION ORAL at 17:14

## 2017-10-20 LAB
ALBUMIN SERPL-MCNC: 2.7 G/DL (ref 3.4–4.8)
ANION GAP SERPL CALCULATED.3IONS-SCNC: 9 MMOL/L (ref 5–15)
BASOPHILS # BLD AUTO: 0.03 10*3/MM3 (ref 0–0.2)
BASOPHILS NFR BLD AUTO: 0.5 % (ref 0–2)
BUN BLD-MCNC: 18 MG/DL (ref 7–21)
BUN/CREAT SERPL: 19.6 (ref 7–25)
CALCIUM SPEC-SCNC: 8.4 MG/DL (ref 8.4–10.2)
CHLORIDE SERPL-SCNC: 100 MMOL/L (ref 95–110)
CO2 SERPL-SCNC: 30 MMOL/L (ref 22–31)
CREAT BLD-MCNC: 0.92 MG/DL (ref 0.5–1)
DEPRECATED RDW RBC AUTO: 60.7 FL (ref 36.4–46.3)
EOSINOPHIL # BLD AUTO: 0.15 10*3/MM3 (ref 0–0.7)
EOSINOPHIL NFR BLD AUTO: 2.6 % (ref 0–7)
ERYTHROCYTE [DISTWIDTH] IN BLOOD BY AUTOMATED COUNT: 17.7 % (ref 11.5–14.5)
GFR SERPL CREATININE-BSD FRML MDRD: 59 ML/MIN/1.73 (ref 60–90)
GLUCOSE BLD-MCNC: 92 MG/DL (ref 60–100)
HCT VFR BLD AUTO: 27.3 % (ref 35–45)
HGB BLD-MCNC: 8.7 G/DL (ref 12–15.5)
IMM GRANULOCYTES # BLD: 0.02 10*3/MM3 (ref 0–0.02)
IMM GRANULOCYTES NFR BLD: 0.4 % (ref 0–0.5)
INR PPP: 1.53 (ref 0.8–1.2)
LYMPHOCYTES # BLD AUTO: 0.99 10*3/MM3 (ref 0.6–4.2)
LYMPHOCYTES NFR BLD AUTO: 17.3 % (ref 10–50)
MCH RBC QN AUTO: 29.8 PG (ref 26.5–34)
MCHC RBC AUTO-ENTMCNC: 31.9 G/DL (ref 31.4–36)
MCV RBC AUTO: 93.5 FL (ref 80–98)
MONOCYTES # BLD AUTO: 0.76 10*3/MM3 (ref 0–0.9)
MONOCYTES NFR BLD AUTO: 13.3 % (ref 0–12)
NEUTROPHILS # BLD AUTO: 3.76 10*3/MM3 (ref 2–8.6)
NEUTROPHILS NFR BLD AUTO: 65.9 % (ref 37–80)
PHOSPHATE SERPL-MCNC: 3.6 MG/DL (ref 2.4–4.4)
PLATELET # BLD AUTO: 348 10*3/MM3 (ref 150–450)
PMV BLD AUTO: 9 FL (ref 8–12)
POTASSIUM BLD-SCNC: 4.4 MMOL/L (ref 3.5–5.1)
PROTHROMBIN TIME: 18.4 SECONDS (ref 11.1–15.3)
RBC # BLD AUTO: 2.92 10*6/MM3 (ref 3.77–5.16)
SODIUM BLD-SCNC: 139 MMOL/L (ref 137–145)
WBC NRBC COR # BLD: 5.71 10*3/MM3 (ref 3.2–9.8)

## 2017-10-20 PROCEDURE — 85610 PROTHROMBIN TIME: CPT | Performed by: FAMILY MEDICINE

## 2017-10-20 PROCEDURE — 94799 UNLISTED PULMONARY SVC/PX: CPT

## 2017-10-20 PROCEDURE — 80069 RENAL FUNCTION PANEL: CPT | Performed by: FAMILY MEDICINE

## 2017-10-20 PROCEDURE — 97116 GAIT TRAINING THERAPY: CPT

## 2017-10-20 PROCEDURE — 97530 THERAPEUTIC ACTIVITIES: CPT

## 2017-10-20 PROCEDURE — 97110 THERAPEUTIC EXERCISES: CPT

## 2017-10-20 PROCEDURE — 99232 SBSQ HOSP IP/OBS MODERATE 35: CPT | Performed by: FAMILY MEDICINE

## 2017-10-20 PROCEDURE — 85025 COMPLETE CBC W/AUTO DIFF WBC: CPT | Performed by: FAMILY MEDICINE

## 2017-10-20 PROCEDURE — 94760 N-INVAS EAR/PLS OXIMETRY 1: CPT

## 2017-10-20 RX ORDER — HYDROCODONE BITARTRATE AND ACETAMINOPHEN 7.5; 325 MG/1; MG/1
1 TABLET ORAL EVERY 4 HOURS PRN
Status: DISCONTINUED | OUTPATIENT
Start: 2017-10-20 | End: 2017-10-24 | Stop reason: HOSPADM

## 2017-10-20 RX ADMIN — ALBUTEROL SULFATE 2.5 MG: 2.5 SOLUTION RESPIRATORY (INHALATION) at 23:35

## 2017-10-20 RX ADMIN — FUROSEMIDE 20 MG: 20 TABLET ORAL at 09:20

## 2017-10-20 RX ADMIN — CETIRIZINE HYDROCHLORIDE 10 MG: 10 TABLET, FILM COATED ORAL at 09:19

## 2017-10-20 RX ADMIN — HYDROCODONE BITARTRATE AND ACETAMINOPHEN 1 TABLET: 7.5; 325 TABLET ORAL at 06:36

## 2017-10-20 RX ADMIN — AMIODARONE HYDROCHLORIDE 200 MG: 200 TABLET ORAL at 20:16

## 2017-10-20 RX ADMIN — FAMOTIDINE 20 MG: 20 TABLET ORAL at 09:20

## 2017-10-20 RX ADMIN — DOCUSATE SODIUM 100 MG: 100 CAPSULE, LIQUID FILLED ORAL at 17:11

## 2017-10-20 RX ADMIN — CLOPIDOGREL BISULFATE 75 MG: 75 TABLET ORAL at 09:19

## 2017-10-20 RX ADMIN — DOCUSATE SODIUM 100 MG: 100 CAPSULE, LIQUID FILLED ORAL at 09:19

## 2017-10-20 RX ADMIN — GABAPENTIN 300 MG: 300 CAPSULE ORAL at 09:20

## 2017-10-20 RX ADMIN — POLYETHYLENE GLYCOL 3350 17 G: 17 POWDER, FOR SOLUTION ORAL at 17:11

## 2017-10-20 RX ADMIN — ATORVASTATIN CALCIUM 80 MG: 40 TABLET, FILM COATED ORAL at 09:20

## 2017-10-20 RX ADMIN — PANTOPRAZOLE SODIUM 40 MG: 40 TABLET, DELAYED RELEASE ORAL at 06:09

## 2017-10-20 RX ADMIN — FAMOTIDINE 20 MG: 20 TABLET ORAL at 20:15

## 2017-10-20 RX ADMIN — FERROUS SULFATE TAB EC 324 MG (65 MG FE EQUIVALENT) 324 MG: 324 (65 FE) TABLET DELAYED RESPONSE at 09:20

## 2017-10-20 RX ADMIN — HYDROCODONE BITARTRATE AND ACETAMINOPHEN 1 TABLET: 7.5; 325 TABLET ORAL at 17:10

## 2017-10-20 RX ADMIN — METOPROLOL TARTRATE 50 MG: 50 TABLET ORAL at 09:19

## 2017-10-20 RX ADMIN — HYDROCODONE BITARTRATE AND ACETAMINOPHEN 1 TABLET: 7.5; 325 TABLET ORAL at 23:38

## 2017-10-20 RX ADMIN — METOPROLOL TARTRATE 50 MG: 50 TABLET ORAL at 17:10

## 2017-10-20 RX ADMIN — WARFARIN SODIUM 3.5 MG: 3 TABLET ORAL at 17:10

## 2017-10-20 RX ADMIN — HYDROCODONE BITARTRATE AND ACETAMINOPHEN 1 TABLET: 7.5; 325 TABLET ORAL at 12:09

## 2017-10-20 RX ADMIN — LISINOPRIL 2.5 MG: 2.5 TABLET ORAL at 09:20

## 2017-10-20 RX ADMIN — Medication 1 TABLET: at 09:20

## 2017-10-20 RX ADMIN — ALBUTEROL SULFATE 2.5 MG: 2.5 SOLUTION RESPIRATORY (INHALATION) at 11:30

## 2017-10-20 RX ADMIN — POLYETHYLENE GLYCOL 3350 17 G: 17 POWDER, FOR SOLUTION ORAL at 09:19

## 2017-10-20 RX ADMIN — GABAPENTIN 300 MG: 300 CAPSULE ORAL at 17:11

## 2017-10-20 RX ADMIN — AMIODARONE HYDROCHLORIDE 200 MG: 200 TABLET ORAL at 09:19

## 2017-10-20 RX ADMIN — MAGNESIUM OXIDE TAB 400 MG (241.3 MG ELEMENTAL MG) 400 MG: 400 (241.3 MG) TAB at 09:20

## 2017-10-20 RX ADMIN — ALBUTEROL SULFATE 2.5 MG: 2.5 SOLUTION RESPIRATORY (INHALATION) at 16:32

## 2017-10-21 LAB
HOLD SPECIMEN: NORMAL
INR PPP: 1.87 (ref 0.8–1.2)
PROTHROMBIN TIME: 21.6 SECONDS (ref 11.1–15.3)
WHOLE BLOOD HOLD SPECIMEN: NORMAL

## 2017-10-21 PROCEDURE — 94760 N-INVAS EAR/PLS OXIMETRY 1: CPT

## 2017-10-21 PROCEDURE — 94799 UNLISTED PULMONARY SVC/PX: CPT

## 2017-10-21 PROCEDURE — 85610 PROTHROMBIN TIME: CPT | Performed by: FAMILY MEDICINE

## 2017-10-21 RX ADMIN — DOCUSATE SODIUM 100 MG: 100 CAPSULE, LIQUID FILLED ORAL at 08:06

## 2017-10-21 RX ADMIN — FUROSEMIDE 20 MG: 20 TABLET ORAL at 08:06

## 2017-10-21 RX ADMIN — LISINOPRIL 2.5 MG: 2.5 TABLET ORAL at 08:07

## 2017-10-21 RX ADMIN — HYDROCODONE BITARTRATE AND ACETAMINOPHEN 1 TABLET: 7.5; 325 TABLET ORAL at 19:52

## 2017-10-21 RX ADMIN — ALBUTEROL SULFATE 2.5 MG: 2.5 SOLUTION RESPIRATORY (INHALATION) at 14:11

## 2017-10-21 RX ADMIN — ALBUTEROL SULFATE 2.5 MG: 2.5 SOLUTION RESPIRATORY (INHALATION) at 05:08

## 2017-10-21 RX ADMIN — CETIRIZINE HYDROCHLORIDE 10 MG: 10 TABLET, FILM COATED ORAL at 08:07

## 2017-10-21 RX ADMIN — METOPROLOL TARTRATE 50 MG: 50 TABLET ORAL at 17:00

## 2017-10-21 RX ADMIN — GABAPENTIN 300 MG: 300 CAPSULE ORAL at 08:07

## 2017-10-21 RX ADMIN — AMIODARONE HYDROCHLORIDE 200 MG: 200 TABLET ORAL at 08:07

## 2017-10-21 RX ADMIN — AMIODARONE HYDROCHLORIDE 200 MG: 200 TABLET ORAL at 20:06

## 2017-10-21 RX ADMIN — ALBUTEROL SULFATE 2.5 MG: 2.5 SOLUTION RESPIRATORY (INHALATION) at 21:08

## 2017-10-21 RX ADMIN — POLYETHYLENE GLYCOL 3350 17 G: 17 POWDER, FOR SOLUTION ORAL at 17:00

## 2017-10-21 RX ADMIN — Medication 1 TABLET: at 08:06

## 2017-10-21 RX ADMIN — MAGNESIUM OXIDE TAB 400 MG (241.3 MG ELEMENTAL MG) 400 MG: 400 (241.3 MG) TAB at 08:06

## 2017-10-21 RX ADMIN — HYDROCODONE BITARTRATE AND ACETAMINOPHEN 1 TABLET: 7.5; 325 TABLET ORAL at 07:08

## 2017-10-21 RX ADMIN — METOPROLOL TARTRATE 50 MG: 50 TABLET ORAL at 08:07

## 2017-10-21 RX ADMIN — POLYETHYLENE GLYCOL 3350 17 G: 17 POWDER, FOR SOLUTION ORAL at 09:00

## 2017-10-21 RX ADMIN — HYDROCODONE BITARTRATE AND ACETAMINOPHEN 1 TABLET: 7.5; 325 TABLET ORAL at 12:32

## 2017-10-21 RX ADMIN — WARFARIN SODIUM 3.5 MG: 3 TABLET ORAL at 17:00

## 2017-10-21 RX ADMIN — DOCUSATE SODIUM 100 MG: 100 CAPSULE, LIQUID FILLED ORAL at 17:00

## 2017-10-21 RX ADMIN — FAMOTIDINE 20 MG: 20 TABLET ORAL at 20:06

## 2017-10-21 RX ADMIN — FAMOTIDINE 20 MG: 20 TABLET ORAL at 08:07

## 2017-10-21 RX ADMIN — ATORVASTATIN CALCIUM 80 MG: 40 TABLET, FILM COATED ORAL at 08:07

## 2017-10-21 RX ADMIN — CLOPIDOGREL BISULFATE 75 MG: 75 TABLET ORAL at 08:07

## 2017-10-21 RX ADMIN — PANTOPRAZOLE SODIUM 40 MG: 40 TABLET, DELAYED RELEASE ORAL at 06:15

## 2017-10-21 RX ADMIN — FERROUS SULFATE TAB EC 324 MG (65 MG FE EQUIVALENT) 324 MG: 324 (65 FE) TABLET DELAYED RESPONSE at 08:07

## 2017-10-21 RX ADMIN — GABAPENTIN 300 MG: 300 CAPSULE ORAL at 17:00

## 2017-10-22 LAB
HOLD SPECIMEN: NORMAL
INR PPP: 2.02 (ref 0.8–1.2)
PROTHROMBIN TIME: 23 SECONDS (ref 11.1–15.3)

## 2017-10-22 PROCEDURE — 97110 THERAPEUTIC EXERCISES: CPT

## 2017-10-22 PROCEDURE — 85610 PROTHROMBIN TIME: CPT | Performed by: FAMILY MEDICINE

## 2017-10-22 PROCEDURE — 94799 UNLISTED PULMONARY SVC/PX: CPT

## 2017-10-22 PROCEDURE — 94760 N-INVAS EAR/PLS OXIMETRY 1: CPT

## 2017-10-22 PROCEDURE — 97116 GAIT TRAINING THERAPY: CPT

## 2017-10-22 RX ADMIN — DOCUSATE SODIUM 100 MG: 100 CAPSULE, LIQUID FILLED ORAL at 08:00

## 2017-10-22 RX ADMIN — FUROSEMIDE 20 MG: 20 TABLET ORAL at 08:00

## 2017-10-22 RX ADMIN — POLYETHYLENE GLYCOL 3350 17 G: 17 POWDER, FOR SOLUTION ORAL at 18:00

## 2017-10-22 RX ADMIN — CETIRIZINE HYDROCHLORIDE 10 MG: 10 TABLET, FILM COATED ORAL at 08:00

## 2017-10-22 RX ADMIN — DOCUSATE SODIUM 100 MG: 100 CAPSULE, LIQUID FILLED ORAL at 17:03

## 2017-10-22 RX ADMIN — ALBUTEROL SULFATE 2.5 MG: 2.5 SOLUTION RESPIRATORY (INHALATION) at 10:58

## 2017-10-22 RX ADMIN — WARFARIN SODIUM 3.5 MG: 3 TABLET ORAL at 17:03

## 2017-10-22 RX ADMIN — Medication 1 TABLET: at 08:00

## 2017-10-22 RX ADMIN — POLYETHYLENE GLYCOL 3350 17 G: 17 POWDER, FOR SOLUTION ORAL at 09:00

## 2017-10-22 RX ADMIN — GABAPENTIN 300 MG: 300 CAPSULE ORAL at 17:03

## 2017-10-22 RX ADMIN — FAMOTIDINE 20 MG: 20 TABLET ORAL at 20:51

## 2017-10-22 RX ADMIN — HYDROCODONE BITARTRATE AND ACETAMINOPHEN 1 TABLET: 7.5; 325 TABLET ORAL at 12:17

## 2017-10-22 RX ADMIN — FAMOTIDINE 20 MG: 20 TABLET ORAL at 08:00

## 2017-10-22 RX ADMIN — MAGNESIUM OXIDE TAB 400 MG (241.3 MG ELEMENTAL MG) 400 MG: 400 (241.3 MG) TAB at 08:00

## 2017-10-22 RX ADMIN — HYDROCODONE BITARTRATE AND ACETAMINOPHEN 1 TABLET: 7.5; 325 TABLET ORAL at 18:07

## 2017-10-22 RX ADMIN — LISINOPRIL 2.5 MG: 2.5 TABLET ORAL at 08:00

## 2017-10-22 RX ADMIN — PANTOPRAZOLE SODIUM 40 MG: 40 TABLET, DELAYED RELEASE ORAL at 06:12

## 2017-10-22 RX ADMIN — GABAPENTIN 300 MG: 300 CAPSULE ORAL at 08:00

## 2017-10-22 RX ADMIN — METOPROLOL TARTRATE 50 MG: 50 TABLET ORAL at 08:00

## 2017-10-22 RX ADMIN — METOPROLOL TARTRATE 50 MG: 50 TABLET ORAL at 17:03

## 2017-10-22 RX ADMIN — HYDROCODONE BITARTRATE AND ACETAMINOPHEN 1 TABLET: 7.5; 325 TABLET ORAL at 07:16

## 2017-10-22 RX ADMIN — AMIODARONE HYDROCHLORIDE 200 MG: 200 TABLET ORAL at 08:00

## 2017-10-22 RX ADMIN — ATORVASTATIN CALCIUM 80 MG: 40 TABLET, FILM COATED ORAL at 08:00

## 2017-10-22 RX ADMIN — CLOPIDOGREL BISULFATE 75 MG: 75 TABLET ORAL at 08:00

## 2017-10-22 RX ADMIN — AMIODARONE HYDROCHLORIDE 200 MG: 200 TABLET ORAL at 20:51

## 2017-10-22 RX ADMIN — FERROUS SULFATE TAB EC 324 MG (65 MG FE EQUIVALENT) 324 MG: 324 (65 FE) TABLET DELAYED RESPONSE at 08:00

## 2017-10-23 LAB
GLUCOSE BLDC GLUCOMTR-MCNC: 152 MG/DL (ref 70–130)
INR PPP: 2.11 (ref 0.8–1.2)
PROTHROMBIN TIME: 23.8 SECONDS (ref 11.1–15.3)

## 2017-10-23 PROCEDURE — 99232 SBSQ HOSP IP/OBS MODERATE 35: CPT | Performed by: FAMILY MEDICINE

## 2017-10-23 PROCEDURE — 97535 SELF CARE MNGMENT TRAINING: CPT

## 2017-10-23 PROCEDURE — 97110 THERAPEUTIC EXERCISES: CPT

## 2017-10-23 PROCEDURE — 97116 GAIT TRAINING THERAPY: CPT

## 2017-10-23 PROCEDURE — 94799 UNLISTED PULMONARY SVC/PX: CPT

## 2017-10-23 PROCEDURE — 94760 N-INVAS EAR/PLS OXIMETRY 1: CPT

## 2017-10-23 PROCEDURE — 82962 GLUCOSE BLOOD TEST: CPT

## 2017-10-23 PROCEDURE — 85610 PROTHROMBIN TIME: CPT | Performed by: FAMILY MEDICINE

## 2017-10-23 PROCEDURE — 97530 THERAPEUTIC ACTIVITIES: CPT

## 2017-10-23 RX ADMIN — POLYETHYLENE GLYCOL 3350 17 G: 17 POWDER, FOR SOLUTION ORAL at 08:03

## 2017-10-23 RX ADMIN — CETIRIZINE HYDROCHLORIDE 10 MG: 10 TABLET, FILM COATED ORAL at 08:03

## 2017-10-23 RX ADMIN — ALBUTEROL SULFATE 2.5 MG: 2.5 SOLUTION RESPIRATORY (INHALATION) at 23:31

## 2017-10-23 RX ADMIN — LISINOPRIL 2.5 MG: 2.5 TABLET ORAL at 08:03

## 2017-10-23 RX ADMIN — ALBUTEROL SULFATE 2.5 MG: 2.5 SOLUTION RESPIRATORY (INHALATION) at 12:46

## 2017-10-23 RX ADMIN — CLOPIDOGREL BISULFATE 75 MG: 75 TABLET ORAL at 08:03

## 2017-10-23 RX ADMIN — HYDROCODONE BITARTRATE AND ACETAMINOPHEN 1 TABLET: 7.5; 325 TABLET ORAL at 07:12

## 2017-10-23 RX ADMIN — METOPROLOL TARTRATE 50 MG: 50 TABLET ORAL at 17:15

## 2017-10-23 RX ADMIN — PANTOPRAZOLE SODIUM 40 MG: 40 TABLET, DELAYED RELEASE ORAL at 06:02

## 2017-10-23 RX ADMIN — ALBUTEROL SULFATE 2.5 MG: 2.5 SOLUTION RESPIRATORY (INHALATION) at 05:05

## 2017-10-23 RX ADMIN — FERROUS SULFATE TAB EC 324 MG (65 MG FE EQUIVALENT) 324 MG: 324 (65 FE) TABLET DELAYED RESPONSE at 07:12

## 2017-10-23 RX ADMIN — DOCUSATE SODIUM 100 MG: 100 CAPSULE, LIQUID FILLED ORAL at 08:03

## 2017-10-23 RX ADMIN — AMIODARONE HYDROCHLORIDE 200 MG: 200 TABLET ORAL at 08:03

## 2017-10-23 RX ADMIN — HYDROCODONE BITARTRATE AND ACETAMINOPHEN 1 TABLET: 7.5; 325 TABLET ORAL at 12:16

## 2017-10-23 RX ADMIN — MAGNESIUM OXIDE TAB 400 MG (241.3 MG ELEMENTAL MG) 400 MG: 400 (241.3 MG) TAB at 08:03

## 2017-10-23 RX ADMIN — GABAPENTIN 300 MG: 300 CAPSULE ORAL at 08:03

## 2017-10-23 RX ADMIN — AMIODARONE HYDROCHLORIDE 200 MG: 200 TABLET ORAL at 20:15

## 2017-10-23 RX ADMIN — FUROSEMIDE 20 MG: 20 TABLET ORAL at 08:03

## 2017-10-23 RX ADMIN — GABAPENTIN 300 MG: 300 CAPSULE ORAL at 17:15

## 2017-10-23 RX ADMIN — FAMOTIDINE 20 MG: 20 TABLET ORAL at 20:15

## 2017-10-23 RX ADMIN — ATORVASTATIN CALCIUM 80 MG: 40 TABLET, FILM COATED ORAL at 08:03

## 2017-10-23 RX ADMIN — Medication 1 TABLET: at 08:03

## 2017-10-23 RX ADMIN — DOCUSATE SODIUM 100 MG: 100 CAPSULE, LIQUID FILLED ORAL at 17:16

## 2017-10-23 RX ADMIN — POLYETHYLENE GLYCOL 3350 17 G: 17 POWDER, FOR SOLUTION ORAL at 18:00

## 2017-10-23 RX ADMIN — FAMOTIDINE 20 MG: 20 TABLET ORAL at 08:03

## 2017-10-23 RX ADMIN — METOPROLOL TARTRATE 50 MG: 50 TABLET ORAL at 08:03

## 2017-10-23 RX ADMIN — HYDROCODONE BITARTRATE AND ACETAMINOPHEN 1 TABLET: 7.5; 325 TABLET ORAL at 17:15

## 2017-10-23 RX ADMIN — WARFARIN SODIUM 3.5 MG: 3 TABLET ORAL at 17:16

## 2017-10-24 ENCOUNTER — ANTICOAGULATION VISIT (OUTPATIENT)
Dept: PHARMACY | Facility: HOSPITAL | Age: 77
End: 2017-10-24

## 2017-10-24 VITALS
HEART RATE: 76 BPM | WEIGHT: 175.2 LBS | DIASTOLIC BLOOD PRESSURE: 63 MMHG | OXYGEN SATURATION: 95 % | SYSTOLIC BLOOD PRESSURE: 136 MMHG | HEIGHT: 68 IN | BODY MASS INDEX: 26.55 KG/M2 | RESPIRATION RATE: 16 BRPM | TEMPERATURE: 98 F

## 2017-10-24 PROBLEM — Z51.89 ENCOUNTER FOR REHABILITATION: Status: ACTIVE | Noted: 2017-10-24

## 2017-10-24 PROBLEM — I48.0 PAROXYSMAL ATRIAL FIBRILLATION (HCC): Status: ACTIVE | Noted: 2017-10-12

## 2017-10-24 LAB
INR PPP: 2.4 (ref 0.8–1.2)
PROTHROMBIN TIME: 26.4 SECONDS (ref 11.1–15.3)

## 2017-10-24 PROCEDURE — 85610 PROTHROMBIN TIME: CPT | Performed by: FAMILY MEDICINE

## 2017-10-24 PROCEDURE — 97110 THERAPEUTIC EXERCISES: CPT

## 2017-10-24 PROCEDURE — 97535 SELF CARE MNGMENT TRAINING: CPT

## 2017-10-24 PROCEDURE — 99238 HOSP IP/OBS DSCHRG MGMT 30/<: CPT | Performed by: FAMILY MEDICINE

## 2017-10-24 PROCEDURE — 97116 GAIT TRAINING THERAPY: CPT

## 2017-10-24 PROCEDURE — 94799 UNLISTED PULMONARY SVC/PX: CPT

## 2017-10-24 RX ORDER — AMIODARONE HYDROCHLORIDE 200 MG/1
200 TABLET ORAL DAILY
Qty: 30 TABLET | Refills: 0 | Status: SHIPPED | OUTPATIENT
Start: 2017-10-24

## 2017-10-24 RX ORDER — FERROUS SULFATE TAB EC 324 MG (65 MG FE EQUIVALENT) 324 (65 FE) MG
324 TABLET DELAYED RESPONSE ORAL
Qty: 30 TABLET | Refills: 0 | Status: SHIPPED | OUTPATIENT
Start: 2017-10-25 | End: 2021-01-01 | Stop reason: HOSPADM

## 2017-10-24 RX ORDER — WARFARIN SODIUM 3 MG/1
3 TABLET ORAL
Qty: 30 TABLET | Refills: 0 | Status: SHIPPED | OUTPATIENT
Start: 2017-10-24 | End: 2017-11-11

## 2017-10-24 RX ORDER — LISINOPRIL 2.5 MG/1
5 TABLET ORAL DAILY
Qty: 30 TABLET | Refills: 0 | Status: SHIPPED | OUTPATIENT
Start: 2017-10-24 | End: 2017-11-07 | Stop reason: SDUPTHER

## 2017-10-24 RX ORDER — FAMOTIDINE 20 MG/1
20 TABLET, FILM COATED ORAL 2 TIMES DAILY
Qty: 60 TABLET | Refills: 0 | Status: SHIPPED | OUTPATIENT
Start: 2017-10-24 | End: 2017-11-20 | Stop reason: SDUPTHER

## 2017-10-24 RX ORDER — HYDROCODONE BITARTRATE AND ACETAMINOPHEN 7.5; 325 MG/1; MG/1
1 TABLET ORAL EVERY 4 HOURS PRN
Qty: 40 TABLET | Refills: 0 | Status: SHIPPED | OUTPATIENT
Start: 2017-10-24 | End: 2017-10-31 | Stop reason: SDUPTHER

## 2017-10-24 RX ADMIN — FAMOTIDINE 20 MG: 20 TABLET ORAL at 07:39

## 2017-10-24 RX ADMIN — PANTOPRAZOLE SODIUM 40 MG: 40 TABLET, DELAYED RELEASE ORAL at 06:09

## 2017-10-24 RX ADMIN — ATORVASTATIN CALCIUM 80 MG: 40 TABLET, FILM COATED ORAL at 07:39

## 2017-10-24 RX ADMIN — POLYETHYLENE GLYCOL 3350 17 G: 17 POWDER, FOR SOLUTION ORAL at 07:40

## 2017-10-24 RX ADMIN — AMIODARONE HYDROCHLORIDE 200 MG: 200 TABLET ORAL at 07:40

## 2017-10-24 RX ADMIN — HYDROCODONE BITARTRATE AND ACETAMINOPHEN 1 TABLET: 7.5; 325 TABLET ORAL at 12:24

## 2017-10-24 RX ADMIN — LISINOPRIL 2.5 MG: 2.5 TABLET ORAL at 07:39

## 2017-10-24 RX ADMIN — METOPROLOL TARTRATE 50 MG: 50 TABLET ORAL at 07:40

## 2017-10-24 RX ADMIN — CETIRIZINE HYDROCHLORIDE 10 MG: 10 TABLET, FILM COATED ORAL at 07:40

## 2017-10-24 RX ADMIN — Medication 1 TABLET: at 07:39

## 2017-10-24 RX ADMIN — DOCUSATE SODIUM 100 MG: 100 CAPSULE, LIQUID FILLED ORAL at 07:39

## 2017-10-24 RX ADMIN — HYDROCODONE BITARTRATE AND ACETAMINOPHEN 1 TABLET: 7.5; 325 TABLET ORAL at 07:38

## 2017-10-24 RX ADMIN — CLOPIDOGREL BISULFATE 75 MG: 75 TABLET ORAL at 07:39

## 2017-10-24 RX ADMIN — FUROSEMIDE 20 MG: 20 TABLET ORAL at 07:39

## 2017-10-24 RX ADMIN — ALBUTEROL SULFATE 2.5 MG: 2.5 SOLUTION RESPIRATORY (INHALATION) at 12:53

## 2017-10-24 RX ADMIN — FERROUS SULFATE TAB EC 324 MG (65 MG FE EQUIVALENT) 324 MG: 324 (65 FE) TABLET DELAYED RESPONSE at 07:39

## 2017-10-24 RX ADMIN — MAGNESIUM OXIDE TAB 400 MG (241.3 MG ELEMENTAL MG) 400 MG: 400 (241.3 MG) TAB at 07:40

## 2017-10-24 RX ADMIN — GABAPENTIN 300 MG: 300 CAPSULE ORAL at 07:39

## 2017-10-24 NOTE — PROGRESS NOTES
Anticoagulation- Warfarin     Completed Discharge Warfarin Counseling    Discussed the followin. Reason for Medication and Length of therapy  2. Drug-Drug Interactions  3. Drug-Diet Interactions  4. Drug- Alcohol Interactions  5. Signs and Symptoms of Bleeding  6. Fall risk- go to the Emergency Room  7. Home procedures:  Patient is going home with [facility]  8. Sent the Rx to: [facility] for warfarin 3mg, Number: 30  Si tablet every night or As Directed + 0 refills  9. Gave Warfarin booklet  10. Answered all Questions  11. Called in lab orders to [facility]    Michael Choudhury RPH  10/24/17  12:04 PM

## 2017-10-25 ENCOUNTER — ANTICOAGULATION VISIT (OUTPATIENT)
Dept: PHARMACY | Facility: HOSPITAL | Age: 77
End: 2017-10-25

## 2017-10-25 LAB — INR PPP: 3.4

## 2017-10-25 NOTE — PROGRESS NOTES
Spoke with Myr[facility]. Reviewed current lab.  No s/s of bleeding. No new meds. No missed doses. Reviewed schedule for follow week. Pt read back regimen and verbalized understanding. All questions answered. Next INR on 10/27/17 provided by [facility].    Nikhil Cartagena III, [facility]  10/25/17  1:34 PM

## 2017-10-27 ENCOUNTER — ANTICOAGULATION VISIT (OUTPATIENT)
Dept: PHARMACY | Facility: HOSPITAL | Age: 77
End: 2017-10-27

## 2017-10-27 LAB — INR PPP: 2.3

## 2017-10-27 NOTE — PROGRESS NOTES
Spoke with [facility] for Ms Calix. Reviewed current lab.  No s/s of bleeding. No new meds. No missed doses. Diet, green beans occassionally.  Held last two days due to supra therapeutic INR 3.4 on Wed 10/25.  Before this INR she was reaching 3.5 mg daily in hospital.  Restarted warfarin 3 mg daily today and will recheck on 11/1 with [facility].  Pt read back regimen and verbalized understanding. All questions answered.    Wilbert Mart CHRISTIANO  10/27/17  1:31 PM

## 2017-10-31 ENCOUNTER — OFFICE VISIT (OUTPATIENT)
Dept: FAMILY MEDICINE CLINIC | Facility: CLINIC | Age: 77
End: 2017-10-31

## 2017-10-31 VITALS
TEMPERATURE: 97.1 F | OXYGEN SATURATION: 100 % | HEART RATE: 58 BPM | SYSTOLIC BLOOD PRESSURE: 133 MMHG | DIASTOLIC BLOOD PRESSURE: 60 MMHG

## 2017-10-31 DIAGNOSIS — S72.002S: Primary | ICD-10-CM

## 2017-10-31 DIAGNOSIS — R60.0 BILATERAL LEG EDEMA: ICD-10-CM

## 2017-10-31 DIAGNOSIS — I48.0 PAROXYSMAL ATRIAL FIBRILLATION (HCC): ICD-10-CM

## 2017-10-31 DIAGNOSIS — L72.3 SEBACEOUS CYST: ICD-10-CM

## 2017-10-31 PROCEDURE — 10060 I&D ABSCESS SIMPLE/SINGLE: CPT | Performed by: FAMILY MEDICINE

## 2017-10-31 PROCEDURE — 99214 OFFICE O/P EST MOD 30 MIN: CPT | Performed by: FAMILY MEDICINE

## 2017-10-31 RX ORDER — FUROSEMIDE 20 MG/1
20 TABLET ORAL 2 TIMES DAILY
Qty: 60 TABLET | Refills: 11 | Status: SHIPPED | OUTPATIENT
Start: 2017-10-31 | End: 2019-11-05

## 2017-10-31 RX ORDER — POTASSIUM CHLORIDE 750 MG/1
10 TABLET, FILM COATED, EXTENDED RELEASE ORAL 2 TIMES DAILY
Qty: 60 TABLET | Refills: 11 | Status: SHIPPED | OUTPATIENT
Start: 2017-10-31 | End: 2018-04-19

## 2017-10-31 RX ORDER — HYDROCODONE BITARTRATE AND ACETAMINOPHEN 7.5; 325 MG/1; MG/1
1 TABLET ORAL EVERY 4 HOURS PRN
Qty: 60 TABLET | Refills: 0 | Status: SHIPPED | OUTPATIENT
Start: 2017-10-31 | End: 2018-04-19

## 2017-10-31 NOTE — PROGRESS NOTES
Subjective   Anaih Calix is a 77 y.o. female.     History of Present Illness     Left hip fracture, orif dr malagon, on ventilator, episode a-fib, has follow up dr mcgee.  On 6 weeks coumadin and dr mcgee to determine if needs lifetime anticoagulation.   Will run out of pain medicine before follow up dr malagon  Places on both hands, she will hit them and it will hurt  Having problem with edema, her lasix wasn't refilled   Taking 20mg bid    Review of Systems   Constitutional: Negative for chills, fatigue and fever.   HENT: Negative for congestion, ear discharge, ear pain, facial swelling, hearing loss, postnasal drip, rhinorrhea, sinus pressure, sore throat, trouble swallowing and voice change.    Eyes: Negative for discharge, redness and visual disturbance.   Respiratory: Negative for cough, chest tightness, shortness of breath and wheezing.    Cardiovascular: Negative for chest pain and palpitations.   Gastrointestinal: Negative for abdominal pain, blood in stool, constipation, diarrhea, nausea and vomiting.   Endocrine: Negative for polydipsia and polyuria.   Genitourinary: Negative for dysuria, flank pain, hematuria and urgency.   Musculoskeletal: Negative for arthralgias, back pain, joint swelling and myalgias.   Skin: Negative for rash.   Neurological: Negative for dizziness, weakness, numbness and headaches.   Hematological: Negative for adenopathy.   Psychiatric/Behavioral: Negative for confusion and sleep disturbance. The patient is not nervous/anxious.        Objective   Physical Exam   Constitutional: She is oriented to person, place, and time. She appears well-developed and well-nourished.   HENT:   Head: Normocephalic and atraumatic.   Right Ear: External ear normal.   Left Ear: External ear normal.   Nose: Nose normal.   Eyes: Conjunctivae and EOM are normal. Pupils are equal, round, and reactive to light.   Neck: Normal range of motion.   Cardiovascular: Normal rate, regular rhythm and intact  distal pulses.    Pulmonary/Chest: Effort normal.   Musculoskeletal: Normal range of motion.   Neurological: She is alert and oriented to person, place, and time.   Skin:   Both hands at posterior 3rd mcp joint, dime size sebaceous cysts raised 3 or so mm.  Right hand center puncta with blackhead  Little more than trace edema both lower legs   Psychiatric: She has a normal mood and affect. Her behavior is normal. Judgment and thought content normal.   Nursing note and vitals reviewed.      Assessment/Plan   Anahi was seen today for follow-up.    Diagnoses and all orders for this visit:    Fracture, hip, left, sequela    Paroxysmal atrial fibrillation    Bilateral leg edema    Sebaceous cyst    Other orders  -     HYDROcodone-acetaminophen (NORCO) 7.5-325 MG per tablet; Take 1 tablet by mouth Every 4 (Four) Hours As Needed (pain).  -     furosemide (LASIX) 20 MG tablet; Take 1 tablet by mouth 2 (Two) Times a Day. As needed for edema  -     potassium chloride (K-DUR) 10 MEQ CR tablet; Take 1 tablet by mouth 2 (Two) Times a Day.      Chirag: [id number]    Take lasix and potassium prn, discussed till she understood, son with her and he understood    PROCEDURE NOTE:  BOTH SEBACEOUS CYST PREPPED ETOH SWAB.  NO LOCAL ANESTHESIA, STERILE CURVED IRIS SCISSORS USED TO UNROOF THE CYSTS AND CONTENTS EXPRESSED.  DRESSING APPLIED. (SKIN ON TOP OF SEBACEOUS CYSTS STRETCHED SO TIGHT ESSENTIALLY NO FEELING SO WAS MINIMALLY UNCOMFORTABLE FOR HER)    TOLERATED VERY WELL.

## 2017-11-01 ENCOUNTER — ANTICOAGULATION VISIT (OUTPATIENT)
Dept: PHARMACY | Facility: HOSPITAL | Age: 77
End: 2017-11-01

## 2017-11-01 LAB — INR PPP: 2.6 (ref 1.7–2.5)

## 2017-11-01 NOTE — PROGRESS NOTES
Spoke with Anahi. Reviewed current lab.  No s/s of bleeding. No new meds. No missed doses. Reviewed schedule for follow week. Pt read back regimen and verbalized understanding. All questions answered. Last day of warfarin therapy will be 11/11.     Michael Choudhury RPH  11/01/17  1:44 PM

## 2017-11-06 DIAGNOSIS — S72.002D CLOSED FRACTURE OF LEFT HIP WITH ROUTINE HEALING, SUBSEQUENT ENCOUNTER: Primary | ICD-10-CM

## 2017-11-07 ENCOUNTER — OFFICE VISIT (OUTPATIENT)
Dept: ORTHOPEDIC SURGERY | Facility: CLINIC | Age: 77
End: 2017-11-07

## 2017-11-07 VITALS — HEIGHT: 68 IN | WEIGHT: 179 LBS | BODY MASS INDEX: 27.13 KG/M2

## 2017-11-07 DIAGNOSIS — S72.002D CLOSED FRACTURE OF LEFT HIP WITH ROUTINE HEALING, SUBSEQUENT ENCOUNTER: Primary | ICD-10-CM

## 2017-11-07 PROCEDURE — 99024 POSTOP FOLLOW-UP VISIT: CPT | Performed by: ORTHOPAEDIC SURGERY

## 2017-11-07 RX ORDER — LISINOPRIL 2.5 MG/1
5 TABLET ORAL DAILY
Qty: 60 TABLET | Refills: 5 | Status: SHIPPED | OUTPATIENT
Start: 2017-11-07 | End: 2018-03-22 | Stop reason: DRUGHIGH

## 2017-11-07 NOTE — PROGRESS NOTES
Anahi Calix is a 77 y.o. female returns for     Chief Complaint   Patient presents with   • Left Hip - Follow-up     Repeat xray done today in office         HISTORY OF PRESENT ILLNESS: Patient states that she is doing better. S/p ORIF left 10/1/17. Doing home PT and doing well. She has been using a walker.  Her pain is improving.  She denies any fevers or chills.       CONCURRENT MEDICAL HISTORY:    Past Medical History:   Diagnosis Date   • Abdominal aortic aneurysm (AAA) without rupture    • Atrial fibrillation 10/2/2017    New onset.  S/p cardioversion  On coumadin now.  Amiodarone    • CAD (coronary artery disease)    • Gastroesophageal reflux disease    • Hypercholesterolemia    • Hypertension    • Nuclear senile cataract    • Osteoarthritis    • Osteoporosis    • Peripheral arterial disease    • Solitary lung nodule        No Known Allergies      Current Outpatient Prescriptions:   •  amiodarone (PACERONE) 200 MG tablet, Take 1 tablet by mouth Daily., Disp: 30 tablet, Rfl: 0  •  aspirin 81 MG EC tablet, Take 81 mg by mouth daily., Disp: , Rfl:   •  atorvastatin (LIPITOR) 80 MG tablet, Take 1 tablet by mouth Daily., Disp: 90 tablet, Rfl: 3  •  Cholecalciferol (GNP VITAMIN D SUPER STRENGTH) 5000 UNITS tablet, Take  by mouth., Disp: , Rfl:   •  famotidine (PEPCID) 20 MG tablet, Take 1 tablet by mouth 2 (Two) Times a Day., Disp: 60 tablet, Rfl: 0  •  ferrous sulfate 324 (65 Fe) MG tablet delayed-release EC tablet, Take 1 tablet by mouth Daily With Breakfast., Disp: 30 tablet, Rfl: 0  •  furosemide (LASIX) 20 MG tablet, Take 1 tablet by mouth 2 (Two) Times a Day. As needed for edema, Disp: 60 tablet, Rfl: 11  •  gabapentin (NEURONTIN) 300 MG capsule, Take 1 capsule by mouth 3 (Three) Times a Day. (Patient taking differently: Take 300 mg by mouth 2 (Two) Times a Day.), Disp: 270 capsule, Rfl: 3  •  HYDROcodone-acetaminophen (NORCO) 7.5-325 MG per tablet, Take 1 tablet by mouth Every 4 (Four) Hours As Needed  "(pain)., Disp: 60 tablet, Rfl: 0  •  lisinopril (PRINIVIL,ZESTRIL) 2.5 MG tablet, Take 2 tablets by mouth Daily., Disp: 60 tablet, Rfl: 5  •  magnesium oxide (MAGOX) 400 (241.3 Mg) MG tablet tablet, Take 1 tablet by mouth Daily., Disp: 30 each, Rfl: 0  •  metoprolol tartrate (LOPRESSOR) 50 MG tablet, Take 1 tablet by mouth 2 (Two) Times a Day., Disp: 180 tablet, Rfl: 0  •  Multiple Vitamins-Minerals (VISION FORMULA PO), Take 1 tablet by mouth every day., Disp: , Rfl:   •  polyethylene glycol (MIRALAX) powder, ONE CAPFUL IN 8 OUNCES OF LIQUID ONCE DAILY UP TO THREE TIMES DAILY AS NEEDED., Disp: 1581 g, Rfl: 11  •  potassium chloride (K-DUR) 10 MEQ CR tablet, Take 1 tablet by mouth 2 (Two) Times a Day., Disp: 60 tablet, Rfl: 11  •  warfarin (COUMADIN) 3 MG tablet, Take 1 tablet by mouth Daily for 18 days. Or as directed by [facility] Pharmacists, Disp: 30 tablet, Rfl: 0    Past Surgical History:   Procedure Laterality Date   • ABDOMINAL AORTIC ANEURYSM REPAIR     • HIP INTERTROCHANTERIC NAILING Left 10/1/2017    Procedure: HIP INTERTROCHANTERIC NAILING - LEFT - [place];  Surgeon: Nicola Rich MD;  Location: [facility];  Service:        ROS  No fevers or chills.  No chest pain or shortness of air.  No GI or  disturbances.    PHYSICAL EXAMINATION:       Ht 68\" (172.7 cm)  Wt 179 lb (81.2 kg)  LMP  (LMP Unknown)  BMI 27.22 kg/m2    Physical Exam   Constitutional: She is oriented to person, place, and time. She appears well-developed and well-nourished.   Neurological: She is alert and oriented to person, place, and time.   Psychiatric: She has a normal mood and affect. Her behavior is normal. Judgment and thought content normal.       GAIT:     [x]  Normal  []  Antalgic    Assistive device: []  None  [x]  Walker     []  Crutches  []  Cane     []  Wheelchair  []  Stretcher    Left Hip Exam     Comments:  Hip flexion to 110°.  Incisions look good.  Good distal pulses and sensation.  Muscle tone and " strength.              Xr Femur 2 Vw Left    Addendum Date: 11/7/2017 Addendum:   Previous x-ray reading was entered and error and referenced to a different x-ray. AP and lateral of the left femur show acceptable position and alignment of an intratrochanteric hip fracture status post intramedullary fixation.  The implant extends distally into the femur and there are no signs of implant loosening or failure.  Hip is fixed in slight varus position.  There is mild shortening noted at the fracture site.  No other acute findings are noted. 11/07/17 at 5:43 PM by Nicola Rich MD        Xr Pelvis 1 Or 2 View    Result Date: 11/7/2017  Narrative: AP of pelvis shows a calcified aortic aneurysm that has been previously noted and appears to be unchanged in size.  Mild arthritic changes noted in the right hip.  Left hip shows intertrochanteric hip fracture treated with intramedullary dakota and lag screw.  No sign of implant loosening or failure is noted.  No other acute bony abnormality is noted. 11/07/17 at 5:41 PM by Nicola Rich MD              ASSESSMENT:    Diagnoses and all orders for this visit:    Closed fracture of left hip with routine healing, subsequent encounter          PLAN    Progress motion and activity as tolerated.  Continue swelling control.  Continue use of a walker was slow progression of weight as pain allows.  Follow-up in 4 weeks with repeat x-rays of the left femur.    Return in about 4 weeks (around 12/5/2017) for Recheck with repeat xrays.    Nicola Rich MD

## 2017-11-10 ENCOUNTER — TELEPHONE (OUTPATIENT)
Dept: ORTHOPEDIC SURGERY | Facility: CLINIC | Age: 77
End: 2017-11-10

## 2017-11-11 ENCOUNTER — ANTICOAGULATION VISIT (OUTPATIENT)
Dept: PHARMACY | Facility: HOSPITAL | Age: 77
End: 2017-11-11

## 2017-11-11 ENCOUNTER — TELEPHONE (OUTPATIENT)
Dept: PHARMACY | Facility: HOSPITAL | Age: 77
End: 2017-11-11

## 2017-11-11 NOTE — TELEPHONE ENCOUNTER
Called patient and reminded her that tonight is the last night of taking the warfarin.  She had no questions, and no problems.

## 2017-11-16 ENCOUNTER — TELEPHONE (OUTPATIENT)
Dept: FAMILY MEDICINE CLINIC | Facility: CLINIC | Age: 77
End: 2017-11-16

## 2017-11-16 RX ORDER — CLOPIDOGREL BISULFATE 75 MG/1
75 TABLET ORAL DAILY
Qty: 30 TABLET | Refills: 11 | Status: SHIPPED | OUTPATIENT
Start: 2017-11-16 | End: 2018-11-06 | Stop reason: SDUPTHER

## 2017-11-16 NOTE — TELEPHONE ENCOUNTER
NEED REFILL    PLAVIX 75MG QD    [facility]      (PLAVIX IS NOT ON THE CURRENT MED LIST. PATIENT STATED THAT THEY TOOK HER OFF PLAVIX AND HAD HER ON COUMADIN WHEN SHE BROKE HER HIP, BUT SHE WAS BACK TO THE PLAVIX NOW.  SHE SAID YOU WROTE THE LAST RX FOR IT.)

## 2017-11-20 RX ORDER — FAMOTIDINE 20 MG/1
20 TABLET, FILM COATED ORAL 2 TIMES DAILY
Qty: 60 TABLET | Refills: 11 | Status: SHIPPED | OUTPATIENT
Start: 2017-11-20 | End: 2018-11-06 | Stop reason: SDUPTHER

## 2017-12-06 DIAGNOSIS — S72.002D CLOSED FRACTURE OF LEFT HIP WITH ROUTINE HEALING, SUBSEQUENT ENCOUNTER: Primary | ICD-10-CM

## 2017-12-07 ENCOUNTER — OFFICE VISIT (OUTPATIENT)
Dept: ORTHOPEDIC SURGERY | Facility: CLINIC | Age: 77
End: 2017-12-07

## 2017-12-07 VITALS — BODY MASS INDEX: 26.52 KG/M2 | WEIGHT: 175 LBS | HEIGHT: 68 IN

## 2017-12-07 DIAGNOSIS — S72.002D CLOSED FRACTURE OF LEFT HIP WITH ROUTINE HEALING, SUBSEQUENT ENCOUNTER: Primary | ICD-10-CM

## 2017-12-07 PROCEDURE — 99024 POSTOP FOLLOW-UP VISIT: CPT | Performed by: ORTHOPAEDIC SURGERY

## 2017-12-07 NOTE — PROGRESS NOTES
The patient is a 77 y.o. female who presents for followup.    Chief Complaint   Patient presents with   • Left Hip - Follow-up       HPI: Patient is here today for recheck of left hip fracture. Patient is S/P left hip intertrochanteric nailing. Patient states she is having some mild pain today; 5/10. Patient had xray upon arrival today.      Current Outpatient Prescriptions:   •  amiodarone (PACERONE) 200 MG tablet, Take 1 tablet by mouth Daily. (Patient taking differently: Take 100 mg by mouth Daily.), Disp: 30 tablet, Rfl: 0  •  aspirin 81 MG EC tablet, Take 81 mg by mouth daily., Disp: , Rfl:   •  atorvastatin (LIPITOR) 80 MG tablet, Take 1 tablet by mouth Daily., Disp: 90 tablet, Rfl: 3  •  Cholecalciferol (GNP VITAMIN D SUPER STRENGTH) 5000 UNITS tablet, Take  by mouth., Disp: , Rfl:   •  clopidogrel (PLAVIX) 75 MG tablet, Take 1 tablet by mouth Daily., Disp: 30 tablet, Rfl: 11  •  famotidine (PEPCID) 20 MG tablet, Take 1 tablet by mouth 2 (Two) Times a Day., Disp: 60 tablet, Rfl: 11  •  ferrous sulfate 324 (65 Fe) MG tablet delayed-release EC tablet, Take 1 tablet by mouth Daily With Breakfast., Disp: 30 tablet, Rfl: 0  •  furosemide (LASIX) 20 MG tablet, Take 1 tablet by mouth 2 (Two) Times a Day. As needed for edema, Disp: 60 tablet, Rfl: 11  •  gabapentin (NEURONTIN) 300 MG capsule, Take 1 capsule by mouth 3 (Three) Times a Day. (Patient taking differently: Take 300 mg by mouth 2 (Two) Times a Day.), Disp: 270 capsule, Rfl: 3  •  HYDROcodone-acetaminophen (NORCO) 7.5-325 MG per tablet, Take 1 tablet by mouth Every 4 (Four) Hours As Needed (pain)., Disp: 60 tablet, Rfl: 0  •  lisinopril (PRINIVIL,ZESTRIL) 2.5 MG tablet, Take 2 tablets by mouth Daily. (Patient taking differently: Take 5 mg by mouth 2 (Two) Times a Day.), Disp: 60 tablet, Rfl: 5  •  magnesium oxide (MAGOX) 400 (241.3 Mg) MG tablet tablet, Take 1 tablet by mouth Daily., Disp: 30 each, Rfl: 0  •  metoprolol tartrate (LOPRESSOR) 50 MG tablet,  "Take 1 tablet by mouth 2 (Two) Times a Day., Disp: 180 tablet, Rfl: 0  •  Multiple Vitamins-Minerals (VISION FORMULA PO), Take 1 tablet by mouth every day., Disp: , Rfl:   •  polyethylene glycol (MIRALAX) powder, ONE CAPFUL IN 8 OUNCES OF LIQUID ONCE DAILY UP TO THREE TIMES DAILY AS NEEDED., Disp: 1581 g, Rfl: 11  •  potassium chloride (K-DUR) 10 MEQ CR tablet, Take 1 tablet by mouth 2 (Two) Times a Day., Disp: 60 tablet, Rfl: 11    No Known Allergies    ROS:  No fevers or chills.  No nausea or vomiting    PHYSICAL EXAM:    Vitals:    12/07/17 1527   Weight: 79.4 kg (175 lb)   Height: 172.7 cm (68\")   PainSc:   5   PainLoc: Hip  Comment: left       GAIT:     []  Normal  [x]  Antalgic    Assistive device: []  None  [x]  Walker     []  Crutches  []  Cane     []  Wheelchair  []  Stretcher    Patient is awake and alert, answers questions appropriately, and is in no apparent distress.    Left hip flexion 100°.  Good internal and external rotation.  Incisions are well-healed.  Minimal swelling in the leg.    Xr Femur 2 View Left    Result Date: 12/7/2017  Narrative: AP and lateral of the left femur show acceptable position and alignment status post intramedullary rodding.  There is slight varus position but no change in position from x-rays taken November 7, 2017.  There is progressive healing noted on the lateral aspect area did no sign of implant loosening or failure is noted. 12/07/17 at 4:52 PM by Nicola Rich MD        ASSESSMENT:  Diagnoses and all orders for this visit:    Closed fracture of left hip with routine healing, subsequent encounter        PLAN:    Continue with range of motion and strengthening exercises.  Continue with motion as tolerated.  Continue working on endurance.  Continue using the walker and slowly increase weight and transition off walker as pain allows.    Return in about 6 weeks (around 1/18/2018) for Recheck with repeat xrays.    Nicola Rich MD  "

## 2017-12-18 RX ORDER — GABAPENTIN 300 MG/1
CAPSULE ORAL
Qty: 270 CAPSULE | Refills: 1 | Status: SHIPPED | OUTPATIENT
Start: 2017-12-18 | End: 2018-09-13 | Stop reason: SDUPTHER

## 2018-01-11 DIAGNOSIS — I10 ESSENTIAL HYPERTENSION: ICD-10-CM

## 2018-01-15 RX ORDER — METOPROLOL TARTRATE 50 MG/1
50 TABLET, FILM COATED ORAL EVERY 12 HOURS SCHEDULED
Qty: 180 TABLET | Refills: 0 | Status: SHIPPED | OUTPATIENT
Start: 2018-01-15 | End: 2018-04-05 | Stop reason: SDUPTHER

## 2018-01-17 DIAGNOSIS — M25.552 LEFT HIP PAIN: Primary | ICD-10-CM

## 2018-01-25 RX ORDER — POLYETHYLENE GLYCOL 3350 17 G/17G
POWDER, FOR SOLUTION ORAL
Qty: 1581 G | Refills: 11 | Status: SHIPPED | OUTPATIENT
Start: 2018-01-25 | End: 2021-01-01 | Stop reason: HOSPADM

## 2018-02-15 ENCOUNTER — OFFICE VISIT (OUTPATIENT)
Dept: ORTHOPEDIC SURGERY | Facility: CLINIC | Age: 78
End: 2018-02-15

## 2018-02-15 VITALS — HEIGHT: 68 IN | WEIGHT: 181 LBS | BODY MASS INDEX: 27.43 KG/M2

## 2018-02-15 DIAGNOSIS — M25.552 LEFT HIP PAIN: Primary | ICD-10-CM

## 2018-02-15 DIAGNOSIS — I48.0 PAROXYSMAL ATRIAL FIBRILLATION (HCC): ICD-10-CM

## 2018-02-15 DIAGNOSIS — S72.002D CLOSED FRACTURE OF LEFT HIP WITH ROUTINE HEALING, SUBSEQUENT ENCOUNTER: ICD-10-CM

## 2018-02-15 DIAGNOSIS — I10 ESSENTIAL HYPERTENSION: ICD-10-CM

## 2018-02-15 PROCEDURE — 99213 OFFICE O/P EST LOW 20 MIN: CPT | Performed by: ORTHOPAEDIC SURGERY

## 2018-02-15 NOTE — PROGRESS NOTES
The patient is a 77 y.o. female who presents for followup.    Chief Complaint   Patient presents with   • Left Hip - Follow-up       HPI: HIP INTERTROCHANTERIC NAILING - LEFT - [place] done on 10/1/2017. Patient is having pain when she is up on her leg for long periods of time. xrays done today.   She states that her pain is getting better.  She is able to do more.  She is trying to walk without the walker some.  No new complaints and no fevers or chills.      Current Outpatient Prescriptions:   •  amiodarone (PACERONE) 200 MG tablet, Take 1 tablet by mouth Daily. (Patient taking differently: Take 100 mg by mouth Daily.), Disp: 30 tablet, Rfl: 0  •  aspirin 81 MG EC tablet, Take 81 mg by mouth daily., Disp: , Rfl:   •  atorvastatin (LIPITOR) 80 MG tablet, Take 1 tablet by mouth Daily., Disp: 90 tablet, Rfl: 3  •  Cholecalciferol (GNP VITAMIN D SUPER STRENGTH) 5000 UNITS tablet, Take  by mouth., Disp: , Rfl:   •  clopidogrel (PLAVIX) 75 MG tablet, Take 1 tablet by mouth Daily., Disp: 30 tablet, Rfl: 11  •  famotidine (PEPCID) 20 MG tablet, Take 1 tablet by mouth 2 (Two) Times a Day., Disp: 60 tablet, Rfl: 11  •  ferrous sulfate 324 (65 Fe) MG tablet delayed-release EC tablet, Take 1 tablet by mouth Daily With Breakfast., Disp: 30 tablet, Rfl: 0  •  furosemide (LASIX) 20 MG tablet, Take 1 tablet by mouth 2 (Two) Times a Day. As needed for edema, Disp: 60 tablet, Rfl: 11  •  gabapentin (NEURONTIN) 300 MG capsule, TAKE 1 CAPSULE BY MOUTH 3 (THREE) TIMES A DAY., Disp: 270 capsule, Rfl: 1  •  HYDROcodone-acetaminophen (NORCO) 7.5-325 MG per tablet, Take 1 tablet by mouth Every 4 (Four) Hours As Needed (pain)., Disp: 60 tablet, Rfl: 0  •  lisinopril (PRINIVIL,ZESTRIL) 2.5 MG tablet, Take 2 tablets by mouth Daily. (Patient taking differently: Take 5 mg by mouth 2 (Two) Times a Day.), Disp: 60 tablet, Rfl: 5  •  magnesium oxide (MAGOX) 400 (241.3 Mg) MG tablet tablet, Take 1 tablet by mouth Daily., Disp: 30 each, Rfl: 0  •   "metoprolol tartrate (LOPRESSOR) 50 MG tablet, Take 1 tablet by mouth Every 12 (Twelve) Hours., Disp: 180 tablet, Rfl: 0  •  Multiple Vitamins-Minerals (VISION FORMULA PO), Take 1 tablet by mouth every day., Disp: , Rfl:   •  polyethylene glycol (MIRALAX) powder, ONE CAPFUL IN 8 OUNCES OF LIQUID ONCE DAILY UP TO THREE TIMES DAILY AS NEEDED., Disp: 1581 g, Rfl: 11  •  potassium chloride (K-DUR) 10 MEQ CR tablet, Take 1 tablet by mouth 2 (Two) Times a Day., Disp: 60 tablet, Rfl: 11    No Known Allergies    ROS:  No fevers or chills.  No nausea or vomiting    PHYSICAL EXAM:    Vitals:    02/15/18 1438   Weight: 82.1 kg (181 lb)   Height: 172.7 cm (68\")       GAIT:     []  Normal  [x]  Antalgic    Assistive device: []  None  [x]  Walker     []  Crutches  []  Cane     []  Wheelchair  []  Stretcher    Patient is awake and alert, answers questions appropriately, and is in no apparent distress.    Nontender on exam.  The incisions are well-healed.  She is able to flex her hip to approximately 110°.  Good distal pulses and sensation.  Good muscle tone and strength.  Stable joint exam.     Xr Femur 2 Vw Left    Result Date: 2/15/2018  Narrative: AP and lateral of the left femur show a trans-trochanteric femur fracture status post intramedullary rodding.  No significant changes noted from prior x-rays.  She does have callus and healing formation around the fracture site.  No sign of implant loosening or failure proximally or distally.  No other acute bony abnormality is noted. 02/15/18 at 5:29 PM by Nicola Rich MD      Xr Pelvis 1 Or 2 View    Result Date: 2/15/2018  Narrative: AP of the pelvis shows acceptable position and alignment of the pelvis with no acute bony abnormality.  Prior transfer trochanteric hip fracture is noted with intramedullary rodding.  No significant changes are identified.  No sign of implant loosening or failure is noted. 02/15/18 at 5:25 PM by Nicola Rich MD  "       ASSESSMENT:  Diagnoses and all orders for this visit:    Left hip pain    Closed fracture of left hip with routine healing, subsequent encounter    Paroxysmal atrial fibrillation    Essential hypertension        PLAN:    She will continue with range of motion and strengthening exercises.  Slowly progress weightbearing as tolerated.  Continue use of the walker until able to walk well without it.  Continue strengthening and conditioning exercises.  Follow-up in 6 weeks with repeat x-rays.    Return in about 6 weeks (around 3/29/2018) for Recheck with repeat xrays.    Nicola Rich MD

## 2018-03-22 ENCOUNTER — OFFICE VISIT (OUTPATIENT)
Dept: FAMILY MEDICINE CLINIC | Facility: CLINIC | Age: 78
End: 2018-03-22

## 2018-03-22 VITALS
DIASTOLIC BLOOD PRESSURE: 100 MMHG | SYSTOLIC BLOOD PRESSURE: 192 MMHG | TEMPERATURE: 98.2 F | HEART RATE: 57 BPM | OXYGEN SATURATION: 97 %

## 2018-03-22 DIAGNOSIS — I10 ESSENTIAL HYPERTENSION: Primary | ICD-10-CM

## 2018-03-22 PROCEDURE — 80053 COMPREHEN METABOLIC PANEL: CPT | Performed by: FAMILY MEDICINE

## 2018-03-22 PROCEDURE — 85027 COMPLETE CBC AUTOMATED: CPT | Performed by: FAMILY MEDICINE

## 2018-03-22 PROCEDURE — 84443 ASSAY THYROID STIM HORMONE: CPT | Performed by: FAMILY MEDICINE

## 2018-03-22 PROCEDURE — 99213 OFFICE O/P EST LOW 20 MIN: CPT | Performed by: FAMILY MEDICINE

## 2018-03-22 PROCEDURE — 36415 COLL VENOUS BLD VENIPUNCTURE: CPT | Performed by: FAMILY MEDICINE

## 2018-03-22 RX ORDER — LISINOPRIL 10 MG/1
10 TABLET ORAL DAILY
Qty: 30 TABLET | Refills: 0 | Status: SHIPPED | OUTPATIENT
Start: 2018-03-22 | End: 2018-04-05 | Stop reason: DRUGHIGH

## 2018-03-23 LAB
ALBUMIN SERPL-MCNC: 3.9 G/DL (ref 3.4–4.8)
ALBUMIN/GLOB SERPL: 1.4 G/DL (ref 1.1–1.8)
ALP SERPL-CCNC: 87 U/L (ref 38–126)
ALT SERPL W P-5'-P-CCNC: 18 U/L (ref 9–52)
ANION GAP SERPL CALCULATED.3IONS-SCNC: 13 MMOL/L (ref 5–15)
AST SERPL-CCNC: 21 U/L (ref 14–36)
BILIRUB SERPL-MCNC: 0.4 MG/DL (ref 0.2–1.3)
BUN BLD-MCNC: 16 MG/DL (ref 7–21)
BUN/CREAT SERPL: 18.6 (ref 7–25)
CALCIUM SPEC-SCNC: 9.3 MG/DL (ref 8.4–10.2)
CHLORIDE SERPL-SCNC: 102 MMOL/L (ref 95–110)
CO2 SERPL-SCNC: 29 MMOL/L (ref 22–31)
CREAT BLD-MCNC: 0.86 MG/DL (ref 0.5–1)
DEPRECATED RDW RBC AUTO: 49.4 FL (ref 36.4–46.3)
ERYTHROCYTE [DISTWIDTH] IN BLOOD BY AUTOMATED COUNT: 14.5 % (ref 11.5–14.5)
GFR SERPL CREATININE-BSD FRML MDRD: 64 ML/MIN/1.73 (ref 39–90)
GLOBULIN UR ELPH-MCNC: 2.8 GM/DL (ref 2.3–3.5)
GLUCOSE BLD-MCNC: 82 MG/DL (ref 60–100)
HCT VFR BLD AUTO: 42.2 % (ref 35–45)
HGB BLD-MCNC: 13.9 G/DL (ref 12–15.5)
MCH RBC QN AUTO: 30.5 PG (ref 26.5–34)
MCHC RBC AUTO-ENTMCNC: 32.9 G/DL (ref 31.4–36)
MCV RBC AUTO: 92.7 FL (ref 80–98)
PLATELET # BLD AUTO: 228 10*3/MM3 (ref 150–450)
PMV BLD AUTO: 9.9 FL (ref 8–12)
POTASSIUM BLD-SCNC: 4.4 MMOL/L (ref 3.5–5.1)
PROT SERPL-MCNC: 6.7 G/DL (ref 6.3–8.6)
RBC # BLD AUTO: 4.55 10*6/MM3 (ref 3.77–5.16)
SODIUM BLD-SCNC: 144 MMOL/L (ref 137–145)
TSH SERPL DL<=0.05 MIU/L-ACNC: 1.8 MIU/ML (ref 0.46–4.68)
WBC NRBC COR # BLD: 7.22 10*3/MM3 (ref 3.2–9.8)

## 2018-04-04 ENCOUNTER — TELEPHONE (OUTPATIENT)
Dept: FAMILY MEDICINE CLINIC | Facility: CLINIC | Age: 78
End: 2018-04-04

## 2018-04-04 NOTE — TELEPHONE ENCOUNTER
PATIENT CAME IN FOR BP CHECK.    224/86    DR MANRIQUEZ INSTRUCTED THE PATIENT TO TAKE 3 LISINOPRIL 10MG PILLS WHEN SHE GETS HOME AND TAKE IT EASY TONIGHT.    CHECK HER BLOOD PRESSURE AT HOME IN THE MORNING AND CALL US WITH THE RESULTS.

## 2018-04-05 ENCOUNTER — TELEPHONE (OUTPATIENT)
Dept: FAMILY MEDICINE CLINIC | Facility: CLINIC | Age: 78
End: 2018-04-05

## 2018-04-05 DIAGNOSIS — I10 ESSENTIAL HYPERTENSION: ICD-10-CM

## 2018-04-05 RX ORDER — METOPROLOL TARTRATE 50 MG/1
50 TABLET, FILM COATED ORAL EVERY 12 HOURS SCHEDULED
Qty: 180 TABLET | Refills: 3 | Status: SHIPPED | OUTPATIENT
Start: 2018-04-05 | End: 2019-04-17 | Stop reason: SDUPTHER

## 2018-04-05 RX ORDER — LISINOPRIL 40 MG/1
40 TABLET ORAL DAILY
Qty: 30 TABLET | Refills: 0 | Status: SHIPPED | OUTPATIENT
Start: 2018-04-05 | End: 2018-05-02 | Stop reason: SDUPTHER

## 2018-04-05 NOTE — TELEPHONE ENCOUNTER
DR. MANRIQUEZ SAID FOR PATIENT TO TAKE FOUR 10 MG LISINOPRIL AND HE SENT IN RX FOR 40 MG LISINOPRIL 1 QD.    INFORMED PATIENT.  SHE SAID WILL CONTINUE TO CHECK BP AT HOME.

## 2018-04-05 NOTE — TELEPHONE ENCOUNTER
YOU ASKED PATIENT TO CALL THIS MORNING AND REPORT HER BP.  AT 6:15 BEFORE TAKING ANY MEDS IT /91.  YOU TOLD HER TO TAKE THREE OF HER PILLS YESTERDAY AFTERNOON.

## 2018-04-09 ENCOUNTER — TELEPHONE (OUTPATIENT)
Dept: FAMILY MEDICINE CLINIC | Facility: CLINIC | Age: 78
End: 2018-04-09

## 2018-04-09 NOTE — TELEPHONE ENCOUNTER
INFORMED PATIENT.  SHE IS TAKING HER METOPROLOL 50 MG BID. SHE SAID SHE WOULD CALL CARDIOLOGIST.   DO YOU STILL WANT OVERNIGHT OXIMETRY?

## 2018-04-18 DIAGNOSIS — S72.002D CLOSED FRACTURE OF LEFT HIP WITH ROUTINE HEALING, SUBSEQUENT ENCOUNTER: Primary | ICD-10-CM

## 2018-04-19 ENCOUNTER — OFFICE VISIT (OUTPATIENT)
Dept: ORTHOPEDIC SURGERY | Facility: CLINIC | Age: 78
End: 2018-04-19

## 2018-04-19 VITALS — BODY MASS INDEX: 28.64 KG/M2 | WEIGHT: 189 LBS | HEIGHT: 68 IN

## 2018-04-19 DIAGNOSIS — I10 ESSENTIAL HYPERTENSION: ICD-10-CM

## 2018-04-19 DIAGNOSIS — M25.552 LEFT HIP PAIN: ICD-10-CM

## 2018-04-19 DIAGNOSIS — S72.002D CLOSED FRACTURE OF LEFT HIP WITH ROUTINE HEALING, SUBSEQUENT ENCOUNTER: Primary | ICD-10-CM

## 2018-04-19 DIAGNOSIS — I48.0 PAROXYSMAL ATRIAL FIBRILLATION (HCC): ICD-10-CM

## 2018-04-19 PROCEDURE — 99213 OFFICE O/P EST LOW 20 MIN: CPT | Performed by: ORTHOPAEDIC SURGERY

## 2018-04-19 RX ORDER — HYDRALAZINE HYDROCHLORIDE 25 MG/1
25 TABLET, FILM COATED ORAL 3 TIMES DAILY
COMMUNITY
End: 2020-02-06

## 2018-04-19 NOTE — PROGRESS NOTES
Anahi Calix is a 77 y.o. female returns for     Chief Complaint   Patient presents with   • Left Hip - Follow-up     Xray today       HISTORY OF PRESENT ILLNESS:  Follow up left intertrochanteric fracture.  ORIF on 10/1/17.  New Xray today.  Doing well, still hurts from time to time if she has been on feet for too long.  Pain is getting better  Using a walker most of the time - walking some without it  Mobility is slowly improving.       CONCURRENT MEDICAL HISTORY:    Past Medical History:   Diagnosis Date   • Abdominal aortic aneurysm (AAA) without rupture    • Atrial fibrillation 10/2/2017    New onset.  S/p cardioversion  On coumadin now.  Amiodarone    • CAD (coronary artery disease)    • Gastroesophageal reflux disease    • Hypercholesterolemia    • Hypertension    • Nuclear senile cataract    • Osteoarthritis    • Osteoporosis    • Peripheral arterial disease    • Solitary lung nodule        No Known Allergies      Current Outpatient Prescriptions:   •  amiodarone (PACERONE) 200 MG tablet, Take 1 tablet by mouth Daily. (Patient taking differently: Take 100 mg by mouth Daily.), Disp: 30 tablet, Rfl: 0  •  aspirin 81 MG EC tablet, Take 81 mg by mouth daily., Disp: , Rfl:   •  Cholecalciferol (GNP VITAMIN D SUPER STRENGTH) 5000 UNITS tablet, Take  by mouth., Disp: , Rfl:   •  clopidogrel (PLAVIX) 75 MG tablet, Take 1 tablet by mouth Daily., Disp: 30 tablet, Rfl: 11  •  famotidine (PEPCID) 20 MG tablet, Take 1 tablet by mouth 2 (Two) Times a Day., Disp: 60 tablet, Rfl: 11  •  ferrous sulfate 324 (65 Fe) MG tablet delayed-release EC tablet, Take 1 tablet by mouth Daily With Breakfast., Disp: 30 tablet, Rfl: 0  •  gabapentin (NEURONTIN) 300 MG capsule, TAKE 1 CAPSULE BY MOUTH 3 (THREE) TIMES A DAY., Disp: 270 capsule, Rfl: 1  •  hydrALAZINE (APRESOLINE) 25 MG tablet, Take 25 mg by mouth 3 (Three) Times a Day., Disp: , Rfl:   •  lisinopril (PRINIVIL,ZESTRIL) 40 MG tablet, Take 1 tablet by mouth Daily., Disp: 30  "tablet, Rfl: 0  •  magnesium oxide (MAGOX) 400 (241.3 Mg) MG tablet tablet, Take 1 tablet by mouth Daily., Disp: 30 each, Rfl: 0  •  metoprolol tartrate (LOPRESSOR) 50 MG tablet, Take 1 tablet by mouth Every 12 (Twelve) Hours., Disp: 180 tablet, Rfl: 3  •  Multiple Vitamins-Minerals (VISION FORMULA PO), Take 1 tablet by mouth every day., Disp: , Rfl:   •  polyethylene glycol (MIRALAX) powder, ONE CAPFUL IN 8 OUNCES OF LIQUID ONCE DAILY UP TO THREE TIMES DAILY AS NEEDED., Disp: 1581 g, Rfl: 11  •  furosemide (LASIX) 20 MG tablet, Take 1 tablet by mouth 2 (Two) Times a Day. As needed for edema, Disp: 60 tablet, Rfl: 11    Past Surgical History:   Procedure Laterality Date   • ABDOMINAL AORTIC ANEURYSM REPAIR     • HIP INTERTROCHANTERIC NAILING Left 10/1/2017    Procedure: HIP INTERTROCHANTERIC NAILING - LEFT - [place];  Surgeon: Nicola Rich MD;  Location: [facility];  Service:        ROS  No fevers or chills.  No chest pain or shortness of air.  No GI or  disturbances.    PHYSICAL EXAMINATION:       Ht 172.7 cm (68\")   Wt 85.7 kg (189 lb)   LMP  (LMP Unknown)   BMI 28.74 kg/m²     Physical Exam   Constitutional: She is oriented to person, place, and time. She appears well-developed and well-nourished.   Neurological: She is alert and oriented to person, place, and time.   Psychiatric: She has a normal mood and affect. Her behavior is normal. Judgment and thought content normal.       GAIT:     []  Normal  []  Antalgic    Assistive device: []  None  [x]  Walker     []  Crutches  []  Cane     []  Wheelchair  []  Stretcher    Left Hip Exam     Comments:  Hip flexion to 110°.  Incisions look good - well healed.  Good distal pulses and sensation.  Good muscle tone and strength.  Stable exam  nontender  Mild distal swelling              Xr Femur 2 View Left    Result Date: 4/20/2018  Narrative: AP and lateral of the left femur show acceptable position and alignment of a trans-trochanteric fracture status " post intramedullary rodding.  There is mild shortening and mild varus position but unchanged from previous x-ray.  She does show progressive bony consolidation in comparison to previous x-ray.  No sign of implant loosening or failure is identified on these x-rays.  Comparison films February 15, 2018. 04/20/18 at 6:52 AM by Nicola Rich MD              ASSESSMENT:    Diagnoses and all orders for this visit:    Closed fracture of left hip with routine healing, subsequent encounter    Left hip pain    Paroxysmal atrial fibrillation    Essential hypertension    Other orders  -     hydrALAZINE (APRESOLINE) 25 MG tablet; Take 25 mg by mouth 3 (Three) Times a Day.          PLAN    Patient's Body mass index is 28.74 kg/m². BMI is above normal parameters. Follow-up plan includes:  exercise counseling and nutrition counseling.    Continue with weight bearing as tolerated  Advance motion as tolerated.  No restrictions with motion or weight  Continue use of walker - slowly transition to cane as pain allows    Return in about 8 weeks (around 6/14/2018) for Recheck with repeat xrays.    Nicola Rich MD

## 2018-05-02 RX ORDER — LISINOPRIL 40 MG/1
40 TABLET ORAL DAILY
Qty: 30 TABLET | Refills: 11 | Status: SHIPPED | OUTPATIENT
Start: 2018-05-02 | End: 2019-04-17 | Stop reason: SDUPTHER

## 2018-05-21 ENCOUNTER — TELEPHONE (OUTPATIENT)
Dept: ORTHOPEDIC SURGERY | Facility: CLINIC | Age: 78
End: 2018-05-21

## 2018-05-21 DIAGNOSIS — S72.002D CLOSED FRACTURE OF LEFT HIP WITH ROUTINE HEALING, SUBSEQUENT ENCOUNTER: Primary | ICD-10-CM

## 2018-07-16 DIAGNOSIS — S72.002D CLOSED FRACTURE OF LEFT HIP WITH ROUTINE HEALING, SUBSEQUENT ENCOUNTER: Primary | ICD-10-CM

## 2018-07-17 ENCOUNTER — OFFICE VISIT (OUTPATIENT)
Dept: ORTHOPEDIC SURGERY | Facility: CLINIC | Age: 78
End: 2018-07-17

## 2018-07-17 VITALS — HEIGHT: 68 IN | BODY MASS INDEX: 28.95 KG/M2 | WEIGHT: 191 LBS

## 2018-07-17 DIAGNOSIS — I10 ESSENTIAL HYPERTENSION: ICD-10-CM

## 2018-07-17 DIAGNOSIS — M25.552 LEFT HIP PAIN: ICD-10-CM

## 2018-07-17 DIAGNOSIS — S72.002D CLOSED FRACTURE OF LEFT HIP WITH ROUTINE HEALING, SUBSEQUENT ENCOUNTER: Primary | ICD-10-CM

## 2018-07-17 DIAGNOSIS — I48.0 PAROXYSMAL ATRIAL FIBRILLATION (HCC): ICD-10-CM

## 2018-07-17 PROCEDURE — 99213 OFFICE O/P EST LOW 20 MIN: CPT | Performed by: ORTHOPAEDIC SURGERY

## 2018-07-17 NOTE — PROGRESS NOTES
The patient is a 77 y.o. female who presents for followup.    Chief Complaint   Patient presents with   • Left Hip - Follow-up       HPI: f/u left hip, repeat xrays done today. No new problems.   She reports improvement in her hip.  She has some soreness with certain activities.  She wears a heel lift on her left side.  She uses a cane most of the time for support.  No fevers or chills.  No numbness or tingling.    Current Outpatient Prescriptions:   •  amiodarone (PACERONE) 200 MG tablet, Take 1 tablet by mouth Daily. (Patient taking differently: Take 100 mg by mouth Daily.), Disp: 30 tablet, Rfl: 0  •  aspirin 81 MG EC tablet, Take 81 mg by mouth daily., Disp: , Rfl:   •  Cholecalciferol (GNP VITAMIN D SUPER STRENGTH) 5000 UNITS tablet, Take  by mouth., Disp: , Rfl:   •  clopidogrel (PLAVIX) 75 MG tablet, Take 1 tablet by mouth Daily., Disp: 30 tablet, Rfl: 11  •  famotidine (PEPCID) 20 MG tablet, Take 1 tablet by mouth 2 (Two) Times a Day., Disp: 60 tablet, Rfl: 11  •  ferrous sulfate 324 (65 Fe) MG tablet delayed-release EC tablet, Take 1 tablet by mouth Daily With Breakfast., Disp: 30 tablet, Rfl: 0  •  furosemide (LASIX) 20 MG tablet, Take 1 tablet by mouth 2 (Two) Times a Day. As needed for edema, Disp: 60 tablet, Rfl: 11  •  gabapentin (NEURONTIN) 300 MG capsule, TAKE 1 CAPSULE BY MOUTH 3 (THREE) TIMES A DAY., Disp: 270 capsule, Rfl: 1  •  hydrALAZINE (APRESOLINE) 25 MG tablet, Take 25 mg by mouth 3 (Three) Times a Day., Disp: , Rfl:   •  lisinopril (PRINIVIL,ZESTRIL) 40 MG tablet, Take 1 tablet by mouth Daily., Disp: 30 tablet, Rfl: 11  •  magnesium oxide (MAGOX) 400 (241.3 Mg) MG tablet tablet, Take 1 tablet by mouth Daily., Disp: 30 each, Rfl: 0  •  metoprolol tartrate (LOPRESSOR) 50 MG tablet, Take 1 tablet by mouth Every 12 (Twelve) Hours., Disp: 180 tablet, Rfl: 3  •  Multiple Vitamins-Minerals (VISION FORMULA PO), Take 1 tablet by mouth every day., Disp: , Rfl:   •  polyethylene glycol (MIRALAX)  "powder, ONE CAPFUL IN 8 OUNCES OF LIQUID ONCE DAILY UP TO THREE TIMES DAILY AS NEEDED., Disp: 1581 g, Rfl: 11    No Known Allergies    ROS:  No fevers or chills.  No nausea or vomiting    PHYSICAL EXAM:    Vitals:    07/17/18 1618   Weight: 86.6 kg (191 lb)   Height: 172.7 cm (68\")       GAIT:     []  Normal  [x]  Antalgic    Assistive device: []  None  []  Walker     []  Crutches  [x]  Cane     []  Wheelchair  []  Stretcher    Patient is awake and alert, answers questions appropriately, and is in no apparent distress.  She is nontender on exam.  She has good distal pulses and sensation in the left leg.  Stable joint exam.  Hip flexion equals 120° actively.  Good muscle tone and strength.  No significant leg swelling.      Xr Femur 2 View Left    Result Date: 7/19/2018  Narrative: Ordering Provider:  Nicola Rich MD Ordering Diagnosis/Indication:  Closed fracture of left hip with routine healing, subsequent encounter Procedure:  XR FEMUR 2 VW LEFT Exam Date:  7/17/18 RELEVANT PRIOR IMAGES:  XR Femur 2 View Left 04/19/2018 [id number] Final COMPARISON:  Todays X-rays were compared to previous images dated 4/19/18.     Impression:  AP and lateral of the left femur shows acceptable position and alignment of a sub-trochanteric femur fracture status post intramedullary rodding.  Again noted is mild translation at the fracture site but unchanged from previous x-rays.  Essentially complete bony consolidation is noted around the fracture site.  No sign of implant loosening or failure is noted.  No other acute bony abnormality is noted. Nicola Rich MD 7/17/18       ASSESSMENT:  Diagnoses and all orders for this visit:    Closed fracture of left hip with routine healing, subsequent encounter    Left hip pain    Essential hypertension    Paroxysmal atrial fibrillation (CMS/HCC)        PLAN:    We discussed continued activity as tolerated.  She will continue strengthening and conditioning exercises as her " pain allows.  Continue use of a cane as needed for balance and support.  We discussed the possibility of continuing use of heel wedge in the left shoe.  Follow up on an as-needed basis.    Return if symptoms worsen or fail to improve, for recheck.    Nicola Rich MD

## 2018-09-13 RX ORDER — GABAPENTIN 300 MG/1
300 CAPSULE ORAL 3 TIMES DAILY
Qty: 270 CAPSULE | Refills: 0 | Status: SHIPPED | OUTPATIENT
Start: 2018-09-13 | End: 2019-02-07 | Stop reason: SDUPTHER

## 2018-11-06 RX ORDER — CLOPIDOGREL BISULFATE 75 MG/1
75 TABLET ORAL DAILY
Qty: 30 TABLET | Refills: 11 | Status: SHIPPED | OUTPATIENT
Start: 2018-11-06 | End: 2020-01-30

## 2018-11-06 RX ORDER — FAMOTIDINE 20 MG/1
20 TABLET, FILM COATED ORAL 2 TIMES DAILY PRN
Qty: 60 TABLET | Refills: 11 | Status: SHIPPED | OUTPATIENT
Start: 2018-11-06

## 2019-02-06 ENCOUNTER — TELEPHONE (OUTPATIENT)
Dept: FAMILY MEDICINE CLINIC | Facility: CLINIC | Age: 79
End: 2019-02-06

## 2019-02-07 ENCOUNTER — OFFICE VISIT (OUTPATIENT)
Dept: FAMILY MEDICINE CLINIC | Facility: CLINIC | Age: 79
End: 2019-02-07

## 2019-02-07 VITALS
HEART RATE: 62 BPM | DIASTOLIC BLOOD PRESSURE: 90 MMHG | WEIGHT: 193.4 LBS | HEIGHT: 68 IN | TEMPERATURE: 98.3 F | SYSTOLIC BLOOD PRESSURE: 180 MMHG | OXYGEN SATURATION: 97 % | BODY MASS INDEX: 29.31 KG/M2

## 2019-02-07 DIAGNOSIS — M54.5 CHRONIC LOW BACK PAIN, UNSPECIFIED BACK PAIN LATERALITY, WITH SCIATICA PRESENCE UNSPECIFIED: ICD-10-CM

## 2019-02-07 DIAGNOSIS — G89.29 CHRONIC LOW BACK PAIN, UNSPECIFIED BACK PAIN LATERALITY, WITH SCIATICA PRESENCE UNSPECIFIED: ICD-10-CM

## 2019-02-07 DIAGNOSIS — J41.0 SIMPLE CHRONIC BRONCHITIS (HCC): Primary | ICD-10-CM

## 2019-02-07 PROCEDURE — 99213 OFFICE O/P EST LOW 20 MIN: CPT | Performed by: FAMILY MEDICINE

## 2019-02-07 PROCEDURE — 96372 THER/PROPH/DIAG INJ SC/IM: CPT | Performed by: FAMILY MEDICINE

## 2019-02-07 RX ORDER — TRIAMCINOLONE ACETONIDE 40 MG/ML
80 INJECTION, SUSPENSION INTRA-ARTICULAR; INTRAMUSCULAR ONCE
Status: COMPLETED | OUTPATIENT
Start: 2019-02-07 | End: 2019-02-07

## 2019-02-07 RX ORDER — GABAPENTIN 300 MG/1
300 CAPSULE ORAL 3 TIMES DAILY
Qty: 270 CAPSULE | Refills: 1 | Status: SHIPPED | OUTPATIENT
Start: 2019-02-07 | End: 2019-11-05 | Stop reason: SDUPTHER

## 2019-02-07 RX ADMIN — TRIAMCINOLONE ACETONIDE 80 MG: 40 INJECTION, SUSPENSION INTRA-ARTICULAR; INTRAMUSCULAR at 15:39

## 2019-02-07 NOTE — PROGRESS NOTES
" Subjective   Anahi Calix is a 78 y.o. female.     History of Present Illness     TAKING NEURONTIN for her back.  Not sure if is helping.  Would like a kenalog shot  She has a cough and inhalers at home.     Review of Systems   Constitutional: Negative for chills, fatigue and fever.   HENT: Negative for congestion, ear discharge, ear pain, facial swelling, hearing loss, postnasal drip, rhinorrhea, sinus pressure, sore throat, trouble swallowing and voice change.    Eyes: Negative for discharge, redness and visual disturbance.   Respiratory: Negative for cough, chest tightness, shortness of breath and wheezing.    Cardiovascular: Negative for chest pain and palpitations.   Gastrointestinal: Negative for abdominal pain, blood in stool, constipation, diarrhea, nausea and vomiting.   Endocrine: Negative for polydipsia and polyuria.   Genitourinary: Negative for dysuria, flank pain, hematuria and urgency.   Musculoskeletal: Positive for back pain. Negative for arthralgias, joint swelling and myalgias.   Skin: Negative for rash.   Neurological: Negative for dizziness, weakness, numbness and headaches.   Hematological: Negative for adenopathy.   Psychiatric/Behavioral: Negative for confusion and sleep disturbance. The patient is not nervous/anxious.            /90 (BP Location: Left arm, Patient Position: Sitting, Cuff Size: Adult)   Pulse 62   Temp 98.3 °F (36.8 °C)   Ht 172.7 cm (67.99\")   Wt 87.7 kg (193 lb 6.4 oz)   LMP  (LMP Unknown)   SpO2 97%   BMI 29.41 kg/m²       Objective     Physical Exam   Constitutional: She is oriented to person, place, and time. She appears well-developed and well-nourished.   HENT:   Head: Normocephalic and atraumatic.   Right Ear: External ear normal.   Left Ear: External ear normal.   Nose: Nose normal.   Eyes: Conjunctivae and EOM are normal. Pupils are equal, round, and reactive to light.   Neck: Normal range of motion.   Pulmonary/Chest: Effort normal. "   Musculoskeletal: Normal range of motion.   Neurological: She is alert and oriented to person, place, and time.   Psychiatric: She has a normal mood and affect. Her behavior is normal. Judgment and thought content normal.   Nursing note and vitals reviewed.          PAST MEDICAL HISTORY     Past Medical History:   Diagnosis Date   • Abdominal aortic aneurysm (AAA) without rupture (CMS/HCC)    • Atrial fibrillation (CMS/HCC) 10/2/2017    New onset.  S/p cardioversion  On coumadin now.  Amiodarone    • CAD (coronary artery disease)    • Gastroesophageal reflux disease    • Hypercholesterolemia    • Hypertension    • Nuclear senile cataract    • Osteoarthritis    • Osteoporosis    • Peripheral arterial disease (CMS/HCC)    • Solitary lung nodule       PAST SURGICAL HISTORY     Past Surgical History:   Procedure Laterality Date   • ABDOMINAL AORTIC ANEURYSM REPAIR     • HIP INTERTROCHANTERIC NAILING Left 10/1/2017    Procedure: HIP INTERTROCHANTERIC NAILING - LEFT - [place];  Surgeon: Nicola Rich MD;  Location: [facility];  Service:       SOCIAL HISTORY     Social History     Socioeconomic History   • Marital status:      Spouse name: Not on file   • Number of children: Not on file   • Years of education: Not on file   • Highest education level: Not on file   Tobacco Use   • Smoking status: Former Smoker     Start date: 1961     Last attempt to quit: 10/1/2017     Years since quittin.3   • Smokeless tobacco: Never Used   Substance and Sexual Activity   • Alcohol use: No   • Drug use: No   • Sexual activity: Not Currently      ALLERGIES   Patient has no known allergies.   MEDICATIONS     Current Outpatient Medications   Medication Sig Dispense Refill   • amiodarone (PACERONE) 200 MG tablet Take 1 tablet by mouth Daily. (Patient taking differently: Take 100 mg by mouth Daily.) 30 tablet 0   • aspirin 81 MG EC tablet Take 81 mg by mouth daily.     • Cholecalciferol (GNP VITAMIN D SUPER  STRENGTH) 5000 UNITS tablet Take  by mouth.     • clopidogrel (PLAVIX) 75 MG tablet Take 1 tablet by mouth Daily. 30 tablet 11   • famotidine (PEPCID) 20 MG tablet Take 1 tablet by mouth 2 (Two) Times a Day As Needed for Heartburn. 60 tablet 11   • ferrous sulfate 324 (65 Fe) MG tablet delayed-release EC tablet Take 1 tablet by mouth Daily With Breakfast. 30 tablet 0   • gabapentin (NEURONTIN) 300 MG capsule Take 1 capsule by mouth 3 (Three) Times a Day. 270 capsule 1   • hydrALAZINE (APRESOLINE) 25 MG tablet Take 25 mg by mouth 3 (Three) Times a Day.     • lisinopril (PRINIVIL,ZESTRIL) 40 MG tablet Take 1 tablet by mouth Daily. 30 tablet 11   • magnesium oxide (MAGOX) 400 (241.3 Mg) MG tablet tablet Take 1 tablet by mouth Daily. 30 each 0   • metoprolol tartrate (LOPRESSOR) 50 MG tablet Take 1 tablet by mouth Every 12 (Twelve) Hours. 180 tablet 3   • Multiple Vitamins-Minerals (VISION FORMULA PO) Take 1 tablet by mouth every day.     • polyethylene glycol (MIRALAX) powder ONE CAPFUL IN 8 OUNCES OF LIQUID ONCE DAILY UP TO THREE TIMES DAILY AS NEEDED. 1581 g 11   • furosemide (LASIX) 20 MG tablet Take 1 tablet by mouth 2 (Two) Times a Day. As needed for edema 60 tablet 11     No current facility-administered medications for this visit.         The following portions of the patient's history were reviewed and updated as appropriate: allergies, current medications, past family history, past medical history, past social history, past surgical history and problem list.        Assessment/Plan   Anahi was seen today for med refill.    Diagnoses and all orders for this visit:    Simple chronic bronchitis (CMS/HCC)  -     triamcinolone acetonide (KENALOG-40) injection 80 mg; Inject 2 mL into the appropriate muscle as directed by prescriber 1 (One) Time.    Chronic low back pain, unspecified back pain laterality, with sciatica presence unspecified  -     triamcinolone acetonide (KENALOG-40) injection 80 mg; Inject 2 mL into  the appropriate muscle as directed by prescriber 1 (One) Time.    Other orders  -     gabapentin (NEURONTIN) 300 MG capsule; Take 1 capsule by mouth 3 (Three) Times a Day.      She can taper off the neurontin, 1 hs for 7 nights then d/c.  See if it is helping. She didn't want pain medicine.                 No Follow-up on file.                  This document has been electronically signed by Ramiro Gloria MD on February 7, 2019 5:53 PM

## 2019-03-06 ENCOUNTER — TELEPHONE (OUTPATIENT)
Dept: FAMILY MEDICINE CLINIC | Facility: CLINIC | Age: 79
End: 2019-03-06

## 2019-03-06 RX ORDER — OSELTAMIVIR PHOSPHATE 75 MG/1
75 CAPSULE ORAL DAILY
Qty: 10 CAPSULE | Refills: 0 | Status: SHIPPED | OUTPATIENT
Start: 2019-03-06 | End: 2019-11-05

## 2019-03-06 NOTE — TELEPHONE ENCOUNTER
PATIENT'S GRANDDAUGHTER THAT LIVES WITH HER HAS BEEN DIAGNOSED WITH THE FLU.  ALIYA WANTS TO KNOW IF YOU CAN GIVE HER AN RX FOR TAMIFLU.  NOEMI

## 2019-04-17 ENCOUNTER — TELEPHONE (OUTPATIENT)
Dept: FAMILY MEDICINE CLINIC | Facility: CLINIC | Age: 79
End: 2019-04-17

## 2019-04-17 DIAGNOSIS — I10 ESSENTIAL HYPERTENSION: ICD-10-CM

## 2019-04-17 RX ORDER — LISINOPRIL 40 MG/1
40 TABLET ORAL DAILY
Qty: 90 TABLET | Refills: 3 | Status: SHIPPED | OUTPATIENT
Start: 2019-04-17 | End: 2020-02-17 | Stop reason: SDUPTHER

## 2019-04-17 RX ORDER — METOPROLOL TARTRATE 50 MG/1
50 TABLET, FILM COATED ORAL EVERY 12 HOURS SCHEDULED
Qty: 180 TABLET | Refills: 3 | Status: SHIPPED | OUTPATIENT
Start: 2019-04-17 | End: 2020-04-15

## 2019-06-05 ENCOUNTER — TELEPHONE (OUTPATIENT)
Dept: FAMILY MEDICINE CLINIC | Facility: CLINIC | Age: 79
End: 2019-06-05

## 2019-11-01 NOTE — PROGRESS NOTES
[facility] Medicine Services  INPATIENT PROGRESS NOTE    Length of Stay: 9  Date of Admission: 9/30/2017  Primary Care Physician: Ramiro Gloria MD    Subjective     Chief Complaint   Patient presents with   • Fall   • Hip Injury     HPI:  Pt s/p surgical fixation for left femur fracture. Pt had a fall yesterday, while she was going to chair from bedside commode, she fell to her knees, even though there were two people holding her but she was unable to keep her balance.     Review of Systems   Constitutional: Negative for chills and fever.   Cardiovascular: Negative for chest pain, palpitations and leg swelling.   Gastrointestinal: Negative for abdominal pain and nausea.   Genitourinary: Negative for dysuria.   Musculoskeletal: Positive for joint swelling and myalgias.   Neurological: Negative for dizziness and light-headedness.   Psychiatric/Behavioral: Negative for agitation and behavioral problems.        All pertinent negatives and positives are as above. All other systems have been reviewed and are negative unless otherwise stated.     Objective      Vital Sign Min/Max for last 24 hours  Temp  Min: 96.7 °F (35.9 °C)  Max: 99.2 °F (37.3 °C)   BP  Min: 100/55  Max: 150/68   Pulse  Min: 65  Max: 87   Resp  Min: 16  Max: 20   SpO2  Min: 92 %  Max: 99 %   Flow (L/min)  Min: 1  Max: 2   Weight  Min: 182 lb 11.2 oz (82.9 kg)  Max: 182 lb 11.2 oz (82.9 kg)         Physical Exam   Constitutional: She is oriented to person, place, and time. She appears well-developed and well-nourished.   HENT:   Head: Normocephalic.   Eyes: EOM are normal. Pupils are equal, round, and reactive to light.   Neck: Normal range of motion.   Cardiovascular: Normal rate, regular rhythm and normal heart sounds.    Pulmonary/Chest: Effort normal and breath sounds normal.   Abdominal: Soft. Bowel sounds are normal.   Musculoskeletal:        Right knee: She exhibits normal range of motion and no  swelling.        Left knee: She exhibits normal range of motion and no swelling.        Legs:  Neurological: She is alert and oriented to person, place, and time.   Skin: Skin is warm.   Psychiatric: She has a normal mood and affect. Her behavior is normal.   Vitals reviewed.      Current Facility-Administered Medications   Medication Dose Route Frequency Provider Last Rate Last Dose   • acetaminophen (TYLENOL) tablet 1,000 mg  1,000 mg Oral Q6H Nicola Rich MD   1,000 mg at 10/09/17 1206   • albuterol (PROVENTIL) nebulizer solution 0.083% 2.5 mg/3mL  2.5 mg Nebulization Q6H - RT Lenin CHRISTIANO Padgett MD   2.5 mg at 10/09/17 1329   • amiodarone (PACERONE) tablet 200 mg  200 mg Oral Q12H Lacho Courtney MD   200 mg at 10/09/17 0843   • calcium acetate (PHOSLO) tablet 667 mg  667 mg Oral Daily Cipriano Leigh MD   667 mg at 10/09/17 1206   • dextrose (D5W) 5 % infusion  75 mL/hr Intravenous Continuous Sukhjinder Mendez MD 75 mL/hr at 10/09/17 0645 75 mL/hr at 10/09/17 0645   • famotidine (PEPCID) tablet 20 mg  20 mg Oral BID Sukhjinder Mendez MD   20 mg at 10/09/17 0842   • furosemide (LASIX) tablet 20 mg  20 mg Oral Daily Cipriano Leigh MD   20 mg at 10/09/17 0842   • gabapentin (NEURONTIN) capsule 300 mg  300 mg Oral TID Cipriano Leigh MD   300 mg at 10/09/17 0842   • lisinopril (PRINIVIL,ZESTRIL) tablet 2.5 mg  2.5 mg Oral Daily Ata Farris MD   2.5 mg at 10/09/17 0842   • metoprolol tartrate (LOPRESSOR) tablet 25 mg  25 mg Oral BID Lacho Courtney MD   25 mg at 10/09/17 0842   • midazolam (VERSED) 50 mg in sodium chloride 0.9 % 100 mL (0.5 mg/mL) infusion  1 mg/hr Intravenous Titrated Vivian Banuelos MD   Stopped at 10/05/17 0855   • Morphine (PF) injection 4 mg  4 mg Intravenous Q2H PRN Nicola Rich MD   4 mg at 10/05/17 1612   • norepinephrine (LEVOPHED) 8,000 mcg in sodium chloride 0.9 % 250 mL (32 mcg/mL) infusion  0.02-0.3 mcg/kg/min Intravenous Titrated Vivian Banuelos MD    Stopped at 10/03/17 1705   • ondansetron (ZOFRAN) injection 4 mg  4 mg Intravenous Q6H PRN Cipriano Leigh MD   4 mg at 10/07/17 1442   • oxyCODONE (ROXICODONE) immediate release tablet 5 mg  5 mg Oral Q4H PRN Nicola Rich MD   5 mg at 10/07/17 1019   • pantoprazole (PROTONIX) EC tablet 40 mg  40 mg Oral Q AM Harshul Phong Banuelos MD   40 mg at 10/09/17 0611   • Pharmacy to dose warfarin   Does not apply Continuous PRN Nicola Rich MD       • piperacillin-tazobactam (ZOSYN) 2.25 g/100 mL 0.9% NS IVPB (mbp)  2.25 g Intravenous Q6H Ata Farris MD 0 mL/hr at 10/03/17 1435 2.25 g at 10/09/17 1401   • polyethylene glycol (MIRALAX) powder 17 g  17 g Oral BID Cipriano Leigh MD   17 g at 10/08/17 0925   • sodium chloride 0.9 % flush 1-10 mL  1-10 mL Intravenous PRN Cipriano Leigh MD   10 mL at 10/03/17 1006   • sodium chloride 0.9 % flush 10 mL  10 mL Intravenous PRN Barrie Rodríguez MD       • sodium chloride 0.9 % flush 10 mL  10 mL Intracatheter Q12H Ata Farris MD   10 mL at 10/08/17 2026   • sodium chloride 0.9 % flush 10 mL  10 mL Intracatheter PRN Ata Farris MD       • warfarin (COUMADIN) half tablet 0.5 mg  0.5 mg Oral Daily Diego Mary MD               Results Review:  I have reviewed the labs, radiology results, and diagnostic studies.    Laboratory Data:     Results from last 7 days  Lab Units 10/09/17  0535 10/08/17  0545 10/07/17  0348 10/07/17  0300   SODIUM mmol/L 137 141  --  148*   SODIUM, ARTERIAL mmol/L  --   --  144.1  --    POTASSIUM mmol/L 3.6 3.2*  --  3.2*   CHLORIDE mmol/L 100 102  --  111*   CO2 mmol/L 27.0 31.0  --  28.0   BUN mg/dL 9 11  --  15   CREATININE mg/dL 0.65 0.64  --  0.63   GLUCOSE mg/dL 103* 110*  --  106*   GLUCOSE, ARTERIAL mmol/L  --   --  114  --    CALCIUM mg/dL 8.5 8.4  --  8.4   BILIRUBIN mg/dL 0.9 0.7  --   --    ALK PHOS U/L 62 59  --   --    ALT (SGPT) U/L 41 45  --   --    AST (SGOT) U/L 38* 43*  --   --    ANION GAP mmol/L 10.0 8.0  --  9.0      Estimated Creatinine Clearance: 67.5 mL/min (by C-G formula based on Cr of 0.65).    Results from last 7 days  Lab Units 10/09/17  0535 10/08/17  0545   MAGNESIUM mg/dL 1.7 1.9           Results from last 7 days  Lab Units 10/09/17  0535 10/08/17  0545 10/07/17  0300 10/06/17  0448 10/05/17  0251   WBC 10*3/mm3 9.15 6.91 7.19 7.96 9.24   HEMOGLOBIN g/dL 10.2* 9.7* 8.9* 9.0* 8.3*   HEMATOCRIT % 30.5* 29.7* 27.7* 28.3* 25.0*   PLATELETS 10*3/mm3 384 279 254 236 188       Results from last 7 days  Lab Units 10/09/17  0633 10/08/17  0545 10/07/17  0300 10/06/17  0448 10/05/17  0251   INR  2.70* 4.75* 4.92* 3.50* 4.82*           Culture Data:   No results found for: BLOODCX  No results found for: URINECX  No results found for: RESPCX  No results found for: WOUNDCX  No results found for: STOOLCX  No components found for: BODYFLD      Radiology Data:   Imaging Results (last 24 hours)     ** No results found for the last 24 hours. **          I have reviewed the patient current medications.     Assessment/Plan     Active Hospital Problems (** Indicates Principal Problem)    Diagnosis Date Noted   • **Displaced intertrochanteric fracture of left femur, initial encounter for closed fracture [S72.142A] 09/30/2017     POD# 8.  Management per orthopedics.     • Atrial fibrillation [I48.91] 10/02/2017     New onset.  S/p cardioversion   On coumadin now.   Amiodarone      • Tobacco dependence syndrome [F17.200] 10/01/2017     Nicotine patch.   Counseling provided       • CAD (coronary artery disease) [I25.10] 09/30/2017   • Hypertension [I10] 09/30/2017     Has been hypotensive over the last 24 hours intermittently.  Decreased Lisinopril from 40 mg daily to 2.5 mg daily.  Holding lopressor right now.  On Cardizem drip for atrial fibrillation.     • Chronic bronchitis [J42] 09/30/2017     Currently intubated.  Unable to be extubated yesterday.       • Essential hypertension [I10] 09/30/2017     Added automatically from request  for surgery [id number]     • Fall [W19.XXXA] 09/30/2017     Fracture s/p repair per orthopedics.     • Peripheral arterial disease [I73.9] 10/31/2016      Resolved Hospital Problems    Diagnosis Date Noted Date Resolved   No resolved problems to display.     Moraxella on sputum: on zosyn.       Discharge Planning: I expect patient to be discharged to home vs rehab in 1-2 days.      DVT Prophylaxis: coumadin               This document has been electronically signed by Diego Mary MD on October 9, 2017 2:57 PM         Fibroids

## 2019-11-05 ENCOUNTER — OFFICE VISIT (OUTPATIENT)
Dept: FAMILY MEDICINE CLINIC | Facility: CLINIC | Age: 79
End: 2019-11-05

## 2019-11-05 ENCOUNTER — APPOINTMENT (OUTPATIENT)
Dept: LAB | Facility: HOSPITAL | Age: 79
End: 2019-11-05

## 2019-11-05 VITALS
OXYGEN SATURATION: 96 % | TEMPERATURE: 96.8 F | DIASTOLIC BLOOD PRESSURE: 70 MMHG | BODY MASS INDEX: 26.76 KG/M2 | HEART RATE: 50 BPM | HEIGHT: 68 IN | WEIGHT: 176.6 LBS | SYSTOLIC BLOOD PRESSURE: 154 MMHG

## 2019-11-05 DIAGNOSIS — I10 ESSENTIAL HYPERTENSION: Primary | ICD-10-CM

## 2019-11-05 DIAGNOSIS — M25.552 LEFT HIP PAIN: ICD-10-CM

## 2019-11-05 PROCEDURE — 80061 LIPID PANEL: CPT | Performed by: FAMILY MEDICINE

## 2019-11-05 PROCEDURE — 84443 ASSAY THYROID STIM HORMONE: CPT | Performed by: FAMILY MEDICINE

## 2019-11-05 PROCEDURE — 85027 COMPLETE CBC AUTOMATED: CPT | Performed by: FAMILY MEDICINE

## 2019-11-05 PROCEDURE — 80053 COMPREHEN METABOLIC PANEL: CPT | Performed by: FAMILY MEDICINE

## 2019-11-05 PROCEDURE — 99213 OFFICE O/P EST LOW 20 MIN: CPT | Performed by: FAMILY MEDICINE

## 2019-11-05 RX ORDER — GABAPENTIN 300 MG/1
300-600 CAPSULE ORAL NIGHTLY
Qty: 180 CAPSULE | Refills: 1 | Status: SHIPPED | OUTPATIENT
Start: 2019-11-05 | End: 2020-11-10 | Stop reason: SDUPTHER

## 2019-11-05 NOTE — PROGRESS NOTES
" Subjective   Anahi Calix is a 79 y.o. female.     History of Present Illness     Annual exam.   Doing well.  Taking only neurontin  300mg hs for hip/back pain  Past due for labwork.  She says ever since cataract surgery, cant see well.    Review of Systems   Constitutional: Negative for chills, fatigue and fever.   HENT: Negative for congestion, ear discharge, ear pain, facial swelling, hearing loss, postnasal drip, rhinorrhea, sinus pressure, sore throat, trouble swallowing and voice change.    Eyes: Negative for discharge, redness and visual disturbance.   Respiratory: Negative for cough, chest tightness, shortness of breath and wheezing.    Cardiovascular: Negative for chest pain and palpitations.   Gastrointestinal: Negative for abdominal pain, blood in stool, constipation, diarrhea, nausea and vomiting.   Endocrine: Negative for polydipsia and polyuria.   Genitourinary: Negative for dysuria, flank pain, hematuria and urgency.   Musculoskeletal: Negative for arthralgias, back pain, joint swelling and myalgias.   Skin: Negative for rash.   Neurological: Negative for dizziness, weakness, numbness and headaches.   Hematological: Negative for adenopathy.   Psychiatric/Behavioral: Negative for confusion and sleep disturbance. The patient is not nervous/anxious.            /70 (BP Location: Left arm, Patient Position: Sitting, Cuff Size: Adult)   Pulse 50   Temp 96.8 °F (36 °C) (Temporal)   Ht 172.7 cm (67.99\")   Wt 80.1 kg (176 lb 9.6 oz)   LMP  (LMP Unknown)   SpO2 96%   BMI 26.86 kg/m²       Objective     Physical Exam   Constitutional: She is oriented to person, place, and time. She appears well-developed and well-nourished.   HENT:   Head: Normocephalic and atraumatic.   Right Ear: External ear normal.   Left Ear: External ear normal.   Nose: Nose normal.   Mouth/Throat: Oropharynx is clear and moist.   Eyelids little puffy, no redness   Eyes: Conjunctivae and EOM are normal. Pupils are equal, " round, and reactive to light.   Neck: Normal range of motion. Neck supple.   Cardiovascular: Normal rate, regular rhythm and normal heart sounds. Exam reveals no gallop and no friction rub.   No murmur heard.  Pulmonary/Chest: Effort normal and breath sounds normal.   Abdominal: Soft. Bowel sounds are normal. She exhibits no distension. There is no tenderness. There is no rebound and no guarding.   Musculoskeletal: Normal range of motion. She exhibits no edema or deformity.   Neurological: She is alert and oriented to person, place, and time. No cranial nerve deficit.   Skin: Skin is warm and dry. No rash noted. No erythema.   Psychiatric: She has a normal mood and affect. Her behavior is normal. Judgment and thought content normal.   Nursing note and vitals reviewed.          PAST MEDICAL HISTORY     Past Medical History:   Diagnosis Date   • Abdominal aortic aneurysm (AAA) without rupture (CMS/HCC)    • Atrial fibrillation (CMS/HCC) 10/2/2017    New onset.  S/p cardioversion  On coumadin now.  Amiodarone    • CAD (coronary artery disease)    • Gastroesophageal reflux disease    • Hypercholesterolemia    • Hypertension    • Nuclear senile cataract    • Osteoarthritis    • Osteoporosis    • Peripheral arterial disease (CMS/HCC)    • Solitary lung nodule       PAST SURGICAL HISTORY     Past Surgical History:   Procedure Laterality Date   • ABDOMINAL AORTIC ANEURYSM REPAIR     • HIP INTERTROCHANTERIC NAILING Left 10/1/2017    Procedure: HIP INTERTROCHANTERIC NAILING - LEFT - [place];  Surgeon: Nicola Rich MD;  Location: [facility];  Service:       SOCIAL HISTORY     Social History     Socioeconomic History   • Marital status:      Spouse name: Not on file   • Number of children: Not on file   • Years of education: Not on file   • Highest education level: Not on file   Tobacco Use   • Smoking status: Former Smoker     Start date: 11/21/1961     Last attempt to quit: 10/1/2017     Years since quitting:  2.0   • Smokeless tobacco: Never Used   Substance and Sexual Activity   • Alcohol use: No   • Drug use: No   • Sexual activity: Not Currently      ALLERGIES   Patient has no known allergies.   MEDICATIONS     Current Outpatient Medications   Medication Sig Dispense Refill   • amiodarone (PACERONE) 200 MG tablet Take 1 tablet by mouth Daily. (Patient taking differently: Take 100 mg by mouth Daily.) 30 tablet 0   • aspirin 81 MG EC tablet Take 81 mg by mouth daily.     • Cholecalciferol (GNP VITAMIN D SUPER STRENGTH) 5000 UNITS tablet Take  by mouth.     • clopidogrel (PLAVIX) 75 MG tablet Take 1 tablet by mouth Daily. 30 tablet 11   • famotidine (PEPCID) 20 MG tablet Take 1 tablet by mouth 2 (Two) Times a Day As Needed for Heartburn. 60 tablet 11   • ferrous sulfate 324 (65 Fe) MG tablet delayed-release EC tablet Take 1 tablet by mouth Daily With Breakfast. 30 tablet 0   • gabapentin (NEURONTIN) 300 MG capsule Take 1-2 capsules by mouth Every Night. 180 capsule 1   • hydrALAZINE (APRESOLINE) 25 MG tablet Take 25 mg by mouth 3 (Three) Times a Day.     • lisinopril (PRINIVIL,ZESTRIL) 40 MG tablet Take 1 tablet by mouth Daily. 90 tablet 3   • magnesium oxide (MAGOX) 400 (241.3 Mg) MG tablet tablet Take 1 tablet by mouth Daily. 30 each 0   • metoprolol tartrate (LOPRESSOR) 50 MG tablet Take 1 tablet by mouth Every 12 (Twelve) Hours. 180 tablet 3   • Multiple Vitamins-Minerals (VISION FORMULA PO) Take 1 tablet by mouth every day.     • polyethylene glycol (MIRALAX) powder ONE CAPFUL IN 8 OUNCES OF LIQUID ONCE DAILY UP TO THREE TIMES DAILY AS NEEDED. 1581 g 11   • Zinc 50 MG capsule Take 1 capsule by mouth Daily.       No current facility-administered medications for this visit.         The following portions of the patient's history were reviewed and updated as appropriate: allergies, current medications, past family history, past medical history, past social history, past surgical history and problem  list.        Assessment/Plan   Anahi was seen today for med refill and annual exam.    Diagnoses and all orders for this visit:    Essential hypertension  -     CBC (No Diff)  -     Comprehensive Metabolic Panel  -     TSH  -     Lipid Panel    Left hip pain    Other orders  -     gabapentin (NEURONTIN) 300 MG capsule; Take 1-2 capsules by mouth Every Night.      Will call with results                 No Follow-up on file.                  This document has been electronically signed by Ramiro Gloria MD on November 5, 2019 12:14 PM

## 2019-11-07 LAB
ALBUMIN SERPL-MCNC: 3.8 G/DL (ref 3.5–5.2)
ALBUMIN/GLOB SERPL: 1.3 G/DL
ALP SERPL-CCNC: 64 U/L (ref 39–117)
ALT SERPL W P-5'-P-CCNC: 12 U/L (ref 1–33)
ANION GAP SERPL CALCULATED.3IONS-SCNC: 9.2 MMOL/L (ref 5–15)
AST SERPL-CCNC: 19 U/L (ref 1–32)
BILIRUB SERPL-MCNC: 0.3 MG/DL (ref 0.2–1.2)
BUN BLD-MCNC: 14 MG/DL (ref 8–23)
BUN/CREAT SERPL: 14.6 (ref 7–25)
CALCIUM SPEC-SCNC: 9.1 MG/DL (ref 8.6–10.5)
CHLORIDE SERPL-SCNC: 103 MMOL/L (ref 98–107)
CHOLEST SERPL-MCNC: 212 MG/DL (ref 0–200)
CO2 SERPL-SCNC: 29.8 MMOL/L (ref 22–29)
CREAT BLD-MCNC: 0.96 MG/DL (ref 0.57–1)
DEPRECATED RDW RBC AUTO: 42.3 FL (ref 37–54)
ERYTHROCYTE [DISTWIDTH] IN BLOOD BY AUTOMATED COUNT: 12.6 % (ref 12.3–15.4)
GFR SERPL CREATININE-BSD FRML MDRD: 56 ML/MIN/1.73
GLOBULIN UR ELPH-MCNC: 2.9 GM/DL
GLUCOSE BLD-MCNC: 75 MG/DL (ref 65–99)
HCT VFR BLD AUTO: 41.9 % (ref 34–46.6)
HDLC SERPL-MCNC: 54 MG/DL (ref 40–60)
HGB BLD-MCNC: 13.6 G/DL (ref 12–15.9)
LDLC SERPL CALC-MCNC: 130 MG/DL (ref 0–100)
LDLC/HDLC SERPL: 2.4 {RATIO}
MCH RBC QN AUTO: 30 PG (ref 26.6–33)
MCHC RBC AUTO-ENTMCNC: 32.5 G/DL (ref 31.5–35.7)
MCV RBC AUTO: 92.3 FL (ref 79–97)
PLATELET # BLD AUTO: 253 10*3/MM3 (ref 140–450)
PMV BLD AUTO: 10.9 FL (ref 6–12)
POTASSIUM BLD-SCNC: 4.9 MMOL/L (ref 3.5–5.2)
PROT SERPL-MCNC: 6.7 G/DL (ref 6–8.5)
RBC # BLD AUTO: 4.54 10*6/MM3 (ref 3.77–5.28)
SODIUM BLD-SCNC: 142 MMOL/L (ref 136–145)
TRIGL SERPL-MCNC: 142 MG/DL (ref 0–150)
TSH SERPL DL<=0.05 MIU/L-ACNC: 1.4 UIU/ML (ref 0.27–4.2)
VLDLC SERPL-MCNC: 28.4 MG/DL (ref 5–40)
WBC NRBC COR # BLD: 7.02 10*3/MM3 (ref 3.4–10.8)

## 2020-01-05 NOTE — TELEPHONE ENCOUNTER
REFILL  PEPCID 20 MG 1 BID  PLAVIX 75 MG 1 QD  [facility]   [facility] [address] [place] [address] Cardiology  Progress Note    Maral Alvarez Patient Status:  Inpatient    1963 MRN [id number]   [facility] 3NE-A Attending Mary Jay MD   Hosp Day # 1 PCP Moris Ott MD       Subjective: (CPT=71045)  TECHNIQUE:  AP chest radiograph was obtained. COMPARISON:  None. INDICATIONS:  cp  PATIENT STATED HISTORY: (As transcribed by Technologist)  Shortness of breath and chest pain this morning.     FINDINGS:  Cardiac size and mediastinal contours

## 2020-01-30 RX ORDER — CLOPIDOGREL BISULFATE 75 MG/1
75 TABLET ORAL DAILY
Qty: 90 TABLET | Refills: 11 | Status: SHIPPED | OUTPATIENT
Start: 2020-01-30 | End: 2021-01-01 | Stop reason: SDUPTHER

## 2020-02-06 ENCOUNTER — OFFICE VISIT (OUTPATIENT)
Dept: FAMILY MEDICINE CLINIC | Facility: CLINIC | Age: 80
End: 2020-02-06

## 2020-02-06 VITALS
DIASTOLIC BLOOD PRESSURE: 80 MMHG | HEIGHT: 68 IN | BODY MASS INDEX: 25.94 KG/M2 | WEIGHT: 171.2 LBS | SYSTOLIC BLOOD PRESSURE: 156 MMHG | TEMPERATURE: 98.1 F | OXYGEN SATURATION: 92 % | HEART RATE: 57 BPM

## 2020-02-06 DIAGNOSIS — I10 ESSENTIAL HYPERTENSION: ICD-10-CM

## 2020-02-06 DIAGNOSIS — R05.9 COUGH: ICD-10-CM

## 2020-02-06 DIAGNOSIS — J20.9 ACUTE BRONCHITIS, UNSPECIFIED ORGANISM: Primary | ICD-10-CM

## 2020-02-06 LAB
EXPIRATION DATE: NORMAL
FLUAV AG NPH QL: NEGATIVE
FLUBV AG NPH QL: NEGATIVE
INTERNAL CONTROL: NORMAL
Lab: NORMAL

## 2020-02-06 PROCEDURE — 87804 INFLUENZA ASSAY W/OPTIC: CPT | Performed by: FAMILY MEDICINE

## 2020-02-06 PROCEDURE — 99214 OFFICE O/P EST MOD 30 MIN: CPT | Performed by: FAMILY MEDICINE

## 2020-02-06 PROCEDURE — 94640 AIRWAY INHALATION TREATMENT: CPT | Performed by: FAMILY MEDICINE

## 2020-02-06 PROCEDURE — 96372 THER/PROPH/DIAG INJ SC/IM: CPT | Performed by: FAMILY MEDICINE

## 2020-02-06 RX ORDER — CEFUROXIME AXETIL 500 MG/1
500 TABLET ORAL 2 TIMES DAILY
Qty: 20 TABLET | Refills: 0 | Status: SHIPPED | OUTPATIENT
Start: 2020-02-06 | End: 2020-11-10

## 2020-02-06 RX ORDER — PROMETHAZINE HYDROCHLORIDE AND CODEINE PHOSPHATE 6.25; 1 MG/5ML; MG/5ML
5 SYRUP ORAL EVERY 6 HOURS PRN
Qty: 240 ML | Refills: 1 | Status: SHIPPED | OUTPATIENT
Start: 2020-02-06 | End: 2021-01-01

## 2020-02-06 RX ORDER — HYDRALAZINE HYDROCHLORIDE 25 MG/1
25 TABLET, FILM COATED ORAL 4 TIMES DAILY
Qty: 120 TABLET | Refills: 11 | Status: SHIPPED | OUTPATIENT
Start: 2020-02-06

## 2020-02-06 RX ORDER — IPRATROPIUM BROMIDE AND ALBUTEROL SULFATE 2.5; .5 MG/3ML; MG/3ML
3 SOLUTION RESPIRATORY (INHALATION) AS NEEDED
Status: COMPLETED | OUTPATIENT
Start: 2020-02-06 | End: 2020-02-06

## 2020-02-06 RX ORDER — HYDRALAZINE HYDROCHLORIDE 25 MG/1
25 TABLET, FILM COATED ORAL 4 TIMES DAILY
Qty: 120 TABLET | Refills: 11 | Status: SHIPPED | COMMUNITY
Start: 2020-02-06 | End: 2020-02-06 | Stop reason: SDUPTHER

## 2020-02-06 RX ORDER — TRIAMCINOLONE ACETONIDE 40 MG/ML
80 INJECTION, SUSPENSION INTRA-ARTICULAR; INTRAMUSCULAR ONCE
Status: COMPLETED | OUTPATIENT
Start: 2020-02-06 | End: 2020-02-06

## 2020-02-06 RX ADMIN — IPRATROPIUM BROMIDE AND ALBUTEROL SULFATE 3 ML: 2.5; .5 SOLUTION RESPIRATORY (INHALATION) at 12:19

## 2020-02-06 RX ADMIN — TRIAMCINOLONE ACETONIDE 80 MG: 40 INJECTION, SUSPENSION INTRA-ARTICULAR; INTRAMUSCULAR at 11:56

## 2020-02-06 NOTE — PROGRESS NOTES
" Subjective   Anahi Calix is a 79 y.o. female.     History of Present Illness     Cough and sinus starting last Thursday, worries may have flu  bp has been running high in pm    Review of Systems   Constitutional: Negative for chills, fatigue and fever.   HENT: Positive for congestion. Negative for ear discharge, ear pain, facial swelling, hearing loss, postnasal drip, rhinorrhea, sinus pressure, sore throat, trouble swallowing and voice change.    Eyes: Negative for discharge, redness and visual disturbance.   Respiratory: Positive for cough. Negative for chest tightness, shortness of breath and wheezing.    Cardiovascular: Negative for chest pain and palpitations.   Gastrointestinal: Negative for abdominal pain, blood in stool, constipation, diarrhea, nausea and vomiting.   Endocrine: Negative for polydipsia and polyuria.   Genitourinary: Negative for dysuria, flank pain, hematuria and urgency.   Musculoskeletal: Negative for arthralgias, back pain, joint swelling and myalgias.   Skin: Negative for rash.   Neurological: Negative for dizziness, weakness, numbness and headaches.   Hematological: Negative for adenopathy.   Psychiatric/Behavioral: Negative for confusion and sleep disturbance. The patient is not nervous/anxious.            /80 (BP Location: Left arm, Patient Position: Sitting, Cuff Size: Adult)   Pulse 57   Temp 98.1 °F (36.7 °C) (Temporal)   Ht 172.7 cm (67.99\")   Wt 77.7 kg (171 lb 3.2 oz)   LMP  (LMP Unknown)   SpO2 92%   BMI 26.04 kg/m²       Objective     Physical Exam   Constitutional: She is oriented to person, place, and time. She appears well-developed and well-nourished.   HENT:   Head: Normocephalic and atraumatic.   Right Ear: External ear normal.   Left Ear: External ear normal.   Nose: Nose normal.   Mouth/Throat: Oropharynx is clear and moist.   Eyes: Pupils are equal, round, and reactive to light. Conjunctivae and EOM are normal.   Neck: Normal range of motion. Neck " supple.   Cardiovascular: Normal rate, regular rhythm and normal heart sounds. Exam reveals no gallop and no friction rub.   No murmur heard.  Pulmonary/Chest: Effort normal.   Diminished with some scattered rhonchi   Abdominal: Soft. Bowel sounds are normal. She exhibits no distension. There is no tenderness. There is no rebound and no guarding.   Musculoskeletal: Normal range of motion. She exhibits no edema or deformity.   Neurological: She is alert and oriented to person, place, and time. No cranial nerve deficit.   Skin: Skin is warm and dry. No rash noted. No erythema.   Psychiatric: She has a normal mood and affect. Her behavior is normal. Judgment and thought content normal.   Nursing note and vitals reviewed.          PAST MEDICAL HISTORY     Past Medical History:   Diagnosis Date   • Abdominal aortic aneurysm (AAA) without rupture (CMS/HCC)    • Atrial fibrillation (CMS/HCC) 10/2/2017    New onset.  S/p cardioversion  On coumadin now.  Amiodarone    • CAD (coronary artery disease)    • Gastroesophageal reflux disease    • Hypercholesterolemia    • Hypertension    • Nuclear senile cataract    • Osteoarthritis    • Osteoporosis    • Peripheral arterial disease (CMS/HCC)    • Solitary lung nodule       PAST SURGICAL HISTORY     Past Surgical History:   Procedure Laterality Date   • ABDOMINAL AORTIC ANEURYSM REPAIR     • HIP INTERTROCHANTERIC NAILING Left 10/1/2017    Procedure: HIP INTERTROCHANTERIC NAILING - LEFT - [place];  Surgeon: Nicola Rich MD;  Location: [facility];  Service:       SOCIAL HISTORY     Social History     Socioeconomic History   • Marital status:      Spouse name: Not on file   • Number of children: Not on file   • Years of education: Not on file   • Highest education level: Not on file   Tobacco Use   • Smoking status: Former Smoker     Start date: 1961     Last attempt to quit: 10/1/2017     Years since quittin.3   • Smokeless tobacco: Never Used   Substance  and Sexual Activity   • Alcohol use: No   • Drug use: No   • Sexual activity: Not Currently      ALLERGIES   Patient has no known allergies.   MEDICATIONS     Current Outpatient Medications   Medication Sig Dispense Refill   • amiodarone (PACERONE) 200 MG tablet Take 1 tablet by mouth Daily. (Patient taking differently: Take 100 mg by mouth Daily.) 30 tablet 0   • aspirin 81 MG EC tablet Take 81 mg by mouth daily.     • Cholecalciferol (GNP VITAMIN D SUPER STRENGTH) 5000 UNITS tablet Take  by mouth.     • clopidogrel (PLAVIX) 75 MG tablet TAKE 1 TABLET BY MOUTH DAILY. 90 tablet 11   • famotidine (PEPCID) 20 MG tablet Take 1 tablet by mouth 2 (Two) Times a Day As Needed for Heartburn. 60 tablet 11   • ferrous sulfate 324 (65 Fe) MG tablet delayed-release EC tablet Take 1 tablet by mouth Daily With Breakfast. 30 tablet 0   • gabapentin (NEURONTIN) 300 MG capsule Take 1-2 capsules by mouth Every Night. 180 capsule 1   • hydrALAZINE (APRESOLINE) 25 MG tablet Take 1 tablet by mouth 4 (Four) Times a Day. 120 tablet 11   • lisinopril (PRINIVIL,ZESTRIL) 40 MG tablet Take 1 tablet by mouth Daily. 90 tablet 3   • magnesium oxide (MAGOX) 400 (241.3 Mg) MG tablet tablet Take 1 tablet by mouth Daily. 30 each 0   • metoprolol tartrate (LOPRESSOR) 50 MG tablet Take 1 tablet by mouth Every 12 (Twelve) Hours. 180 tablet 3   • Multiple Vitamins-Minerals (VISION FORMULA PO) Take 1 tablet by mouth every day.     • polyethylene glycol (MIRALAX) powder ONE CAPFUL IN 8 OUNCES OF LIQUID ONCE DAILY UP TO THREE TIMES DAILY AS NEEDED. 1581 g 11   • Zinc 50 MG capsule Take 1 capsule by mouth Daily.     • cefuroxime (CEFTIN) 500 MG tablet Take 1 tablet by mouth 2 (Two) Times a Day. 20 tablet 0   • promethazine-codeine (PHENERGAN with CODEINE) 6.25-10 MG/5ML syrup Take 5 mL by mouth Every 6 (Six) Hours As Needed for Cough. 240 mL 1     No current facility-administered medications for this visit.         The following portions of the patient's  history were reviewed and updated as appropriate: allergies, current medications, past family history, past medical history, past social history, past surgical history and problem list.        Assessment/Plan   Anahi was seen today for illness, cough and uri.    Diagnoses and all orders for this visit:    Acute bronchitis, unspecified organism  -     triamcinolone acetonide (KENALOG-40) injection 80 mg  -     promethazine-codeine (PHENERGAN with CODEINE) 6.25-10 MG/5ML syrup; Take 5 mL by mouth Every 6 (Six) Hours As Needed for Cough.  -     ipratropium-albuterol (DUO-NEB) nebulizer solution 3 mL    Cough  -     POCT Influenza A/B    Essential hypertension    Other orders  -     hydrALAZINE (APRESOLINE) 25 MG tablet; Take 1 tablet by mouth 4 (Four) Times a Day.  -     cefuroxime (CEFTIN) 500 MG tablet; Take 1 tablet by mouth 2 (Two) Times a Day.      Changed hydralazine to qid, or just take tid and take extra one in pm if bp is high    Has inhalers at home.                  No follow-ups on file.                  This document has been electronically signed by Ramiro Gloria MD on February 6, 2020 1:34 PM

## 2020-02-17 RX ORDER — LISINOPRIL 40 MG/1
40 TABLET ORAL DAILY
Qty: 90 TABLET | Refills: 3 | Status: SHIPPED | OUTPATIENT
Start: 2020-02-17 | End: 2021-01-01 | Stop reason: SDUPTHER

## 2020-02-17 NOTE — TELEPHONE ENCOUNTER
Patient called in to request RX refill of lisinopril (PRINIVIL,ZESTRIL) 40 MG tablet    Pharmacy confirmed      Please Advise

## 2020-04-15 DIAGNOSIS — I10 ESSENTIAL HYPERTENSION: ICD-10-CM

## 2020-04-15 RX ORDER — METOPROLOL TARTRATE 50 MG/1
50 TABLET, FILM COATED ORAL EVERY 12 HOURS SCHEDULED
Qty: 180 TABLET | Refills: 2 | Status: SHIPPED | OUTPATIENT
Start: 2020-04-15 | End: 2021-01-01

## 2020-06-08 ENCOUNTER — TELEPHONE (OUTPATIENT)
Dept: FAMILY MEDICINE CLINIC | Facility: CLINIC | Age: 80
End: 2020-06-08

## 2020-06-08 NOTE — TELEPHONE ENCOUNTER
Patient called and wanted to have Dr. Gloria give her another handicap pola.     Please contact patient and advise ability @ [phone].

## 2020-11-09 ENCOUNTER — TELEPHONE (OUTPATIENT)
Dept: FAMILY MEDICINE CLINIC | Facility: CLINIC | Age: 80
End: 2020-11-09

## 2020-11-10 ENCOUNTER — OFFICE VISIT (OUTPATIENT)
Dept: FAMILY MEDICINE CLINIC | Facility: CLINIC | Age: 80
End: 2020-11-10

## 2020-11-10 DIAGNOSIS — I10 ESSENTIAL HYPERTENSION: ICD-10-CM

## 2020-11-10 DIAGNOSIS — M54.9 CHRONIC BACK PAIN, UNSPECIFIED BACK LOCATION, UNSPECIFIED BACK PAIN LATERALITY: Primary | ICD-10-CM

## 2020-11-10 DIAGNOSIS — G89.29 CHRONIC BACK PAIN, UNSPECIFIED BACK LOCATION, UNSPECIFIED BACK PAIN LATERALITY: Primary | ICD-10-CM

## 2020-11-10 PROCEDURE — G2025 DIS SITE TELE SVCS RHC/FQHC: HCPCS | Performed by: FAMILY MEDICINE

## 2020-11-10 RX ORDER — GABAPENTIN 300 MG/1
300-600 CAPSULE ORAL NIGHTLY
Qty: 180 CAPSULE | Refills: 1 | Status: SHIPPED | OUTPATIENT
Start: 2020-11-10 | End: 2021-01-01 | Stop reason: SDUPTHER

## 2020-11-10 NOTE — PROGRESS NOTES
Subjective   Anahi Calix is a 80 y.o. female.     Chief Complaint   Patient presents with   • Med Refill             History of Present Illness     TELEPHONE VISIT  Taking neurontin for back pain.  Needs refill.  It also helps him sleep.   Also due for yearly labs.   Vision getting worse, macular degeneration, seeing a provider in [place] referred to by dr hennessy    Review of Systems   Musculoskeletal: Positive for back pain.           LMP  (LMP Unknown)       Objective     Physical Exam        PAST MEDICAL HISTORY     Past Medical History:   Diagnosis Date   • Abdominal aortic aneurysm (AAA) without rupture (CMS/HCC)    • Atrial fibrillation (CMS/HCC) 10/2/2017    New onset.  S/p cardioversion  On coumadin now.  Amiodarone    • CAD (coronary artery disease)    • Gastroesophageal reflux disease    • Hypercholesterolemia    • Hypertension    • Nuclear senile cataract    • Osteoarthritis    • Osteoporosis    • Peripheral arterial disease (CMS/HCC)    • Solitary lung nodule       PAST SURGICAL HISTORY     Past Surgical History:   Procedure Laterality Date   • ABDOMINAL AORTIC ANEURYSM REPAIR     • HIP INTERTROCHANTERIC NAILING Left 10/1/2017    Procedure: HIP INTERTROCHANTERIC NAILING - LEFT - [place];  Surgeon: Nicola Rich MD;  Location: [facility];  Service:       SOCIAL HISTORY     Social History     Socioeconomic History   • Marital status:      Spouse name: Not on file   • Number of children: Not on file   • Years of education: Not on file   • Highest education level: Not on file   Tobacco Use   • Smoking status: Former Smoker     Start date: 11/21/1961     Quit date: 10/1/2017     Years since quitting: 3.1   • Smokeless tobacco: Never Used   Substance and Sexual Activity   • Alcohol use: No   • Drug use: No   • Sexual activity: Not Currently      ALLERGIES   Patient has no known allergies.   MEDICATIONS     Current Outpatient Medications   Medication Sig Dispense Refill   • amiodarone  (PACERONE) 200 MG tablet Take 1 tablet by mouth Daily. (Patient taking differently: Take 100 mg by mouth Daily.) 30 tablet 0   • aspirin 81 MG EC tablet Take 81 mg by mouth daily.     • Cholecalciferol (GNP VITAMIN D SUPER STRENGTH) 5000 UNITS tablet Take  by mouth.     • clopidogrel (PLAVIX) 75 MG tablet TAKE 1 TABLET BY MOUTH DAILY. 90 tablet 11   • famotidine (PEPCID) 20 MG tablet Take 1 tablet by mouth 2 (Two) Times a Day As Needed for Heartburn. 60 tablet 11   • ferrous sulfate 324 (65 Fe) MG tablet delayed-release EC tablet Take 1 tablet by mouth Daily With Breakfast. 30 tablet 0   • gabapentin (NEURONTIN) 300 MG capsule Take 1-2 capsules by mouth Every Night. 180 capsule 1   • hydrALAZINE (APRESOLINE) 25 MG tablet Take 1 tablet by mouth 4 (Four) Times a Day. 120 tablet 11   • lisinopril (PRINIVIL,ZESTRIL) 40 MG tablet Take 1 tablet by mouth Daily. 90 tablet 3   • magnesium oxide (MAGOX) 400 (241.3 Mg) MG tablet tablet Take 1 tablet by mouth Daily. 30 each 0   • metoprolol tartrate (LOPRESSOR) 50 MG tablet TAKE 1 TABLET BY MOUTH EVERY 12 (TWELVE) HOURS. 180 tablet 2   • Multiple Vitamins-Minerals (VISION FORMULA PO) Take 1 tablet by mouth every day.     • polyethylene glycol (MIRALAX) powder ONE CAPFUL IN 8 OUNCES OF LIQUID ONCE DAILY UP TO THREE TIMES DAILY AS NEEDED. 1581 g 11   • Zinc 50 MG capsule Take 1 capsule by mouth Daily.     • promethazine-codeine (PHENERGAN with CODEINE) 6.25-10 MG/5ML syrup Take 5 mL by mouth Every 6 (Six) Hours As Needed for Cough. 240 mL 1     No current facility-administered medications for this visit.         The following portions of the patient's history were reviewed and updated as appropriate: allergies, current medications, past family history, past medical history, past social history, past surgical history and problem list.        Assessment/Plan   Diagnoses and all orders for this visit:    1. Chronic back pain, unspecified back location, unspecified back pain  laterality (Primary)  -     gabapentin (NEURONTIN) 300 MG capsule; Take 1-2 capsules by mouth Every Night.  Dispense: 180 capsule; Refill: 1    2. Essential hypertension  -     CBC (No Diff); Future  -     Comprehensive Metabolic Panel; Future  -     Lipid Panel; Future      Refills.     This visit has been rescheduled as a phone visit to comply with patient safety concerns in accordance with CDC recommendations. Total time of discussion was 10 minutes.                     No follow-ups on file.                  This document has been electronically signed by Ramiro Gloria MD on November 10, 2020 12:27 CST

## 2020-11-18 ENCOUNTER — LAB (OUTPATIENT)
Dept: LAB | Facility: HOSPITAL | Age: 80
End: 2020-11-18

## 2020-11-18 DIAGNOSIS — I10 ESSENTIAL HYPERTENSION: ICD-10-CM

## 2020-11-18 PROCEDURE — 85027 COMPLETE CBC AUTOMATED: CPT

## 2020-11-18 PROCEDURE — 80061 LIPID PANEL: CPT

## 2020-11-18 PROCEDURE — 80053 COMPREHEN METABOLIC PANEL: CPT

## 2020-11-19 LAB
ALBUMIN SERPL-MCNC: 4.1 G/DL (ref 3.5–5.2)
ALBUMIN/GLOB SERPL: 1.4 G/DL
ALP SERPL-CCNC: 86 U/L (ref 39–117)
ALT SERPL W P-5'-P-CCNC: 10 U/L (ref 1–33)
ANION GAP SERPL CALCULATED.3IONS-SCNC: 11 MMOL/L (ref 5–15)
AST SERPL-CCNC: 17 U/L (ref 1–32)
BILIRUB SERPL-MCNC: 0.2 MG/DL (ref 0–1.2)
BUN SERPL-MCNC: 24 MG/DL (ref 8–23)
BUN/CREAT SERPL: 26.4 (ref 7–25)
CALCIUM SPEC-SCNC: 9.6 MG/DL (ref 8.6–10.5)
CHLORIDE SERPL-SCNC: 106 MMOL/L (ref 98–107)
CHOLEST SERPL-MCNC: 204 MG/DL (ref 0–200)
CO2 SERPL-SCNC: 27 MMOL/L (ref 22–29)
CREAT SERPL-MCNC: 0.91 MG/DL (ref 0.57–1)
DEPRECATED RDW RBC AUTO: 41.2 FL (ref 37–54)
ERYTHROCYTE [DISTWIDTH] IN BLOOD BY AUTOMATED COUNT: 12.9 % (ref 12.3–15.4)
GFR SERPL CREATININE-BSD FRML MDRD: 60 ML/MIN/1.73
GLOBULIN UR ELPH-MCNC: 3 GM/DL
GLUCOSE SERPL-MCNC: 99 MG/DL (ref 65–99)
HCT VFR BLD AUTO: 38.2 % (ref 34–46.6)
HDLC SERPL-MCNC: 59 MG/DL (ref 40–60)
HGB BLD-MCNC: 12.7 G/DL (ref 12–15.9)
LDLC SERPL CALC-MCNC: 111 MG/DL (ref 0–100)
LDLC/HDLC SERPL: 1.78 {RATIO}
MCH RBC QN AUTO: 29.3 PG (ref 26.6–33)
MCHC RBC AUTO-ENTMCNC: 33.2 G/DL (ref 31.5–35.7)
MCV RBC AUTO: 88.2 FL (ref 79–97)
PLATELET # BLD AUTO: 271 10*3/MM3 (ref 140–450)
PMV BLD AUTO: 10 FL (ref 6–12)
POTASSIUM SERPL-SCNC: 5.3 MMOL/L (ref 3.5–5.2)
PROT SERPL-MCNC: 7.1 G/DL (ref 6–8.5)
RBC # BLD AUTO: 4.33 10*6/MM3 (ref 3.77–5.28)
SODIUM SERPL-SCNC: 144 MMOL/L (ref 136–145)
TRIGL SERPL-MCNC: 200 MG/DL (ref 0–150)
VLDLC SERPL-MCNC: 34 MG/DL (ref 5–40)
WBC # BLD AUTO: 7.41 10*3/MM3 (ref 3.4–10.8)

## 2021-01-01 ENCOUNTER — HOSPITAL ENCOUNTER (INPATIENT)
Facility: HOSPITAL | Age: 81
LOS: 8 days | Discharge: HOSPICE | End: 2021-11-19
Attending: FAMILY MEDICINE | Admitting: FAMILY MEDICINE

## 2021-01-01 ENCOUNTER — HOME CARE VISIT (OUTPATIENT)
Dept: HOME HEALTH SERVICES | Facility: HOME HEALTHCARE | Age: 81
End: 2021-01-01

## 2021-01-01 ENCOUNTER — APPOINTMENT (OUTPATIENT)
Dept: GENERAL RADIOLOGY | Facility: HOSPITAL | Age: 81
End: 2021-01-01

## 2021-01-01 ENCOUNTER — HOME CARE VISIT (OUTPATIENT)
Dept: HOME HEALTH SERVICES | Facility: CLINIC | Age: 81
End: 2021-01-01

## 2021-01-01 ENCOUNTER — TELEPHONE (OUTPATIENT)
Dept: FAMILY MEDICINE CLINIC | Facility: CLINIC | Age: 81
End: 2021-01-01

## 2021-01-01 ENCOUNTER — OFFICE VISIT (OUTPATIENT)
Dept: FAMILY MEDICINE CLINIC | Facility: CLINIC | Age: 81
End: 2021-01-01

## 2021-01-01 ENCOUNTER — HOME HEALTH ADMISSION (OUTPATIENT)
Dept: HOME HEALTH SERVICES | Facility: HOME HEALTHCARE | Age: 81
End: 2021-01-01

## 2021-01-01 ENCOUNTER — APPOINTMENT (OUTPATIENT)
Dept: CT IMAGING | Facility: HOSPITAL | Age: 81
End: 2021-01-01

## 2021-01-01 ENCOUNTER — APPOINTMENT (OUTPATIENT)
Dept: CARDIOLOGY | Facility: HOSPITAL | Age: 81
End: 2021-01-01

## 2021-01-01 VITALS
HEART RATE: 50 BPM | DIASTOLIC BLOOD PRESSURE: 69 MMHG | TEMPERATURE: 97.3 F | RESPIRATION RATE: 18 BRPM | OXYGEN SATURATION: 96 % | SYSTOLIC BLOOD PRESSURE: 142 MMHG

## 2021-01-01 VITALS
TEMPERATURE: 97.4 F | DIASTOLIC BLOOD PRESSURE: 71 MMHG | BODY MASS INDEX: 21.7 KG/M2 | RESPIRATION RATE: 16 BRPM | OXYGEN SATURATION: 95 % | WEIGHT: 143.2 LBS | SYSTOLIC BLOOD PRESSURE: 165 MMHG | HEART RATE: 62 BPM | HEIGHT: 68 IN

## 2021-01-01 VITALS
RESPIRATION RATE: 18 BRPM | HEART RATE: 74 BPM | TEMPERATURE: 96.8 F | DIASTOLIC BLOOD PRESSURE: 60 MMHG | SYSTOLIC BLOOD PRESSURE: 132 MMHG

## 2021-01-01 VITALS
DIASTOLIC BLOOD PRESSURE: 76 MMHG | SYSTOLIC BLOOD PRESSURE: 168 MMHG | OXYGEN SATURATION: 96 % | HEART RATE: 90 BPM | RESPIRATION RATE: 18 BRPM | TEMPERATURE: 95.8 F

## 2021-01-01 VITALS
HEART RATE: 61 BPM | SYSTOLIC BLOOD PRESSURE: 183 MMHG | BODY MASS INDEX: 22.67 KG/M2 | HEIGHT: 68 IN | DIASTOLIC BLOOD PRESSURE: 77 MMHG | WEIGHT: 149.6 LBS | TEMPERATURE: 97.4 F | OXYGEN SATURATION: 94 %

## 2021-01-01 VITALS
HEIGHT: 68 IN | DIASTOLIC BLOOD PRESSURE: 84 MMHG | OXYGEN SATURATION: 94 % | WEIGHT: 170 LBS | HEART RATE: 62 BPM | TEMPERATURE: 97.3 F | SYSTOLIC BLOOD PRESSURE: 168 MMHG | BODY MASS INDEX: 25.76 KG/M2

## 2021-01-01 VITALS
HEART RATE: 48 BPM | TEMPERATURE: 98 F | OXYGEN SATURATION: 98 % | SYSTOLIC BLOOD PRESSURE: 100 MMHG | DIASTOLIC BLOOD PRESSURE: 58 MMHG | RESPIRATION RATE: 18 BRPM

## 2021-01-01 VITALS
OXYGEN SATURATION: 95 % | SYSTOLIC BLOOD PRESSURE: 120 MMHG | RESPIRATION RATE: 18 BRPM | DIASTOLIC BLOOD PRESSURE: 58 MMHG | HEART RATE: 51 BPM

## 2021-01-01 VITALS
TEMPERATURE: 98 F | BODY MASS INDEX: 24.67 KG/M2 | OXYGEN SATURATION: 92 % | HEART RATE: 71 BPM | DIASTOLIC BLOOD PRESSURE: 70 MMHG | HEIGHT: 68 IN | WEIGHT: 162.8 LBS | SYSTOLIC BLOOD PRESSURE: 130 MMHG

## 2021-01-01 VITALS
OXYGEN SATURATION: 95 % | DIASTOLIC BLOOD PRESSURE: 74 MMHG | HEART RATE: 58 BPM | SYSTOLIC BLOOD PRESSURE: 156 MMHG | RESPIRATION RATE: 20 BRPM | TEMPERATURE: 97.5 F

## 2021-01-01 VITALS
SYSTOLIC BLOOD PRESSURE: 132 MMHG | RESPIRATION RATE: 18 BRPM | HEART RATE: 56 BPM | DIASTOLIC BLOOD PRESSURE: 78 MMHG | TEMPERATURE: 97.6 F | OXYGEN SATURATION: 96 %

## 2021-01-01 DIAGNOSIS — H54.7 POOR VISION: ICD-10-CM

## 2021-01-01 DIAGNOSIS — R07.81 RIB PAIN ON LEFT SIDE: Primary | ICD-10-CM

## 2021-01-01 DIAGNOSIS — R06.02 SOB (SHORTNESS OF BREATH): ICD-10-CM

## 2021-01-01 DIAGNOSIS — G89.29 CHRONIC LOW BACK PAIN WITHOUT SCIATICA, UNSPECIFIED BACK PAIN LATERALITY: Primary | ICD-10-CM

## 2021-01-01 DIAGNOSIS — G89.29 CHRONIC BACK PAIN, UNSPECIFIED BACK LOCATION, UNSPECIFIED BACK PAIN LATERALITY: ICD-10-CM

## 2021-01-01 DIAGNOSIS — Z00.00 MEDICARE ANNUAL WELLNESS VISIT, INITIAL: Primary | ICD-10-CM

## 2021-01-01 DIAGNOSIS — G89.29 CHRONIC LOW BACK PAIN WITHOUT SCIATICA, UNSPECIFIED BACK PAIN LATERALITY: ICD-10-CM

## 2021-01-01 DIAGNOSIS — R21 RASH: Primary | ICD-10-CM

## 2021-01-01 DIAGNOSIS — M54.9 CHRONIC BACK PAIN, UNSPECIFIED BACK LOCATION, UNSPECIFIED BACK PAIN LATERALITY: ICD-10-CM

## 2021-01-01 DIAGNOSIS — M54.50 CHRONIC LOW BACK PAIN WITHOUT SCIATICA, UNSPECIFIED BACK PAIN LATERALITY: ICD-10-CM

## 2021-01-01 DIAGNOSIS — I10 UNCONTROLLED HYPERTENSION: ICD-10-CM

## 2021-01-01 DIAGNOSIS — I25.10 ATHEROSCLEROSIS OF NATIVE CORONARY ARTERY OF NATIVE HEART WITHOUT ANGINA PECTORIS: ICD-10-CM

## 2021-01-01 DIAGNOSIS — I10 ESSENTIAL HYPERTENSION: ICD-10-CM

## 2021-01-01 DIAGNOSIS — R06.00 DYSPNEA, UNSPECIFIED TYPE: ICD-10-CM

## 2021-01-01 DIAGNOSIS — J90 PLEURAL EFFUSION: ICD-10-CM

## 2021-01-01 DIAGNOSIS — R09.89 BRUIT OF RIGHT CAROTID ARTERY: Primary | ICD-10-CM

## 2021-01-01 DIAGNOSIS — I48.0 PAF (PAROXYSMAL ATRIAL FIBRILLATION) (HCC): ICD-10-CM

## 2021-01-01 DIAGNOSIS — R91.8 MASS OF LEFT LUNG: Primary | ICD-10-CM

## 2021-01-01 DIAGNOSIS — M54.50 CHRONIC LOW BACK PAIN WITHOUT SCIATICA, UNSPECIFIED BACK PAIN LATERALITY: Primary | ICD-10-CM

## 2021-01-01 DIAGNOSIS — I50.9 ACUTE ON CHRONIC CONGESTIVE HEART FAILURE, UNSPECIFIED HEART FAILURE TYPE (HCC): ICD-10-CM

## 2021-01-01 DIAGNOSIS — M25.552 LEFT HIP PAIN: ICD-10-CM

## 2021-01-01 DIAGNOSIS — Z74.09 DECREASED MOBILITY AND ENDURANCE: ICD-10-CM

## 2021-01-01 LAB
ALBUMIN FLD-MCNC: 2.3 G/DL
ALBUMIN SERPL-MCNC: 3.1 G/DL (ref 3.5–5.2)
ALBUMIN SERPL-MCNC: 3.2 G/DL (ref 3.5–5.2)
ALBUMIN SERPL-MCNC: 3.4 G/DL (ref 3.5–5.2)
ALBUMIN/GLOB SERPL: 1 G/DL
ALBUMIN/GLOB SERPL: 1.1 G/DL
ALBUMIN/GLOB SERPL: 1.1 G/DL
ALP SERPL-CCNC: 65 U/L (ref 39–117)
ALP SERPL-CCNC: 77 U/L (ref 39–117)
ALP SERPL-CCNC: 79 U/L (ref 39–117)
ALT SERPL W P-5'-P-CCNC: 10 U/L (ref 1–33)
ALT SERPL W P-5'-P-CCNC: 23 U/L (ref 1–33)
ALT SERPL W P-5'-P-CCNC: 9 U/L (ref 1–33)
ANION GAP SERPL CALCULATED.3IONS-SCNC: 11 MMOL/L (ref 5–15)
ANION GAP SERPL CALCULATED.3IONS-SCNC: 12 MMOL/L (ref 5–15)
ANION GAP SERPL CALCULATED.3IONS-SCNC: 5 MMOL/L (ref 5–15)
ANION GAP SERPL CALCULATED.3IONS-SCNC: 5 MMOL/L (ref 5–15)
ANION GAP SERPL CALCULATED.3IONS-SCNC: 7 MMOL/L (ref 5–15)
ANION GAP SERPL CALCULATED.3IONS-SCNC: 8 MMOL/L (ref 5–15)
ANION GAP SERPL CALCULATED.3IONS-SCNC: 8 MMOL/L (ref 5–15)
APPEARANCE FLD: CLEAR
AST SERPL-CCNC: 14 U/L (ref 1–32)
AST SERPL-CCNC: 18 U/L (ref 1–32)
AST SERPL-CCNC: 30 U/L (ref 1–32)
BACTERIA SPEC AEROBE CULT: NORMAL
BACTERIA SPEC AEROBE CULT: NORMAL
BACTERIA SPEC RESP CULT: ABNORMAL
BACTERIA SPEC RESP CULT: ABNORMAL
BASOPHILS # BLD AUTO: 0.02 10*3/MM3 (ref 0–0.2)
BASOPHILS # BLD AUTO: 0.04 10*3/MM3 (ref 0–0.2)
BASOPHILS # BLD AUTO: 0.04 10*3/MM3 (ref 0–0.2)
BASOPHILS # BLD AUTO: 0.05 10*3/MM3 (ref 0–0.2)
BASOPHILS # BLD AUTO: 0.06 10*3/MM3 (ref 0–0.2)
BASOPHILS # BLD AUTO: 0.08 10*3/MM3 (ref 0–0.2)
BASOPHILS NFR BLD AUTO: 0.1 % (ref 0–1.5)
BASOPHILS NFR BLD AUTO: 0.2 % (ref 0–1.5)
BASOPHILS NFR BLD AUTO: 0.3 % (ref 0–1.5)
BASOPHILS NFR BLD AUTO: 0.3 % (ref 0–1.5)
BASOPHILS NFR BLD AUTO: 0.4 % (ref 0–1.5)
BASOPHILS NFR BLD AUTO: 0.7 % (ref 0–1.5)
BH CV ECHO MEAS - BSA(HAYCOCK): 1.8 M^2
BH CV ECHO MEAS - BSA: 1.8 M^2
BH CV ECHO MEAS - BZI_BMI: 22 KILOGRAMS/M^2
BH CV ECHO MEAS - BZI_METRIC_HEIGHT: 172.7 CM
BH CV ECHO MEAS - BZI_METRIC_WEIGHT: 65.8 KG
BH CV ECHO MEAS - MV MAX PG: 17.6 MMHG
BH CV ECHO MEAS - MV MEAN PG: 8 MMHG
BH CV ECHO MEAS - MV V2 MAX: 210 CM/SEC
BH CV ECHO MEAS - MV V2 MEAN: 128 CM/SEC
BH CV ECHO MEAS - MV V2 VTI: 59.8 CM
BH CV ECHO MEAS - RAP SYSTOLE: 15 MMHG
BH CV ECHO MEAS - RVSP: 61.5 MMHG
BH CV ECHO MEAS - TR MAX VEL: 341 CM/SEC
BILIRUB SERPL-MCNC: 0.2 MG/DL (ref 0–1.2)
BILIRUB SERPL-MCNC: 0.3 MG/DL (ref 0–1.2)
BILIRUB SERPL-MCNC: 0.3 MG/DL (ref 0–1.2)
BUN SERPL-MCNC: 14 MG/DL (ref 8–23)
BUN SERPL-MCNC: 16 MG/DL (ref 8–23)
BUN SERPL-MCNC: 23 MG/DL (ref 8–23)
BUN SERPL-MCNC: 25 MG/DL (ref 8–23)
BUN SERPL-MCNC: 26 MG/DL (ref 8–23)
BUN/CREAT SERPL: 14.4 (ref 7–25)
BUN/CREAT SERPL: 15.9 (ref 7–25)
BUN/CREAT SERPL: 16 (ref 7–25)
BUN/CREAT SERPL: 20.2 (ref 7–25)
BUN/CREAT SERPL: 23.6 (ref 7–25)
BUN/CREAT SERPL: 25.3 (ref 7–25)
BUN/CREAT SERPL: 26 (ref 7–25)
CALCIUM SPEC-SCNC: 8.8 MG/DL (ref 8.6–10.5)
CALCIUM SPEC-SCNC: 9 MG/DL (ref 8.6–10.5)
CALCIUM SPEC-SCNC: 9.1 MG/DL (ref 8.6–10.5)
CALCIUM SPEC-SCNC: 9.2 MG/DL (ref 8.6–10.5)
CALCIUM SPEC-SCNC: 9.3 MG/DL (ref 8.6–10.5)
CALCIUM SPEC-SCNC: 9.7 MG/DL (ref 8.6–10.5)
CALCIUM SPEC-SCNC: 9.9 MG/DL (ref 8.6–10.5)
CHLORIDE SERPL-SCNC: 100 MMOL/L (ref 98–107)
CHLORIDE SERPL-SCNC: 100 MMOL/L (ref 98–107)
CHLORIDE SERPL-SCNC: 101 MMOL/L (ref 98–107)
CHLORIDE SERPL-SCNC: 95 MMOL/L (ref 98–107)
CHLORIDE SERPL-SCNC: 97 MMOL/L (ref 98–107)
CHLORIDE SERPL-SCNC: 98 MMOL/L (ref 98–107)
CHLORIDE SERPL-SCNC: 98 MMOL/L (ref 98–107)
CO2 SERPL-SCNC: 27 MMOL/L (ref 22–29)
CO2 SERPL-SCNC: 29 MMOL/L (ref 22–29)
CO2 SERPL-SCNC: 30 MMOL/L (ref 22–29)
CO2 SERPL-SCNC: 32 MMOL/L (ref 22–29)
CO2 SERPL-SCNC: 32 MMOL/L (ref 22–29)
CO2 SERPL-SCNC: 33 MMOL/L (ref 22–29)
CO2 SERPL-SCNC: 35 MMOL/L (ref 22–29)
COLOR FLD: YELLOW
CREAT SERPL-MCNC: 0.88 MG/DL (ref 0.57–1)
CREAT SERPL-MCNC: 0.99 MG/DL (ref 0.57–1)
CREAT SERPL-MCNC: 1 MG/DL (ref 0.57–1)
CREAT SERPL-MCNC: 1.06 MG/DL (ref 0.57–1)
CREAT SERPL-MCNC: 1.11 MG/DL (ref 0.57–1)
CREAT SERPL-MCNC: 1.24 MG/DL (ref 0.57–1)
CREAT SERPL-MCNC: 1.44 MG/DL (ref 0.57–1)
D-LACTATE SERPL-SCNC: 1.6 MMOL/L (ref 0.5–2)
DEPRECATED RDW RBC AUTO: 50.2 FL (ref 37–54)
DEPRECATED RDW RBC AUTO: 51.5 FL (ref 37–54)
DEPRECATED RDW RBC AUTO: 51.8 FL (ref 37–54)
DEPRECATED RDW RBC AUTO: 52.1 FL (ref 37–54)
DEPRECATED RDW RBC AUTO: 52.5 FL (ref 37–54)
DEPRECATED RDW RBC AUTO: 53.1 FL (ref 37–54)
EOSINOPHIL # BLD AUTO: 0 10*3/MM3 (ref 0–0.4)
EOSINOPHIL # BLD AUTO: 0.02 10*3/MM3 (ref 0–0.4)
EOSINOPHIL # BLD AUTO: 0.04 10*3/MM3 (ref 0–0.4)
EOSINOPHIL NFR BLD AUTO: 0 % (ref 0.3–6.2)
EOSINOPHIL NFR BLD AUTO: 0.2 % (ref 0.3–6.2)
EOSINOPHIL NFR BLD AUTO: 0.5 % (ref 0.3–6.2)
ERYTHROCYTE [DISTWIDTH] IN BLOOD BY AUTOMATED COUNT: 15.8 % (ref 12.3–15.4)
ERYTHROCYTE [DISTWIDTH] IN BLOOD BY AUTOMATED COUNT: 15.9 % (ref 12.3–15.4)
ERYTHROCYTE [DISTWIDTH] IN BLOOD BY AUTOMATED COUNT: 15.9 % (ref 12.3–15.4)
ERYTHROCYTE [DISTWIDTH] IN BLOOD BY AUTOMATED COUNT: 16 % (ref 12.3–15.4)
ERYTHROCYTE [DISTWIDTH] IN BLOOD BY AUTOMATED COUNT: 16.1 % (ref 12.3–15.4)
ERYTHROCYTE [DISTWIDTH] IN BLOOD BY AUTOMATED COUNT: 16.3 % (ref 12.3–15.4)
FERRITIN SERPL-MCNC: 189.7 NG/ML (ref 13–150)
FLUAV SUBTYP SPEC NAA+PROBE: NOT DETECTED
FLUBV RNA ISLT QL NAA+PROBE: NOT DETECTED
FOLATE SERPL-MCNC: 9.13 NG/ML (ref 4.78–24.2)
GFR SERPL CREATININE-BSD FRML MDRD: 35 ML/MIN/1.73
GFR SERPL CREATININE-BSD FRML MDRD: 42 ML/MIN/1.73
GFR SERPL CREATININE-BSD FRML MDRD: 47 ML/MIN/1.73
GFR SERPL CREATININE-BSD FRML MDRD: 50 ML/MIN/1.73
GFR SERPL CREATININE-BSD FRML MDRD: 53 ML/MIN/1.73
GFR SERPL CREATININE-BSD FRML MDRD: 54 ML/MIN/1.73
GFR SERPL CREATININE-BSD FRML MDRD: 62 ML/MIN/1.73
GLOBULIN UR ELPH-MCNC: 2.9 GM/DL
GLOBULIN UR ELPH-MCNC: 3.2 GM/DL
GLOBULIN UR ELPH-MCNC: 3.2 GM/DL
GLUCOSE FLD-MCNC: 105 MG/DL
GLUCOSE SERPL-MCNC: 119 MG/DL (ref 65–99)
GLUCOSE SERPL-MCNC: 124 MG/DL (ref 65–99)
GLUCOSE SERPL-MCNC: 130 MG/DL (ref 65–99)
GLUCOSE SERPL-MCNC: 137 MG/DL (ref 65–99)
GLUCOSE SERPL-MCNC: 173 MG/DL (ref 65–99)
GLUCOSE SERPL-MCNC: 90 MG/DL (ref 65–99)
GLUCOSE SERPL-MCNC: 95 MG/DL (ref 65–99)
GRAM STN SPEC: ABNORMAL
HCT VFR BLD AUTO: 28.4 % (ref 34–46.6)
HCT VFR BLD AUTO: 29.2 % (ref 34–46.6)
HCT VFR BLD AUTO: 33.1 % (ref 34–46.6)
HCT VFR BLD AUTO: 33.3 % (ref 34–46.6)
HCT VFR BLD AUTO: 37.3 % (ref 34–46.6)
HCT VFR BLD AUTO: 37.3 % (ref 34–46.6)
HGB BLD-MCNC: 10.3 G/DL (ref 12–15.9)
HGB BLD-MCNC: 10.3 G/DL (ref 12–15.9)
HGB BLD-MCNC: 12 G/DL (ref 12–15.9)
HGB BLD-MCNC: 12.1 G/DL (ref 12–15.9)
HGB BLD-MCNC: 9.2 G/DL (ref 12–15.9)
HGB BLD-MCNC: 9.3 G/DL (ref 12–15.9)
HOLD SPECIMEN: NORMAL
HOLD SPECIMEN: NORMAL
IMM GRANULOCYTES # BLD AUTO: 0.04 10*3/MM3 (ref 0–0.05)
IMM GRANULOCYTES # BLD AUTO: 0.05 10*3/MM3 (ref 0–0.05)
IMM GRANULOCYTES # BLD AUTO: 0.09 10*3/MM3 (ref 0–0.05)
IMM GRANULOCYTES # BLD AUTO: 0.27 10*3/MM3 (ref 0–0.05)
IMM GRANULOCYTES # BLD AUTO: 0.3 10*3/MM3 (ref 0–0.05)
IMM GRANULOCYTES # BLD AUTO: 0.33 10*3/MM3 (ref 0–0.05)
IMM GRANULOCYTES NFR BLD AUTO: 0.5 % (ref 0–0.5)
IMM GRANULOCYTES NFR BLD AUTO: 0.5 % (ref 0–0.5)
IMM GRANULOCYTES NFR BLD AUTO: 0.6 % (ref 0–0.5)
IMM GRANULOCYTES NFR BLD AUTO: 1.1 % (ref 0–0.5)
IMM GRANULOCYTES NFR BLD AUTO: 1.1 % (ref 0–0.5)
IMM GRANULOCYTES NFR BLD AUTO: 1.8 % (ref 0–0.5)
INR PPP: 1.1 (ref 0.8–1.2)
IRON 24H UR-MRATE: 12 MCG/DL (ref 37–145)
IRON SATN MFR SERPL: 6 % (ref 20–50)
LAB AP CASE REPORT: NORMAL
LDH FLD-CCNC: 162 U/L
LYMPHOCYTES # BLD AUTO: 0.29 10*3/MM3 (ref 0.7–3.1)
LYMPHOCYTES # BLD AUTO: 0.31 10*3/MM3 (ref 0.7–3.1)
LYMPHOCYTES # BLD AUTO: 0.5 10*3/MM3 (ref 0.7–3.1)
LYMPHOCYTES # BLD AUTO: 0.8 10*3/MM3 (ref 0.7–3.1)
LYMPHOCYTES # BLD AUTO: 0.8 10*3/MM3 (ref 0.7–3.1)
LYMPHOCYTES # BLD AUTO: 1.42 10*3/MM3 (ref 0.7–3.1)
LYMPHOCYTES NFR BLD AUTO: 1.2 % (ref 19.6–45.3)
LYMPHOCYTES NFR BLD AUTO: 1.7 % (ref 19.6–45.3)
LYMPHOCYTES NFR BLD AUTO: 1.8 % (ref 19.6–45.3)
LYMPHOCYTES NFR BLD AUTO: 18.5 % (ref 19.6–45.3)
LYMPHOCYTES NFR BLD AUTO: 5.3 % (ref 19.6–45.3)
LYMPHOCYTES NFR BLD AUTO: 7.6 % (ref 19.6–45.3)
MAXIMAL PREDICTED HEART RATE: 140 BPM
MCH RBC QN AUTO: 27.9 PG (ref 26.6–33)
MCH RBC QN AUTO: 28.1 PG (ref 26.6–33)
MCH RBC QN AUTO: 28.3 PG (ref 26.6–33)
MCH RBC QN AUTO: 28.7 PG (ref 26.6–33)
MCHC RBC AUTO-ENTMCNC: 30.9 G/DL (ref 31.5–35.7)
MCHC RBC AUTO-ENTMCNC: 31.1 G/DL (ref 31.5–35.7)
MCHC RBC AUTO-ENTMCNC: 31.8 G/DL (ref 31.5–35.7)
MCHC RBC AUTO-ENTMCNC: 32.2 G/DL (ref 31.5–35.7)
MCHC RBC AUTO-ENTMCNC: 32.4 G/DL (ref 31.5–35.7)
MCHC RBC AUTO-ENTMCNC: 32.4 G/DL (ref 31.5–35.7)
MCV RBC AUTO: 86.5 FL (ref 79–97)
MCV RBC AUTO: 87.4 FL (ref 79–97)
MCV RBC AUTO: 88.5 FL (ref 79–97)
MCV RBC AUTO: 88.8 FL (ref 79–97)
MCV RBC AUTO: 90.2 FL (ref 79–97)
MCV RBC AUTO: 90.2 FL (ref 79–97)
MONOCYTES # BLD AUTO: 0.61 10*3/MM3 (ref 0.1–0.9)
MONOCYTES # BLD AUTO: 0.72 10*3/MM3 (ref 0.1–0.9)
MONOCYTES # BLD AUTO: 0.78 10*3/MM3 (ref 0.1–0.9)
MONOCYTES # BLD AUTO: 0.84 10*3/MM3 (ref 0.1–0.9)
MONOCYTES # BLD AUTO: 0.86 10*3/MM3 (ref 0.1–0.9)
MONOCYTES # BLD AUTO: 1.46 10*3/MM3 (ref 0.1–0.9)
MONOCYTES NFR BLD AUTO: 11.2 % (ref 5–12)
MONOCYTES NFR BLD AUTO: 2.8 % (ref 5–12)
MONOCYTES NFR BLD AUTO: 3 % (ref 5–12)
MONOCYTES NFR BLD AUTO: 3.6 % (ref 5–12)
MONOCYTES NFR BLD AUTO: 7.4 % (ref 5–12)
MONOCYTES NFR BLD AUTO: 9.6 % (ref 5–12)
MONOS+MACROS NFR FLD: 78 %
NEUTROPHILS NFR BLD AUTO: 12.84 10*3/MM3 (ref 1.7–7)
NEUTROPHILS NFR BLD AUTO: 15.87 10*3/MM3 (ref 1.7–7)
NEUTROPHILS NFR BLD AUTO: 22.72 10*3/MM3 (ref 1.7–7)
NEUTROPHILS NFR BLD AUTO: 28.12 10*3/MM3 (ref 1.7–7)
NEUTROPHILS NFR BLD AUTO: 5.27 10*3/MM3 (ref 1.7–7)
NEUTROPHILS NFR BLD AUTO: 68.6 % (ref 42.7–76)
NEUTROPHILS NFR BLD AUTO: 8.81 10*3/MM3 (ref 1.7–7)
NEUTROPHILS NFR BLD AUTO: 83.9 % (ref 42.7–76)
NEUTROPHILS NFR BLD AUTO: 84.2 % (ref 42.7–76)
NEUTROPHILS NFR BLD AUTO: 92.7 % (ref 42.7–76)
NEUTROPHILS NFR BLD AUTO: 94.1 % (ref 42.7–76)
NEUTROPHILS NFR BLD AUTO: 94.5 % (ref 42.7–76)
NEUTROPHILS NFR FLD MANUAL: 22 %
NRBC BLD AUTO-RTO: 0 /100 WBC (ref 0–0.2)
NRBC BLD AUTO-RTO: 0.1 /100 WBC (ref 0–0.2)
NT-PROBNP SERPL-MCNC: 5064 PG/ML (ref 0–1800)
PATH REPORT.ADDENDUM SPEC: NORMAL
PATH REPORT.FINAL DX SPEC: NORMAL
PH FLD: >7.7 [PH]
PLATELET # BLD AUTO: 269 10*3/MM3 (ref 140–450)
PLATELET # BLD AUTO: 291 10*3/MM3 (ref 140–450)
PLATELET # BLD AUTO: 297 10*3/MM3 (ref 140–450)
PLATELET # BLD AUTO: 321 10*3/MM3 (ref 140–450)
PLATELET # BLD AUTO: 339 10*3/MM3 (ref 140–450)
PLATELET # BLD AUTO: 353 10*3/MM3 (ref 140–450)
PMV BLD AUTO: 8.7 FL (ref 6–12)
PMV BLD AUTO: 8.8 FL (ref 6–12)
PMV BLD AUTO: 8.9 FL (ref 6–12)
PMV BLD AUTO: 9 FL (ref 6–12)
PMV BLD AUTO: 9 FL (ref 6–12)
PMV BLD AUTO: 9.1 FL (ref 6–12)
POTASSIUM SERPL-SCNC: 4.1 MMOL/L (ref 3.5–5.2)
POTASSIUM SERPL-SCNC: 4.2 MMOL/L (ref 3.5–5.2)
POTASSIUM SERPL-SCNC: 4.4 MMOL/L (ref 3.5–5.2)
POTASSIUM SERPL-SCNC: 4.6 MMOL/L (ref 3.5–5.2)
POTASSIUM SERPL-SCNC: 4.9 MMOL/L (ref 3.5–5.2)
PROT FLD-MCNC: 3.5 G/DL
PROT SERPL-MCNC: 6 G/DL (ref 6–8.5)
PROT SERPL-MCNC: 6.4 G/DL (ref 6–8.5)
PROT SERPL-MCNC: 6.6 G/DL (ref 6–8.5)
PROTHROMBIN TIME: 14.1 SECONDS (ref 11.1–15.3)
QT INTERVAL: 360 MS
QT INTERVAL: 482 MS
QTC INTERVAL: 482 MS
QTC INTERVAL: 529 MS
RBC # BLD AUTO: 3.21 10*6/MM3 (ref 3.77–5.28)
RBC # BLD AUTO: 3.29 10*6/MM3 (ref 3.77–5.28)
RBC # BLD AUTO: 3.67 10*6/MM3 (ref 3.77–5.28)
RBC # BLD AUTO: 3.69 10*6/MM3 (ref 3.77–5.28)
RBC # BLD AUTO: 4.27 10*6/MM3 (ref 3.77–5.28)
RBC # BLD AUTO: 4.31 10*6/MM3 (ref 3.77–5.28)
RBC # FLD AUTO: 5000 /MM3 (ref 0–0)
SARS-COV-2 RNA PNL SPEC NAA+PROBE: NOT DETECTED
SODIUM SERPL-SCNC: 133 MMOL/L (ref 136–145)
SODIUM SERPL-SCNC: 135 MMOL/L (ref 136–145)
SODIUM SERPL-SCNC: 137 MMOL/L (ref 136–145)
SODIUM SERPL-SCNC: 138 MMOL/L (ref 136–145)
SODIUM SERPL-SCNC: 139 MMOL/L (ref 136–145)
SODIUM SERPL-SCNC: 140 MMOL/L (ref 136–145)
SODIUM SERPL-SCNC: 141 MMOL/L (ref 136–145)
SPECIMEN VOL FLD: 300 ML
STRESS TARGET HR: 119 BPM
TIBC SERPL-MCNC: 209 MCG/DL (ref 298–536)
TRANSFERRIN SERPL-MCNC: 140 MG/DL (ref 200–360)
TROPONIN T SERPL-MCNC: <0.01 NG/ML (ref 0–0.03)
VIT B12 BLD-MCNC: 310 PG/ML (ref 211–946)
WBC # BLD AUTO: 10.5 10*3/MM3 (ref 3.4–10.8)
WBC # BLD AUTO: 15.23 10*3/MM3 (ref 3.4–10.8)
WBC # BLD AUTO: 17.11 10*3/MM3 (ref 3.4–10.8)
WBC # BLD AUTO: 24.06 10*3/MM3 (ref 3.4–10.8)
WBC # BLD AUTO: 29.87 10*3/MM3 (ref 3.4–10.8)
WBC # BLD AUTO: 7.68 10*3/MM3 (ref 3.4–10.8)
WBC # FLD: 12 /MM3 (ref 0–5)
WHOLE BLOOD HOLD SPECIMEN: NORMAL
WHOLE BLOOD HOLD SPECIMEN: NORMAL

## 2021-01-01 PROCEDURE — 25010000002 PIPERACILLIN SOD-TAZOBACTAM PER 1 G: Performed by: INTERNAL MEDICINE

## 2021-01-01 PROCEDURE — 94799 UNLISTED PULMONARY SVC/PX: CPT

## 2021-01-01 PROCEDURE — 87040 BLOOD CULTURE FOR BACTERIA: CPT | Performed by: INTERNAL MEDICINE

## 2021-01-01 PROCEDURE — 25010000002 FUROSEMIDE PER 20 MG: Performed by: INTERNAL MEDICINE

## 2021-01-01 PROCEDURE — 25010000002 ONDANSETRON PER 1 MG: Performed by: INTERNAL MEDICINE

## 2021-01-01 PROCEDURE — 87186 SC STD MICRODIL/AGAR DIL: CPT | Performed by: INTERNAL MEDICINE

## 2021-01-01 PROCEDURE — 25010000002 DIGOXIN PER 500 MCG: Performed by: INTERNAL MEDICINE

## 2021-01-01 PROCEDURE — 99232 SBSQ HOSP IP/OBS MODERATE 35: CPT | Performed by: INTERNAL MEDICINE

## 2021-01-01 PROCEDURE — 88341 IMHCHEM/IMCYTCHM EA ADD ANTB: CPT

## 2021-01-01 PROCEDURE — 99214 OFFICE O/P EST MOD 30 MIN: CPT | Performed by: FAMILY MEDICINE

## 2021-01-01 PROCEDURE — 88342 IMHCHEM/IMCYTCHM 1ST ANTB: CPT

## 2021-01-01 PROCEDURE — 25010000002 METHYLPREDNISOLONE PER 125 MG: Performed by: INTERNAL MEDICINE

## 2021-01-01 PROCEDURE — 36415 COLL VENOUS BLD VENIPUNCTURE: CPT

## 2021-01-01 PROCEDURE — 25010000002 HYDRALAZINE PER 20 MG: Performed by: FAMILY MEDICINE

## 2021-01-01 PROCEDURE — G0157 HHC PT ASSISTANT EA 15: HCPCS

## 2021-01-01 PROCEDURE — 88305 TISSUE EXAM BY PATHOLOGIST: CPT

## 2021-01-01 PROCEDURE — 25010000002 NA FERRIC GLUC CPLX PER 12.5 MG: Performed by: INTERNAL MEDICINE

## 2021-01-01 PROCEDURE — 0W9B3ZX DRAINAGE OF LEFT PLEURAL CAVITY, PERCUTANEOUS APPROACH, DIAGNOSTIC: ICD-10-PCS | Performed by: RADIOLOGY

## 2021-01-01 PROCEDURE — 94664 DEMO&/EVAL PT USE INHALER: CPT

## 2021-01-01 PROCEDURE — 82728 ASSAY OF FERRITIN: CPT | Performed by: INTERNAL MEDICINE

## 2021-01-01 PROCEDURE — G0158 HHC OT ASSISTANT EA 15: HCPCS

## 2021-01-01 PROCEDURE — 87205 SMEAR GRAM STAIN: CPT | Performed by: INTERNAL MEDICINE

## 2021-01-01 PROCEDURE — G0155 HHCP-SVS OF CSW,EA 15 MIN: HCPCS

## 2021-01-01 PROCEDURE — G0438 PPPS, INITIAL VISIT: HCPCS | Performed by: FAMILY MEDICINE

## 2021-01-01 PROCEDURE — 96372 THER/PROPH/DIAG INJ SC/IM: CPT | Performed by: FAMILY MEDICINE

## 2021-01-01 PROCEDURE — 93005 ELECTROCARDIOGRAM TRACING: CPT

## 2021-01-01 PROCEDURE — 82042 OTHER SOURCE ALBUMIN QUAN EA: CPT | Performed by: INTERNAL MEDICINE

## 2021-01-01 PROCEDURE — 80053 COMPREHEN METABOLIC PANEL: CPT | Performed by: FAMILY MEDICINE

## 2021-01-01 PROCEDURE — 88112 CYTOPATH CELL ENHANCE TECH: CPT

## 2021-01-01 PROCEDURE — 89051 BODY FLUID CELL COUNT: CPT | Performed by: INTERNAL MEDICINE

## 2021-01-01 PROCEDURE — 80053 COMPREHEN METABOLIC PANEL: CPT | Performed by: INTERNAL MEDICINE

## 2021-01-01 PROCEDURE — 80048 BASIC METABOLIC PNL TOTAL CA: CPT | Performed by: INTERNAL MEDICINE

## 2021-01-01 PROCEDURE — 25010000002 IOPAMIDOL 61 % SOLUTION: Performed by: FAMILY MEDICINE

## 2021-01-01 PROCEDURE — 94760 N-INVAS EAR/PLS OXIMETRY 1: CPT

## 2021-01-01 PROCEDURE — 87077 CULTURE AEROBIC IDENTIFY: CPT | Performed by: INTERNAL MEDICINE

## 2021-01-01 PROCEDURE — 93308 TTE F-UP OR LMTD: CPT

## 2021-01-01 PROCEDURE — 75989 ABSCESS DRAINAGE UNDER X-RAY: CPT

## 2021-01-01 PROCEDURE — G0299 HHS/HOSPICE OF RN EA 15 MIN: HCPCS

## 2021-01-01 PROCEDURE — 88185 FLOWCYTOMETRY/TC ADD-ON: CPT

## 2021-01-01 PROCEDURE — 85025 COMPLETE CBC W/AUTO DIFF WBC: CPT | Performed by: FAMILY MEDICINE

## 2021-01-01 PROCEDURE — 82945 GLUCOSE OTHER FLUID: CPT | Performed by: INTERNAL MEDICINE

## 2021-01-01 PROCEDURE — 99213 OFFICE O/P EST LOW 20 MIN: CPT | Performed by: FAMILY MEDICINE

## 2021-01-01 PROCEDURE — 83540 ASSAY OF IRON: CPT | Performed by: INTERNAL MEDICINE

## 2021-01-01 PROCEDURE — 88184 FLOWCYTOMETRY/ TC 1 MARKER: CPT | Performed by: INTERNAL MEDICINE

## 2021-01-01 PROCEDURE — 93325 DOPPLER ECHO COLOR FLOW MAPG: CPT

## 2021-01-01 PROCEDURE — 25010000002 ALBUMIN HUMAN 25% PER 50 ML: Performed by: INTERNAL MEDICINE

## 2021-01-01 PROCEDURE — 83986 ASSAY PH BODY FLUID NOS: CPT | Performed by: INTERNAL MEDICINE

## 2021-01-01 PROCEDURE — 99285 EMERGENCY DEPT VISIT HI MDM: CPT

## 2021-01-01 PROCEDURE — 25010000002 FUROSEMIDE PER 20 MG: Performed by: FAMILY MEDICINE

## 2021-01-01 PROCEDURE — 63710000001 ONDANSETRON PER 8 MG: Performed by: INTERNAL MEDICINE

## 2021-01-01 PROCEDURE — 71046 X-RAY EXAM CHEST 2 VIEWS: CPT

## 2021-01-01 PROCEDURE — 83880 ASSAY OF NATRIURETIC PEPTIDE: CPT | Performed by: FAMILY MEDICINE

## 2021-01-01 PROCEDURE — 83605 ASSAY OF LACTIC ACID: CPT | Performed by: FAMILY MEDICINE

## 2021-01-01 PROCEDURE — 93010 ELECTROCARDIOGRAM REPORT: CPT | Performed by: INTERNAL MEDICINE

## 2021-01-01 PROCEDURE — 85610 PROTHROMBIN TIME: CPT | Performed by: INTERNAL MEDICINE

## 2021-01-01 PROCEDURE — 82746 ASSAY OF FOLIC ACID SERUM: CPT | Performed by: INTERNAL MEDICINE

## 2021-01-01 PROCEDURE — 99232 SBSQ HOSP IP/OBS MODERATE 35: CPT | Performed by: NURSE PRACTITIONER

## 2021-01-01 PROCEDURE — 84466 ASSAY OF TRANSFERRIN: CPT | Performed by: INTERNAL MEDICINE

## 2021-01-01 PROCEDURE — 93321 DOPPLER ECHO F-UP/LMTD STD: CPT

## 2021-01-01 PROCEDURE — 82607 VITAMIN B-12: CPT | Performed by: INTERNAL MEDICINE

## 2021-01-01 PROCEDURE — 87636 SARSCOV2 & INF A&B AMP PRB: CPT | Performed by: FAMILY MEDICINE

## 2021-01-01 PROCEDURE — 99222 1ST HOSP IP/OBS MODERATE 55: CPT | Performed by: NURSE PRACTITIONER

## 2021-01-01 PROCEDURE — 87070 CULTURE OTHR SPECIMN AEROBIC: CPT | Performed by: INTERNAL MEDICINE

## 2021-01-01 PROCEDURE — 83615 LACTATE (LD) (LDH) ENZYME: CPT | Performed by: INTERNAL MEDICINE

## 2021-01-01 PROCEDURE — 71260 CT THORAX DX C+: CPT

## 2021-01-01 PROCEDURE — 87040 BLOOD CULTURE FOR BACTERIA: CPT | Performed by: FAMILY MEDICINE

## 2021-01-01 PROCEDURE — 93005 ELECTROCARDIOGRAM TRACING: CPT | Performed by: HOSPITALIST

## 2021-01-01 PROCEDURE — 0BBL3ZX EXCISION OF LEFT LUNG, PERCUTANEOUS APPROACH, DIAGNOSTIC: ICD-10-PCS | Performed by: RADIOLOGY

## 2021-01-01 PROCEDURE — 85025 COMPLETE CBC W/AUTO DIFF WBC: CPT | Performed by: INTERNAL MEDICINE

## 2021-01-01 PROCEDURE — 71250 CT THORAX DX C-: CPT

## 2021-01-01 PROCEDURE — 88333 PATH CONSLTJ SURG CYTO XM 1: CPT

## 2021-01-01 PROCEDURE — 99222 1ST HOSP IP/OBS MODERATE 55: CPT | Performed by: INTERNAL MEDICINE

## 2021-01-01 PROCEDURE — G0152 HHCP-SERV OF OT,EA 15 MIN: HCPCS

## 2021-01-01 PROCEDURE — 94640 AIRWAY INHALATION TREATMENT: CPT | Performed by: FAMILY MEDICINE

## 2021-01-01 PROCEDURE — 84157 ASSAY OF PROTEIN OTHER: CPT | Performed by: INTERNAL MEDICINE

## 2021-01-01 PROCEDURE — G0151 HHCP-SERV OF PT,EA 15 MIN: HCPCS

## 2021-01-01 PROCEDURE — P9047 ALBUMIN (HUMAN), 25%, 50ML: HCPCS | Performed by: INTERNAL MEDICINE

## 2021-01-01 PROCEDURE — 25010000002 LORAZEPAM PER 2 MG: Performed by: FAMILY MEDICINE

## 2021-01-01 PROCEDURE — 84484 ASSAY OF TROPONIN QUANT: CPT | Performed by: FAMILY MEDICINE

## 2021-01-01 PROCEDURE — 25010000002 ENOXAPARIN PER 10 MG: Performed by: INTERNAL MEDICINE

## 2021-01-01 PROCEDURE — 94640 AIRWAY INHALATION TREATMENT: CPT

## 2021-01-01 PROCEDURE — 36415 COLL VENOUS BLD VENIPUNCTURE: CPT | Performed by: INTERNAL MEDICINE

## 2021-01-01 PROCEDURE — 99233 SBSQ HOSP IP/OBS HIGH 50: CPT | Performed by: INTERNAL MEDICINE

## 2021-01-01 PROCEDURE — 93005 ELECTROCARDIOGRAM TRACING: CPT | Performed by: FAMILY MEDICINE

## 2021-01-01 PROCEDURE — 71045 X-RAY EXAM CHEST 1 VIEW: CPT

## 2021-01-01 PROCEDURE — G0180 MD CERTIFICATION HHA PATIENT: HCPCS | Performed by: FAMILY MEDICINE

## 2021-01-01 RX ORDER — HYDRALAZINE HYDROCHLORIDE 50 MG/1
50 TABLET, FILM COATED ORAL 3 TIMES DAILY
Status: DISCONTINUED | OUTPATIENT
Start: 2021-01-01 | End: 2021-01-01 | Stop reason: HOSPADM

## 2021-01-01 RX ORDER — ACETYLCYSTEINE 100 MG/ML
2 SOLUTION ORAL; RESPIRATORY (INHALATION)
Status: DISCONTINUED | OUTPATIENT
Start: 2021-01-01 | End: 2021-01-01 | Stop reason: HOSPADM

## 2021-01-01 RX ORDER — METHYLPREDNISOLONE SODIUM SUCCINATE 125 MG/2ML
60 INJECTION, POWDER, LYOPHILIZED, FOR SOLUTION INTRAMUSCULAR; INTRAVENOUS EVERY 8 HOURS
Status: DISCONTINUED | OUTPATIENT
Start: 2021-01-01 | End: 2021-01-01 | Stop reason: HOSPADM

## 2021-01-01 RX ORDER — FUROSEMIDE 10 MG/ML
80 INJECTION INTRAMUSCULAR; INTRAVENOUS ONCE
Status: COMPLETED | OUTPATIENT
Start: 2021-01-01 | End: 2021-01-01

## 2021-01-01 RX ORDER — TRIAMCINOLONE ACETONIDE 40 MG/ML
80 INJECTION, SUSPENSION INTRA-ARTICULAR; INTRAMUSCULAR ONCE
Status: COMPLETED | OUTPATIENT
Start: 2021-01-01 | End: 2021-01-01

## 2021-01-01 RX ORDER — ONDANSETRON 2 MG/ML
4 INJECTION INTRAMUSCULAR; INTRAVENOUS EVERY 6 HOURS PRN
Status: DISCONTINUED | OUTPATIENT
Start: 2021-01-01 | End: 2021-01-01 | Stop reason: HOSPADM

## 2021-01-01 RX ORDER — FUROSEMIDE 40 MG/1
40 TABLET ORAL DAILY
Status: DISCONTINUED | OUTPATIENT
Start: 2021-01-01 | End: 2021-01-01

## 2021-01-01 RX ORDER — SODIUM CHLORIDE 0.9 % (FLUSH) 0.9 %
10 SYRINGE (ML) INJECTION EVERY 12 HOURS SCHEDULED
Status: DISCONTINUED | OUTPATIENT
Start: 2021-01-01 | End: 2021-01-01 | Stop reason: HOSPADM

## 2021-01-01 RX ORDER — SODIUM CHLORIDE 0.9 % (FLUSH) 0.9 %
10 SYRINGE (ML) INJECTION AS NEEDED
Status: DISCONTINUED | OUTPATIENT
Start: 2021-01-01 | End: 2021-01-01 | Stop reason: HOSPADM

## 2021-01-01 RX ORDER — ONDANSETRON 4 MG/1
4 TABLET, FILM COATED ORAL EVERY 6 HOURS PRN
Status: DISCONTINUED | OUTPATIENT
Start: 2021-01-01 | End: 2021-01-01 | Stop reason: HOSPADM

## 2021-01-01 RX ORDER — ACETAMINOPHEN 650 MG/1
650 SUPPOSITORY RECTAL EVERY 4 HOURS PRN
Status: DISCONTINUED | OUTPATIENT
Start: 2021-01-01 | End: 2021-01-01 | Stop reason: HOSPADM

## 2021-01-01 RX ORDER — ALBUMIN (HUMAN) 12.5 G/50ML
12.5 SOLUTION INTRAVENOUS ONCE
Status: COMPLETED | OUTPATIENT
Start: 2021-01-01 | End: 2021-01-01

## 2021-01-01 RX ORDER — METOPROLOL TARTRATE 50 MG/1
50 TABLET, FILM COATED ORAL EVERY 12 HOURS SCHEDULED
Status: DISCONTINUED | OUTPATIENT
Start: 2021-01-01 | End: 2021-01-01

## 2021-01-01 RX ORDER — POLYETHYLENE GLYCOL 3350 17 G/17G
17 POWDER, FOR SOLUTION ORAL DAILY PRN
Status: DISCONTINUED | OUTPATIENT
Start: 2021-01-01 | End: 2021-01-01

## 2021-01-01 RX ORDER — LEVOFLOXACIN 5 MG/ML
750 INJECTION, SOLUTION INTRAVENOUS ONCE
Status: DISCONTINUED | OUTPATIENT
Start: 2021-01-01 | End: 2021-01-01 | Stop reason: HOSPADM

## 2021-01-01 RX ORDER — FOLIC ACID 1 MG/1
1 TABLET ORAL DAILY
Status: DISCONTINUED | OUTPATIENT
Start: 2021-01-01 | End: 2021-01-01 | Stop reason: HOSPADM

## 2021-01-01 RX ORDER — ACETYLCYSTEINE 100 MG/ML
4 SOLUTION ORAL; RESPIRATORY (INHALATION)
Status: DISCONTINUED | OUTPATIENT
Start: 2021-01-01 | End: 2021-01-01

## 2021-01-01 RX ORDER — LISINOPRIL 40 MG/1
40 TABLET ORAL DAILY
Qty: 90 TABLET | Refills: 3 | Status: SHIPPED | OUTPATIENT
Start: 2021-01-01

## 2021-01-01 RX ORDER — GABAPENTIN 300 MG/1
300 CAPSULE ORAL NIGHTLY
Status: DISCONTINUED | OUTPATIENT
Start: 2021-01-01 | End: 2021-01-01 | Stop reason: HOSPADM

## 2021-01-01 RX ORDER — IPRATROPIUM BROMIDE AND ALBUTEROL SULFATE 2.5; .5 MG/3ML; MG/3ML
3 SOLUTION RESPIRATORY (INHALATION)
Status: DISCONTINUED | OUTPATIENT
Start: 2021-01-01 | End: 2021-01-01 | Stop reason: HOSPADM

## 2021-01-01 RX ORDER — TRAMADOL HYDROCHLORIDE 50 MG/1
50 TABLET ORAL EVERY 6 HOURS PRN
Qty: 30 TABLET | Refills: 0 | Status: SHIPPED | OUTPATIENT
Start: 2021-01-01 | End: 2021-01-01 | Stop reason: HOSPADM

## 2021-01-01 RX ORDER — METOPROLOL TARTRATE 5 MG/5ML
5 INJECTION INTRAVENOUS ONCE
Status: COMPLETED | OUTPATIENT
Start: 2021-01-01 | End: 2021-01-01

## 2021-01-01 RX ORDER — ALBUTEROL SULFATE 2.5 MG/3ML
2.5 SOLUTION RESPIRATORY (INHALATION) EVERY 4 HOURS PRN
Status: DISCONTINUED | OUTPATIENT
Start: 2021-01-01 | End: 2021-01-01 | Stop reason: HOSPADM

## 2021-01-01 RX ORDER — ZINC SULFATE 50(220)MG
220 CAPSULE ORAL DAILY
Status: DISCONTINUED | OUTPATIENT
Start: 2021-01-01 | End: 2021-01-01 | Stop reason: HOSPADM

## 2021-01-01 RX ORDER — CLOPIDOGREL BISULFATE 75 MG/1
75 TABLET ORAL DAILY
Qty: 90 TABLET | Refills: 3 | Status: SHIPPED | OUTPATIENT
Start: 2021-01-01 | End: 2021-01-01 | Stop reason: HOSPADM

## 2021-01-01 RX ORDER — AMOXICILLIN 250 MG
2 CAPSULE ORAL 2 TIMES DAILY
Status: DISCONTINUED | OUTPATIENT
Start: 2021-01-01 | End: 2021-01-01 | Stop reason: HOSPADM

## 2021-01-01 RX ORDER — GABAPENTIN 300 MG/1
300-600 CAPSULE ORAL NIGHTLY
Qty: 180 CAPSULE | Refills: 1 | Status: SHIPPED | OUTPATIENT
Start: 2021-01-01

## 2021-01-01 RX ORDER — BISACODYL 10 MG
10 SUPPOSITORY, RECTAL RECTAL DAILY PRN
Status: DISCONTINUED | OUTPATIENT
Start: 2021-01-01 | End: 2021-01-01 | Stop reason: HOSPADM

## 2021-01-01 RX ORDER — TRAMADOL HYDROCHLORIDE 50 MG/1
50 TABLET ORAL EVERY 6 HOURS PRN
Status: DISCONTINUED | OUTPATIENT
Start: 2021-01-01 | End: 2021-01-01 | Stop reason: SDUPTHER

## 2021-01-01 RX ORDER — FENTANYL 25 UG/H
1 PATCH TRANSDERMAL
Status: DISCONTINUED | OUTPATIENT
Start: 2021-01-01 | End: 2021-01-01 | Stop reason: HOSPADM

## 2021-01-01 RX ORDER — FENTANYL 25 UG/H
1 PATCH TRANSDERMAL
Qty: 3 PATCH | Refills: 0 | Status: SHIPPED | OUTPATIENT
Start: 2021-01-01 | End: 2021-01-01

## 2021-01-01 RX ORDER — LANOLIN ALCOHOL/MO/W.PET/CERES
1000 CREAM (GRAM) TOPICAL DAILY
Status: DISCONTINUED | OUTPATIENT
Start: 2021-01-01 | End: 2021-01-01 | Stop reason: HOSPADM

## 2021-01-01 RX ORDER — HYDRALAZINE HYDROCHLORIDE 50 MG/1
50 TABLET, FILM COATED ORAL 3 TIMES DAILY
Status: ON HOLD | COMMUNITY
Start: 2021-01-01 | End: 2021-01-01

## 2021-01-01 RX ORDER — ASPIRIN 81 MG/1
81 TABLET ORAL DAILY
Status: CANCELLED | OUTPATIENT
Start: 2021-01-01

## 2021-01-01 RX ORDER — CLOPIDOGREL BISULFATE 75 MG/1
75 TABLET ORAL DAILY
Status: CANCELLED | OUTPATIENT
Start: 2021-01-01

## 2021-01-01 RX ORDER — METOPROLOL TARTRATE 50 MG/1
50 TABLET, FILM COATED ORAL EVERY 12 HOURS SCHEDULED
Qty: 180 TABLET | Refills: 2 | Status: SHIPPED | OUTPATIENT
Start: 2021-01-01

## 2021-01-01 RX ORDER — BUDESONIDE AND FORMOTEROL FUMARATE DIHYDRATE 160; 4.5 UG/1; UG/1
2 AEROSOL RESPIRATORY (INHALATION)
Qty: 1 EACH | Refills: 12 | Status: SHIPPED | OUTPATIENT
Start: 2021-01-01

## 2021-01-01 RX ORDER — FAMOTIDINE 20 MG/1
20 TABLET, FILM COATED ORAL 2 TIMES DAILY
Status: DISCONTINUED | OUTPATIENT
Start: 2021-01-01 | End: 2021-01-01 | Stop reason: HOSPADM

## 2021-01-01 RX ORDER — ASPIRIN 81 MG/1
81 TABLET ORAL DAILY
Status: DISCONTINUED | OUTPATIENT
Start: 2021-01-01 | End: 2021-01-01

## 2021-01-01 RX ORDER — AMIODARONE HYDROCHLORIDE 200 MG/1
100 TABLET ORAL DAILY
Status: DISCONTINUED | OUTPATIENT
Start: 2021-01-01 | End: 2021-01-01 | Stop reason: HOSPADM

## 2021-01-01 RX ORDER — OXYCODONE HCL 5 MG/5 ML
5 SOLUTION, ORAL ORAL
Qty: 15 ML | Refills: 0 | Status: SHIPPED | OUTPATIENT
Start: 2021-01-01

## 2021-01-01 RX ORDER — BISACODYL 10 MG
10 SUPPOSITORY, RECTAL RECTAL DAILY PRN
Status: DISCONTINUED | OUTPATIENT
Start: 2021-01-01 | End: 2021-01-01

## 2021-01-01 RX ORDER — FERROUS SULFATE TAB EC 324 MG (65 MG FE EQUIVALENT) 324 (65 FE) MG
324 TABLET DELAYED RESPONSE ORAL
Status: DISCONTINUED | OUTPATIENT
Start: 2021-01-01 | End: 2021-01-01

## 2021-01-01 RX ORDER — LORAZEPAM 2 MG/ML
1 INJECTION INTRAMUSCULAR EVERY 4 HOURS PRN
Status: DISCONTINUED | OUTPATIENT
Start: 2021-01-01 | End: 2021-01-01 | Stop reason: HOSPADM

## 2021-01-01 RX ORDER — HYDRALAZINE HYDROCHLORIDE 20 MG/ML
20 INJECTION INTRAMUSCULAR; INTRAVENOUS ONCE
Status: COMPLETED | OUTPATIENT
Start: 2021-01-01 | End: 2021-01-01

## 2021-01-01 RX ORDER — ONDANSETRON 4 MG/1
4 TABLET, FILM COATED ORAL EVERY 6 HOURS PRN
Qty: 15 TABLET | Refills: 0 | Status: SHIPPED | OUTPATIENT
Start: 2021-01-01

## 2021-01-01 RX ORDER — ACETAMINOPHEN 160 MG/5ML
650 SOLUTION ORAL EVERY 4 HOURS PRN
Status: DISCONTINUED | OUTPATIENT
Start: 2021-01-01 | End: 2021-01-01 | Stop reason: HOSPADM

## 2021-01-01 RX ORDER — IPRATROPIUM BROMIDE AND ALBUTEROL SULFATE 2.5; .5 MG/3ML; MG/3ML
3 SOLUTION RESPIRATORY (INHALATION)
Status: DISCONTINUED | OUTPATIENT
Start: 2021-01-01 | End: 2021-01-01

## 2021-01-01 RX ORDER — DIGOXIN 0.25 MG/ML
250 INJECTION INTRAMUSCULAR; INTRAVENOUS ONCE
Status: COMPLETED | OUTPATIENT
Start: 2021-01-01 | End: 2021-01-01

## 2021-01-01 RX ORDER — CLOPIDOGREL BISULFATE 75 MG/1
75 TABLET ORAL EVERY OTHER DAY
Status: DISCONTINUED | OUTPATIENT
Start: 2021-01-01 | End: 2021-01-01

## 2021-01-01 RX ORDER — NYSTATIN 100000 U/G
OINTMENT TOPICAL 3 TIMES DAILY
Qty: 30 G | Refills: 1 | Status: SHIPPED | OUTPATIENT
Start: 2021-01-01 | End: 2021-01-01

## 2021-01-01 RX ORDER — ACETAMINOPHEN 325 MG/1
650 TABLET ORAL EVERY 4 HOURS PRN
Status: DISCONTINUED | OUTPATIENT
Start: 2021-01-01 | End: 2021-01-01 | Stop reason: HOSPADM

## 2021-01-01 RX ORDER — IPRATROPIUM BROMIDE AND ALBUTEROL SULFATE 2.5; .5 MG/3ML; MG/3ML
3 SOLUTION RESPIRATORY (INHALATION) AS NEEDED
Status: COMPLETED | OUTPATIENT
Start: 2021-01-01 | End: 2021-01-01

## 2021-01-01 RX ORDER — BISACODYL 5 MG/1
5 TABLET, DELAYED RELEASE ORAL DAILY PRN
Status: DISCONTINUED | OUTPATIENT
Start: 2021-01-01 | End: 2021-01-01

## 2021-01-01 RX ORDER — AMOXICILLIN 250 MG
2 CAPSULE ORAL 2 TIMES DAILY
Status: DISCONTINUED | OUTPATIENT
Start: 2021-01-01 | End: 2021-01-01

## 2021-01-01 RX ORDER — MULTIPLE VITAMINS W/ MINERALS TAB 9MG-400MCG
1 TAB ORAL 2 TIMES DAILY
Status: DISCONTINUED | OUTPATIENT
Start: 2021-01-01 | End: 2021-01-01 | Stop reason: HOSPADM

## 2021-01-01 RX ORDER — LISINOPRIL 20 MG/1
20 TABLET ORAL DAILY
Status: DISCONTINUED | OUTPATIENT
Start: 2021-01-01 | End: 2021-01-01

## 2021-01-01 RX ORDER — BUDESONIDE AND FORMOTEROL FUMARATE DIHYDRATE 160; 4.5 UG/1; UG/1
2 AEROSOL RESPIRATORY (INHALATION)
Status: DISCONTINUED | OUTPATIENT
Start: 2021-01-01 | End: 2021-01-01 | Stop reason: HOSPADM

## 2021-01-01 RX ORDER — LISINOPRIL 20 MG/1
20 TABLET ORAL
Status: DISCONTINUED | OUTPATIENT
Start: 2021-01-01 | End: 2021-01-01 | Stop reason: HOSPADM

## 2021-01-01 RX ORDER — BISACODYL 5 MG/1
5 TABLET, DELAYED RELEASE ORAL DAILY PRN
Status: DISCONTINUED | OUTPATIENT
Start: 2021-01-01 | End: 2021-01-01 | Stop reason: HOSPADM

## 2021-01-01 RX ORDER — ALUMINA, MAGNESIA, AND SIMETHICONE 2400; 2400; 240 MG/30ML; MG/30ML; MG/30ML
15 SUSPENSION ORAL EVERY 6 HOURS PRN
Status: DISCONTINUED | OUTPATIENT
Start: 2021-01-01 | End: 2021-01-01 | Stop reason: HOSPADM

## 2021-01-01 RX ORDER — FUROSEMIDE 10 MG/ML
20 INJECTION INTRAMUSCULAR; INTRAVENOUS DAILY
Status: DISCONTINUED | OUTPATIENT
Start: 2021-01-01 | End: 2021-01-01 | Stop reason: HOSPADM

## 2021-01-01 RX ORDER — LISINOPRIL 40 MG/1
40 TABLET ORAL DAILY
Status: DISCONTINUED | OUTPATIENT
Start: 2021-01-01 | End: 2021-01-01

## 2021-01-01 RX ORDER — OXYCODONE HCL 5 MG/5 ML
5 SOLUTION, ORAL ORAL
Status: DISCONTINUED | OUTPATIENT
Start: 2021-01-01 | End: 2021-01-01 | Stop reason: HOSPADM

## 2021-01-01 RX ORDER — LORAZEPAM 1 MG/1
1 TABLET ORAL EVERY 8 HOURS PRN
Qty: 9 TABLET | Refills: 0 | Status: SHIPPED | OUTPATIENT
Start: 2021-01-01 | End: 2021-01-01

## 2021-01-01 RX ORDER — POLYETHYLENE GLYCOL 3350 17 G/17G
17 POWDER, FOR SOLUTION ORAL DAILY
Status: DISCONTINUED | OUTPATIENT
Start: 2021-01-01 | End: 2021-01-01 | Stop reason: HOSPADM

## 2021-01-01 RX ORDER — LORAZEPAM 0.5 MG/1
1 TABLET ORAL EVERY 4 HOURS PRN
Status: DISCONTINUED | OUTPATIENT
Start: 2021-01-01 | End: 2021-01-01 | Stop reason: HOSPADM

## 2021-01-01 RX ADMIN — ACETAMINOPHEN 650 MG: 325 TABLET, FILM COATED ORAL at 17:28

## 2021-01-01 RX ADMIN — HYDRALAZINE HYDROCHLORIDE 50 MG: 50 TABLET, FILM COATED ORAL at 21:49

## 2021-01-01 RX ADMIN — ACETYLCYSTEINE 4 ML: 100 SOLUTION ORAL; RESPIRATORY (INHALATION) at 15:24

## 2021-01-01 RX ADMIN — ACETYLCYSTEINE 4 ML: 100 SOLUTION ORAL; RESPIRATORY (INHALATION) at 07:28

## 2021-01-01 RX ADMIN — ACETYLCYSTEINE 4 ML: 100 SOLUTION ORAL; RESPIRATORY (INHALATION) at 07:26

## 2021-01-01 RX ADMIN — FOLIC ACID 1 MG: 1 TABLET ORAL at 08:27

## 2021-01-01 RX ADMIN — BUDESONIDE AND FORMOTEROL FUMARATE DIHYDRATE 2 PUFF: 160; 4.5 AEROSOL RESPIRATORY (INHALATION) at 07:51

## 2021-01-01 RX ADMIN — CYANOCOBALAMIN TAB 1000 MCG 1000 MCG: 1000 TAB at 08:23

## 2021-01-01 RX ADMIN — IPRATROPIUM BROMIDE AND ALBUTEROL SULFATE 3 ML: 2.5; .5 SOLUTION RESPIRATORY (INHALATION) at 18:05

## 2021-01-01 RX ADMIN — LORAZEPAM 1 MG: 2 INJECTION INTRAMUSCULAR; INTRAVENOUS at 14:32

## 2021-01-01 RX ADMIN — GABAPENTIN 300 MG: 300 CAPSULE ORAL at 20:28

## 2021-01-01 RX ADMIN — HYDRALAZINE HYDROCHLORIDE 50 MG: 50 TABLET, FILM COATED ORAL at 20:33

## 2021-01-01 RX ADMIN — METOPROLOL TARTRATE 25 MG: 25 TABLET, FILM COATED ORAL at 20:24

## 2021-01-01 RX ADMIN — ALBUTEROL SULFATE 2.5 MG: 2.5 SOLUTION RESPIRATORY (INHALATION) at 23:30

## 2021-01-01 RX ADMIN — METHYLPREDNISOLONE SODIUM SUCCINATE 60 MG: 125 INJECTION, POWDER, FOR SOLUTION INTRAMUSCULAR; INTRAVENOUS at 18:01

## 2021-01-01 RX ADMIN — Medication 100 MG: at 08:27

## 2021-01-01 RX ADMIN — METHYLPREDNISOLONE SODIUM SUCCINATE 60 MG: 125 INJECTION, POWDER, FOR SOLUTION INTRAMUSCULAR; INTRAVENOUS at 18:24

## 2021-01-01 RX ADMIN — METHYLPREDNISOLONE SODIUM SUCCINATE 60 MG: 125 INJECTION, POWDER, FOR SOLUTION INTRAMUSCULAR; INTRAVENOUS at 00:48

## 2021-01-01 RX ADMIN — CYANOCOBALAMIN TAB 1000 MCG 1000 MCG: 1000 TAB at 08:28

## 2021-01-01 RX ADMIN — ZINC SULFATE 220 MG (50 MG) CAPSULE 220 MG: CAPSULE at 08:52

## 2021-01-01 RX ADMIN — METHYLPREDNISOLONE SODIUM SUCCINATE 60 MG: 125 INJECTION, POWDER, FOR SOLUTION INTRAMUSCULAR; INTRAVENOUS at 12:02

## 2021-01-01 RX ADMIN — ACETAMINOPHEN 650 MG: 325 TABLET, FILM COATED ORAL at 15:34

## 2021-01-01 RX ADMIN — ZINC SULFATE 220 MG (50 MG) CAPSULE 220 MG: CAPSULE at 09:30

## 2021-01-01 RX ADMIN — HYDRALAZINE HYDROCHLORIDE 50 MG: 50 TABLET, FILM COATED ORAL at 08:23

## 2021-01-01 RX ADMIN — SODIUM CHLORIDE, PRESERVATIVE FREE 10 ML: 5 INJECTION INTRAVENOUS at 21:28

## 2021-01-01 RX ADMIN — METHYLPREDNISOLONE SODIUM SUCCINATE 60 MG: 125 INJECTION, POWDER, FOR SOLUTION INTRAMUSCULAR; INTRAVENOUS at 01:45

## 2021-01-01 RX ADMIN — METOPROLOL TARTRATE 5 MG: 5 INJECTION INTRAVENOUS at 12:10

## 2021-01-01 RX ADMIN — IPRATROPIUM BROMIDE AND ALBUTEROL SULFATE 3 ML: 2.5; .5 SOLUTION RESPIRATORY (INHALATION) at 06:32

## 2021-01-01 RX ADMIN — ACETYLCYSTEINE 4 ML: 100 SOLUTION ORAL; RESPIRATORY (INHALATION) at 03:01

## 2021-01-01 RX ADMIN — Medication 1 TABLET: at 08:38

## 2021-01-01 RX ADMIN — SODIUM CHLORIDE, PRESERVATIVE FREE 10 ML: 5 INJECTION INTRAVENOUS at 20:15

## 2021-01-01 RX ADMIN — METOPROLOL TARTRATE 25 MG: 25 TABLET, FILM COATED ORAL at 20:33

## 2021-01-01 RX ADMIN — IPRATROPIUM BROMIDE AND ALBUTEROL SULFATE 3 ML: 2.5; .5 SOLUTION RESPIRATORY (INHALATION) at 10:57

## 2021-01-01 RX ADMIN — HYDRALAZINE HYDROCHLORIDE 50 MG: 50 TABLET, FILM COATED ORAL at 20:15

## 2021-01-01 RX ADMIN — HYDRALAZINE HYDROCHLORIDE 50 MG: 50 TABLET, FILM COATED ORAL at 09:31

## 2021-01-01 RX ADMIN — GABAPENTIN 300 MG: 300 CAPSULE ORAL at 22:35

## 2021-01-01 RX ADMIN — ACETYLCYSTEINE 4 ML: 100 SOLUTION ORAL; RESPIRATORY (INHALATION) at 23:30

## 2021-01-01 RX ADMIN — PIPERACILLIN SODIUM AND TAZOBACTAM SODIUM 3.38 G: 3; .375 INJECTION, POWDER, LYOPHILIZED, FOR SOLUTION INTRAVENOUS at 08:25

## 2021-01-01 RX ADMIN — OXYCODONE HYDROCHLORIDE 5 MG: 5 SOLUTION ORAL at 14:49

## 2021-01-01 RX ADMIN — BUDESONIDE AND FORMOTEROL FUMARATE DIHYDRATE 2 PUFF: 160; 4.5 AEROSOL RESPIRATORY (INHALATION) at 07:20

## 2021-01-01 RX ADMIN — SODIUM CHLORIDE, PRESERVATIVE FREE 10 ML: 5 INJECTION INTRAVENOUS at 21:20

## 2021-01-01 RX ADMIN — DOCUSATE SODIUM 50 MG AND SENNOSIDES 8.6 MG 2 TABLET: 8.6; 5 TABLET, FILM COATED ORAL at 08:46

## 2021-01-01 RX ADMIN — BUDESONIDE AND FORMOTEROL FUMARATE DIHYDRATE 2 PUFF: 160; 4.5 AEROSOL RESPIRATORY (INHALATION) at 06:40

## 2021-01-01 RX ADMIN — METOPROLOL TARTRATE 25 MG: 25 TABLET, FILM COATED ORAL at 08:38

## 2021-01-01 RX ADMIN — Medication 1 TABLET: at 20:24

## 2021-01-01 RX ADMIN — Medication 100 MG: at 09:30

## 2021-01-01 RX ADMIN — ACETYLCYSTEINE 4 ML: 100 SOLUTION ORAL; RESPIRATORY (INHALATION) at 11:40

## 2021-01-01 RX ADMIN — FOLIC ACID 1 MG: 1 TABLET ORAL at 08:23

## 2021-01-01 RX ADMIN — HYDRALAZINE HYDROCHLORIDE 50 MG: 50 TABLET, FILM COATED ORAL at 15:05

## 2021-01-01 RX ADMIN — SODIUM CHLORIDE, PRESERVATIVE FREE 10 ML: 5 INJECTION INTRAVENOUS at 08:30

## 2021-01-01 RX ADMIN — BUDESONIDE AND FORMOTEROL FUMARATE DIHYDRATE 2 PUFF: 160; 4.5 AEROSOL RESPIRATORY (INHALATION) at 07:28

## 2021-01-01 RX ADMIN — IPRATROPIUM BROMIDE AND ALBUTEROL SULFATE 3 ML: 2.5; .5 SOLUTION RESPIRATORY (INHALATION) at 15:29

## 2021-01-01 RX ADMIN — METHYLPREDNISOLONE SODIUM SUCCINATE 60 MG: 125 INJECTION, POWDER, FOR SOLUTION INTRAMUSCULAR; INTRAVENOUS at 13:17

## 2021-01-01 RX ADMIN — DOCUSATE SODIUM 50 MG AND SENNOSIDES 8.6 MG 2 TABLET: 8.6; 5 TABLET, FILM COATED ORAL at 09:29

## 2021-01-01 RX ADMIN — DOCUSATE SODIUM 50 MG AND SENNOSIDES 8.6 MG 2 TABLET: 8.6; 5 TABLET, FILM COATED ORAL at 20:18

## 2021-01-01 RX ADMIN — METHYLPREDNISOLONE SODIUM SUCCINATE 60 MG: 125 INJECTION, POWDER, FOR SOLUTION INTRAMUSCULAR; INTRAVENOUS at 00:45

## 2021-01-01 RX ADMIN — METOPROLOL TARTRATE 25 MG: 25 TABLET, FILM COATED ORAL at 08:47

## 2021-01-01 RX ADMIN — METHYLPREDNISOLONE SODIUM SUCCINATE 60 MG: 125 INJECTION, POWDER, FOR SOLUTION INTRAMUSCULAR; INTRAVENOUS at 16:52

## 2021-01-01 RX ADMIN — ACETAMINOPHEN 650 MG: 325 TABLET, FILM COATED ORAL at 07:16

## 2021-01-01 RX ADMIN — PIPERACILLIN SODIUM AND TAZOBACTAM SODIUM 3.38 G: 3; .375 INJECTION, POWDER, LYOPHILIZED, FOR SOLUTION INTRAVENOUS at 00:45

## 2021-01-01 RX ADMIN — Medication 1 TABLET: at 08:52

## 2021-01-01 RX ADMIN — PIPERACILLIN SODIUM AND TAZOBACTAM SODIUM 3.38 G: 3; .375 INJECTION, POWDER, LYOPHILIZED, FOR SOLUTION INTRAVENOUS at 08:55

## 2021-01-01 RX ADMIN — ACETYLCYSTEINE 4 ML: 100 SOLUTION ORAL; RESPIRATORY (INHALATION) at 15:29

## 2021-01-01 RX ADMIN — SODIUM CHLORIDE, PRESERVATIVE FREE 10 ML: 5 INJECTION INTRAVENOUS at 20:23

## 2021-01-01 RX ADMIN — SODIUM CHLORIDE, PRESERVATIVE FREE 10 ML: 5 INJECTION INTRAVENOUS at 09:31

## 2021-01-01 RX ADMIN — METOPROLOL TARTRATE 25 MG: 25 TABLET, FILM COATED ORAL at 20:15

## 2021-01-01 RX ADMIN — ACETAMINOPHEN 650 MG: 325 TABLET, FILM COATED ORAL at 12:55

## 2021-01-01 RX ADMIN — Medication 1 TABLET: at 20:18

## 2021-01-01 RX ADMIN — HYDRALAZINE HYDROCHLORIDE 50 MG: 50 TABLET, FILM COATED ORAL at 21:28

## 2021-01-01 RX ADMIN — ACETAMINOPHEN 650 MG: 325 TABLET, FILM COATED ORAL at 18:24

## 2021-01-01 RX ADMIN — DOCUSATE SODIUM 50 MG AND SENNOSIDES 8.6 MG 2 TABLET: 8.6; 5 TABLET, FILM COATED ORAL at 08:28

## 2021-01-01 RX ADMIN — SODIUM CHLORIDE, PRESERVATIVE FREE 10 ML: 5 INJECTION INTRAVENOUS at 20:33

## 2021-01-01 RX ADMIN — FAMOTIDINE 20 MG: 20 TABLET ORAL at 22:35

## 2021-01-01 RX ADMIN — Medication 400 MG: at 08:47

## 2021-01-01 RX ADMIN — IPRATROPIUM BROMIDE AND ALBUTEROL SULFATE 3 ML: 2.5; .5 SOLUTION RESPIRATORY (INHALATION) at 07:26

## 2021-01-01 RX ADMIN — METOPROLOL TARTRATE 25 MG: 25 TABLET, FILM COATED ORAL at 09:31

## 2021-01-01 RX ADMIN — Medication 1 TABLET: at 08:28

## 2021-01-01 RX ADMIN — CLOPIDOGREL BISULFATE 75 MG: 75 TABLET ORAL at 08:47

## 2021-01-01 RX ADMIN — IPRATROPIUM BROMIDE AND ALBUTEROL SULFATE 3 ML: 2.5; .5 SOLUTION RESPIRATORY (INHALATION) at 19:51

## 2021-01-01 RX ADMIN — METOPROLOL TARTRATE 25 MG: 25 TABLET, FILM COATED ORAL at 20:18

## 2021-01-01 RX ADMIN — METHYLPREDNISOLONE SODIUM SUCCINATE 60 MG: 125 INJECTION, POWDER, FOR SOLUTION INTRAMUSCULAR; INTRAVENOUS at 09:26

## 2021-01-01 RX ADMIN — SODIUM CHLORIDE 125 MG: 9 INJECTION, SOLUTION INTRAVENOUS at 12:48

## 2021-01-01 RX ADMIN — FAMOTIDINE 20 MG: 20 TABLET ORAL at 20:24

## 2021-01-01 RX ADMIN — FERROUS SULFATE TAB EC 324 MG (65 MG FE EQUIVALENT) 324 MG: 324 (65 FE) TABLET DELAYED RESPONSE at 08:27

## 2021-01-01 RX ADMIN — FUROSEMIDE 20 MG: 10 INJECTION, SOLUTION INTRAMUSCULAR; INTRAVENOUS at 16:49

## 2021-01-01 RX ADMIN — TRAMADOL HYDROCHLORIDE 50 MG: 50 TABLET, COATED ORAL at 20:16

## 2021-01-01 RX ADMIN — FUROSEMIDE 40 MG: 40 TABLET ORAL at 08:52

## 2021-01-01 RX ADMIN — BUDESONIDE AND FORMOTEROL FUMARATE DIHYDRATE 2 PUFF: 160; 4.5 AEROSOL RESPIRATORY (INHALATION) at 07:14

## 2021-01-01 RX ADMIN — ACETAMINOPHEN 650 MG: 325 TABLET, FILM COATED ORAL at 21:32

## 2021-01-01 RX ADMIN — FAMOTIDINE 20 MG: 20 TABLET ORAL at 20:28

## 2021-01-01 RX ADMIN — PIPERACILLIN SODIUM AND TAZOBACTAM SODIUM 3.38 G: 3; .375 INJECTION, POWDER, LYOPHILIZED, FOR SOLUTION INTRAVENOUS at 00:52

## 2021-01-01 RX ADMIN — LISINOPRIL 20 MG: 20 TABLET ORAL at 08:23

## 2021-01-01 RX ADMIN — ACETYLCYSTEINE 2 ML: 100 SOLUTION ORAL; RESPIRATORY (INHALATION) at 07:20

## 2021-01-01 RX ADMIN — METOPROLOL TARTRATE 25 MG: 25 TABLET, FILM COATED ORAL at 08:23

## 2021-01-01 RX ADMIN — IPRATROPIUM BROMIDE AND ALBUTEROL SULFATE 3 ML: 2.5; .5 SOLUTION RESPIRATORY (INHALATION) at 19:25

## 2021-01-01 RX ADMIN — Medication 1 TABLET: at 09:29

## 2021-01-01 RX ADMIN — Medication 1 TABLET: at 20:23

## 2021-01-01 RX ADMIN — IPRATROPIUM BROMIDE AND ALBUTEROL SULFATE 3 ML: 2.5; .5 SOLUTION RESPIRATORY (INHALATION) at 07:19

## 2021-01-01 RX ADMIN — Medication 100 MG: at 08:50

## 2021-01-01 RX ADMIN — GABAPENTIN 300 MG: 300 CAPSULE ORAL at 20:18

## 2021-01-01 RX ADMIN — Medication 1 TABLET: at 22:35

## 2021-01-01 RX ADMIN — TRIAMCINOLONE ACETONIDE 80 MG: 40 INJECTION, SUSPENSION INTRA-ARTICULAR; INTRAMUSCULAR at 16:35

## 2021-01-01 RX ADMIN — PIPERACILLIN SODIUM AND TAZOBACTAM SODIUM 3.38 G: 3; .375 INJECTION, POWDER, LYOPHILIZED, FOR SOLUTION INTRAVENOUS at 15:05

## 2021-01-01 RX ADMIN — ACETYLCYSTEINE 4 ML: 100 SOLUTION ORAL; RESPIRATORY (INHALATION) at 14:08

## 2021-01-01 RX ADMIN — FENTANYL 1 PATCH: 25 PATCH TRANSDERMAL at 15:06

## 2021-01-01 RX ADMIN — HYDRALAZINE HYDROCHLORIDE 50 MG: 50 TABLET, FILM COATED ORAL at 08:47

## 2021-01-01 RX ADMIN — METHYLPREDNISOLONE SODIUM SUCCINATE 60 MG: 125 INJECTION, POWDER, FOR SOLUTION INTRAMUSCULAR; INTRAVENOUS at 09:51

## 2021-01-01 RX ADMIN — FAMOTIDINE 20 MG: 20 TABLET ORAL at 08:38

## 2021-01-01 RX ADMIN — DOCUSATE SODIUM 50 MG AND SENNOSIDES 8.6 MG 2 TABLET: 8.6; 5 TABLET, FILM COATED ORAL at 20:15

## 2021-01-01 RX ADMIN — GABAPENTIN 300 MG: 300 CAPSULE ORAL at 20:33

## 2021-01-01 RX ADMIN — GABAPENTIN 300 MG: 300 CAPSULE ORAL at 20:15

## 2021-01-01 RX ADMIN — DOCUSATE SODIUM 50 MG AND SENNOSIDES 8.6 MG 2 TABLET: 8.6; 5 TABLET, FILM COATED ORAL at 20:24

## 2021-01-01 RX ADMIN — SODIUM CHLORIDE, PRESERVATIVE FREE 10 ML: 5 INJECTION INTRAVENOUS at 20:18

## 2021-01-01 RX ADMIN — PIPERACILLIN SODIUM AND TAZOBACTAM SODIUM 3.38 G: 3; .375 INJECTION, POWDER, LYOPHILIZED, FOR SOLUTION INTRAVENOUS at 16:49

## 2021-01-01 RX ADMIN — IPRATROPIUM BROMIDE AND ALBUTEROL SULFATE 3 ML: 2.5; .5 SOLUTION RESPIRATORY (INHALATION) at 07:28

## 2021-01-01 RX ADMIN — SODIUM CHLORIDE 500 ML: 9 INJECTION, SOLUTION INTRAVENOUS at 23:17

## 2021-01-01 RX ADMIN — HYDRALAZINE HYDROCHLORIDE 50 MG: 50 TABLET, FILM COATED ORAL at 17:25

## 2021-01-01 RX ADMIN — Medication 1 TABLET: at 21:28

## 2021-01-01 RX ADMIN — POLYETHYLENE GLYCOL 3350 17 G: 17 POWDER, FOR SOLUTION ORAL at 08:25

## 2021-01-01 RX ADMIN — IPRATROPIUM BROMIDE AND ALBUTEROL SULFATE 3 ML: 2.5; .5 SOLUTION RESPIRATORY (INHALATION) at 07:50

## 2021-01-01 RX ADMIN — FOLIC ACID 1 MG: 1 TABLET ORAL at 09:50

## 2021-01-01 RX ADMIN — ALBUTEROL SULFATE 2.5 MG: 2.5 SOLUTION RESPIRATORY (INHALATION) at 03:01

## 2021-01-01 RX ADMIN — DOCUSATE SODIUM 50 MG AND SENNOSIDES 8.6 MG 2 TABLET: 8.6; 5 TABLET, FILM COATED ORAL at 20:23

## 2021-01-01 RX ADMIN — ACETAMINOPHEN 650 MG: 325 TABLET, FILM COATED ORAL at 14:34

## 2021-01-01 RX ADMIN — IPRATROPIUM BROMIDE AND ALBUTEROL SULFATE 3 ML: 2.5; .5 SOLUTION RESPIRATORY (INHALATION) at 11:06

## 2021-01-01 RX ADMIN — METHYLPREDNISOLONE SODIUM SUCCINATE 60 MG: 125 INJECTION, POWDER, FOR SOLUTION INTRAMUSCULAR; INTRAVENOUS at 00:53

## 2021-01-01 RX ADMIN — ASPIRIN 81 MG: 81 TABLET, FILM COATED ORAL at 08:46

## 2021-01-01 RX ADMIN — SODIUM CHLORIDE, PRESERVATIVE FREE 10 ML: 5 INJECTION INTRAVENOUS at 08:25

## 2021-01-01 RX ADMIN — BUDESONIDE AND FORMOTEROL FUMARATE DIHYDRATE 2 PUFF: 160; 4.5 AEROSOL RESPIRATORY (INHALATION) at 11:01

## 2021-01-01 RX ADMIN — ACETAMINOPHEN 650 MG: 325 TABLET, FILM COATED ORAL at 06:13

## 2021-01-01 RX ADMIN — FAMOTIDINE 20 MG: 20 TABLET ORAL at 20:23

## 2021-01-01 RX ADMIN — DOCUSATE SODIUM 50 MG AND SENNOSIDES 8.6 MG 2 TABLET: 8.6; 5 TABLET, FILM COATED ORAL at 09:50

## 2021-01-01 RX ADMIN — HYDRALAZINE HYDROCHLORIDE 50 MG: 50 TABLET, FILM COATED ORAL at 09:50

## 2021-01-01 RX ADMIN — METOPROLOL TARTRATE 25 MG: 25 TABLET, FILM COATED ORAL at 08:27

## 2021-01-01 RX ADMIN — CYANOCOBALAMIN TAB 1000 MCG 1000 MCG: 1000 TAB at 08:38

## 2021-01-01 RX ADMIN — HYDRALAZINE HYDROCHLORIDE 50 MG: 50 TABLET, FILM COATED ORAL at 20:28

## 2021-01-01 RX ADMIN — FOLIC ACID 1 MG: 1 TABLET ORAL at 09:31

## 2021-01-01 RX ADMIN — Medication 100 MG: at 08:39

## 2021-01-01 RX ADMIN — ZINC SULFATE 220 MG (50 MG) CAPSULE 220 MG: CAPSULE at 08:49

## 2021-01-01 RX ADMIN — METHYLPREDNISOLONE SODIUM SUCCINATE 60 MG: 125 INJECTION, POWDER, FOR SOLUTION INTRAMUSCULAR; INTRAVENOUS at 17:25

## 2021-01-01 RX ADMIN — DOCUSATE SODIUM 50 MG AND SENNOSIDES 8.6 MG 2 TABLET: 8.6; 5 TABLET, FILM COATED ORAL at 08:23

## 2021-01-01 RX ADMIN — IPRATROPIUM BROMIDE AND ALBUTEROL SULFATE 3 ML: 2.5; .5 SOLUTION RESPIRATORY (INHALATION) at 14:07

## 2021-01-01 RX ADMIN — PIPERACILLIN SODIUM AND TAZOBACTAM SODIUM 3.38 G: 3; .375 INJECTION, POWDER, LYOPHILIZED, FOR SOLUTION INTRAVENOUS at 00:19

## 2021-01-01 RX ADMIN — METHYLPREDNISOLONE SODIUM SUCCINATE 60 MG: 125 INJECTION, POWDER, FOR SOLUTION INTRAMUSCULAR; INTRAVENOUS at 08:22

## 2021-01-01 RX ADMIN — IPRATROPIUM BROMIDE AND ALBUTEROL SULFATE 3 ML: 2.5; .5 SOLUTION RESPIRATORY (INHALATION) at 11:40

## 2021-01-01 RX ADMIN — IPRATROPIUM BROMIDE AND ALBUTEROL SULFATE 3 ML: 2.5; .5 SOLUTION RESPIRATORY (INHALATION) at 14:33

## 2021-01-01 RX ADMIN — SODIUM CHLORIDE, PRESERVATIVE FREE 10 ML: 5 INJECTION INTRAVENOUS at 08:50

## 2021-01-01 RX ADMIN — ACETYLCYSTEINE 4 ML: 100 SOLUTION ORAL; RESPIRATORY (INHALATION) at 07:55

## 2021-01-01 RX ADMIN — Medication 1 TABLET: at 08:23

## 2021-01-01 RX ADMIN — Medication 1 TABLET: at 20:15

## 2021-01-01 RX ADMIN — HYDRALAZINE HYDROCHLORIDE 50 MG: 50 TABLET, FILM COATED ORAL at 18:23

## 2021-01-01 RX ADMIN — ACETAMINOPHEN 650 MG: 325 TABLET, FILM COATED ORAL at 19:40

## 2021-01-01 RX ADMIN — CYANOCOBALAMIN TAB 1000 MCG 1000 MCG: 1000 TAB at 12:28

## 2021-01-01 RX ADMIN — ACETAMINOPHEN 650 MG: 325 TABLET, FILM COATED ORAL at 20:23

## 2021-01-01 RX ADMIN — HYDRALAZINE HYDROCHLORIDE 50 MG: 50 TABLET, FILM COATED ORAL at 15:06

## 2021-01-01 RX ADMIN — FOLIC ACID 1 MG: 1 TABLET ORAL at 08:38

## 2021-01-01 RX ADMIN — ENOXAPARIN SODIUM 40 MG: 40 INJECTION SUBCUTANEOUS at 20:23

## 2021-01-01 RX ADMIN — ACETYLCYSTEINE 4 ML: 100 SOLUTION ORAL; RESPIRATORY (INHALATION) at 11:07

## 2021-01-01 RX ADMIN — FAMOTIDINE 20 MG: 20 TABLET ORAL at 21:28

## 2021-01-01 RX ADMIN — ACETYLCYSTEINE 4 ML: 100 SOLUTION ORAL; RESPIRATORY (INHALATION) at 21:36

## 2021-01-01 RX ADMIN — SODIUM CHLORIDE, PRESERVATIVE FREE 10 ML: 5 INJECTION INTRAVENOUS at 08:54

## 2021-01-01 RX ADMIN — HYDRALAZINE HYDROCHLORIDE 20 MG: 20 INJECTION INTRAMUSCULAR; INTRAVENOUS at 15:01

## 2021-01-01 RX ADMIN — Medication 400 MG: at 08:23

## 2021-01-01 RX ADMIN — FUROSEMIDE 20 MG: 10 INJECTION, SOLUTION INTRAMUSCULAR; INTRAVENOUS at 08:27

## 2021-01-01 RX ADMIN — Medication 400 MG: at 08:38

## 2021-01-01 RX ADMIN — CYANOCOBALAMIN TAB 1000 MCG 1000 MCG: 1000 TAB at 09:50

## 2021-01-01 RX ADMIN — FAMOTIDINE 20 MG: 20 TABLET ORAL at 08:27

## 2021-01-01 RX ADMIN — ALBUMIN HUMAN 12.5 G: 0.25 SOLUTION INTRAVENOUS at 09:27

## 2021-01-01 RX ADMIN — ACETYLCYSTEINE 4 ML: 100 SOLUTION ORAL; RESPIRATORY (INHALATION) at 23:06

## 2021-01-01 RX ADMIN — Medication 1 TABLET: at 09:50

## 2021-01-01 RX ADMIN — IPRATROPIUM BROMIDE AND ALBUTEROL SULFATE 3 ML: 2.5; .5 SOLUTION RESPIRATORY (INHALATION) at 15:24

## 2021-01-01 RX ADMIN — Medication 400 MG: at 09:30

## 2021-01-01 RX ADMIN — DOCUSATE SODIUM 50 MG AND SENNOSIDES 8.6 MG 2 TABLET: 8.6; 5 TABLET, FILM COATED ORAL at 08:38

## 2021-01-01 RX ADMIN — METHYLPREDNISOLONE SODIUM SUCCINATE 60 MG: 125 INJECTION, POWDER, FOR SOLUTION INTRAMUSCULAR; INTRAVENOUS at 00:49

## 2021-01-01 RX ADMIN — SODIUM CHLORIDE, PRESERVATIVE FREE 10 ML: 5 INJECTION INTRAVENOUS at 09:51

## 2021-01-01 RX ADMIN — FERROUS SULFATE TAB EC 324 MG (65 MG FE EQUIVALENT) 324 MG: 324 (65 FE) TABLET DELAYED RESPONSE at 08:52

## 2021-01-01 RX ADMIN — IPRATROPIUM BROMIDE AND ALBUTEROL SULFATE 3 ML: 2.5; .5 SOLUTION RESPIRATORY (INHALATION) at 07:14

## 2021-01-01 RX ADMIN — PIPERACILLIN SODIUM AND TAZOBACTAM SODIUM 3.38 G: 3; .375 INJECTION, POWDER, LYOPHILIZED, FOR SOLUTION INTRAVENOUS at 15:56

## 2021-01-01 RX ADMIN — IPRATROPIUM BROMIDE AND ALBUTEROL SULFATE 3 ML: 2.5; .5 SOLUTION RESPIRATORY (INHALATION) at 19:24

## 2021-01-01 RX ADMIN — METOPROLOL TARTRATE 25 MG: 25 TABLET, FILM COATED ORAL at 09:50

## 2021-01-01 RX ADMIN — IPRATROPIUM BROMIDE AND ALBUTEROL SULFATE 3 ML: 2.5; .5 SOLUTION RESPIRATORY (INHALATION) at 10:53

## 2021-01-01 RX ADMIN — Medication 100 MG: at 08:49

## 2021-01-01 RX ADMIN — Medication 1 TABLET: at 20:29

## 2021-01-01 RX ADMIN — ACETAMINOPHEN 650 MG: 325 TABLET, FILM COATED ORAL at 20:01

## 2021-01-01 RX ADMIN — ALBUTEROL SULFATE 2.5 MG: 2.5 SOLUTION RESPIRATORY (INHALATION) at 23:09

## 2021-01-01 RX ADMIN — PIPERACILLIN SODIUM AND TAZOBACTAM SODIUM 3.38 G: 3; .375 INJECTION, POWDER, LYOPHILIZED, FOR SOLUTION INTRAVENOUS at 00:00

## 2021-01-01 RX ADMIN — FAMOTIDINE 20 MG: 20 TABLET ORAL at 20:18

## 2021-01-01 RX ADMIN — LISINOPRIL 20 MG: 20 TABLET ORAL at 08:27

## 2021-01-01 RX ADMIN — ACETYLCYSTEINE 4 ML: 100 SOLUTION ORAL; RESPIRATORY (INHALATION) at 18:40

## 2021-01-01 RX ADMIN — ONDANSETRON HYDROCHLORIDE 4 MG: 4 TABLET, FILM COATED ORAL at 10:39

## 2021-01-01 RX ADMIN — ACETYLCYSTEINE 4 ML: 100 SOLUTION ORAL; RESPIRATORY (INHALATION) at 23:09

## 2021-01-01 RX ADMIN — FUROSEMIDE 80 MG: 10 INJECTION INTRAMUSCULAR; INTRAVENOUS at 15:01

## 2021-01-01 RX ADMIN — FAMOTIDINE 20 MG: 20 TABLET ORAL at 20:15

## 2021-01-01 RX ADMIN — FAMOTIDINE 20 MG: 20 TABLET ORAL at 08:23

## 2021-01-01 RX ADMIN — SODIUM CHLORIDE 125 MG: 9 INJECTION, SOLUTION INTRAVENOUS at 13:47

## 2021-01-01 RX ADMIN — ZINC SULFATE 220 MG (50 MG) CAPSULE 220 MG: CAPSULE at 08:27

## 2021-01-01 RX ADMIN — METHYLPREDNISOLONE SODIUM SUCCINATE 60 MG: 125 INJECTION, POWDER, FOR SOLUTION INTRAMUSCULAR; INTRAVENOUS at 02:09

## 2021-01-01 RX ADMIN — Medication 1 TABLET: at 08:46

## 2021-01-01 RX ADMIN — PIPERACILLIN SODIUM AND TAZOBACTAM SODIUM 3.38 G: 3; .375 INJECTION, POWDER, LYOPHILIZED, FOR SOLUTION INTRAVENOUS at 17:25

## 2021-01-01 RX ADMIN — IOPAMIDOL 90 ML: 612 INJECTION, SOLUTION INTRAVENOUS at 14:50

## 2021-01-01 RX ADMIN — Medication 400 MG: at 08:27

## 2021-01-01 RX ADMIN — ACETYLCYSTEINE 4 ML: 100 SOLUTION ORAL; RESPIRATORY (INHALATION) at 14:07

## 2021-01-01 RX ADMIN — METHYLPREDNISOLONE SODIUM SUCCINATE 60 MG: 125 INJECTION, POWDER, FOR SOLUTION INTRAMUSCULAR; INTRAVENOUS at 16:49

## 2021-01-01 RX ADMIN — FUROSEMIDE 20 MG: 10 INJECTION, SOLUTION INTRAMUSCULAR; INTRAVENOUS at 09:49

## 2021-01-01 RX ADMIN — HYDRALAZINE HYDROCHLORIDE 50 MG: 50 TABLET, FILM COATED ORAL at 08:38

## 2021-01-01 RX ADMIN — LISINOPRIL 40 MG: 40 TABLET ORAL at 08:53

## 2021-01-01 RX ADMIN — BUDESONIDE AND FORMOTEROL FUMARATE DIHYDRATE 2 PUFF: 160; 4.5 AEROSOL RESPIRATORY (INHALATION) at 19:25

## 2021-01-01 RX ADMIN — DOCUSATE SODIUM 50 MG AND SENNOSIDES 8.6 MG 2 TABLET: 8.6; 5 TABLET, FILM COATED ORAL at 08:27

## 2021-01-01 RX ADMIN — SODIUM CHLORIDE 125 MG: 9 INJECTION, SOLUTION INTRAVENOUS at 09:00

## 2021-01-01 RX ADMIN — GABAPENTIN 300 MG: 300 CAPSULE ORAL at 20:24

## 2021-01-01 RX ADMIN — NYSTATIN OINTMENT 1 APPLICATION: 100000 OINTMENT TOPICAL at 09:33

## 2021-01-01 RX ADMIN — PIPERACILLIN SODIUM AND TAZOBACTAM SODIUM 3.38 G: 3; .375 INJECTION, POWDER, LYOPHILIZED, FOR SOLUTION INTRAVENOUS at 00:48

## 2021-01-01 RX ADMIN — SODIUM CHLORIDE 125 MG: 9 INJECTION, SOLUTION INTRAVENOUS at 11:58

## 2021-01-01 RX ADMIN — CYANOCOBALAMIN TAB 1000 MCG 1000 MCG: 1000 TAB at 09:31

## 2021-01-01 RX ADMIN — Medication 400 MG: at 08:52

## 2021-01-01 RX ADMIN — ACETAMINOPHEN 650 MG: 325 TABLET, FILM COATED ORAL at 14:57

## 2021-01-01 RX ADMIN — Medication 1 TABLET: at 20:33

## 2021-01-01 RX ADMIN — PIPERACILLIN SODIUM AND TAZOBACTAM SODIUM 3.38 G: 3; .375 INJECTION, POWDER, LYOPHILIZED, FOR SOLUTION INTRAVENOUS at 08:46

## 2021-01-01 RX ADMIN — ZINC SULFATE 220 MG (50 MG) CAPSULE 220 MG: CAPSULE at 08:29

## 2021-01-01 RX ADMIN — ACETAMINOPHEN 650 MG: 325 TABLET, FILM COATED ORAL at 19:45

## 2021-01-01 RX ADMIN — FAMOTIDINE 20 MG: 20 TABLET ORAL at 09:31

## 2021-01-01 RX ADMIN — SODIUM CHLORIDE, PRESERVATIVE FREE 10 ML: 5 INJECTION INTRAVENOUS at 21:21

## 2021-01-01 RX ADMIN — GABAPENTIN 300 MG: 300 CAPSULE ORAL at 21:27

## 2021-01-01 RX ADMIN — IPRATROPIUM BROMIDE AND ALBUTEROL SULFATE 3 ML: 2.5; .5 SOLUTION RESPIRATORY (INHALATION) at 11:07

## 2021-01-01 RX ADMIN — HYDRALAZINE HYDROCHLORIDE 50 MG: 50 TABLET, FILM COATED ORAL at 08:52

## 2021-01-01 RX ADMIN — METOPROLOL TARTRATE 25 MG: 25 TABLET, FILM COATED ORAL at 08:54

## 2021-01-01 RX ADMIN — PIPERACILLIN SODIUM AND TAZOBACTAM SODIUM 3.38 G: 3; .375 INJECTION, POWDER, LYOPHILIZED, FOR SOLUTION INTRAVENOUS at 17:53

## 2021-01-01 RX ADMIN — HYDRALAZINE HYDROCHLORIDE 50 MG: 50 TABLET, FILM COATED ORAL at 15:35

## 2021-01-01 RX ADMIN — METHYLPREDNISOLONE SODIUM SUCCINATE 60 MG: 125 INJECTION, POWDER, FOR SOLUTION INTRAMUSCULAR; INTRAVENOUS at 08:47

## 2021-01-01 RX ADMIN — TRAMADOL HYDROCHLORIDE 50 MG: 50 TABLET, COATED ORAL at 17:36

## 2021-01-01 RX ADMIN — ACETAMINOPHEN 650 MG: 325 TABLET, FILM COATED ORAL at 15:05

## 2021-01-01 RX ADMIN — SODIUM CHLORIDE 250 ML: 9 INJECTION, SOLUTION INTRAVENOUS at 12:10

## 2021-01-01 RX ADMIN — GABAPENTIN 300 MG: 300 CAPSULE ORAL at 20:23

## 2021-01-01 RX ADMIN — HYDRALAZINE HYDROCHLORIDE 50 MG: 50 TABLET, FILM COATED ORAL at 08:27

## 2021-01-01 RX ADMIN — ACETAMINOPHEN 650 MG: 325 TABLET, FILM COATED ORAL at 08:46

## 2021-01-01 RX ADMIN — LISINOPRIL 20 MG: 20 TABLET ORAL at 09:50

## 2021-01-01 RX ADMIN — HYDRALAZINE HYDROCHLORIDE 50 MG: 50 TABLET, FILM COATED ORAL at 16:49

## 2021-01-01 RX ADMIN — ACETAMINOPHEN 650 MG: 325 TABLET, FILM COATED ORAL at 08:37

## 2021-01-01 RX ADMIN — ALBUTEROL SULFATE 2.5 MG: 2.5 SOLUTION RESPIRATORY (INHALATION) at 21:36

## 2021-01-01 RX ADMIN — FAMOTIDINE 20 MG: 20 TABLET ORAL at 20:36

## 2021-01-01 RX ADMIN — PIPERACILLIN SODIUM AND TAZOBACTAM SODIUM 3.38 G: 3; .375 INJECTION, POWDER, LYOPHILIZED, FOR SOLUTION INTRAVENOUS at 17:44

## 2021-01-01 RX ADMIN — TRAMADOL HYDROCHLORIDE 50 MG: 50 TABLET, COATED ORAL at 10:39

## 2021-01-01 RX ADMIN — PIPERACILLIN SODIUM AND TAZOBACTAM SODIUM 3.38 G: 3; .375 INJECTION, POWDER, LYOPHILIZED, FOR SOLUTION INTRAVENOUS at 00:23

## 2021-01-01 RX ADMIN — ALBUMIN HUMAN 12.5 G: 0.25 SOLUTION INTRAVENOUS at 23:22

## 2021-01-01 RX ADMIN — FAMOTIDINE 20 MG: 20 TABLET ORAL at 08:52

## 2021-01-01 RX ADMIN — NYSTATIN OINTMENT 1 APPLICATION: 100000 OINTMENT TOPICAL at 22:37

## 2021-01-01 RX ADMIN — Medication 1 TABLET: at 08:27

## 2021-01-01 RX ADMIN — ACETAMINOPHEN 650 MG: 325 TABLET, FILM COATED ORAL at 08:31

## 2021-01-01 RX ADMIN — FOLIC ACID 1 MG: 1 TABLET ORAL at 12:28

## 2021-01-01 RX ADMIN — ACETYLCYSTEINE 4 ML: 100 SOLUTION ORAL; RESPIRATORY (INHALATION) at 19:24

## 2021-01-01 RX ADMIN — Medication 400 MG: at 09:50

## 2021-01-01 RX ADMIN — ACETYLCYSTEINE 4 ML: 100 SOLUTION ORAL; RESPIRATORY (INHALATION) at 07:14

## 2021-01-01 RX ADMIN — METOPROLOL TARTRATE 25 MG: 25 TABLET, FILM COATED ORAL at 21:28

## 2021-01-01 RX ADMIN — POLYETHYLENE GLYCOL 3350 17 G: 17 POWDER, FOR SOLUTION ORAL at 08:22

## 2021-01-01 RX ADMIN — BUDESONIDE AND FORMOTEROL FUMARATE DIHYDRATE 2 PUFF: 160; 4.5 AEROSOL RESPIRATORY (INHALATION) at 18:39

## 2021-01-01 RX ADMIN — Medication 400 MG: at 08:28

## 2021-01-01 RX ADMIN — PIPERACILLIN SODIUM AND TAZOBACTAM SODIUM 3.38 G: 3; .375 INJECTION, POWDER, LYOPHILIZED, FOR SOLUTION INTRAVENOUS at 08:47

## 2021-01-01 RX ADMIN — ACETYLCYSTEINE 4 ML: 100 SOLUTION ORAL; RESPIRATORY (INHALATION) at 11:06

## 2021-01-01 RX ADMIN — ONDANSETRON HYDROCHLORIDE 4 MG: 4 TABLET, FILM COATED ORAL at 20:15

## 2021-01-01 RX ADMIN — PIPERACILLIN SODIUM AND TAZOBACTAM SODIUM 3.38 G: 3; .375 INJECTION, POWDER, LYOPHILIZED, FOR SOLUTION INTRAVENOUS at 08:22

## 2021-01-01 RX ADMIN — FAMOTIDINE 20 MG: 20 TABLET ORAL at 08:28

## 2021-01-01 RX ADMIN — METHYLPREDNISOLONE SODIUM SUCCINATE 60 MG: 125 INJECTION, POWDER, FOR SOLUTION INTRAMUSCULAR; INTRAVENOUS at 17:44

## 2021-01-01 RX ADMIN — ZINC SULFATE 220 MG (50 MG) CAPSULE 220 MG: CAPSULE at 09:50

## 2021-01-01 RX ADMIN — ONDANSETRON 4 MG: 2 INJECTION INTRAMUSCULAR; INTRAVENOUS at 22:49

## 2021-01-01 RX ADMIN — PIPERACILLIN SODIUM AND TAZOBACTAM SODIUM 3.38 G: 3; .375 INJECTION, POWDER, LYOPHILIZED, FOR SOLUTION INTRAVENOUS at 18:24

## 2021-01-01 RX ADMIN — IPRATROPIUM BROMIDE AND ALBUTEROL SULFATE 3 ML: 2.5; .5 SOLUTION RESPIRATORY (INHALATION) at 14:08

## 2021-01-01 RX ADMIN — ACETYLCYSTEINE 2 ML: 100 SOLUTION ORAL; RESPIRATORY (INHALATION) at 19:51

## 2021-01-01 RX ADMIN — FAMOTIDINE 20 MG: 20 TABLET ORAL at 08:47

## 2021-01-01 RX ADMIN — ACETAMINOPHEN 650 MG: 325 TABLET, FILM COATED ORAL at 09:34

## 2021-01-01 RX ADMIN — PIPERACILLIN SODIUM AND TAZOBACTAM SODIUM 3.38 G: 3; .375 INJECTION, POWDER, LYOPHILIZED, FOR SOLUTION INTRAVENOUS at 00:49

## 2021-01-01 RX ADMIN — Medication 100 MG: at 09:50

## 2021-01-01 RX ADMIN — PIPERACILLIN SODIUM AND TAZOBACTAM SODIUM 3.38 G: 3; .375 INJECTION, POWDER, LYOPHILIZED, FOR SOLUTION INTRAVENOUS at 09:30

## 2021-01-01 RX ADMIN — SODIUM CHLORIDE 125 MG: 9 INJECTION, SOLUTION INTRAVENOUS at 09:31

## 2021-01-01 RX ADMIN — OXYCODONE HYDROCHLORIDE 5 MG: 5 SOLUTION ORAL at 09:45

## 2021-01-01 RX ADMIN — ALBUTEROL SULFATE 2.5 MG: 2.5 SOLUTION RESPIRATORY (INHALATION) at 23:06

## 2021-01-01 RX ADMIN — PIPERACILLIN SODIUM AND TAZOBACTAM SODIUM 3.38 G: 3; .375 INJECTION, POWDER, LYOPHILIZED, FOR SOLUTION INTRAVENOUS at 08:29

## 2021-01-01 RX ADMIN — FUROSEMIDE 20 MG: 10 INJECTION, SOLUTION INTRAMUSCULAR; INTRAVENOUS at 08:22

## 2021-01-01 RX ADMIN — METOPROLOL TARTRATE 25 MG: 25 TABLET, FILM COATED ORAL at 20:28

## 2021-01-01 RX ADMIN — METHYLPREDNISOLONE SODIUM SUCCINATE 60 MG: 125 INJECTION, POWDER, FOR SOLUTION INTRAMUSCULAR; INTRAVENOUS at 08:26

## 2021-01-01 RX ADMIN — FAMOTIDINE 20 MG: 20 TABLET ORAL at 09:50

## 2021-01-01 RX ADMIN — Medication 100 MG: at 15:24

## 2021-01-01 RX ADMIN — BUDESONIDE AND FORMOTEROL FUMARATE DIHYDRATE 2 PUFF: 160; 4.5 AEROSOL RESPIRATORY (INHALATION) at 07:27

## 2021-01-01 RX ADMIN — DIGOXIN 250 MCG: 0.25 INJECTION INTRAMUSCULAR; INTRAVENOUS at 13:55

## 2021-01-01 RX ADMIN — IPRATROPIUM BROMIDE AND ALBUTEROL SULFATE 3 ML: 2.5; .5 SOLUTION RESPIRATORY (INHALATION) at 18:40

## 2021-01-01 RX ADMIN — ZINC SULFATE 220 MG (50 MG) CAPSULE 220 MG: CAPSULE at 08:39

## 2021-01-01 RX ADMIN — METOPROLOL TARTRATE 5 MG: 5 INJECTION INTRAVENOUS at 18:47

## 2021-03-03 NOTE — PROGRESS NOTES
" Subjective   Anahi Calix is a 80 y.o. female.     Chief Complaint   Patient presents with   • rib cage             History of Present Illness     Rib pain for 3 weeks.  Left side.  Hurts with deep breath.  Stabbing pain that may go from front lateral to posterior lateral.   Tried taking deep breaths and makes worse.   Also  bp up some due to the pain over the past 3 weeks, has been taking an extra hydralazine.   Also  For about a year, she will hear a \"click\" in her head on the left side.  Happens several times a day.  It is not her neck or her ear.  Not painful? Sounds like a piece of metal rolling in her head.     Review of Systems   Constitutional: Negative for chills, fatigue and fever.   HENT: Negative for congestion, ear discharge, ear pain, facial swelling, hearing loss, postnasal drip, rhinorrhea, sinus pressure, sore throat, trouble swallowing and voice change.    Eyes: Negative for discharge, redness and visual disturbance.   Respiratory: Negative for cough, chest tightness, shortness of breath and wheezing.    Cardiovascular: Negative for chest pain and palpitations.   Gastrointestinal: Negative for abdominal pain, blood in stool, constipation, diarrhea, nausea and vomiting.   Endocrine: Negative for polydipsia and polyuria.   Genitourinary: Negative for dysuria, flank pain, hematuria and urgency.   Musculoskeletal: Negative for arthralgias, back pain, joint swelling and myalgias.   Skin: Negative for rash.   Neurological: Negative for dizziness, weakness, numbness and headaches.   Hematological: Negative for adenopathy.   Psychiatric/Behavioral: Negative for confusion and sleep disturbance. The patient is not nervous/anxious.            /84 (BP Location: Left arm, Patient Position: Sitting, Cuff Size: Adult)   Pulse 62   Temp 97.3 °F (36.3 °C) (Infrared)   Ht 172.7 cm (67.99\")   Wt 77.1 kg (170 lb)   LMP  (LMP Unknown)   SpO2 94%   BMI 25.85 kg/m²       Objective     Physical " Exam  Vitals signs and nursing note reviewed.   Constitutional:       Appearance: Normal appearance. She is well-developed.   HENT:      Head: Normocephalic and atraumatic.      Right Ear: External ear normal.      Left Ear: External ear normal.      Nose: Nose normal.   Eyes:      Extraocular Movements: Extraocular movements intact.      Conjunctiva/sclera: Conjunctivae normal.      Pupils: Pupils are equal, round, and reactive to light.   Neck:      Musculoskeletal: Normal range of motion.   Pulmonary:      Effort: Pulmonary effort is normal.   Musculoskeletal: Normal range of motion.      Comments: Some discomfort pushing left anterior lateral lower ribs.    Skin:     Findings: No rash.   Neurological:      General: No focal deficit present.      Mental Status: She is alert and oriented to person, place, and time.   Psychiatric:         Behavior: Behavior normal.         Thought Content: Thought content normal.         Judgment: Judgment normal.             PAST MEDICAL HISTORY     Past Medical History:   Diagnosis Date   • Abdominal aortic aneurysm (AAA) without rupture (CMS/HCC)    • Atrial fibrillation (CMS/HCC) 10/2/2017    New onset.  S/p cardioversion  On coumadin now.  Amiodarone    • CAD (coronary artery disease)    • Gastroesophageal reflux disease    • Hypercholesterolemia    • Hypertension    • Nuclear senile cataract    • Osteoarthritis    • Osteoporosis    • Peripheral arterial disease (CMS/HCC)    • Solitary lung nodule       PAST SURGICAL HISTORY     Past Surgical History:   Procedure Laterality Date   • ABDOMINAL AORTIC ANEURYSM REPAIR     • HIP INTERTROCHANTERIC NAILING Left 10/1/2017    Procedure: HIP INTERTROCHANTERIC NAILING - LEFT - [place];  Surgeon: Nicola Rich MD;  Location: [facility];  Service:       SOCIAL HISTORY     Social History     Socioeconomic History   • Marital status:      Spouse name: Not on file   • Number of children: Not on file   • Years of education:  Not on file   • Highest education level: Not on file   Tobacco Use   • Smoking status: Former Smoker     Packs/day: 1.00     Years: 57.00     Pack years: 57.00     Start date: 11/21/1961     Quit date: 10/1/2017     Years since quitting: 3.4   • Smokeless tobacco: Never Used   Substance and Sexual Activity   • Alcohol use: No   • Drug use: No   • Sexual activity: Not Currently      ALLERGIES   Patient has no known allergies.   MEDICATIONS     Current Outpatient Medications   Medication Sig Dispense Refill   • amiodarone (PACERONE) 200 MG tablet Take 1 tablet by mouth Daily. (Patient taking differently: Take 100 mg by mouth Daily.) 30 tablet 0   • aspirin 81 MG EC tablet Take 81 mg by mouth daily.     • Cholecalciferol (GNP VITAMIN D SUPER STRENGTH) 5000 UNITS tablet Take  by mouth.     • clopidogrel (PLAVIX) 75 MG tablet Take 1 tablet by mouth Daily. 90 tablet 3   • famotidine (PEPCID) 20 MG tablet Take 1 tablet by mouth 2 (Two) Times a Day As Needed for Heartburn. 60 tablet 11   • ferrous sulfate 324 (65 Fe) MG tablet delayed-release EC tablet Take 1 tablet by mouth Daily With Breakfast. 30 tablet 0   • gabapentin (NEURONTIN) 300 MG capsule Take 1-2 capsules by mouth Every Night. 180 capsule 1   • hydrALAZINE (APRESOLINE) 25 MG tablet Take 1 tablet by mouth 4 (Four) Times a Day. 120 tablet 11   • hydrALAZINE (APRESOLINE) 50 MG tablet Take 50 mg by mouth 3 (Three) Times a Day.     • lisinopril (PRINIVIL,ZESTRIL) 40 MG tablet Take 1 tablet by mouth Daily. 90 tablet 3   • magnesium oxide (MAGOX) 400 (241.3 Mg) MG tablet tablet Take 1 tablet by mouth Daily. 30 each 0   • metoprolol tartrate (LOPRESSOR) 50 MG tablet TAKE 1 TABLET BY MOUTH EVERY 12 (TWELVE) HOURS. 180 tablet 2   • Multiple Vitamins-Minerals (VISION FORMULA PO) Take 1 tablet by mouth every day.     • polyethylene glycol (MIRALAX) powder ONE CAPFUL IN 8 OUNCES OF LIQUID ONCE DAILY UP TO THREE TIMES DAILY AS NEEDED. 1581 g 11   • Zinc 50 MG capsule Take 1  capsule by mouth Daily.     • promethazine-codeine (PHENERGAN with CODEINE) 6.25-10 MG/5ML syrup Take 5 mL by mouth Every 6 (Six) Hours As Needed for Cough. 240 mL 1   • traMADol (Ultram) 50 MG tablet Take 1 tablet by mouth Every 6 (Six) Hours As Needed for Moderate Pain . 30 tablet 0     No current facility-administered medications for this visit.         The following portions of the patient's history were reviewed and updated as appropriate: allergies, current medications, past family history, past medical history, past social history, past surgical history and problem list.        Assessment/Plan   Diagnoses and all orders for this visit:    1. Rib pain on left side (Primary)  -     XR Ribs Left With PA Chest  -     traMADol (Ultram) 50 MG tablet; Take 1 tablet by mouth Every 6 (Six) Hours As Needed for Moderate Pain .  Dispense: 30 tablet; Refill: 0    2. SOB (shortness of breath)  -     triamcinolone acetonide (KENALOG-40) injection 80 mg  -     ipratropium-albuterol (DUO-NEB) nebulizer solution 3 mL    3. Essential hypertension      I suspect ossified cartilage on ribs along with intercostal muscle spasms.    Heating pad, deep breaths.  She does not inhale deeply.   Ultram for pain.     Ok to take extra hydralazine for bp.  Get pain better, bp should get better.                  No follow-ups on file.                  This document has been electronically signed by Ramiro Gloria MD on March 2, 2021 18:32 CST

## 2021-05-10 NOTE — TELEPHONE ENCOUNTER
PATIENT STATES SHE HAS A BEDSORE ON HER BUTTOCKS.  CHAIR THAT SHE SLEEPS IN BROKE AND IS PUTTING MORE PRESSURE ON ONE SIDE.  SHE ASKED IF YOU COULD SEND IN AN RX TO PUT ON IT.  NOEMI

## 2021-05-11 NOTE — PROGRESS NOTES
" Subjective   Anahi Calix is a 80 y.o. female.     Chief Complaint   Patient presents with   • Skin Ulcer             History of Present Illness     Couple of weeks, believes  Has pressure ulcer on buttocks.  Her chair is broken, a spring is loose.  She has difficulty getting out of the chair and uses a wedge.   She is applying Vaseline to the area.     Review of Systems   Constitutional: Negative for chills, fatigue and fever.   HENT: Negative for congestion, ear discharge, ear pain, facial swelling, hearing loss, postnasal drip, rhinorrhea, sinus pressure, sore throat, trouble swallowing and voice change.    Eyes: Negative for discharge, redness and visual disturbance.   Respiratory: Negative for cough, chest tightness, shortness of breath and wheezing.    Cardiovascular: Negative for chest pain and palpitations.   Gastrointestinal: Negative for abdominal pain, blood in stool, constipation, diarrhea, nausea and vomiting.   Endocrine: Negative for polydipsia and polyuria.   Genitourinary: Negative for dysuria, flank pain, hematuria and urgency.   Musculoskeletal: Positive for back pain. Negative for arthralgias, joint swelling and myalgias.   Skin: Negative for rash.   Neurological: Negative for dizziness, weakness, numbness and headaches.   Hematological: Negative for adenopathy.   Psychiatric/Behavioral: Negative for confusion and sleep disturbance. The patient is not nervous/anxious.            /70 (BP Location: Left arm, Patient Position: Sitting, Cuff Size: Adult)   Pulse 71   Temp 98 °F (36.7 °C) (Infrared)   Ht 172.7 cm (67.99\")   Wt 73.8 kg (162 lb 12.8 oz)   LMP  (LMP Unknown)   SpO2 92%   BMI 24.76 kg/m²       Objective     Physical Exam  Vitals and nursing note reviewed.   Constitutional:       Appearance: Normal appearance. She is well-developed.   HENT:      Head: Normocephalic and atraumatic.      Right Ear: External ear normal.      Left Ear: External ear normal.      Nose: Nose " normal.   Eyes:      Extraocular Movements: Extraocular movements intact.      Conjunctiva/sclera: Conjunctivae normal.      Pupils: Pupils are equal, round, and reactive to light.   Pulmonary:      Effort: Pulmonary effort is normal.   Musculoskeletal:         General: Normal range of motion.      Cervical back: Normal range of motion.   Skin:     Comments: chaparone present. Superior gluteal fold, no skin breakdown but rash red border central more clear. Butterfly shaped, about 6cm greatest diameter.    Neurological:      General: No focal deficit present.      Mental Status: She is alert and oriented to person, place, and time.   Psychiatric:         Behavior: Behavior normal.         Thought Content: Thought content normal.         Judgment: Judgment normal.             PAST MEDICAL HISTORY     Past Medical History:   Diagnosis Date   • Abdominal aortic aneurysm (AAA) without rupture (CMS/HCC)    • Atrial fibrillation (CMS/HCC) 10/2/2017    New onset.  S/p cardioversion  On coumadin now.  Amiodarone    • CAD (coronary artery disease)    • Gastroesophageal reflux disease    • Hypercholesterolemia    • Hypertension    • Nuclear senile cataract    • Osteoarthritis    • Osteoporosis    • Peripheral arterial disease (CMS/HCC)    • Solitary lung nodule       PAST SURGICAL HISTORY     Past Surgical History:   Procedure Laterality Date   • ABDOMINAL AORTIC ANEURYSM REPAIR     • HIP INTERTROCHANTERIC NAILING Left 10/1/2017    Procedure: HIP INTERTROCHANTERIC NAILING - LEFT - [place];  Surgeon: Nicola Rich MD;  Location: [facility];  Service:       SOCIAL HISTORY     Social History     Socioeconomic History   • Marital status:      Spouse name: Not on file   • Number of children: Not on file   • Years of education: Not on file   • Highest education level: Not on file   Tobacco Use   • Smoking status: Former Smoker     Packs/day: 1.00     Years: 57.00     Pack years: 57.00     Start date: 11/21/1961      Quit date: 10/1/2017     Years since quitting: 3.6   • Smokeless tobacco: Never Used   Substance and Sexual Activity   • Alcohol use: No   • Drug use: No   • Sexual activity: Not Currently      ALLERGIES   Patient has no known allergies.   MEDICATIONS     Current Outpatient Medications   Medication Sig Dispense Refill   • amiodarone (PACERONE) 200 MG tablet Take 1 tablet by mouth Daily. (Patient taking differently: Take 100 mg by mouth Daily.) 30 tablet 0   • aspirin 81 MG EC tablet Take 81 mg by mouth daily.     • Cholecalciferol (GNP VITAMIN D SUPER STRENGTH) 5000 UNITS tablet Take  by mouth.     • clopidogrel (PLAVIX) 75 MG tablet Take 1 tablet by mouth Daily. 90 tablet 3   • famotidine (PEPCID) 20 MG tablet Take 1 tablet by mouth 2 (Two) Times a Day As Needed for Heartburn. 60 tablet 11   • ferrous sulfate 324 (65 Fe) MG tablet delayed-release EC tablet Take 1 tablet by mouth Daily With Breakfast. 30 tablet 0   • gabapentin (NEURONTIN) 300 MG capsule Take 1-2 capsules by mouth Every Night. 180 capsule 1   • hydrALAZINE (APRESOLINE) 25 MG tablet Take 1 tablet by mouth 4 (Four) Times a Day. (Patient taking differently: Take 25 mg by mouth 4 (Four) Times a Day. Patient states she takes as needed) 120 tablet 11   • hydrALAZINE (APRESOLINE) 50 MG tablet Take 50 mg by mouth 3 (Three) Times a Day.     • lisinopril (PRINIVIL,ZESTRIL) 40 MG tablet Take 1 tablet by mouth Daily. 90 tablet 3   • magnesium oxide (MAGOX) 400 (241.3 Mg) MG tablet tablet Take 1 tablet by mouth Daily. 30 each 0   • metoprolol tartrate (LOPRESSOR) 50 MG tablet TAKE 1 TABLET BY MOUTH EVERY 12 (TWELVE) HOURS. 180 tablet 2   • Multiple Vitamins-Minerals (VISION FORMULA PO) Take 1 tablet by mouth every day.     • polyethylene glycol (MIRALAX) powder ONE CAPFUL IN 8 OUNCES OF LIQUID ONCE DAILY UP TO THREE TIMES DAILY AS NEEDED. 1581 g 11   • Zinc 50 MG capsule Take 1 capsule by mouth Daily.     • nystatin (MYCOSTATIN) 838586 UNIT/GM ointment Apply   topically to the appropriate area as directed 3 (Three) Times a Day. 30 g 1   • promethazine-codeine (PHENERGAN with CODEINE) 6.25-10 MG/5ML syrup Take 5 mL by mouth Every 6 (Six) Hours As Needed for Cough. 240 mL 1   • traMADol (Ultram) 50 MG tablet Take 1 tablet by mouth Every 6 (Six) Hours As Needed for Moderate Pain . 30 tablet 0     No current facility-administered medications for this visit.        The following portions of the patient's history were reviewed and updated as appropriate: allergies, current medications, past family history, past medical history, past social history, past surgical history and problem list.        Assessment/Plan   Diagnoses and all orders for this visit:    1. Rash (Primary)    2. Chronic low back pain without sciatica, unspecified back pain laterality    Other orders  -     nystatin (MYCOSTATIN) 225775 UNIT/GM ointment; Apply  topically to the appropriate area as directed 3 (Three) Times a Day.  Dispense: 30 g; Refill: 1    try the nystatin ointment a few weeks and see if clears up the rash.  I suspect has been sweating in this area and not due to pressure.     I think she would benefit from a lift chair.  rx written.                     No follow-ups on file.                  This document has been electronically signed by Ramiro Gloria MD on May 11, 2021 16:54 CDT

## 2021-08-30 NOTE — PROGRESS NOTES
The ABCs of the Annual Wellness Visit  Initial Medicare Wellness Visit    Chief Complaint   Patient presents with   • Medicare Wellness-Initial Visit       Subjective   History of Present Illness:  Anahi Calix is a 80 y.o. female who presents for an Initial Medicare Wellness Visit.    HEALTH RISK ASSESSMENT    Recent Hospitalizations:  No hospitalization(s) within the last year.    Current Medical Providers:  Patient Care Team:  Ramiro Gloria MD as PCP - General  LaylaNicola hall MD as Surgeon (Orthopedic Surgery)    Smoking Status:  Social History     Tobacco Use   Smoking Status Former Smoker   • Packs/day: 1.00   • Years: 57.00   • Pack years: 57.00   • Start date: 11/21/1961   • Quit date: 10/1/2017   • Years since quitting: 3.9   Smokeless Tobacco Never Used       Alcohol Consumption:  Social History     Substance and Sexual Activity   Alcohol Use No       Depression Screen:   PHQ-2/PHQ-9 Depression Screening 8/30/2021   Little interest or pleasure in doing things 0   Feeling down, depressed, or hopeless 1   Total Score 1       Fall Risk Screen:  STEADI Fall Risk Assessment was completed, and patient is at MODERATE risk for falls. Assessment completed on:8/30/2021    Health Habits and Functional and Cognitive Screening:  Functional & Cognitive Status 8/30/2021   Do you have difficulty preparing food and eating? No   Do you have difficulty bathing yourself, getting dressed or grooming yourself? No   Do you have difficulty using the toilet? No   Do you have difficulty moving around from place to place? No   Do you have trouble with steps or getting out of a bed or a chair? No   Current Diet Well Balanced Diet   Dental Exam Not up to date   Eye Exam Up to date   Exercise (times per week) 0 times per week   Current Exercises Include No Regular Exercise   Do you need help using the phone?  No   Are you deaf or do you have serious difficulty hearing?  No   Do you need help with transportation? Yes   Do  you need help shopping? Yes   Do you need help preparing meals?  No   Do you need help with housework?  Yes   Do you need help with laundry? No   Do you need help taking your medications? No   Do you need help managing money? No   Do you ever drive or ride in a car without wearing a seat belt? No   Have you felt unusual stress, anger or loneliness in the last month? No   Who do you live with? Child   If you need help, do you have trouble finding someone available to you? No   Have you been bothered in the last four weeks by sexual problems? No   Do you have difficulty concentrating, remembering or making decisions? No         Does the patient have evidence of cognitive impairment? No    Asprin use counseling:Taking ASA appropriately as indicated    Age-appropriate Screening Schedule:  Refer to the list below for future screening recommendations based on patient's age, sex and/or medical conditions. Orders for these recommended tests are listed in the plan section. The patient has been provided with a written plan.    Health Maintenance   Topic Date Due   • DXA SCAN  08/30/2021 (Originally 10/31/2019)   • MAMMOGRAM  05/11/2022 (Originally 10/31/2019)   • ZOSTER VACCINE (2 of 2) 05/20/2022 (Originally 12/26/2017)   • INFLUENZA VACCINE  10/01/2021   • LIPID PANEL  11/18/2021   • TDAP/TD VACCINES (2 - Td or Tdap) 09/30/2027          The following portions of the patient's history were reviewed and updated as appropriate: allergies, current medications, past family history, past medical history, past social history, past surgical history and problem list.    Outpatient Medications Prior to Visit   Medication Sig Dispense Refill   • amiodarone (PACERONE) 200 MG tablet Take 1 tablet by mouth Daily. (Patient taking differently: Take 100 mg by mouth Daily.) 30 tablet 0   • aspirin 81 MG EC tablet Take 81 mg by mouth daily.     • Cholecalciferol (GNP VITAMIN D SUPER STRENGTH) 5000 UNITS tablet Take  by mouth.     •  clopidogrel (PLAVIX) 75 MG tablet Take 1 tablet by mouth Daily. 90 tablet 3   • famotidine (PEPCID) 20 MG tablet Take 1 tablet by mouth 2 (Two) Times a Day As Needed for Heartburn. 60 tablet 11   • ferrous sulfate 324 (65 Fe) MG tablet delayed-release EC tablet Take 1 tablet by mouth Daily With Breakfast. 30 tablet 0   • gabapentin (NEURONTIN) 300 MG capsule Take 1-2 capsules by mouth Every Night. 180 capsule 1   • hydrALAZINE (APRESOLINE) 50 MG tablet Take 50 mg by mouth 3 (Three) Times a Day.     • lisinopril (PRINIVIL,ZESTRIL) 40 MG tablet Take 1 tablet by mouth Daily. 90 tablet 3   • magnesium oxide (MAGOX) 400 (241.3 Mg) MG tablet tablet Take 1 tablet by mouth Daily. 30 each 0   • metoprolol tartrate (LOPRESSOR) 50 MG tablet TAKE 1 TABLET BY MOUTH EVERY 12 (TWELVE) HOURS. 180 tablet 2   • Multiple Vitamins-Minerals (VISION FORMULA PO) Take 1 tablet by mouth every day.     • polyethylene glycol (MIRALAX) powder ONE CAPFUL IN 8 OUNCES OF LIQUID ONCE DAILY UP TO THREE TIMES DAILY AS NEEDED. 1581 g 11   • Zinc 50 MG capsule Take 1 capsule by mouth Daily.     • hydrALAZINE (APRESOLINE) 25 MG tablet Take 1 tablet by mouth 4 (Four) Times a Day. (Patient taking differently: Take 25 mg by mouth 4 (Four) Times a Day. Patient states she takes as needed) 120 tablet 11   • traMADol (Ultram) 50 MG tablet Take 1 tablet by mouth Every 6 (Six) Hours As Needed for Moderate Pain . 30 tablet 0   • nystatin (MYCOSTATIN) 990063 UNIT/GM ointment Apply  topically to the appropriate area as directed 3 (Three) Times a Day. 30 g 1   • promethazine-codeine (PHENERGAN with CODEINE) 6.25-10 MG/5ML syrup Take 5 mL by mouth Every 6 (Six) Hours As Needed for Cough. 240 mL 1     No facility-administered medications prior to visit.       Patient Active Problem List   Diagnosis   • Peripheral arterial disease (CMS/HCC)   • Gastroesophageal reflux disease   • Abdominal aortic aneurysm (AAA) without rupture (CMS/HCC)   • Closed fracture of left hip  (CMS/[facility])   • CAD (coronary artery disease)   • Hypertension   • Chronic bronchitis (CMS/HCC)   • Essential hypertension   • Fall   • Tobacco dependence syndrome   • Atrial fibrillation (CMS/HCC)   • Hip fracture, left (CMS/HCC)   • Fracture, hip, left, sequela   • Encounter for rehabilitation   • Acute on chronic respiratory failure with hypoxia (CMS/HCC)   • Anemia due to acute blood loss   • Paroxysmal atrial fibrillation (CMS/HCC)   • Encounter for rehabilitation   • Abdominal aortic aneurysm (CMS/[facility])   • Left hip pain       Advanced Care Planning:  ACP discussion was held with the patient during this visit. Patient has an advance directive in EMR which is still valid.     Review of Systems   Constitutional: Negative for chills, fatigue and fever.   HENT: Negative for congestion, ear discharge, ear pain, facial swelling, hearing loss, postnasal drip, rhinorrhea, sinus pressure, sore throat, trouble swallowing and voice change.    Eyes: Positive for visual disturbance. Negative for discharge and redness.   Respiratory: Negative for cough, chest tightness, shortness of breath and wheezing.    Cardiovascular: Negative for chest pain and palpitations.   Gastrointestinal: Negative for abdominal pain, blood in stool, constipation, diarrhea, nausea and vomiting.   Endocrine: Negative for polydipsia and polyuria.   Genitourinary: Negative for dysuria, flank pain, hematuria and urgency.   Musculoskeletal: Negative for arthralgias, back pain, joint swelling and myalgias.   Skin: Negative for rash.   Neurological: Negative for dizziness, weakness, numbness and headaches.   Hematological: Negative for adenopathy.   Psychiatric/Behavioral: Negative for confusion and sleep disturbance. The patient is not nervous/anxious.        Compared to one year ago, the patient feels her physical health is the same.  Compared to one year ago, the patient feels her mental health is the same.    Reviewed chart for potential of high risk  "medication in the elderly: yes  Reviewed chart for potential of harmful drug interactions in the elderly:yes    Objective         Vitals:    08/30/21 1617   BP: (!) 183/77   BP Location: Left arm   Patient Position: Sitting   Cuff Size: Adult   Pulse: 61   Temp: 97.4 °F (36.3 °C)   TempSrc: Temporal   SpO2: 94%   Weight: 67.9 kg (149 lb 9.6 oz)   Height: 172.7 cm (67.99\")   PainSc: 0-No pain       Body mass index is 22.75 kg/m².  Discussed the patient's BMI with her. The BMI is in the acceptable range.    Physical Exam  Vitals and nursing note reviewed.   Constitutional:       Appearance: Normal appearance. She is well-developed.   HENT:      Head: Normocephalic and atraumatic.      Right Ear: External ear normal.      Left Ear: External ear normal.      Nose: Nose normal.   Eyes:      Extraocular Movements: Extraocular movements intact.      Conjunctiva/sclera: Conjunctivae normal.      Pupils: Pupils are equal, round, and reactive to light.   Pulmonary:      Effort: Pulmonary effort is normal.   Musculoskeletal:         General: Normal range of motion.      Cervical back: Normal range of motion.   Neurological:      General: No focal deficit present.      Mental Status: She is alert and oriented to person, place, and time.   Psychiatric:         Behavior: Behavior normal.         Thought Content: Thought content normal.         Judgment: Judgment normal.               Assessment/Plan   Medicare Risks and Personalized Health Plan  CMS Preventative Services Quick Reference  Advance Directive Discussion  Breast Cancer/Mammogram Screening  Colon Cancer Screening  Osteoporosis Risk    The above risks/problems have been discussed with the patient.  Pertinent information has been shared with the patient in the After Visit Summary.  Follow up plans and orders are seen below in the Assessment/Plan Section.    Diagnoses and all orders for this visit:    1. Medicare annual wellness visit, initial (Primary)      Follow " Up:  No follow-ups on file.     An After Visit Summary and PPPS were given to the patient.     She has macular degeneration and is going blind.     She does not want mammogram, stool fit test, dexa scan.  She says we have her advance directive on file.     Follow up 6 months since taking neurontin.  Blood work at that time.

## 2021-10-25 NOTE — TELEPHONE ENCOUNTER
Isamar called and wanted to know if Dr. Gloria would order Home Health for Ms. Calix.  She said that she used to live with her brother but she doesn't anymore and that she can't see very well.  She needs help with meds, learning to use her cane, and walking.

## 2021-10-27 NOTE — HOME HEALTH
REASON FOR REFERRAL: Patient referred to  PT by PCP due to increased difficulty with ambulation, now requiring a cane, and a recent fall.  She reports her balance is off because of her vision and she frequently bumps into things in her home.  She is using a cane more but isn't really sure how to use it right  DIAGNOSIS: Weakness, abnormal gait  SURGICAL PROCEDURE: N/A  PERTINENT MEDICAL HISTORY: PAD, GERD, AAA, CAD HTN, a-fib, anemia  PRIOR LEVEL OF FUNCTION: Patient ambulate all surfaces independent  PATIENT GOAL FOR THIS EPISODE OF CARE: Be more  confident when I'm walking  HOME SOCIAL ENVIRONMENT: Lives alone in single story home with several steps to enter

## 2021-10-28 NOTE — HOME HEALTH
"REASON FOR REFERRAL:  82 y/o female referred to home health OT by Dr Gloria her PCP due to jesus with decreased vision, decreased mobility, needing assistance with meds.  Jesus reported that she had macular degeneration and is having some problems completing certain functional tasks due to decreased vision.  Patient reported that she would like to increase her endurance and learn modifications due to decreased vision.    PMH:  AAA, A-fib, CAD, GERD, HTN, OA, chronic low back pain, chronic bronchitis, osteoporosis, lung nodule, peripheral artery disease, ORIF L intertochanteric fracture.  Patient reported personl history of macular degeneration.    PLOF:  Independent with ADLs, IADLs, functional transfers/functional mobility without AD.  No longer drives and out in the community with family.    HOME ENVIRONMENT:  Patient lives alone in a one level home with 4 steps to enter 1 handrail.  Patient reported that her son has been living with her for years but has moved out of state this week and she is now alone.  She reported that she has anxiety about being home alone now.  Patient reported that she has [employer] that can assist her with groceries/transportation/etc.    PRECAUTIONS: High Fall Risk    MD APPTS:  10/29/21 Dr Courtney    DME: SC, BSC (over toilet), RW, walk in shower with built in corner seat, grab bar inside shower, hand held shower, lift chair.    AROM B UES: WFL    STRENGTH B UES:  4/5 grossly throughout.    HAND DOMINANCE: R hand dominant, B  strength: 4/5.    REHAB POTENTIAL:  Good for Goals    WISH TO ADDRESS BATH/DRESSING WITH OT:  NO    PATIENT'S GOAL:  \"REGAIN SOME OF MY ENERGY. BE ABLE TO STAY IN MY OWN HOME AS LONG AS I CAN\""

## 2021-11-02 NOTE — HOME HEALTH
MSW met with the patient and referred her for meals on wheels and homemaking services through the [facility] office.

## 2021-11-04 NOTE — HOME HEALTH
"pt up amb. in home and reports had appointment w/ Dr. Maya on 10/29/21; pt reports MD request pt have heart cath procedure in [place] to fix valve but pt reports unsure if pt will \"go through with the procedure\"; pt reports improved mobilities in home but still limited due to poor vision"

## 2021-11-09 NOTE — CASE COMMUNICATION
Patient able to complete OT POC meeting all goals OT discharge summary is complete and Fabiola ready for co-sign. Thank you.

## 2021-11-10 NOTE — HOME HEALTH
pt up amb. in home w/ [facility] and reports steady progress w/ mobilities in home; pt reports HH ALEJO present on 11/8/21, and pt D/Cd from  PT services; pt reports wanting to return to being able to go get mail at mailbox, but still a little fearful of falling

## 2021-11-11 PROBLEM — R91.8 MASS OF LEFT LUNG: Status: ACTIVE | Noted: 2021-01-01

## 2021-11-11 NOTE — ED PROVIDER NOTES
Subjective   Patient presents to the emergency department with shortness of air that began roughly 4 weeks ago, and has worsened over the past 2 days.  Patient states now she gets short of breath while talking.  She is a former smoker who quit smoking in 2017.  She denies any chronic lung conditions.          Shortness of Breath  Severity:  Mild  Onset quality:  Gradual  Duration:  4 weeks  Timing:  Constant  Progression:  Worsening  Chronicity:  New  Relieved by:  Nothing  Worsened by:  Exertion, movement and activity  Associated symptoms: no abdominal pain, no chest pain, no cough, no diaphoresis, no ear pain, no fever, no headaches, no neck pain, no rash, no sore throat, no vomiting and no wheezing    Risk factors: no tobacco use        Review of Systems   Constitutional: Positive for activity change. Negative for appetite change, chills, diaphoresis, fatigue and fever.   HENT: Negative for congestion, ear discharge, ear pain, nosebleeds, rhinorrhea, sinus pressure, sore throat and trouble swallowing.    Eyes: Negative for discharge and redness.   Respiratory: Positive for shortness of breath. Negative for apnea, cough, chest tightness and wheezing.    Cardiovascular: Negative for chest pain.   Gastrointestinal: Negative for abdominal pain, diarrhea, nausea and vomiting.   Endocrine: Negative for polyuria.   Genitourinary: Negative for dysuria, frequency and urgency.   Musculoskeletal: Negative for myalgias and neck pain.   Skin: Negative for color change and rash.   Allergic/Immunologic: Negative for immunocompromised state.   Neurological: Positive for weakness. Negative for dizziness, seizures, syncope, light-headedness and headaches.   Hematological: Negative for adenopathy. Does not bruise/bleed easily.   Psychiatric/Behavioral: Negative for behavioral problems and confusion.   All other systems reviewed and are negative.      Past Medical History:   Diagnosis Date   • Abdominal aortic aneurysm (AAA) without  rupture (HCC)    • Atrial fibrillation (HCC) 10/2/2017    New onset.  S/p cardioversion  On coumadin now.  Amiodarone    • CAD (coronary artery disease)    • Gastroesophageal reflux disease    • Hypercholesterolemia    • Hypertension    • Nuclear senile cataract    • Osteoarthritis    • Osteoporosis    • Peripheral arterial disease (HCC)    • Solitary lung nodule        No Known Allergies    Past Surgical History:   Procedure Laterality Date   • ABDOMINAL AORTIC ANEURYSM REPAIR     • HIP INTERTROCHANTERIC NAILING Left 10/1/2017    Procedure: HIP INTERTROCHANTERIC NAILING - LEFT - [place];  Surgeon: Nicola Rich MD;  Location: [facility];  Service:        Family History   Problem Relation Age of Onset   • Hypertension Mother    • Coronary artery disease Father        Social History     Socioeconomic History   • Marital status:    Tobacco Use   • Smoking status: Former Smoker     Packs/day: 1.00     Years: 57.00     Pack years: 57.00     Start date: 1961     Quit date: 10/1/2017     Years since quittin.1   • Smokeless tobacco: Never Used   Substance and Sexual Activity   • Alcohol use: No   • Drug use: No   • Sexual activity: Not Currently           Objective   Physical Exam  Vitals and nursing note reviewed.   Constitutional:       Appearance: She is well-developed.   HENT:      Head: Normocephalic and atraumatic.      Nose: Nose normal.   Eyes:      General: No scleral icterus.        Right eye: No discharge.         Left eye: No discharge.      Conjunctiva/sclera: Conjunctivae normal.      Pupils: Pupils are equal, round, and reactive to light.   Neck:      Trachea: No tracheal deviation.   Cardiovascular:      Rate and Rhythm: Regular rhythm. Bradycardia present.      Heart sounds: Murmur heard.    Systolic murmur is present.      Pulmonary:      Effort: Pulmonary effort is normal. No respiratory distress.      Breath sounds: Normal breath sounds. No stridor. No wheezing or rales.    Abdominal:      General: Bowel sounds are normal. There is no distension.      Palpations: Abdomen is soft. There is no mass.      Tenderness: There is no abdominal tenderness. There is no guarding or rebound.   Musculoskeletal:      Cervical back: Normal range of motion and neck supple.   Skin:     General: Skin is warm and dry.      Findings: No erythema or rash.   Neurological:      Mental Status: She is alert and oriented to person, place, and time.      Coordination: Coordination normal.   Psychiatric:         Behavior: Behavior normal.         Thought Content: Thought content normal.         ECG 12 Lead      Date/Time: 11/11/2021 4:39 PM  Performed by: Juan Carlos Schmitz MD  Authorized by: Juan Carlos Schmitz MD   Interpreted by physician  Rhythm: sinus rhythm  BPM: 60  ST Segments: ST segments normal                   ED Course  ED Course as of 11/11/21 1641   Thu Nov 11, 2021   1639 Oxygen saturation rate drops to 88% with minimal ambulation in room. [CB]      ED Course User Index  [CB] Juan Carlos Schmitz MD                   Labs Reviewed   COMPREHENSIVE METABOLIC PANEL - Abnormal; Notable for the following components:       Result Value    Sodium 133 (*)     Chloride 95 (*)     Albumin 3.40 (*)     All other components within normal limits    Narrative:     GFR Normal >60  Chronic Kidney Disease <60  Kidney Failure <15     BNP (IN-HOUSE) - Abnormal; Notable for the following components:    proBNP 5,064.0 (*)     All other components within normal limits    Narrative:     Among patients with dyspnea, NT-proBNP is highly sensitive for the detection of acute congestive heart failure. In addition NT-proBNP of <300 pg/ml effectively rules out acute congestive heart failure with 99% negative predictive value.    Results may be falsely decreased if patient taking Biotin.     CBC WITH AUTO DIFFERENTIAL - Abnormal; Notable for the following components:    RDW 15.8 (*)     Neutrophil % 83.9 (*)      Lymphocyte % 7.6 (*)     Eosinophil % 0.2 (*)     Neutrophils, Absolute 8.81 (*)     All other components within normal limits   TROPONIN (IN-HOUSE) - Normal    Narrative:     Troponin T Reference Range:  <= 0.03 ng/mL-   Negative for AMI  >0.03 ng/mL-     Abnormal for myocardial necrosis.  Clinicians would have to utilize clinical acumen, EKG, Troponin and serial changes to determine if it is an Acute Myocardial Infarction or myocardial injury due to an underlying chronic condition.       Results may be falsely decreased if patient taking Biotin.     BLOOD CULTURE   BLOOD CULTURE   COVID-19 AND FLU A/B, NP SWAB IN TRANSPORT MEDIA 8-12 HR TAT   RAINBOW DRAW    Narrative:     The following orders were created for panel order Pillow Draw.  Procedure                               Abnormality         Status                     ---------                               -----------         ------                     Green Top (Gel)[[id number]]                                  Final result               Lavender Top[[id number]]                                     Final result               Gold Top - SST[[id number]]                                   Final result               Light Blue Top[[id number]]                                   Final result                 Please view results for these tests on the individual orders.   LACTIC ACID, PLASMA   CBC AND DIFFERENTIAL    Narrative:     The following orders were created for panel order CBC & Differential.  Procedure                               Abnormality         Status                     ---------                               -----------         ------                     CBC Auto Differential[[id number]]        Abnormal            Final result                 Please view results for these tests on the individual orders.   GREEN TOP   LAVENDER TOP   GOLD TOP - SST   LIGHT BLUE TOP       CT Chest With Contrast Diagnostic   Final Result   1.  Large spiculated mass in the left  upper lobe and   aorticopulmonary window region of the mediastinum with two   central areas of necrosis or fluid collections. Findings are   highly suspicious for neoplasm but could also be due to an   infectious process. This mass exerts mass effect on the left   upper lobe bronchial structures and left upper lobe pulmonary   artery.   2.  Moderate-sized left pleural effusion with adjacent   subsegmental atelectasis of the left lower lobe.   3.  Coronary artery atherosclerotic disease.   4.  Abdominal aortic aneurysms at the hiatus and at the level of   the kidneys measuring up to 4.3 cm in diameter. There may have   been a previous abdominal aortic aneurysm repair of the more   inferior level. These findings are incompletely evaluated.   Recommend correlation with surgical history, and more complete   imaging of the abdomen and pelvis to evaluate the size of the   aneurysm.      For management of the more superior aneurysm,   4.0-4.4 cm AAA, recommend follow-up every 12 months and recommend   vascular consultation.      Electronically signed by:  Trent Domínguez MD  11/11/2021 3:48 PM   CST Workstation: [id number]      XR Chest 1 View   Final Result         1. Large 11.8 x 8.1 cm masslike density in the left upper lobe   with questionable cavitation/air-fluid level as above. The   findings are indicative of underlying pulmonary neoplasm. CT of   the chest with contrast is recommended for confirmation and   staging.   2. Moderate size left pleural effusion.            Electronically signed by:  Paty Albert MD  11/11/2021 1:46 PM   CST Workstation: [id number]                                       [facility]    Final diagnoses:   Mass of left lung   Pleural effusion   Acute on chronic congestive heart failure, unspecified heart failure type (HCC)   Uncontrolled hypertension       ED Disposition  ED Disposition     ED Disposition Condition Comment    Decision to Admit  Level of Care: Telemetry [5]   Diagnosis: Mass of left  lung [[id number]]   Admitting Physician: ALFREDITO CARDOSO [[id number]]   Attending Physician: ALFREDITO CARDOSO [[id number]]   Certification: I Certify That Inpatient Hospital Services Are Medically Necessary For Greater Than 2 Midnights            No follow-up provider specified.       Medication List      No changes were made to your prescriptions during this visit.          Juan Carlos Schmitz MD  11/11/21 1638       Juan Carlos Schmitz MD  11/11/21 1640

## 2021-11-11 NOTE — CONSULTS
DATE OF CONSULT: 11/11/2021    REQUESTING SOURCE:  Dr Teresa      REASON FOR CONSULTATION: Large left lung mass worrisome for malignancy, left pleural effusion      HISTORY OF PRESENT ILLNESS:    81-year-old female with medical problem consisting of hypertension, dyslipidemia, history of nicotine addiction with 50-pack-year smoking history that she quit in 2017, osteoporosis, osteoarthritis, peripheral arterial disease, history of atrial fibrillation after hip surgery for which patient is currently on Plavix, history of abdominal aortic aneurysm presented to emergency room today on November 11, 2021 with complaint of worsening shortness of breath over the last 3 to 4 weeks.  In the last 3 days as per daughter at bedside is clearly got worse.  During work-up in the emergency room patient had a CT scan done that showed large left upper lung mass along with moderate to large pleural effusion.  Hematology oncology has been consulted for abnormal CT scan showing possible lung malignancy.  Complains of cough productive of yellowish-white sputum.  Denies any hemoptysis.  Denies any headache or dizziness.  Complains of chronic back pain.  Admits to 30 pound of weight loss in last 1 year.  Complains of poor appetite.  Denies any prior history of malignancy.  Denies any blood in stool or any change in bowel habits.        PAST MEDICAL HISTORY:    Past Medical History:   Diagnosis Date   • Abdominal aortic aneurysm (AAA) without rupture (HCC)    • Atrial fibrillation (HCC) 10/2/2017    New onset.  S/p cardioversion  On coumadin now.  Amiodarone    • CAD (coronary artery disease)    • Gastroesophageal reflux disease    • Hypercholesterolemia    • Hypertension    • Nuclear senile cataract    • Osteoarthritis    • Osteoporosis    • Peripheral arterial disease (HCC)    • Solitary lung nodule        PAST SURGICAL HISTORY:  Past Surgical History:   Procedure Laterality Date   • ABDOMINAL AORTIC ANEURYSM REPAIR     • HIP  INTERTROCHANTERIC NAILING Left 10/1/2017    Procedure: HIP INTERTROCHANTERIC NAILING - LEFT - [place];  Surgeon: Nicola Rich MD;  Location: [facility];  Service:        ALLERGIES:    No Known Allergies    SOCIAL HISTORY:   Social History     Tobacco Use   • Smoking status: Former Smoker     Packs/day: 1.00     Years: 57.00     Pack years: 57.00     Start date: 1961     Quit date: 10/1/2017     Years since quittin.1   • Smokeless tobacco: Never Used   Substance Use Topics   • Alcohol use: No   • Drug use: No       CURRENT MEDICATIONS:    Current Facility-Administered Medications   Medication Dose Route Frequency Provider Last Rate Last Admin   • acetaminophen (TYLENOL) tablet 650 mg  650 mg Oral Q4H PRN Mike Teresa MD        Or   • acetaminophen (TYLENOL) 160 MG/5ML solution 650 mg  650 mg Oral Q4H PRN Mike Teresa MD        Or   • acetaminophen (TYLENOL) suppository 650 mg  650 mg Rectal Q4H PRN Mike Teresa MD       • aluminum-magnesium hydroxide-simethicone (MAALOX MAX) 400-400-40 MG/5ML suspension 15 mL  15 mL Oral Q6H PRN Mike Teresa MD       • sennosides-docusate (PERICOLACE) 8.6-50 MG per tablet 2 tablet  2 tablet Oral BID Mike Teresa MD        And   • polyethylene glycol (MIRALAX) packet 17 g  17 g Oral Daily PRN Mike Teresa MD        And   • bisacodyl (DULCOLAX) EC tablet 5 mg  5 mg Oral Daily PRN Mike Teresa MD        And   • bisacodyl (DULCOLAX) suppository 10 mg  10 mg Rectal Daily PRN Mike Teresa MD       • enoxaparin (LOVENOX) syringe 40 mg  40 mg Subcutaneous Q24H Mike Teresa MD       • [START ON 2021] furosemide (LASIX) tablet 40 mg  40 mg Oral Daily Mike Teresa MD       • Hold medication  1 each Does not apply Continuous PRN Mike Teresa MD       • levoFLOXacin (LEVAQUIN) 750 mg/150 mL D5W (premix) (LEVAQUIN) 750 mg  750 mg Intravenous Once Juan Carlos Schmitz MD       • ondansetron  (ZOFRAN) tablet 4 mg  4 mg Oral Q6H PRN Mike Teresa MD        Or   • ondansetron (ZOFRAN) injection 4 mg  4 mg Intravenous Q6H PRN Mike Teresa MD       • Pharmacy to Dose Zosyn   Does not apply Continuous Mike Teresa MD       • piperacillin-tazobactam (ZOSYN) 3.375 g/100 mL 0.9% NS IVPB (mbp)  3.375 g Intravenous Once Mike Teresa MD       • [START ON 11/12/2021] piperacillin-tazobactam (ZOSYN) 3.375 g/100 mL 0.9% NS IVPB (mbp)  3.375 g Intravenous Q8H Mike Teresa MD       • sodium chloride 0.9 % flush 10 mL  10 mL Intravenous PRN Juan Carlos Schmitz MD       • sodium chloride 0.9 % flush 10 mL  10 mL Intravenous Q12H Mike Teresa MD       • sodium chloride 0.9 % flush 10 mL  10 mL Intravenous PRN Mike Teresa MD         Current Outpatient Medications   Medication Sig Dispense Refill   • Acetaminophen (TYLENOL ARTHRITIS PAIN PO) Take 500 mg by mouth 3 (Three) Times a Day As Needed. Indications: Mild Pain     • amiodarone (PACERONE) 200 MG tablet Take 1 tablet by mouth Daily. (Patient taking differently: Take 100 mg by mouth Daily.) 30 tablet 0   • aspirin 81 MG EC tablet Take 81 mg by mouth daily.     • Cholecalciferol (GNP VITAMIN D SUPER STRENGTH) 5000 UNITS tablet Take  by mouth.     • clopidogrel (PLAVIX) 75 MG tablet Take 1 tablet by mouth Daily. 90 tablet 3   • famotidine (PEPCID) 20 MG tablet Take 1 tablet by mouth 2 (Two) Times a Day As Needed for Heartburn. (Patient taking differently: Take 1 tablet by mouth 2 (Two) Times a Day.) 60 tablet 11   • ferrous sulfate 324 (65 Fe) MG tablet delayed-release EC tablet Take 1 tablet by mouth Daily With Breakfast. 30 tablet 0   • furosemide (LASIX) 40 MG tablet Take 40 mg by mouth Daily. ONE TABLET BY MOUTH EVERY DAY     • gabapentin (NEURONTIN) 300 MG capsule Take 1-2 capsules by mouth Every Night. 180 capsule 1   • hydrALAZINE (APRESOLINE) 25 MG tablet Take 1 tablet by mouth 4 (Four) Times a Day. (Patient taking  differently: Take 25 mg by mouth 4 (Four) Times a Day. Patient states she takes as needed) 120 tablet 11   • hydrALAZINE (APRESOLINE) 50 MG tablet Take 50 mg by mouth 3 (Three) Times a Day.     • lisinopril (PRINIVIL,ZESTRIL) 40 MG tablet Take 1 tablet by mouth Daily. 90 tablet 3   • magnesium oxide (MAGOX) 400 (241.3 Mg) MG tablet tablet Take 1 tablet by mouth Daily. 30 each 0   • metoprolol tartrate (LOPRESSOR) 50 MG tablet TAKE 1 TABLET BY MOUTH EVERY 12 (TWELVE) HOURS. 180 tablet 2   • Multiple Vitamins-Minerals (VISION FORMULA PO) Take 1 tablet by mouth every day.     • polyethylene glycol (MIRALAX) powder ONE CAPFUL IN 8 OUNCES OF LIQUID ONCE DAILY UP TO THREE TIMES DAILY AS NEEDED. 1581 g 11   • traMADol (Ultram) 50 MG tablet Take 1 tablet by mouth Every 6 (Six) Hours As Needed for Moderate Pain . 30 tablet 0   • Zinc 50 MG capsule Take 1 capsule by mouth Daily.          HOME MEDICATIONS:   No current facility-administered medications on file prior to encounter.     Current Outpatient Medications on File Prior to Encounter   Medication Sig Dispense Refill   • Acetaminophen (TYLENOL ARTHRITIS PAIN PO) Take 500 mg by mouth 3 (Three) Times a Day As Needed. Indications: Mild Pain     • amiodarone (PACERONE) 200 MG tablet Take 1 tablet by mouth Daily. (Patient taking differently: Take 100 mg by mouth Daily.) 30 tablet 0   • aspirin 81 MG EC tablet Take 81 mg by mouth daily.     • Cholecalciferol (GNP VITAMIN D SUPER STRENGTH) 5000 UNITS tablet Take  by mouth.     • clopidogrel (PLAVIX) 75 MG tablet Take 1 tablet by mouth Daily. 90 tablet 3   • famotidine (PEPCID) 20 MG tablet Take 1 tablet by mouth 2 (Two) Times a Day As Needed for Heartburn. (Patient taking differently: Take 1 tablet by mouth 2 (Two) Times a Day.) 60 tablet 11   • ferrous sulfate 324 (65 Fe) MG tablet delayed-release EC tablet Take 1 tablet by mouth Daily With Breakfast. 30 tablet 0   • furosemide (LASIX) 40 MG tablet Take 40 mg by mouth Daily.  ONE TABLET BY MOUTH EVERY DAY     • gabapentin (NEURONTIN) 300 MG capsule Take 1-2 capsules by mouth Every Night. 180 capsule 1   • hydrALAZINE (APRESOLINE) 25 MG tablet Take 1 tablet by mouth 4 (Four) Times a Day. (Patient taking differently: Take 25 mg by mouth 4 (Four) Times a Day. Patient states she takes as needed) 120 tablet 11   • hydrALAZINE (APRESOLINE) 50 MG tablet Take 50 mg by mouth 3 (Three) Times a Day.     • lisinopril (PRINIVIL,ZESTRIL) 40 MG tablet Take 1 tablet by mouth Daily. 90 tablet 3   • magnesium oxide (MAGOX) 400 (241.3 Mg) MG tablet tablet Take 1 tablet by mouth Daily. 30 each 0   • metoprolol tartrate (LOPRESSOR) 50 MG tablet TAKE 1 TABLET BY MOUTH EVERY 12 (TWELVE) HOURS. 180 tablet 2   • Multiple Vitamins-Minerals (VISION FORMULA PO) Take 1 tablet by mouth every day.     • polyethylene glycol (MIRALAX) powder ONE CAPFUL IN 8 OUNCES OF LIQUID ONCE DAILY UP TO THREE TIMES DAILY AS NEEDED. 1581 g 11   • traMADol (Ultram) 50 MG tablet Take 1 tablet by mouth Every 6 (Six) Hours As Needed for Moderate Pain . 30 tablet 0   • Zinc 50 MG capsule Take 1 capsule by mouth Daily.         FAMILY HISTORY:    Family History   Problem Relation Age of Onset   • Hypertension Mother    • Coronary artery disease Father        REVIEW OF SYSTEMS:        CONSTITUTIONAL:  Complains of fatigue.  Positive for 30 pound of weight loss in last 1 year.  Complains of poor appetite.  Denies any fever, chills .     EYES: Positive for chronic decreased visual acuity due to macular degeneration. No discharge. No new lesions    ENMT:  No epistaxis, mouth sores or difficulty swallowing.    RESPIRATORY: Positive for shortness of breath and cough.  No hemoptysis.      CARDIOVASCULAR:  No chest pain or palpitations.    GASTROINTESTINAL:  No abdominal pain nausea, vomiting or blood in the stool.    GENITOURINARY: No Dysuria or Hematuria.    MUSCULOSKELETAL: Positive for chronic back pain.    LYMPHATICS:  Denies any abnormal  "swollen glands anywhere in the body.    NEUROLOGICAL : No tingling or numbness. No headache or dizziness. No seizures or balance problems.    SKIN: Positive for easy bruising.    ENDOCRINE : No new hot or cold intolerance. No new polyuria . No polydipsia.        PHYSICAL EXAMINATION:      VITAL SIGNS:  /74   Pulse 54   Temp 97.5 °F (36.4 °C) (Infrared)   Resp 20   Ht 172.7 cm (68\")   Wt 66.7 kg (147 lb)   LMP  (LMP Unknown)   SpO2 96%   BMI 22.35 kg/m²       11/11/21  1215   Weight: 66.7 kg (147 lb)       ECOG performance status: 2    CONSTITUTIONAL:  Not in any distress.  Appears frail.  Using supplemental oxygen via nasal cannula    EYES: Mild conjunctival Pallor. No Icterus. No Pterygium. Extraocular Movements intact.No ptosis.    ENMT:  Normocephalic, Atraumatic.No Facial Asymmetry noted.    NECK:  No adenopathy.Trachea midline. NO JVD.    RESPIRATORY: Decreased air entry in left lung field. No rhonchi or wheezing.Fair respiratory effort.    CARDIOVASCULAR:  S1, S2. Regular rate and rhythm. No murmur or gallop appreciated.    ABDOMEN:  Soft,  nontender. Bowel sounds present in all four quadrants.  No Hepatosplenomegaly appreciated.    MUSCULOSKELETAL:  No edema.No Calf Tenderness.Decreased range of motion.    NEUROLOGIC:    No  Motor  deficit appreciated. Cranial Nerves 2-12 grossly intact.    SKIN : Bruising present on bilateral upper extremity. Skin is warm and dry to touch.    LYMPHATICS: No new enlarged lymph nodes in neck or supraclavicular area.    PSYCHIATRY: Alert, awake and oriented ×3.Normal affect.  Normal judgment.  Makes good eye contact.          DIAGNOSTIC DATA:    WBC   Date Value Ref Range Status   11/11/2021 10.50 3.40 - 10.80 10*3/mm3 Final     RBC   Date Value Ref Range Status   11/11/2021 4.31 3.77 - 5.28 10*6/mm3 Final     Hemoglobin   Date Value Ref Range Status   11/11/2021 12.1 12.0 - 15.9 g/dL Final     Hematocrit   Date Value Ref Range Status   11/11/2021 37.3 34.0 - 46.6 " % Final     MCV   Date Value Ref Range Status   11/11/2021 86.5 79.0 - 97.0 fL Final     MCH   Date Value Ref Range Status   11/11/2021 28.1 26.6 - 33.0 pg Final     MCHC   Date Value Ref Range Status   11/11/2021 32.4 31.5 - 35.7 g/dL Final     RDW   Date Value Ref Range Status   11/11/2021 15.8 (H) 12.3 - 15.4 % Final     RDW-SD   Date Value Ref Range Status   11/11/2021 50.2 37.0 - 54.0 fl Final     MPV   Date Value Ref Range Status   11/11/2021 8.7 6.0 - 12.0 fL Final     Platelets   Date Value Ref Range Status   11/11/2021 321 140 - 450 10*3/mm3 Final     Neutrophil %   Date Value Ref Range Status   11/11/2021 83.9 (H) 42.7 - 76.0 % Final     Lymphocyte %   Date Value Ref Range Status   11/11/2021 7.6 (L) 19.6 - 45.3 % Final     Monocyte %   Date Value Ref Range Status   11/11/2021 7.4 5.0 - 12.0 % Final     Eosinophil %   Date Value Ref Range Status   11/11/2021 0.2 (L) 0.3 - 6.2 % Final     Basophil %   Date Value Ref Range Status   11/11/2021 0.4 0.0 - 1.5 % Final     Immature Grans %   Date Value Ref Range Status   11/11/2021 0.5 0.0 - 0.5 % Final     Neutrophils, Absolute   Date Value Ref Range Status   11/11/2021 8.81 (H) 1.70 - 7.00 10*3/mm3 Final     Lymphocytes, Absolute   Date Value Ref Range Status   11/11/2021 0.80 0.70 - 3.10 10*3/mm3 Final     Monocytes, Absolute   Date Value Ref Range Status   11/11/2021 0.78 0.10 - 0.90 10*3/mm3 Final     Eosinophils, Absolute   Date Value Ref Range Status   11/11/2021 0.02 0.00 - 0.40 10*3/mm3 Final     Basophils, Absolute   Date Value Ref Range Status   11/11/2021 0.04 0.00 - 0.20 10*3/mm3 Final     Immature Grans, Absolute   Date Value Ref Range Status   11/11/2021 0.05 0.00 - 0.05 10*3/mm3 Final     nRBC   Date Value Ref Range Status   11/11/2021 0.0 0.0 - 0.2 /100 WBC Final     Glucose   Date Value Ref Range Status   11/11/2021 95 65 - 99 mg/dL Final     Sodium   Date Value Ref Range Status   11/11/2021 133 (L) 136 - 145 mmol/L Final     Potassium   Date  Value Ref Range Status   11/11/2021 4.4 3.5 - 5.2 mmol/L Final     CO2   Date Value Ref Range Status   11/11/2021 27.0 22.0 - 29.0 mmol/L Final     Chloride   Date Value Ref Range Status   11/11/2021 95 (L) 98 - 107 mmol/L Final     Anion Gap   Date Value Ref Range Status   11/11/2021 11.0 5.0 - 15.0 mmol/L Final     Creatinine   Date Value Ref Range Status   11/11/2021 0.88 0.57 - 1.00 mg/dL Final     BUN   Date Value Ref Range Status   11/11/2021 14 8 - 23 mg/dL Final     BUN/Creatinine Ratio   Date Value Ref Range Status   11/11/2021 15.9 7.0 - 25.0 Final     Calcium   Date Value Ref Range Status   11/11/2021 9.0 8.6 - 10.5 mg/dL Final     eGFR Non  Amer   Date Value Ref Range Status   11/11/2021 62 >60 mL/min/1.73 Final     Alkaline Phosphatase   Date Value Ref Range Status   11/11/2021 77 39 - 117 U/L Final     Total Protein   Date Value Ref Range Status   11/11/2021 6.6 6.0 - 8.5 g/dL Final     ALT (SGPT)   Date Value Ref Range Status   11/11/2021 10 1 - 33 U/L Final     AST (SGOT)   Date Value Ref Range Status   11/11/2021 18 1 - 32 U/L Final     Total Bilirubin   Date Value Ref Range Status   11/11/2021 0.3 0.0 - 1.2 mg/dL Final     Albumin   Date Value Ref Range Status   11/11/2021 3.40 (L) 3.50 - 5.20 g/dL Final     Globulin   Date Value Ref Range Status   11/11/2021 3.2 gm/dL Final     Lab Results   Component Value Date    IRON 28 (L) 10/13/2017    TIBC 229 (L) 10/13/2017    LABIRON 12 (L) 10/13/2017     No results found for: , LABCA2, AFPTM, HCGQUANT, , CHROMGRNA, [id number], CEA, REFLABREPO]    Radiology Data :  CT Chest With Contrast Diagnostic    Result Date: 11/11/2021  1.  Large spiculated mass in the left upper lobe and aorticopulmonary window region of the mediastinum with two central areas of necrosis or fluid collections. Findings are highly suspicious for neoplasm but could also be due to an infectious process. This mass exerts mass effect on the left upper lobe bronchial  structures and left upper lobe pulmonary artery. 2.  Moderate-sized left pleural effusion with adjacent subsegmental atelectasis of the left lower lobe. 3.  Coronary artery atherosclerotic disease. 4.  Abdominal aortic aneurysms at the hiatus and at the level of the kidneys measuring up to 4.3 cm in diameter. There may have been a previous abdominal aortic aneurysm repair of the more inferior level. These findings are incompletely evaluated. Recommend correlation with surgical history, and more complete imaging of the abdomen and pelvis to evaluate the size of the aneurysm. For management of the more superior aneurysm, 4.0-4.4 cm AAA, recommend follow-up every 12 months and recommend vascular consultation. Electronically signed by:  Trent Domínguez MD  11/11/2021 3:48 PM CST Workstation: [id number]    XR Chest 1 View    Result Date: 11/11/2021  1. Large 11.8 x 8.1 cm masslike density in the left upper lobe with questionable cavitation/air-fluid level as above. The findings are indicative of underlying pulmonary neoplasm. CT of the chest with contrast is recommended for confirmation and staging. 2. Moderate size left pleural effusion. Electronically signed by:  Paty Albert MD  11/11/2021 1:46 PM CST Workstation: [id number]      Pathology :  * Cannot find OR log *    ASSESSMENT AND PLAN:      1.  Left upper lobe mass:  -CT scan showing large left upper lobe mass measuring at 11 x 8 cm is highly worrisome for malignancy.  Results of CT scan were reviewed with patient and her daughter.  -Due to her frail health condition as well as poor performance status options were discussed with patient.  -Patient and her daughter requesting biopsy to be done before they make final decision.  -Case was discussed with radiologist Dr. Domínguez.  Will order a CT-guided biopsy tomorrow along with CT-guided thoracentesis.  -We will send out both biopsy as well as thoracentesis fluid for pathology evaluation.  -Further recommendation will  depend on the result of biopsy.    2.  Left pleural effusion:  -Possibility of postobstructive parapneumonic infusion versus malignant pleural effusion  -We will do cytology on pleural fluid tomorrow.    3.  Atrial fibrillation    4.  Hypertension    5.  Health maintenance: Patient quit smoking in 2017          Thank you for this consultation.    Pee Nolasco MD  11/11/2021  17:44 CST        Part of this note may be an electronic transcription/translation of spoken language to printed text using the Dragon Dictation System.

## 2021-11-11 NOTE — ED NOTES
This tech ambulated pt and before we reached the door of the room pt's oxygen saturation was 91%. Pt sat back in bed and after a few deep breaths, pt's oxygen was 88% on room air. Pt placed on 2L nasal cannula and is now at 94%. MALINI Perez and  notified.      Jennie Olson  11/11/21 [id number]

## 2021-11-11 NOTE — ED NOTES
Patient presents to ED with c/o shortness of air that has worsened since Tuesday, she saw Dr. Courtney and was diagnosed with a fib. Patient is alert and oriented x4.      Cinthia Hicks, RN  11/11/21 1240

## 2021-11-11 NOTE — H&P
[facility] Medicine Services  INPATIENT HISTORY AND PHYSICAL       Patient Care Team:  Ramiro Gloria MD as PCP - General  [facility], Nicola Mcmanus MD as Surgeon (Orthopedic Surgery)    Date of Admission: 11/11/2021    Primary Care Physician: Ramiro Gloria MD    Chief complaint   Chief Complaint   Patient presents with   • Shortness of Breath       Subjective     Patient is a 81 y.o. female with history of hypertension, coronary artery disease, AAA, atrial fibrillation, nicotine dependence (she quit in 2077) presents with 2-week history of progressively worsening shortness of breath associated with nonproductive cough, weakness and overall functional decline.  No reported history of fever, chills, nausea, vomiting, chest pain, unintentional weight loss, hemoptysis, orthopnea or paroxysmal nocturnal dyspnea.    On triage, she was noted to have elevated blood pressure of 213/86 which improved slightly with IV hydralazine.    Blood work showed negative troponin, unremarkable CBC and CMP.  Chest x-ray showed moderate left-sided pleural effusion and large 11.8 x 8.1 cm masslike density in the left upper lobe indicative of neoplasm.      Review of Systems   Constitutional: Positive for activity change and fatigue. Negative for appetite change, chills, diaphoresis and fever.   HENT: Negative for trouble swallowing and voice change.    Eyes: Negative for photophobia and visual disturbance.   Respiratory: Negative for cough, choking, chest tightness, shortness of breath, wheezing and stridor.    Cardiovascular: Negative for chest pain, palpitations and leg swelling.   Gastrointestinal: Negative for abdominal distention, abdominal pain, blood in stool, constipation, diarrhea, nausea and vomiting.   Endocrine: Negative for cold intolerance, heat intolerance, polydipsia, polyphagia and polyuria.   Genitourinary: Negative for decreased urine volume, difficulty urinating, dysuria,  enuresis, flank pain, frequency, hematuria and urgency.   Musculoskeletal: Negative for arthralgias, gait problem, myalgias, neck pain and neck stiffness.   Skin: Negative for pallor, rash and wound.   Neurological: Negative for dizziness, tremors, seizures, syncope, facial asymmetry, speech difficulty, weakness, light-headedness, numbness and headaches.   Hematological: Does not bruise/bleed easily.   Psychiatric/Behavioral: Negative for agitation, behavioral problems and confusion.         History  Past Medical History:   Diagnosis Date   • Abdominal aortic aneurysm (AAA) without rupture (HCC)    • Atrial fibrillation (HCC) 10/2/2017    New onset.  S/p cardioversion  On coumadin now.  Amiodarone    • CAD (coronary artery disease)    • Gastroesophageal reflux disease    • Hypercholesterolemia    • Hypertension    • Nuclear senile cataract    • Osteoarthritis    • Osteoporosis    • Peripheral arterial disease (HCC)    • Solitary lung nodule      Past Surgical History:   Procedure Laterality Date   • ABDOMINAL AORTIC ANEURYSM REPAIR     • HIP INTERTROCHANTERIC NAILING Left 10/1/2017    Procedure: HIP INTERTROCHANTERIC NAILING - LEFT - Long daokta;  Surgeon: Nicola Rich MD;  Location: [facility];  Service:      Family History   Problem Relation Age of Onset   • Hypertension Mother    • Coronary artery disease Father      Social History     Tobacco Use   • Smoking status: Former Smoker     Packs/day: 1.00     Years: 57.00     Pack years: 57.00     Start date: 1961     Quit date: 10/1/2017     Years since quittin.1   • Smokeless tobacco: Never Used   Substance Use Topics   • Alcohol use: No   • Drug use: No     (Not in a hospital admission)    Allergies:  Patient has no known allergies.  Prior to Admission medications    Medication Sig Start Date End Date Taking? Authorizing Provider   Acetaminophen (TYLENOL ARTHRITIS PAIN PO) Take 500 mg by mouth 3 (Three) Times a Day As Needed. Indications: Mild Pain  10/1/21   Sher Murillo MD   amiodarone (PACERONE) 200 MG tablet Take 1 tablet by mouth Daily.  Patient taking differently: Take 100 mg by mouth Daily. 10/24/17   Nikhil Rouse MD   aspirin 81 MG EC tablet Take 81 mg by mouth daily.    Sher Murillo MD   Cholecalciferol (GNP VITAMIN D SUPER STRENGTH) 5000 UNITS tablet Take  by mouth.    Sher Murillo MD   clopidogrel (PLAVIX) 75 MG tablet Take 1 tablet by mouth Daily. 2/8/21   Ramiro Gloria MD   famotidine (PEPCID) 20 MG tablet Take 1 tablet by mouth 2 (Two) Times a Day As Needed for Heartburn.  Patient taking differently: Take 1 tablet by mouth 2 (Two) Times a Day. 11/6/18   Ramiro Gloria MD   ferrous sulfate 324 (65 Fe) MG tablet delayed-release EC tablet Take 1 tablet by mouth Daily With Breakfast. 10/25/17   Nikhil Rouse MD   furosemide (LASIX) 40 MG tablet Take 40 mg by mouth Daily. ONE TABLET BY MOUTH EVERY DAY 10/30/21   Sher Murillo MD   gabapentin (NEURONTIN) 300 MG capsule Take 1-2 capsules by mouth Every Night. 11/1/21   Ramiro Gloria MD   hydrALAZINE (APRESOLINE) 25 MG tablet Take 1 tablet by mouth 4 (Four) Times a Day.  Patient taking differently: Take 25 mg by mouth 4 (Four) Times a Day. Patient states she takes as needed 2/6/20   Ramiro Gloria MD   hydrALAZINE (APRESOLINE) 50 MG tablet Take 50 mg by mouth 3 (Three) Times a Day. 1/6/21   Sher Murillo MD   lisinopril (PRINIVIL,ZESTRIL) 40 MG tablet Take 1 tablet by mouth Daily. 2/8/21   Ramiro Gloria MD   magnesium oxide (MAGOX) 400 (241.3 Mg) MG tablet tablet Take 1 tablet by mouth Daily. 10/24/17   Nikhil Rouse MD   metoprolol tartrate (LOPRESSOR) 50 MG tablet TAKE 1 TABLET BY MOUTH EVERY 12 (TWELVE) HOURS. 4/9/21   Yasmine Jose APRN   Multiple Vitamins-Minerals (VISION FORMULA PO) Take 1 tablet by mouth every day.    Sher Murillo MD   polyethylene glycol (MIRALAX) powder ONE CAPFUL IN 8 OUNCES OF LIQUID ONCE  DAILY UP TO THREE TIMES DAILY AS NEEDED. 1/25/18   Ramiro Gloria MD   traMADol (Ultram) 50 MG tablet Take 1 tablet by mouth Every 6 (Six) Hours As Needed for Moderate Pain . 3/2/21   Ramiro Gloria MD   Zinc 50 MG capsule Take 1 capsule by mouth Daily.    Provider, MD Sher       Objective        Vital Signs  Temp:  [97.5 °F (36.4 °C)] 97.5 °F (36.4 °C)  Heart Rate:  [56-65] 62  Resp:  [20] 20  BP: (155-213)/(72-98) 194/81      Physical Exam  Vitals and nursing note reviewed.   Constitutional:       General: She is not in acute distress.     Appearance: She is underweight. She is not diaphoretic.      Comments: Patient is underweight, cachectic and seems severely malnourished.   HENT:      Head: Normocephalic and atraumatic.      Right Ear: External ear normal.      Left Ear: External ear normal.      Nose: Nose normal.   Eyes:      Extraocular Movements: Extraocular movements intact.      Conjunctiva/sclera: Conjunctivae normal.      Pupils: Pupils are equal, round, and reactive to light.   Neck:      Thyroid: No thyromegaly.      Vascular: No JVD.   Cardiovascular:      Rate and Rhythm: Normal rate and regular rhythm.      Heart sounds: Normal heart sounds. No murmur heard.  No friction rub. No gallop.    Pulmonary:      Effort: Pulmonary effort is normal. No respiratory distress.      Breath sounds: No stridor. Examination of the left-middle field reveals decreased breath sounds. Examination of the left-lower field reveals decreased breath sounds. Decreased breath sounds and rhonchi present. No wheezing or rales.   Chest:      Chest wall: No tenderness.   Abdominal:      General: Bowel sounds are normal. There is no distension.      Palpations: Abdomen is soft. There is no mass.      Tenderness: There is no abdominal tenderness. There is no right CVA tenderness, left CVA tenderness, guarding or rebound.      Hernia: No hernia is present.   Musculoskeletal:         General: No swelling, tenderness,  deformity or signs of injury. Normal range of motion.      Cervical back: Normal range of motion and neck supple.      Right lower leg: No edema.      Left lower leg: No edema.   Skin:     General: Skin is warm and dry.      Coloration: Skin is not jaundiced or pale.      Findings: No bruising, erythema, lesion or rash.      Comments: She has chronic stasis changes both legs.   Neurological:      General: No focal deficit present.      Mental Status: She is alert and oriented to person, place, and time.      Cranial Nerves: No cranial nerve deficit.      Sensory: No sensory deficit.      Motor: No weakness.      Coordination: Coordination normal.      Gait: Gait normal.      Deep Tendon Reflexes: Reflexes are normal and symmetric. Reflexes normal.   Psychiatric:         Mood and Affect: Mood normal.         Behavior: Behavior normal. Behavior is cooperative.         Thought Content: Thought content normal.         Judgment: Judgment normal.           Results Review:     Results from last 7 days   Lab Units 11/11/21  1306   SODIUM mmol/L 133*   POTASSIUM mmol/L 4.4   CHLORIDE mmol/L 95*   CO2 mmol/L 27.0   BUN mg/dL 14   CREATININE mg/dL 0.88   GLUCOSE mg/dL 95   CALCIUM mg/dL 9.0   BILIRUBIN mg/dL 0.3   ALK PHOS U/L 77   ALT (SGPT) U/L 10   AST (SGOT) U/L 18             Results from last 7 days   Lab Units 11/11/21  1306   WBC 10*3/mm3 10.50   HEMOGLOBIN g/dL 12.1   HEMATOCRIT % 37.3   PLATELETS 10*3/mm3 321           Imaging Results (Last 7 Days)     Procedure Component Value Units Date/Time    CT Chest With Contrast Diagnostic [[id number]] Collected: 11/11/21 1444     Updated: 11/11/21 1550    Narrative:      PROCEDURE: CT SCAN OF THE CHEST WITH INTRAVENOUS CONTRAST.    CLINICAL INFORMATION:  large mass seen on CXR     This exam was performed using radiation doses that are as low as  reasonably achievable (ALARA).  This exam was performed according to our departmental dose  optimization program, which includes  automated exposure control,  adjustment of the mA and/or KV according to patient size and/or  use of iterative reconstruction technique.    COMPARISON: Chest x-ray dated 11/11/2021. Coronary CT dated  9/11/2016.    CONTRAST: 100 cc intravenous Isovue 300    TECHNIQUE: Axial images were obtained and multiplanar  reconstructions were made.      FINDINGS:  LUNGS/PLEURA: There is a large mass in the anterior left upper  lobe and left mediastinum/aorticopulmonary window region  measuring 8 x 6 x 9 cm. Inside the mass there are two areas of  what appear to represent necrosis or fluid collections, the  larger one is more superior and anterior measuring 5 x 5 cm and  the smaller one is more inferior and medial measuring 1.9 x 3.8  cm. There is surrounding consolidation and nodularity in the left  lung apex and around the parameter of the mass. The margins of  the mass are spiculated.  There is a moderate size left pleural effusion with adjacent  subsegmental atelectasis of the left lower lobe. There is  platelike atelectasis in the posterior right lung base. There is  centrilobular emphysema in the upper lobes bilaterally.  TRACHEA AND BRONCHI:  The left upper lobe bronchi are narrowed by  the mass.  MEDIASTINUM, OSEI AND LYMPH NODES: The mediastinum, osei and  lymph nodes are normal.  HEART AND PERICARDIUM: The heart and pericardium are normal.  VASCULAR: There is either near complete, or complete obliteration  of the left upper lobe pulmonary artery due to the mass. There is  coronary artery calcification/coronary artery disease. There is  calcification of the mitral annulus. There is aneurysmal  dilatation of the proximal abdominal aorta at the level of the  diaphragmatic hiatus measuring 3.9 cm in diameter, and partially  visualized aneurysmal dilatation of the more distal abdominal  aorta at the level of the kidneys which measures 4.3 x 4 cm,  which appears to have undergone surgical repair but is not  well  evaluated.  UPPER ABDOMEN: There is prominence of both adrenal glands which  could be due to metastatic disease or at analysis but is  nonspecific.  OSSEOUS STRUCTURES: Bones appear demineralized. There are mild  compression deformities of multiple thoracic and upper lumbar  levels similar to the prior study.      Impression:      1.  Large spiculated mass in the left upper lobe and  aorticopulmonary window region of the mediastinum with two  central areas of necrosis or fluid collections. Findings are  highly suspicious for neoplasm but could also be due to an  infectious process. This mass exerts mass effect on the left  upper lobe bronchial structures and left upper lobe pulmonary  artery.  2.  Moderate-sized left pleural effusion with adjacent  subsegmental atelectasis of the left lower lobe.  3.  Coronary artery atherosclerotic disease.  4.  Abdominal aortic aneurysms at the hiatus and at the level of  the kidneys measuring up to 4.3 cm in diameter. There may have  been a previous abdominal aortic aneurysm repair of the more  inferior level. These findings are incompletely evaluated.  Recommend correlation with surgical history, and more complete  imaging of the abdomen and pelvis to evaluate the size of the  aneurysm.    For management of the more superior aneurysm,  4.0-4.4 cm AAA, recommend follow-up every 12 months and recommend  vascular consultation.    Electronically signed by:  Trent Domínguez MD  11/11/2021 3:48 PM  CST Workstation: [id number]    XR Chest 1 View [[id number]] Collected: 11/11/21 1325     Updated: 11/11/21 1347    Narrative:      EXAM DESCRIPTION:     XR CHEST 1 VW    CLINICAL HISTORY:     81 years  Female  [facility] Triage Protocol    COMPARISON:     March 2, 2021    TECHNIQUE:     One view-AP portable radiograph the chest    FINDINGS:     There is a large ovoid masslike density involving the left upper  lobe measuring 11.8 x 8.1 cm in size. There is a questionable  air-fluid level  superior aspect of this mass. The findings are  highly suspicious for neoplasm. Infiltrate is possible but less  likely. There is a moderate size left pleural effusion. The right  lung is clear. The cardiac silhouette and pulmonary vasculature  demonstrate no evidence of acute congestive heart failure.      Impression:          1. Large 11.8 x 8.1 cm masslike density in the left upper lobe  with questionable cavitation/air-fluid level as above. The  findings are indicative of underlying pulmonary neoplasm. CT of  the chest with contrast is recommended for confirmation and  staging.  2. Moderate size left pleural effusion.        Electronically signed by:  Paty Albert MD  11/11/2021 1:46 PM  CST Workstation: [id number]          Assessment / Plan       Hospital Problem List:    Mass of left lung  This is likely secondary to malignancy and complicated by malignant pleural effusion/possible postobstructive  pneumonia: Begin noninvasive respiratory therapy, empiric antimicrobial therapy and schedule patient for CT-guided thoracentesis/lung biopsy.  Blood cultures have been drawn and results are pending.  Consult oncologist.    Accelerated hypertension: Continue patient's home medications and make adjustments as needed for optimal blood pressure control.  She may need Cardene infusion if the need arises.    Paroxysmal atrial fibrillation: Patient is in normal sinus rhythm.  Continue amiodarone and Lopressor.  She is not anticoagulated secondary to risk of fall.    Coronary artery disease: Continue beta-blocker.  Hold dual antiplatelet therapy pending thoracentesis/lung biopsy.    4.3 cm AAA: Patient is to continue surveillance/monitoring as outpatient.    Severe malnutrition/cachexia: Consult dietitian.    Begin GI and DVT prophylaxis.    Additional orders and treatment plan as hospital course dictates.    Code status was discussed with the patient and she wishes to be 'no intubation and no CPR'.        I confirmed that  the patient's Advance Care Plan is present, code status is documented, or surrogate decision maker is listed in the patient's medical record.     I have utilized all available immediate resources to obtain, update, or review the patient's current medications    I discussed the patient's findings and my recommendations with patient and her daughter.     Mike Teresa MD  11/11/21  16:56 CST      Dictated Utilizing Dragon Dictation

## 2021-11-12 NOTE — PROGRESS NOTES
[facility] Medicine Services  INPATIENT PROGRESS NOTE    Length of Stay: 1  Date of Admission: 11/11/2021  Primary Care Physician: Ramiro Gloria MD    Subjective   Chief Complaint: No new complaints.    HPI: Patient is seen for follow-up.  She is doing better, less deconditioned, less short of air, tolerating her diet and maintaining O2 saturation in the mid 90s on 2 L of supplemental oxygen.  She voices no new complaints.    Review of Systems   Constitutional: Positive for activity change and fatigue. Negative for appetite change, chills, diaphoresis and fever.   HENT: Negative for trouble swallowing and voice change.    Eyes: Negative for photophobia and visual disturbance.   Respiratory: Positive for shortness of breath. Negative for cough, choking, chest tightness, wheezing and stridor.    Cardiovascular: Negative for chest pain, palpitations and leg swelling.   Gastrointestinal: Negative for abdominal distention, abdominal pain, blood in stool, constipation, diarrhea, nausea and vomiting.   Endocrine: Negative for cold intolerance, heat intolerance, polydipsia, polyphagia and polyuria.   Genitourinary: Negative for decreased urine volume, difficulty urinating, dysuria, enuresis, flank pain, frequency, hematuria and urgency.   Musculoskeletal: Negative for arthralgias, gait problem, myalgias, neck pain and neck stiffness.   Skin: Negative for pallor, rash and wound.   Neurological: Negative for dizziness, tremors, seizures, syncope, facial asymmetry, speech difficulty, weakness, light-headedness, numbness and headaches.   Hematological: Does not bruise/bleed easily.   Psychiatric/Behavioral: Negative for agitation, behavioral problems and confusion.       Objective    Temp:  [96.8 °F (36 °C)-98.4 °F (36.9 °C)] 97.4 °F (36.3 °C)  Heart Rate:  [40-83] 60  Resp:  [16-20] 16  BP: ()/(52-98) 162/71    Physical Exam  Vitals and nursing note reviewed.   Constitutional:        General: She is not in acute distress.     Appearance: She is underweight. She is not diaphoretic.      Comments: Patient is underweight, cachectic and seems severely malnourished.   HENT:      Head: Normocephalic and atraumatic.      Right Ear: External ear normal.      Left Ear: External ear normal.      Nose: Nose normal.   Eyes:      Extraocular Movements: Extraocular movements intact.      Conjunctiva/sclera: Conjunctivae normal.      Pupils: Pupils are equal, round, and reactive to light.   Neck:      Thyroid: No thyromegaly.      Vascular: No JVD.   Cardiovascular:      Rate and Rhythm: Normal rate and regular rhythm.      Heart sounds: Normal heart sounds. No murmur heard.  No friction rub. No gallop.    Pulmonary:      Effort: Pulmonary effort is normal. No respiratory distress.      Breath sounds: No stridor. Examination of the left-middle field reveals decreased breath sounds. Examination of the left-lower field reveals decreased breath sounds. Decreased breath sounds and rhonchi present. No wheezing or rales.   Chest:      Chest wall: No tenderness.   Abdominal:      General: Bowel sounds are normal. There is no distension.      Palpations: Abdomen is soft. There is no mass.      Tenderness: There is no abdominal tenderness. There is no right CVA tenderness, left CVA tenderness, guarding or rebound.      Hernia: No hernia is present  Musculoskeletal:         General: No swelling, tenderness, deformity or signs of injury. Normal range of motion.      Cervical back: Normal range of motion and neck supple.      Right lower leg: No edema.      Left lower leg: No edema.   Skin:     General: Skin is warm and dry.      Coloration: Skin is not jaundiced or pale.      Findings: No bruising, erythema, lesion or rash.      Comments: She has chronic stasis changes both legs.   Neurological:      General: No focal deficit present.      Mental Status: She is alert and oriented to person, place, and time.       Cranial Nerves: No cranial nerve deficit.      Sensory: No sensory deficit.      Motor: No weakness.      Coordination: Coordination normal.      Gait: Gait normal.      Deep Tendon Reflexes: Reflexes are normal and symmetric. Reflexes normal.   Psychiatric:         Mood and Affect: Mood normal.         Behavior: Behavior normal. Behavior is cooperative.         Thought Content: Thought content normal.         Judgment: Judgment normal      Medication Review:    Current Facility-Administered Medications:   •  acetaminophen (TYLENOL) tablet 650 mg, 650 mg, Oral, Q4H PRN, 650 mg at 11/12/21 0613 **OR** acetaminophen (TYLENOL) 160 MG/5ML solution 650 mg, 650 mg, Oral, Q4H PRN **OR** acetaminophen (TYLENOL) suppository 650 mg, 650 mg, Rectal, Q4H PRN, Mike Teresa MD  •  aluminum-magnesium hydroxide-simethicone (MAALOX MAX) 400-400-40 MG/5ML suspension 15 mL, 15 mL, Oral, Q6H PRN, Mike Teresa MD  •  amiodarone (PACERONE) half tablet 100 mg, 100 mg, Oral, Daily, Mike Teresa MD  •  sennosides-docusate (PERICOLACE) 8.6-50 MG per tablet 2 tablet, 2 tablet, Oral, BID **AND** polyethylene glycol (MIRALAX) packet 17 g, 17 g, Oral, Daily PRN **AND** bisacodyl (DULCOLAX) EC tablet 5 mg, 5 mg, Oral, Daily PRN **AND** bisacodyl (DULCOLAX) suppository 10 mg, 10 mg, Rectal, Daily PRN, Mike Teresa MD  •  enoxaparin (LOVENOX) syringe 40 mg, 40 mg, Subcutaneous, Q24H, Mike Teresa MD  •  famotidine (PEPCID) tablet 20 mg, 20 mg, Oral, BID, Mike Teresa MD, 20 mg at 11/12/21 0852  •  ferrous sulfate EC tablet 324 mg, 324 mg, Oral, Daily With Breakfast, Mike Teresa MD, 324 mg at 11/12/21 0852  •  furosemide (LASIX) tablet 40 mg, 40 mg, Oral, Daily, Mike Teresa MD, 40 mg at 11/12/21 0883  •  gabapentin (NEURONTIN) capsule 300 mg, 300 mg, Oral, Nightly, Mike Teresa MD, 300 mg at 11/11/21 2066  •  Hold medication, 1 each, Does not apply, Continuous PRN, Mike Teresa,  MD  •  hydrALAZINE (APRESOLINE) tablet 50 mg, 50 mg, Oral, TID, Mike Teresa MD, 50 mg at 11/12/21 0852  •  hydrocortisone-bacitracin-zinc oxide-nystatin (MAGIC BARRIER) ointment 1 application, 1 application, Topical, PRN, Mike Teresa MD, 1 application at 11/11/21 2237  •  I-Renetta Eye Vitamin, 1 tablet, Oral, BID, Jaimie Gunderson MD, 1 tablet at 11/12/21 0852  •  levoFLOXacin (LEVAQUIN) 750 mg/150 mL D5W (premix) (LEVAQUIN) 750 mg, 750 mg, Intravenous, Once, Mike Teresa MD  •  lisinopril (PRINIVIL,ZESTRIL) tablet 40 mg, 40 mg, Oral, Daily, Mike Teresa MD, 40 mg at 11/12/21 0853  •  magnesium oxide (MAG-OX) tablet 400 mg, 400 mg, Oral, Daily, Mike Teresa MD, 400 mg at 11/12/21 0852  •  metoprolol tartrate (LOPRESSOR) tablet 25 mg, 25 mg, Oral, Q12H, Mike Teresa MD, 25 mg at 11/12/21 0854  •  ondansetron (ZOFRAN) tablet 4 mg, 4 mg, Oral, Q6H PRN **OR** ondansetron (ZOFRAN) injection 4 mg, 4 mg, Intravenous, Q6H PRN, Mike Teresa MD  •  Pharmacy to Dose Zosyn, , Does not apply, Continuous, Mike Teresa MD  •  piperacillin-tazobactam (ZOSYN) 3.375 g/100 mL 0.9% NS IVPB (mbp), 3.375 g, Intravenous, Q8H, Mike Teresa MD, Last Rate: 0 mL/hr at 11/12/21 0354, 3.375 g at 11/12/21 0855  •  sodium chloride 0.9 % flush 10 mL, 10 mL, Intravenous, PRN, Mike Teresa MD  •  sodium chloride 0.9 % flush 10 mL, 10 mL, Intravenous, Q12H, Mike Teresa MD, 10 mL at 11/12/21 0854  •  sodium chloride 0.9 % flush 10 mL, 10 mL, Intravenous, PRN, Mike Teresa MD  •  traMADol (ULTRAM) tablet 50 mg, 50 mg, Oral, Q6H PRN, Mike Teresa MD  •  zinc sulfate (ZINCATE) capsule 220 mg, 220 mg, Oral, Daily, Mike Teresa MD, 220 mg at 11/12/21 0852    Results Review:  I have reviewed the labs, radiology results, and diagnostic studies.    Laboratory Data:   Results from last 7 days   Lab Units 11/12/21  0613 11/11/21  1306   SODIUM mmol/L 141  133*   POTASSIUM mmol/L 4.1 4.4   CHLORIDE mmol/L 101 95*   CO2 mmol/L 35.0* 27.0   BUN mg/dL 16 14   CREATININE mg/dL 1.11* 0.88   GLUCOSE mg/dL 90 95   CALCIUM mg/dL 9.1 9.0   BILIRUBIN mg/dL 0.2 0.3   ALK PHOS U/L 65 77   ALT (SGPT) U/L 9 10   AST (SGOT) U/L 14 18   ANION GAP mmol/L 5.0 11.0     Estimated Creatinine Clearance: 42.6 mL/min (A) (by C-G formula based on SCr of 1.11 mg/dL (H)).          Results from last 7 days   Lab Units 11/12/21  0613 11/11/21  1306   WBC 10*3/mm3 7.68 10.50   HEMOGLOBIN g/dL 12.0 12.1   HEMATOCRIT % 37.3 37.3   PLATELETS 10*3/mm3 291 321     Results from last 7 days   Lab Units 11/12/21  0613   INR  1.10       Culture Data:   No results found for: BLOODCX  No results found for: URINECX  No results found for: RESPCX  No results found for: WOUNDCX  No results found for: STOOLCX  No components found for: BODYFLD    Radiology Data:   Imaging Results (Last 24 Hours)     Procedure Component Value Units Date/Time    CT Chest With Contrast Diagnostic [[id number]] Collected: 11/11/21 1444     Updated: 11/11/21 1550    Narrative:      PROCEDURE: CT SCAN OF THE CHEST WITH INTRAVENOUS CONTRAST.    CLINICAL INFORMATION:  large mass seen on CXR     This exam was performed using radiation doses that are as low as  reasonably achievable (ALARA).  This exam was performed according to our departmental dose  optimization program, which includes automated exposure control,  adjustment of the mA and/or KV according to patient size and/or  use of iterative reconstruction technique.    COMPARISON: Chest x-ray dated 11/11/2021. Coronary CT dated  9/11/2016.    CONTRAST: 100 cc intravenous Isovue 300    TECHNIQUE: Axial images were obtained and multiplanar  reconstructions were made.      FINDINGS:  LUNGS/PLEURA: There is a large mass in the anterior left upper  lobe and left mediastinum/aorticopulmonary window region  measuring 8 x 6 x 9 cm. Inside the mass there are two areas of  what appear to  represent necrosis or fluid collections, the  larger one is more superior and anterior measuring 5 x 5 cm and  the smaller one is more inferior and medial measuring 1.9 x 3.8  cm. There is surrounding consolidation and nodularity in the left  lung apex and around the parameter of the mass. The margins of  the mass are spiculated.  There is a moderate size left pleural effusion with adjacent  subsegmental atelectasis of the left lower lobe. There is  platelike atelectasis in the posterior right lung base. There is  centrilobular emphysema in the upper lobes bilaterally.  TRACHEA AND BRONCHI:  The left upper lobe bronchi are narrowed by  the mass.  MEDIASTINUM, OSEI AND LYMPH NODES: The mediastinum, osei and  lymph nodes are normal.  HEART AND PERICARDIUM: The heart and pericardium are normal.  VASCULAR: There is either near complete, or complete obliteration  of the left upper lobe pulmonary artery due to the mass. There is  coronary artery calcification/coronary artery disease. There is  calcification of the mitral annulus. There is aneurysmal  dilatation of the proximal abdominal aorta at the level of the  diaphragmatic hiatus measuring 3.9 cm in diameter, and partially  visualized aneurysmal dilatation of the more distal abdominal  aorta at the level of the kidneys which measures 4.3 x 4 cm,  which appears to have undergone surgical repair but is not well  evaluated.  UPPER ABDOMEN: There is prominence of both adrenal glands which  could be due to metastatic disease or at analysis but is  nonspecific.  OSSEOUS STRUCTURES: Bones appear demineralized. There are mild  compression deformities of multiple thoracic and upper lumbar  levels similar to the prior study.      Impression:      1.  Large spiculated mass in the left upper lobe and  aorticopulmonary window region of the mediastinum with two  central areas of necrosis or fluid collections. Findings are  highly suspicious for neoplasm but could also be due to  an  infectious process. This mass exerts mass effect on the left  upper lobe bronchial structures and left upper lobe pulmonary  artery.  2.  Moderate-sized left pleural effusion with adjacent  subsegmental atelectasis of the left lower lobe.  3.  Coronary artery atherosclerotic disease.  4.  Abdominal aortic aneurysms at the hiatus and at the level of  the kidneys measuring up to 4.3 cm in diameter. There may have  been a previous abdominal aortic aneurysm repair of the more  inferior level. These findings are incompletely evaluated.  Recommend correlation with surgical history, and more complete  imaging of the abdomen and pelvis to evaluate the size of the  aneurysm.    For management of the more superior aneurysm,  4.0-4.4 cm AAA, recommend follow-up every 12 months and recommend  vascular consultation.    Electronically signed by:  Trent Domínguez MD  11/11/2021 3:48 PM  CST Workstation: [id number]    XR Chest 1 View [[id number]] Collected: 11/11/21 1325     Updated: 11/11/21 1347    Narrative:      EXAM DESCRIPTION:     XR CHEST 1 VW    CLINICAL HISTORY:     81 years  Female  [facility] Triage Protocol    COMPARISON:     March 2, 2021    TECHNIQUE:     One view-AP portable radiograph the chest    FINDINGS:     There is a large ovoid masslike density involving the left upper  lobe measuring 11.8 x 8.1 cm in size. There is a questionable  air-fluid level superior aspect of this mass. The findings are  highly suspicious for neoplasm. Infiltrate is possible but less  likely. There is a moderate size left pleural effusion. The right  lung is clear. The cardiac silhouette and pulmonary vasculature  demonstrate no evidence of acute congestive heart failure.      Impression:          1. Large 11.8 x 8.1 cm masslike density in the left upper lobe  with questionable cavitation/air-fluid level as above. The  findings are indicative of underlying pulmonary neoplasm. CT of  the chest with contrast is recommended for confirmation  and  staging.  2. Moderate size left pleural effusion.        Electronically signed by:  Paty Albert MD  11/11/2021 1:46 PM  CST Workstation: [id number]          I have reviewed the patient's current medications.     Assessment/Plan     Hospital Problem List:  Active Problems:    Mass of left lung    Mass of left lung: This is likely secondary to malignancy and complicated by malignant pleural effusion/possible postobstructive  pneumonia: Continue noninvasive respiratory therapy and empiric antimicrobial therapy.  She is awaiting CT-guided thoracentesis/lung biopsy.  Blood cultures have been drawn and results are pending.  Input by Dr. Nolasco is appreciated..     Accelerated hypertension: Blood pressure has improved.  Continue home medications and make adjustments as needed for optimal blood pressure control.      Paroxysmal atrial fibrillation: Patient is in normal sinus rhythm.  Continue amiodarone and Lopressor.  She is not anticoagulated secondary to risk of fall.     Coronary artery disease: Continue beta-blocker.  Hold dual antiplatelet therapy pending thoracentesis/lung biopsy.     4.3 cm AAA: Patient is to continue surveillance/monitoring as outpatient.     Severe malnutrition/cachexia: Dietitian is following.    Deconditioning: Consult PT and OT.     Continue GI and DVT prophylaxis.     Overall prognosis is guarded.    Code status was discussed with the patient and she wishes to be 'no intubation and no CPR'.       Discharge Planning: In progress.    I confirmed that the patient's Advance Care Plan is present, code status is documented, or surrogate decision maker is listed in the patient's medical record.     I have utilized all available immediate resources to obtain, update, or review the patient's current medications      Mike Teresa MD   11/12/21   11:02 CST

## 2021-11-12 NOTE — CASE COMMUNICATION
Called and spoke with patient regaring setting up snv today. Patient extremely short of breath with talking. States it started a couple of days ago but got worse yesterday. States she feels tightness in her chest. I instructed her that she needs to be seen by MD. Pt does not want to go to the hospital unless she has to. I called and spoke with 's office and he is out of town but the APRN that is in office today states that patien t needs to be evaluated by the ER. I called with patient back and she is in agreement to call her daughter and go to ER to be evaluated. Offered ambulance transport but pt wants daughter to take her. Instructed pt if she is too sob and needs ambulance transport to please call be back. I left my name and phone number with patient. Called report to ER [facility].

## 2021-11-12 NOTE — PLAN OF CARE
Problem: Adult Inpatient Plan of Care  Goal: Plan of Care Review  Outcome: Ongoing, Progressing  Flowsheets (Taken 11/12/2021 5456)  Progress: no change  Plan of Care Reviewed With: patient  Outcome Summary: new admit   Goal Outcome Evaluation:  Plan of Care Reviewed With: patient        Progress: no change  Outcome Summary: new admit

## 2021-11-12 NOTE — POST-PROCEDURE NOTE
PROCEDURE: CT GUIDED THORACENTESIS, CT GUIDED LUNG BIOPSY     INDICATION: Left apical lung mass. Left-sided pleural effusion., R91.8 Other nonspecific abnormal finding of lung field J90 Pleural effusion, not elsewhere classified I50.9 Heart failure, unspecified I10 Essential (primary) hypertension.    COMPARISON: CT chest dated 11/11/2021.    This exam was performed according to our departmental dose optimization program, which includes automated exposure control, adjustment of the mA and/or KV according to patient size and/or use of iterative reconstruction technique.    TECHNIQUE: Following a discussion of the procedure, its benefits, and risks (including bleeding, infection, mistargeting, pneumothorax) with the patient, an informed written consent was obtained and documented in the patient's chart.  The time-out form was completed to confirm patient identity and side/type of procedure.    Localizing CT demonstrated an appropriate window for needle introduction into the left apical lung mass.    The skin over the target area was cleansed with chlorhexidine. Local anesthesia was obtained using 2% lidocaine, superficially and at depth.     Using aseptic technique, a 20-gauge coaxial core needle was advanced into the lung mass and two samples were obtained.     The specimens were handed to the pathologist who confirmed specimen adequacy.    A sterile bandage was applied.    Following this, an appropriate site was marked in the left lateral hemithorax. The skin was prepped and draped in the usual sterile fashion.  10 mL of lidocaine was used for local anesthesia. A 19-gauge Yueh needle and catheter was inserted into the pleural space under CT guidance. The needle was removed and fluid was collected into a vacuum bottle.  Approximately 300 mL of serosanguineous fluid was collected and submitted for laboratory evaluation, as requested.    The patient tolerated the procedures well and returned to the medical floor in  stable condition. No immediate post procedure complications.    Blood loss: Minimal.    CONCLUSION:   Successful ultrasound-guided left lung mass needle biopsy, and thoracentesis with removal of 300 mL of serosanguineous fluid, as described above.

## 2021-11-12 NOTE — CONSULTS
Adult Nutrition  Assessment    Patient Name:  Anahi Calix  YOB: 1940  MRN: [id number]  Admit Date:  11/11/2021    Assessment Date:  11/12/2021    Comments:  Pt with dx mass left lunch, accelerated HTN, paroxysmal A-Fib, CAD, AAA.  Pt currently NPO for planned biopsy.  Pt reports fair appetite.  Intake 75% for 1 previous meal recorded.  Pt indicates she eats 75% or more of her meals at home.  10 lb or more wt loss noted the past 2 mo per Nutrition Screen.  Pt however indicates wt has been stable the past 3 mo though pt had lost 20-25 lb the past 8 mo. Wt Reports shows stable wt since 08/21.  Pt willing to receive Milk at breakfast, Boost at lunch, Ice Cream at supper.  Pt denies GI distress.  Pt with bilateral upper gluteal wound.  Meds and labs reviewed.  Consult received regarding MSA.  Given current wt, current intake, fact wt has been stable since 08/21 pt not assessed as meeting criteria for severe malnutrition at present.  Will monitor for PO intake to be resumed, add supplements, and make recommendations as appropriate.              Reason for Assessment     Row Name 11/12/21 1409          Reason for Assessment    Reason For Assessment identified at risk by screening criteria     Identified At Risk by Screening Criteria MST SCORE 2+; unintentional loss of 10 lbs or more in the past 2 mos                    Labs/Tests/Procedures/Meds     Row Name 11/12/21 1405          Labs/Procedures/Meds    Lab Results Reviewed reviewed            Medications    Pertinent Medications Reviewed reviewed, pertinent                Physical Findings     Row Name 11/12/21 1406          Physical Findings    Skin other (see comments)  bilateral uper gluteal wound                Estimated/Assessed Needs     Row Name 11/12/21 1404          Calculation Measurements    Weight Used For Calculations 66.7 kg (147 lb 0.8 oz)            Estimated/Assessed Needs    Additional Documentation Calorie Requirements (Group);  Fluid Requirements (Group); [place]-St. Jeor Equation (Group); Protein Requirements (Group)            Calorie Requirements    Estimated Calorie Requirement (kcal/day) 1541     Estimated Calorie Need Method [place]-St Jeor            [place]-St. Jeor Equation    RMR ([place]-St. Jeor Equation) 1185.5            Protein Requirements    Weight Used For Protein Calculations 66.7 kg (147 lb 0.8 oz)     Est Protein Requirement Amount (gms/kg) 1.2 gm protein     Estimated Protein Requirements (gms/day) 80.04            Fluid Requirements    Fluid Requirements (mL/day) 1667     RDA Method (mL) 1667                Nutrition Prescription Ordered     Row Name 11/12/21 1411          Nutrition Prescription PO    Current PO Diet NPO                Evaluation of Received Nutrient/Fluid Intake     Row Name 11/12/21 1411          PO Evaluation    Number of Meals 1     % PO Intake 75                     Electronically signed by:  Merary Corona RD  11/12/21 14:11 CST

## 2021-11-12 NOTE — PAYOR COMM NOTE
"Wilma Toure  Case Management Extender  [phone] phone  [phone] fax    Aliya Vargas (81 y.o. Female)             Date of Birth Social Security Number Address Home Phone MRN    1940  [address] [place] [address]  [place] [address] [phone] [id number]    Jewish Marital Status             Baptism        Admission Date Admission Type Admitting Provider Attending Provider Department, Room/Bed    11/11/21 Emergency Andrew Watters MD Williams, Kevin L, MD [address], 318/1    Discharge Date Discharge Disposition Discharge Destination                         Attending Provider: Andrew Watters MD    Allergies: No Known Allergies    Isolation: None   Infection: None   Code Status: No CPR   Advance Care Planning Activity    Ht: 172.7 cm (68\")   Wt: 66.7 kg (147 lb)    Admission Cmt: None   Principal Problem: None                Active Insurance as of 11/11/2021     Primary Coverage     Payor Plan Insurance Group Employer/Plan Group    MEDICARE MEDICARE A & B      Payor Plan Address Payor Plan Phone Number Payor Plan Fax Number Effective Dates    [address] [phone]  9/1/2006 - None Entered    [facility] [id number]       Subscriber Name Subscriber Birth Date Member ID       ALIYA VARGAS 1940 [id number]           Secondary Coverage     Payor Plan Insurance Group Employer/Plan Group    [facility]     Payor Plan Address Payor Plan Phone Number Payor Plan Fax Number Effective Dates    [address] [address]   11/1/2016 - None Entered    [facility] [id number]       Subscriber Name Subscriber Birth Date Member ID       ALIYA VARGAS 1940 [id number]                 Emergency Contacts      (Rel.) Home Phone Work Phone Mobile Phone    YogiAris lin (Son) -- -- [phone]    RoyerIsamar (Daughter) -- -- [phone]               History & Physical      Echendu, Mike PHAM MD at 11/11/21 [address] [place] [address] " [facility]  INPATIENT HISTORY AND PHYSICAL       Patient Care Team:  Ramiro Gloria MD as PCP - General  [facility] as Surgeon (Orthopedic Surgery)    Date of Admission: 11/11/2021    Primary Care Physician: Ramiro Gloria MD    Chief complaint   Chief Complaint   Patient presents with   • Shortness of Breath       Subjective     Patient is a 81 y.o. female with history of hypertension, coronary artery disease, AAA, atrial fibrillation, nicotine dependence (she quit in 2077) presents with 2-week history of progressively worsening shortness of breath associated with nonproductive cough, weakness and overall functional decline.  No reported history of fever, chills, nausea, vomiting, chest pain, unintentional weight loss, hemoptysis, orthopnea or paroxysmal nocturnal dyspnea.    On triage, she was noted to have elevated blood pressure of 213/86 which improved slightly with IV hydralazine.    Blood work showed negative troponin, unremarkable CBC and CMP.  Chest x-ray showed moderate left-sided pleural effusion and large 11.8 x 8.1 cm masslike density in the left upper lobe indicative of neoplasm.      Review of Systems   Constitutional: Positive for activity change and fatigue. Negative for appetite change, chills, diaphoresis and fever.   HENT: Negative for trouble swallowing and voice change.    Eyes: Negative for photophobia and visual disturbance.   Respiratory: Negative for cough, choking, chest tightness, shortness of breath, wheezing and stridor.    Cardiovascular: Negative for chest pain, palpitations and leg swelling.   Gastrointestinal: Negative for abdominal distention, abdominal pain, blood in stool, constipation, diarrhea, nausea and vomiting.   Endocrine: Negative for cold intolerance, heat intolerance, polydipsia, polyphagia and polyuria.   Genitourinary: Negative for decreased urine volume, difficulty urinating, dysuria, enuresis, flank pain,  frequency, hematuria and urgency.   Musculoskeletal: Negative for arthralgias, gait problem, myalgias, neck pain and neck stiffness.   Skin: Negative for pallor, rash and wound.   Neurological: Negative for dizziness, tremors, seizures, syncope, facial asymmetry, speech difficulty, weakness, light-headedness, numbness and headaches.   Hematological: Does not bruise/bleed easily.   Psychiatric/Behavioral: Negative for agitation, behavioral problems and confusion.         History  Past Medical History:   Diagnosis Date   • Abdominal aortic aneurysm (AAA) without rupture (HCC)    • Atrial fibrillation (HCC) 10/2/2017    New onset.  S/p cardioversion  On coumadin now.  Amiodarone    • CAD (coronary artery disease)    • Gastroesophageal reflux disease    • Hypercholesterolemia    • Hypertension    • Nuclear senile cataract    • Osteoarthritis    • Osteoporosis    • Peripheral arterial disease (HCC)    • Solitary lung nodule      Past Surgical History:   Procedure Laterality Date   • ABDOMINAL AORTIC ANEURYSM REPAIR     • HIP INTERTROCHANTERIC NAILING Left 10/1/2017    Procedure: HIP INTERTROCHANTERIC NAILING - LEFT - [place];  Surgeon: Nicola Rich MD;  Location: [facility];  Service:      Family History   Problem Relation Age of Onset   • Hypertension Mother    • Coronary artery disease Father      Social History     Tobacco Use   • Smoking status: Former Smoker     Packs/day: 1.00     Years: 57.00     Pack years: 57.00     Start date: 1961     Quit date: 10/1/2017     Years since quittin.1   • Smokeless tobacco: Never Used   Substance Use Topics   • Alcohol use: No   • Drug use: No     (Not in a hospital admission)    Allergies:  Patient has no known allergies.  Prior to Admission medications    Medication Sig Start Date End Date Taking? Authorizing Provider   Acetaminophen (TYLENOL ARTHRITIS PAIN PO) Take 500 mg by mouth 3 (Three) Times a Day As Needed. Indications: Mild Pain 10/1/21   Provider,  MD Sher   amiodarone (PACERONE) 200 MG tablet Take 1 tablet by mouth Daily.  Patient taking differently: Take 100 mg by mouth Daily. 10/24/17   Nikhil Rouse MD   aspirin 81 MG EC tablet Take 81 mg by mouth daily.    Sher Murillo MD   Cholecalciferol (GNP VITAMIN D SUPER STRENGTH) 5000 UNITS tablet Take  by mouth.    Sher Murillo MD   clopidogrel (PLAVIX) 75 MG tablet Take 1 tablet by mouth Daily. 2/8/21   Ramiro Gloria MD   famotidine (PEPCID) 20 MG tablet Take 1 tablet by mouth 2 (Two) Times a Day As Needed for Heartburn.  Patient taking differently: Take 1 tablet by mouth 2 (Two) Times a Day. 11/6/18   Ramiro Gloria MD   ferrous sulfate 324 (65 Fe) MG tablet delayed-release EC tablet Take 1 tablet by mouth Daily With Breakfast. 10/25/17   Nikhil Rouse MD   furosemide (LASIX) 40 MG tablet Take 40 mg by mouth Daily. ONE TABLET BY MOUTH EVERY DAY 10/30/21   Sher Murillo MD   gabapentin (NEURONTIN) 300 MG capsule Take 1-2 capsules by mouth Every Night. 11/1/21   Ramiro Gloria MD   hydrALAZINE (APRESOLINE) 25 MG tablet Take 1 tablet by mouth 4 (Four) Times a Day.  Patient taking differently: Take 25 mg by mouth 4 (Four) Times a Day. Patient states she takes as needed 2/6/20   Ramiro Gloria MD   hydrALAZINE (APRESOLINE) 50 MG tablet Take 50 mg by mouth 3 (Three) Times a Day. 1/6/21   Sher Murillo MD   lisinopril (PRINIVIL,ZESTRIL) 40 MG tablet Take 1 tablet by mouth Daily. 2/8/21   Ramiro Gloria MD   magnesium oxide (MAGOX) 400 (241.3 Mg) MG tablet tablet Take 1 tablet by mouth Daily. 10/24/17   Nikhil Rouse MD   metoprolol tartrate (LOPRESSOR) 50 MG tablet TAKE 1 TABLET BY MOUTH EVERY 12 (TWELVE) HOURS. 4/9/21   Yasmine Jose APRN   Multiple Vitamins-Minerals (VISION FORMULA PO) Take 1 tablet by mouth every day.    Sher Murillo MD   polyethylene glycol (MIRALAX) powder ONE CAPFUL IN 8 OUNCES OF LIQUID ONCE DAILY UP TO THREE  TIMES DAILY AS NEEDED. 1/25/18   Ramiro Gloria MD   traMADol (Ultram) 50 MG tablet Take 1 tablet by mouth Every 6 (Six) Hours As Needed for Moderate Pain . 3/2/21   Ramiro Gloria MD   Zinc 50 MG capsule Take 1 capsule by mouth Daily.    Provider, MD Sher       Objective        Vital Signs  Temp:  [97.5 °F (36.4 °C)] 97.5 °F (36.4 °C)  Heart Rate:  [56-65] 62  Resp:  [20] 20  BP: (155-213)/(72-98) 194/81      Physical Exam  Vitals and nursing note reviewed.   Constitutional:       General: She is not in acute distress.     Appearance: She is underweight. She is not diaphoretic.      Comments: Patient is underweight, cachectic and seems severely malnourished.   HENT:      Head: Normocephalic and atraumatic.      Right Ear: External ear normal.      Left Ear: External ear normal.      Nose: Nose normal.   Eyes:      Extraocular Movements: Extraocular movements intact.      Conjunctiva/sclera: Conjunctivae normal.      Pupils: Pupils are equal, round, and reactive to light.   Neck:      Thyroid: No thyromegaly.      Vascular: No JVD.   Cardiovascular:      Rate and Rhythm: Normal rate and regular rhythm.      Heart sounds: Normal heart sounds. No murmur heard.  No friction rub. No gallop.    Pulmonary:      Effort: Pulmonary effort is normal. No respiratory distress.      Breath sounds: No stridor. Examination of the left-middle field reveals decreased breath sounds. Examination of the left-lower field reveals decreased breath sounds. Decreased breath sounds and rhonchi present. No wheezing or rales.   Chest:      Chest wall: No tenderness.   Abdominal:      General: Bowel sounds are normal. There is no distension.      Palpations: Abdomen is soft. There is no mass.      Tenderness: There is no abdominal tenderness. There is no right CVA tenderness, left CVA tenderness, guarding or rebound.      Hernia: No hernia is present.   Musculoskeletal:         General: No swelling, tenderness, deformity or signs of  injury. Normal range of motion.      Cervical back: Normal range of motion and neck supple.      Right lower leg: No edema.      Left lower leg: No edema.   Skin:     General: Skin is warm and dry.      Coloration: Skin is not jaundiced or pale.      Findings: No bruising, erythema, lesion or rash.      Comments: She has chronic stasis changes both legs.   Neurological:      General: No focal deficit present.      Mental Status: She is alert and oriented to person, place, and time.      Cranial Nerves: No cranial nerve deficit.      Sensory: No sensory deficit.      Motor: No weakness.      Coordination: Coordination normal.      Gait: Gait normal.      Deep Tendon Reflexes: Reflexes are normal and symmetric. Reflexes normal.   Psychiatric:         Mood and Affect: Mood normal.         Behavior: Behavior normal. Behavior is cooperative.         Thought Content: Thought content normal.         Judgment: Judgment normal.           Results Review:     Results from last 7 days   Lab Units 11/11/21  1306   SODIUM mmol/L 133*   POTASSIUM mmol/L 4.4   CHLORIDE mmol/L 95*   CO2 mmol/L 27.0   BUN mg/dL 14   CREATININE mg/dL 0.88   GLUCOSE mg/dL 95   CALCIUM mg/dL 9.0   BILIRUBIN mg/dL 0.3   ALK PHOS U/L 77   ALT (SGPT) U/L 10   AST (SGOT) U/L 18             Results from last 7 days   Lab Units 11/11/21  1306   WBC 10*3/mm3 10.50   HEMOGLOBIN g/dL 12.1   HEMATOCRIT % 37.3   PLATELETS 10*3/mm3 321           Imaging Results (Last 7 Days)     Procedure Component Value Units Date/Time    CT Chest With Contrast Diagnostic [[id number]] Collected: 11/11/21 1444     Updated: 11/11/21 1550    Narrative:      PROCEDURE: CT SCAN OF THE CHEST WITH INTRAVENOUS CONTRAST.    CLINICAL INFORMATION:  large mass seen on CXR     This exam was performed using radiation doses that are as low as  reasonably achievable (ALARA).  This exam was performed according to our departmental dose  optimization program, which includes automated exposure  control,  adjustment of the mA and/or KV according to patient size and/or  use of iterative reconstruction technique.    COMPARISON: Chest x-ray dated 11/11/2021. Coronary CT dated  9/11/2016.    CONTRAST: 100 cc intravenous Isovue 300    TECHNIQUE: Axial images were obtained and multiplanar  reconstructions were made.      FINDINGS:  LUNGS/PLEURA: There is a large mass in the anterior left upper  lobe and left mediastinum/aorticopulmonary window region  measuring 8 x 6 x 9 cm. Inside the mass there are two areas of  what appear to represent necrosis or fluid collections, the  larger one is more superior and anterior measuring 5 x 5 cm and  the smaller one is more inferior and medial measuring 1.9 x 3.8  cm. There is surrounding consolidation and nodularity in the left  lung apex and around the parameter of the mass. The margins of  the mass are spiculated.  There is a moderate size left pleural effusion with adjacent  subsegmental atelectasis of the left lower lobe. There is  platelike atelectasis in the posterior right lung base. There is  centrilobular emphysema in the upper lobes bilaterally.  TRACHEA AND BRONCHI:  The left upper lobe bronchi are narrowed by  the mass.  MEDIASTINUM, OSEI AND LYMPH NODES: The mediastinum, osei and  lymph nodes are normal.  HEART AND PERICARDIUM: The heart and pericardium are normal.  VASCULAR: There is either near complete, or complete obliteration  of the left upper lobe pulmonary artery due to the mass. There is  coronary artery calcification/coronary artery disease. There is  calcification of the mitral annulus. There is aneurysmal  dilatation of the proximal abdominal aorta at the level of the  diaphragmatic hiatus measuring 3.9 cm in diameter, and partially  visualized aneurysmal dilatation of the more distal abdominal  aorta at the level of the kidneys which measures 4.3 x 4 cm,  which appears to have undergone surgical repair but is not well  evaluated.  UPPER ABDOMEN:  There is prominence of both adrenal glands which  could be due to metastatic disease or at analysis but is  nonspecific.  OSSEOUS STRUCTURES: Bones appear demineralized. There are mild  compression deformities of multiple thoracic and upper lumbar  levels similar to the prior study.      Impression:      1.  Large spiculated mass in the left upper lobe and  aorticopulmonary window region of the mediastinum with two  central areas of necrosis or fluid collections. Findings are  highly suspicious for neoplasm but could also be due to an  infectious process. This mass exerts mass effect on the left  upper lobe bronchial structures and left upper lobe pulmonary  artery.  2.  Moderate-sized left pleural effusion with adjacent  subsegmental atelectasis of the left lower lobe.  3.  Coronary artery atherosclerotic disease.  4.  Abdominal aortic aneurysms at the hiatus and at the level of  the kidneys measuring up to 4.3 cm in diameter. There may have  been a previous abdominal aortic aneurysm repair of the more  inferior level. These findings are incompletely evaluated.  Recommend correlation with surgical history, and more complete  imaging of the abdomen and pelvis to evaluate the size of the  aneurysm.    For management of the more superior aneurysm,  4.0-4.4 cm AAA, recommend follow-up every 12 months and recommend  vascular consultation.    Electronically signed by:  Trent Domínguez MD  11/11/2021 3:48 PM  CST Workstation: [id number]    XR Chest 1 View [[id number]] Collected: 11/11/21 1325     Updated: 11/11/21 1347    Narrative:      EXAM DESCRIPTION:     XR CHEST 1 VW    CLINICAL HISTORY:     81 years  Female  [facility]A Triage Protocol    COMPARISON:     March 2, 2021    TECHNIQUE:     One view-AP portable radiograph the chest    FINDINGS:     There is a large ovoid masslike density involving the left upper  lobe measuring 11.8 x 8.1 cm in size. There is a questionable  air-fluid level superior aspect of this mass. The  findings are  highly suspicious for neoplasm. Infiltrate is possible but less  likely. There is a moderate size left pleural effusion. The right  lung is clear. The cardiac silhouette and pulmonary vasculature  demonstrate no evidence of acute congestive heart failure.      Impression:          1. Large 11.8 x 8.1 cm masslike density in the left upper lobe  with questionable cavitation/air-fluid level as above. The  findings are indicative of underlying pulmonary neoplasm. CT of  the chest with contrast is recommended for confirmation and  staging.  2. Moderate size left pleural effusion.        Electronically signed by:  Paty Albert MD  11/11/2021 1:46 PM  CST Workstation: [id number]          Assessment / Plan       Hospital Problem List:    Mass of left lung  This is likely secondary to malignancy and complicated by malignant pleural effusion/possible postobstructive  pneumonia: Begin noninvasive respiratory therapy, empiric antimicrobial therapy and schedule patient for CT-guided thoracentesis/lung biopsy.  Blood cultures have been drawn and results are pending.  Consult oncologist.    Accelerated hypertension: Continue patient's home medications and make adjustments as needed for optimal blood pressure control.  She may need Cardene infusion if the need arises.    Paroxysmal atrial fibrillation: Patient is in normal sinus rhythm.  Continue amiodarone and Lopressor.  She is not anticoagulated secondary to risk of fall.    Coronary artery disease: Continue beta-blocker.  Hold dual antiplatelet therapy pending thoracentesis/lung biopsy.    4.3 cm AAA: Patient is to continue surveillance/monitoring as outpatient.    Severe malnutrition/cachexia: Consult dietitian.    Begin GI and DVT prophylaxis.    Additional orders and treatment plan as hospital course dictates.    Code status was discussed with the patient and she wishes to be 'no intubation and no CPR'.        I confirmed that the patient's Advance Care Plan is  present, code status is documented, or surrogate decision maker is listed in the patient's medical record.     I have utilized all available immediate resources to obtain, update, or review the patient's current medications    I discussed the patient's findings and my recommendations with patient and her daughter.     Mike Teresa MD  11/11/21  16:56 CST      Dictated Utilizing Dragon Dictation         Electronically signed by Mike Teresa MD at 11/11/21 0039

## 2021-11-12 NOTE — PROGRESS NOTES
HISTORY OF PRESENT ILLNESS:    Had thoracentesis as well as fine-needle biopsy of lung mass done earlier today.  Shortness of breath is somewhat improved as compared to yesterday.  Denies any bleeding.        PAST MEDICAL HISTORY:    Past Medical History:   Diagnosis Date   • Abdominal aortic aneurysm (AAA) without rupture (HCC)    • Atrial fibrillation (HCC) 10/2/2017    New onset.  S/p cardioversion  On coumadin now.  Amiodarone    • CAD (coronary artery disease)    • Gastroesophageal reflux disease    • Hypercholesterolemia    • Hypertension    • Nuclear senile cataract    • Osteoarthritis    • Osteoporosis    • Peripheral arterial disease (HCC)    • Solitary lung nodule        SOCIAL HISTORY:    Social History     Tobacco Use   • Smoking status: Former Smoker     Packs/day: 1.00     Years: 57.00     Pack years: 57.00     Start date: 1961     Quit date: 10/1/2017     Years since quittin.1   • Smokeless tobacco: Never Used   Substance Use Topics   • Alcohol use: No   • Drug use: No       Surgical History :  Past Surgical History:   Procedure Laterality Date   • ABDOMINAL AORTIC ANEURYSM REPAIR     • HIP INTERTROCHANTERIC NAILING Left 10/1/2017    Procedure: HIP INTERTROCHANTERIC NAILING - LEFT - [place];  Surgeon: Nicola Rich MD;  Location: [facility];  Service:        ALLERGIES:    No Known Allergies      FAMILY HISTORY:  Family History   Problem Relation Age of Onset   • Hypertension Mother    • Coronary artery disease Father            REVIEW OF SYSTEMS:      CONSTITUTIONAL:  Complains of fatigue.  Positive for 30 pound of weight loss in last 1 year.  Positive for poor appetite.  Denies any fever, chills .     EYES: No visual disturbances. No discharge. No new lesions    ENMT:  No epistaxis, mouth sores or difficulty swallowing.    RESPIRATORY: Positive for shortness of breath and cough.  No hemoptysis.    CARDIOVASCULAR:  No chest pain or palpitations.    GASTROINTESTINAL:  No  "abdominal pain nausea, vomiting or blood in the stool.    GENITOURINARY: No Dysuria or Hematuria.    MUSCULOSKELETAL: Positive for chronic back pain.    LYMPHATICS:  Denies any abnormal swollen glands anywhere in the body.    NEUROLOGICAL : No tingling or numbness. No headache or dizziness. No seizures or balance problems.    SKIN: Positive for easy bruising.    ENDOCRINE : No new hot or cold intolerance. No new polyuria . No polydipsia.        PHYSICAL EXAMINATION:      VITAL SIGNS:  /73 (BP Location: Left arm, Patient Position: Lying)   Pulse 60   Temp 97.3 °F (36.3 °C) (Temporal)   Resp 18   Ht 172.7 cm (68\")   Wt 66.7 kg (147 lb)   LMP  (LMP Unknown)   SpO2 98%   BMI 22.35 kg/m²       11/11/21  1215 11/11/21  1828   Weight: 66.7 kg (147 lb) 66.7 kg (147 lb)         ECOG performance status: 2    CONSTITUTIONAL:  Not in any distress.  Appears frail.    EYES: Mild conjunctival Pallor. No Icterus. No Pterygium. Extraocular Movements intact.No ptosis.    ENMT:  Normocephalic, Atraumatic.No Facial Asymmetry noted.    NECK:  No adenopathy.Trachea midline. NO JVD.    RESPIRATORY: Diminished air entry on the left lung field.  No rhonchi or wheezing.    CARDIOVASCULAR:  S1, S2. Regular rate and rhythm. No murmur or gallop appreciated.    ABDOMEN:  Soft, nontender. Bowel sounds present in all four quadrants.  No Hepatosplenomegaly appreciated.    MUSCULOSKELETAL:  No edema.No Calf Tenderness.Normal range of motion.    NEUROLOGIC:    No  Motor  deficit appreciated. Cranial Nerves 2-12 grossly intact.    SKIN : Bruising present on bilateral upper extremities. Skin is warm and moist to touch.    LYMPHATICS: No new enlarged lymph nodes in neck or supraclavicular area.    PSYCHIATRY: Alert, awake and oriented ×3.Normal affect.  Normal judgment.  Makes good eye contact.        DIAGNOSTIC DATA:    Glucose   Date Value Ref Range Status   11/12/2021 90 65 - 99 mg/dL Final     Sodium   Date Value Ref Range Status "   11/12/2021 141 136 - 145 mmol/L Final     Potassium   Date Value Ref Range Status   11/12/2021 4.1 3.5 - 5.2 mmol/L Final     CO2   Date Value Ref Range Status   11/12/2021 35.0 (H) 22.0 - 29.0 mmol/L Final     Chloride   Date Value Ref Range Status   11/12/2021 101 98 - 107 mmol/L Final     Anion Gap   Date Value Ref Range Status   11/12/2021 5.0 5.0 - 15.0 mmol/L Final     Creatinine   Date Value Ref Range Status   11/12/2021 1.11 (H) 0.57 - 1.00 mg/dL Final     BUN   Date Value Ref Range Status   11/12/2021 16 8 - 23 mg/dL Final     BUN/Creatinine Ratio   Date Value Ref Range Status   11/12/2021 14.4 7.0 - 25.0 Final     Calcium   Date Value Ref Range Status   11/12/2021 9.1 8.6 - 10.5 mg/dL Final     eGFR Non  Amer   Date Value Ref Range Status   11/12/2021 47 (L) >60 mL/min/1.73 Final     Alkaline Phosphatase   Date Value Ref Range Status   11/12/2021 65 39 - 117 U/L Final     Total Protein   Date Value Ref Range Status   11/12/2021 6.0 6.0 - 8.5 g/dL Final     ALT (SGPT)   Date Value Ref Range Status   11/12/2021 9 1 - 33 U/L Final     AST (SGOT)   Date Value Ref Range Status   11/12/2021 14 1 - 32 U/L Final     Total Bilirubin   Date Value Ref Range Status   11/12/2021 0.2 0.0 - 1.2 mg/dL Final     Albumin   Date Value Ref Range Status   11/12/2021 3.10 (L) 3.50 - 5.20 g/dL Final     Globulin   Date Value Ref Range Status   11/12/2021 2.9 gm/dL Final     Lab Results   Component Value Date    WBC 7.68 11/12/2021    HGB 12.0 11/12/2021    HCT 37.3 11/12/2021    MCV 87.4 11/12/2021     11/12/2021     Lab Results   Component Value Date    NEUTROABS 5.27 11/12/2021    IRON 28 (L) 10/13/2017    TIBC 229 (L) 10/13/2017    LABIRON 12 (L) 10/13/2017     No results found for: , LABCA2, AFPTM, HCGQUANT, , CHROMGRNA, [id number], CEA, REFLABREPO        PATHOLOGY:  * Cannot find OR log *         RADIOLOGY DATA :  CT Chest With Contrast Diagnostic    Result Date: 11/11/2021  1.  Large spiculated  mass in the left upper lobe and aorticopulmonary window region of the mediastinum with two central areas of necrosis or fluid collections. Findings are highly suspicious for neoplasm but could also be due to an infectious process. This mass exerts mass effect on the left upper lobe bronchial structures and left upper lobe pulmonary artery. 2.  Moderate-sized left pleural effusion with adjacent subsegmental atelectasis of the left lower lobe. 3.  Coronary artery atherosclerotic disease. 4.  Abdominal aortic aneurysms at the hiatus and at the level of the kidneys measuring up to 4.3 cm in diameter. There may have been a previous abdominal aortic aneurysm repair of the more inferior level. These findings are incompletely evaluated. Recommend correlation with surgical history, and more complete imaging of the abdomen and pelvis to evaluate the size of the aneurysm. For management of the more superior aneurysm, 4.0-4.4 cm AAA, recommend follow-up every 12 months and recommend vascular consultation. Electronically signed by:  Trent Domínguez MD  11/11/2021 3:48 PM CST Workstation: [id number]    XR Chest 1 View    Result Date: 11/11/2021  1. Large 11.8 x 8.1 cm masslike density in the left upper lobe with questionable cavitation/air-fluid level as above. The findings are indicative of underlying pulmonary neoplasm. CT of the chest with contrast is recommended for confirmation and staging. 2. Moderate size left pleural effusion. Electronically signed by:  Paty Albert MD  11/11/2021 1:46 PM CST Workstation: [id number]    CT Needle Biopsy Lung    Result Date: 11/12/2021  CONCLUSION: Successful ultrasound-guided left lung mass needle biopsy, and thoracentesis with removal of 300 mL of serosanguineous fluid, as described above. Electronically signed by:  Trent Domínguez MD  11/12/2021 4:32 PM CST Workstation: [id number]    CT Guided Thoracentesis    Result Date: 11/12/2021  CONCLUSION: Successful ultrasound-guided left lung  mass needle biopsy, and thoracentesis with removal of 300 mL of serosanguineous fluid, as described above. Electronically signed by:  Trent Domínguez MD  11/12/2021 4:32 PM CST Workstation: [id number]        ASSESSMENT AND PLAN:      1.  Left upper lobe lung mass  -Patient underwent CT-guided fine-needle aspiration as well as thoracentesis earlier today.  -Thoracentesis fluid as well as biopsy has been sent for pathology review  -Discussed with patient as well as her daughter at bedside.  Further recommendation will depend on the result of pathology report.  -Patient would also benefit from further staging work-up once tissue diagnosis is obtained with PET/CT.  -We will recheck CBC in the morning.    2.  Shortness of breath:  -Secondary to underlying COPD plus postobstructive pneumonia plus effusion plus lung mass  -Continue with current management    3.  Atrial fibrillation    4 . hypertension         Pee Nolasco MD  11/12/2021  17:02 CST        Part of this note may be an electronic transcription/translation of spoken language to printed text using the Dragon Dictation System.

## 2021-11-12 NOTE — PLAN OF CARE
Goal Outcome Evaluation:  Plan of Care Reviewed With: patient        Progress: no change  Outcome Summary: Initial assessment.  Intake 75% - 1x prior to NPO status for procedure.  Resume PO intake as appropriate.

## 2021-11-12 NOTE — PLAN OF CARE
Patient NPO for CT guided thoracentesis and biopsy. Patient stated last dose of plavix on Tuesday. Patient denies any pain at this time.   Goal Outcome Evaluation:

## 2021-11-13 NOTE — NURSING NOTE
Patient converted back into NSR. Rate 94. Confirmed with telemetry. Communicated with Dr. Teresa, stated to hold off on cardizem gtt at this time.

## 2021-11-13 NOTE — PLAN OF CARE
Patient went into AFIB with RVR this morning. Confirmed with EKG. MD made aware and medications ordered and administered Confirmed with telemetry at this time, Patient is currently in NSR with rate of 75.   Goal Outcome Evaluation:

## 2021-11-13 NOTE — PROGRESS NOTES
HISTORY OF PRESENT ILLNESS:    Had episode of hypotension requiring albumin and intravenous fluid.  Continues to have shortness of breath.  No bleeding reported.        PAST MEDICAL HISTORY:    Past Medical History:   Diagnosis Date   • Abdominal aortic aneurysm (AAA) without rupture (HCC)    • Atrial fibrillation (HCC) 10/2/2017    New onset.  S/p cardioversion  On coumadin now.  Amiodarone    • CAD (coronary artery disease)    • Gastroesophageal reflux disease    • Hypercholesterolemia    • Hypertension    • Nuclear senile cataract    • Osteoarthritis    • Osteoporosis    • Peripheral arterial disease (HCC)    • Solitary lung nodule        SOCIAL HISTORY:    Social History     Tobacco Use   • Smoking status: Former Smoker     Packs/day: 1.00     Years: 57.00     Pack years: 57.00     Start date: 1961     Quit date: 10/1/2017     Years since quittin.1   • Smokeless tobacco: Never Used   Substance Use Topics   • Alcohol use: No   • Drug use: No       Surgical History :  Past Surgical History:   Procedure Laterality Date   • ABDOMINAL AORTIC ANEURYSM REPAIR     • HIP INTERTROCHANTERIC NAILING Left 10/1/2017    Procedure: HIP INTERTROCHANTERIC NAILING - LEFT - [place];  Surgeon: Nicola Rich MD;  Location: [facility];  Service:        ALLERGIES:    No Known Allergies      FAMILY HISTORY:  Family History   Problem Relation Age of Onset   • Hypertension Mother    • Coronary artery disease Father            REVIEW OF SYSTEMS:      CONSTITUTIONAL:  Complains of fatigue.  Positive for weight loss.  Positive for poor appetite.  Denies any fever, chills .     EYES: No visual disturbances. No discharge. No new lesions    ENMT:  No epistaxis, mouth sores or difficulty swallowing.    RESPIRATORY: Positive for shortness of breath and cough.  No hemoptysis    CARDIOVASCULAR:  No chest pain or palpitations.    GASTROINTESTINAL:  No abdominal pain nausea, vomiting or blood in the stool.    GENITOURINARY:  "No Dysuria or Hematuria.    MUSCULOSKELETAL: Positive for arthralgia    LYMPHATICS:  Denies any abnormal swollen glands anywhere in the body.    NEUROLOGICAL : No tingling or numbness. No headache or dizziness. No seizures or balance problems.    SKIN: Positive for easy bruising    ENDOCRINE : No new hot or cold intolerance. No new polyuria . No polydipsia.        PHYSICAL EXAMINATION:      VITAL SIGNS:  BP 95/59 (BP Location: Left arm, Patient Position: Lying)   Pulse 71   Temp 96.8 °F (36 °C) (Temporal)   Resp 18   Ht 172.7 cm (68\")   Wt 61.9 kg (136 lb 6 oz)   LMP  (LMP Unknown)   SpO2 96%   BMI 20.74 kg/m²       11/11/21  1215 11/11/21  1828 11/13/21  0611   Weight: 66.7 kg (147 lb) 66.7 kg (147 lb) 61.9 kg (136 lb 6 oz)         ECOG performance status: 2    CONSTITUTIONAL:  Not in any distress.  Appears frail.    EYES: Mild conjunctival Pallor. No Icterus. No Pterygium. Extraocular Movements intact.No ptosis.    ENMT:  Normocephalic, Atraumatic.No Facial Asymmetry noted.    NECK:  No adenopathy.Trachea midline. NO JVD.    RESPIRATORY: Diminished air entry in left lung field.  No rhonchi or wheezing.    CARDIOVASCULAR:  S1, S2. Regular rate and rhythm. No murmur or gallop appreciated.    ABDOMEN:  Soft,  nontender. Bowel sounds present in all four quadrants.  No Hepatosplenomegaly appreciated.    MUSCULOSKELETAL:  No edema.No Calf Tenderness.Normal range of motion.    NEUROLOGIC:    No  Motor  deficit appreciated. Cranial Nerves 2-12 grossly intact.    SKIN : Bruising present on bilateral upper extremity. Skin is warm and moist to touch.    LYMPHATICS: No new enlarged lymph nodes in neck or supraclavicular area.    PSYCHIATRY: Alert, awake and oriented ×3.Normal affect.  Normal judgment.  Makes good eye contact.        DIAGNOSTIC DATA:    Glucose   Date Value Ref Range Status   11/13/2021 119 (H) 65 - 99 mg/dL Final     Sodium   Date Value Ref Range Status   11/13/2021 140 136 - 145 mmol/L Final "     Potassium   Date Value Ref Range Status   11/13/2021 4.1 3.5 - 5.2 mmol/L Final     CO2   Date Value Ref Range Status   11/13/2021 32.0 (H) 22.0 - 29.0 mmol/L Final     Chloride   Date Value Ref Range Status   11/13/2021 100 98 - 107 mmol/L Final     Anion Gap   Date Value Ref Range Status   11/13/2021 8.0 5.0 - 15.0 mmol/L Final     Creatinine   Date Value Ref Range Status   11/13/2021 1.44 (H) 0.57 - 1.00 mg/dL Final     BUN   Date Value Ref Range Status   11/13/2021 23 8 - 23 mg/dL Final     BUN/Creatinine Ratio   Date Value Ref Range Status   11/13/2021 16.0 7.0 - 25.0 Final     Calcium   Date Value Ref Range Status   11/13/2021 8.8 8.6 - 10.5 mg/dL Final     eGFR Non  Amer   Date Value Ref Range Status   11/13/2021 35 (L) >60 mL/min/1.73 Final     Alkaline Phosphatase   Date Value Ref Range Status   11/12/2021 65 39 - 117 U/L Final     Total Protein   Date Value Ref Range Status   11/12/2021 6.0 6.0 - 8.5 g/dL Final     ALT (SGPT)   Date Value Ref Range Status   11/12/2021 9 1 - 33 U/L Final     AST (SGOT)   Date Value Ref Range Status   11/12/2021 14 1 - 32 U/L Final     Total Bilirubin   Date Value Ref Range Status   11/12/2021 0.2 0.0 - 1.2 mg/dL Final     Albumin   Date Value Ref Range Status   11/12/2021 3.10 (L) 3.50 - 5.20 g/dL Final     Globulin   Date Value Ref Range Status   11/12/2021 2.9 gm/dL Final     Lab Results   Component Value Date    WBC 15.23 (H) 11/13/2021    HGB 9.3 (L) 11/13/2021    HCT 29.2 (L) 11/13/2021    MCV 88.8 11/13/2021     11/13/2021     Lab Results   Component Value Date    NEUTROABS 12.84 (H) 11/13/2021    IRON 12 (L) 11/13/2021    TIBC 209 (L) 11/13/2021    LABIRON 6 (L) 11/13/2021    FERRITIN 189.70 (H) 11/13/2021     No results found for: , LABCA2, AFPTM, HCGQUANT, , CHROMGRNA, [id number], CEA, REFLABREPO        PATHOLOGY:  * Cannot find OR log *         RADIOLOGY DATA :  CT Chest With Contrast Diagnostic    Result Date: 11/11/2021  1.  Large  spiculated mass in the left upper lobe and aorticopulmonary window region of the mediastinum with two central areas of necrosis or fluid collections. Findings are highly suspicious for neoplasm but could also be due to an infectious process. This mass exerts mass effect on the left upper lobe bronchial structures and left upper lobe pulmonary artery. 2.  Moderate-sized left pleural effusion with adjacent subsegmental atelectasis of the left lower lobe. 3.  Coronary artery atherosclerotic disease. 4.  Abdominal aortic aneurysms at the hiatus and at the level of the kidneys measuring up to 4.3 cm in diameter. There may have been a previous abdominal aortic aneurysm repair of the more inferior level. These findings are incompletely evaluated. Recommend correlation with surgical history, and more complete imaging of the abdomen and pelvis to evaluate the size of the aneurysm. For management of the more superior aneurysm, 4.0-4.4 cm AAA, recommend follow-up every 12 months and recommend vascular consultation. Electronically signed by:  Trent Domínguez MD  11/11/2021 3:48 PM CST Workstation: [id number]    XR Chest 1 View    Result Date: 11/11/2021  1. Large 11.8 x 8.1 cm masslike density in the left upper lobe with questionable cavitation/air-fluid level as above. The findings are indicative of underlying pulmonary neoplasm. CT of the chest with contrast is recommended for confirmation and staging. 2. Moderate size left pleural effusion. Electronically signed by:  Paty Albert MD  11/11/2021 1:46 PM CST Workstation: [id number]    CT Needle Biopsy Lung    Result Date: 11/12/2021  CONCLUSION: Successful ultrasound-guided left lung mass needle biopsy, and thoracentesis with removal of 300 mL of serosanguineous fluid, as described above. Electronically signed by:  Trent Domínguez MD  11/12/2021 4:32 PM CST Workstation: [id number]    CT Guided Thoracentesis    Result Date: 11/12/2021  CONCLUSION: Successful ultrasound-guided  left lung mass needle biopsy, and thoracentesis with removal of 300 mL of serosanguineous fluid, as described above. Electronically signed by:  Trent Domínguez MD  11/12/2021 4:32 PM CST Workstation: [id number]        ASSESSMENT AND PLAN:      1.  Anemia:  -Hemoglobin is decreased 9.3.  Hemoglobin was 12 yesterday  -On thoracentesis fluid,  There was increased RBC consistent with hemorrhagic tape.  -Iron studies done earlier today shows anemia of chronic disease with ferritin of 183 and iron saturation of only 6%  -We will start patient on intravenous Ferrlecit starting today.  -We will recheck CBC in the morning  -Transfuse as needed if hemoglobin is less than 7    2.  Left upper lobe lung mass and left pleural effusion  -S/p CT-guided biopsy of left upper lobe lung mass and left pleural thoracentesis.  Is awaiting pathology report    3.  Shortness of breath:  -Continue with management as per medical team    4.  Leukocytosis:  -Reactive    5.  Atrial fibrillation    6.Hypertension         Pee Nolasco MD  11/13/2021  11:03 CST        Part of this note may be an electronic transcription/translation of spoken language to printed text using the Dragon Dictation System.

## 2021-11-13 NOTE — PROGRESS NOTES
[facility] Medicine Services  INPATIENT PROGRESS NOTE    Length of Stay: 2  Date of Admission: 11/11/2021  Primary Care Physician: Ramiro Gloria MD    Subjective   Chief Complaint: Shortness of air.      HPI: Patient is seen for follow-up.  She is more short of air this morning, has productive cough and became hypotensive last night.  She is currently of 3 L of supplemental oxygen with saturation in the upper 90s.      Review of Systems   Constitutional: Positive for activity change and fatigue. Negative for appetite change, chills, diaphoresis and fever.   HENT: Negative for trouble swallowing and voice change.    Eyes: Positive for visual disturbance. Negative for photophobia.   Respiratory: Positive for cough and shortness of breath. Negative for choking, chest tightness, wheezing and stridor.    Cardiovascular: Negative for chest pain, palpitations and leg swelling.   Gastrointestinal: Negative for abdominal distention, abdominal pain, blood in stool, constipation, diarrhea, nausea and vomiting.   Endocrine: Negative for cold intolerance, heat intolerance, polydipsia, polyphagia and polyuria.   Genitourinary: Negative for decreased urine volume, difficulty urinating, dysuria, enuresis, flank pain, frequency, hematuria and urgency.   Musculoskeletal: Negative for arthralgias, gait problem, myalgias, neck pain and neck stiffness.   Skin: Negative for pallor, rash and wound.   Neurological: Negative for dizziness, tremors, seizures, syncope, facial asymmetry, speech difficulty, weakness, light-headedness, numbness and headaches.   Hematological: Does not bruise/bleed easily.   Psychiatric/Behavioral: Negative for agitation, behavioral problems and confusion.       Objective    Temp:  [96.2 °F (35.7 °C)-98.2 °F (36.8 °C)] 96.8 °F (36 °C)  Heart Rate:  [51-72] 65  Resp:  [16-18] 17  BP: ()/(44-74) 95/59      Vitals and nursing note reviewed.   Constitutional:        General: She is not in acute distress.     Appearance: She is underweight. She is not diaphoretic.      Comments: Patient is underweight, cachectic and seems severely malnourished.   HENT:      Head: Normocephalic and atraumatic.      Right Ear: External ear normal.      Left Ear: External ear normal.      Nose: Nose normal.   Eyes: She has some visual impairment.     Extraocular Movements: Extraocular movements intact.      Conjunctiva/sclera: Conjunctivae normal.      Pupils: Pupils are equal, round, and reactive to light.   Neck:      Thyroid: No thyromegaly.      Vascular: No JVD.   Cardiovascular:      Rate and Rhythm: Normal rate and regular rhythm.      Heart sounds: Normal heart sounds. No murmur heard.  No friction rub. No gallop.    Pulmonary:      Effort: Pulmonary effort is normal. No respiratory distress.      Breath sounds: No stridor. Examination of the left-middle field reveals decreased breath sounds. Examination of the left-lower field reveals decreased breath sounds. Decreased breath sounds and rhonchi present. No wheezing or rales.   Chest:      Chest wall: No tenderness.   Abdominal:      General: Bowel sounds are normal. There is no distension.      Palpations: Abdomen is soft. There is no mass.      Tenderness: There is no abdominal tenderness. There is no right CVA tenderness, left CVA tenderness, guarding or rebound.      Hernia: No hernia is present  Musculoskeletal:         General: No swelling, tenderness, deformity or signs of injury. Normal range of motion.      Cervical back: Normal range of motion and neck supple.      Right lower leg: No edema.      Left lower leg: No edema.   Skin:     General: Skin is warm and dry.      Coloration: Skin is not jaundiced or pale.      Findings: No bruising, erythema, lesion or rash.      Comments: She has chronic stasis changes both legs.   Neurological:      General: No focal deficit present.      Mental Status: She is alert and oriented to person,  place, and time.      Cranial Nerves: No cranial nerve deficit.      Sensory: No sensory deficit.      Motor: No weakness.      Coordination: Coordination normal.      Gait: Gait normal.      Deep Tendon Reflexes: Reflexes are normal and symmetric. Reflexes normal.   Psychiatric:         Mood and Affect: Mood normal.         Behavior: Behavior normal. Behavior is cooperative.         Thought Content: Thought content normal.         Judgment: Judgment normal      Medication Review:    Current Facility-Administered Medications:   •  acetaminophen (TYLENOL) tablet 650 mg, 650 mg, Oral, Q4H PRN, 650 mg at 11/12/21 1945 **OR** acetaminophen (TYLENOL) 160 MG/5ML solution 650 mg, 650 mg, Oral, Q4H PRN **OR** acetaminophen (TYLENOL) suppository 650 mg, 650 mg, Rectal, Q4H PRN, Mike Teresa MD  •  aluminum-magnesium hydroxide-simethicone (MAALOX MAX) 400-400-40 MG/5ML suspension 15 mL, 15 mL, Oral, Q6H PRN, Mike Teresa MD  •  amiodarone (PACERONE) half tablet 100 mg, 100 mg, Oral, Daily, Mike Teresa MD, 100 mg at 11/13/21 0850  •  sennosides-docusate (PERICOLACE) 8.6-50 MG per tablet 2 tablet, 2 tablet, Oral, BID, 2 tablet at 11/13/21 0828 **AND** polyethylene glycol (MIRALAX) packet 17 g, 17 g, Oral, Daily PRN **AND** bisacodyl (DULCOLAX) EC tablet 5 mg, 5 mg, Oral, Daily PRN **AND** bisacodyl (DULCOLAX) suppository 10 mg, 10 mg, Rectal, Daily PRN, Mike Teresa MD  •  budesonide-formoterol (SYMBICORT) 160-4.5 MCG/ACT inhaler 2 puff, 2 puff, Inhalation, BID - RT, Mike Teresa MD  •  enoxaparin (LOVENOX) syringe 40 mg, 40 mg, Subcutaneous, Q24H, Mike Teresa MD, 40 mg at 11/12/21 2023  •  famotidine (PEPCID) tablet 20 mg, 20 mg, Oral, BID, Mike Teresa MD, 20 mg at 11/13/21 0828  •  ferrous sulfate EC tablet 324 mg, 324 mg, Oral, Daily With Breakfast, Mike Teresa MD, 324 mg at 11/13/21 0827  •  gabapentin (NEURONTIN) capsule 300 mg, 300 mg, Oral, Nightly, Malick  Mike PHAM MD, 300 mg at 11/12/21 2023  •  Hold medication, 1 each, Does not apply, Continuous PRN, Mike Teresa MD  •  hydrALAZINE (APRESOLINE) tablet 50 mg, 50 mg, Oral, TID, Mike Teresa MD, 50 mg at 11/12/21 1505  •  hydrocortisone-bacitracin-zinc oxide-nystatin (MAGIC BARRIER) ointment 1 application, 1 application, Topical, PRN, Mike Teresa MD, 1 application at 11/11/21 2237  •  I-Renetta Eye Vitamin, 1 tablet, Oral, BID, Jaimie Gunderson MD, 1 tablet at 11/13/21 0828  •  ipratropium-albuterol (DUO-NEB) nebulizer solution 3 mL, 3 mL, Nebulization, 4x Daily - RT, Mike Teresa MD  •  levoFLOXacin (LEVAQUIN) 750 mg/150 mL D5W (premix) (LEVAQUIN) 750 mg, 750 mg, Intravenous, Once, Mike Teresa MD  •  magnesium oxide (MAG-OX) tablet 400 mg, 400 mg, Oral, Daily, Mike Teresa MD, 400 mg at 11/13/21 0828  •  methylPREDNISolone sodium succinate (SOLU-Medrol) injection 60 mg, 60 mg, Intravenous, Q8H, Mike Teresa MD, 60 mg at 11/13/21 0926  •  metoprolol tartrate (LOPRESSOR) tablet 25 mg, 25 mg, Oral, Q12H, Mike Teresa MD, 25 mg at 11/12/21 0854  •  ondansetron (ZOFRAN) tablet 4 mg, 4 mg, Oral, Q6H PRN **OR** ondansetron (ZOFRAN) injection 4 mg, 4 mg, Intravenous, Q6H PRN, Mike Teresa MD, 4 mg at 11/12/21 2249  •  Pharmacy to Dose Zosyn, , Does not apply, Continuous, Mike Teresa MD  •  piperacillin-tazobactam (ZOSYN) 3.375 g/100 mL 0.9% NS IVPB (mbp), 3.375 g, Intravenous, Q8H, Mike Teresa MD, Last Rate: 0 mL/hr at 11/12/21 0354, 3.375 g at 11/13/21 0829  •  sodium chloride 0.9 % flush 10 mL, 10 mL, Intravenous, PRN, Mike Teresa MD  •  sodium chloride 0.9 % flush 10 mL, 10 mL, Intravenous, Q12H, Mike Teresa MD, 10 mL at 11/13/21 0830  •  sodium chloride 0.9 % flush 10 mL, 10 mL, Intravenous, PRN, Mike Teresa MD  •  traMADol (ULTRAM) tablet 50 mg, 50 mg, Oral, Q6H PRN, Mike Teresa MD, 50 mg at 11/12/21  1736  •  zinc sulfate (ZINCATE) capsule 220 mg, 220 mg, Oral, Daily, Mike Teresa MD, 220 mg at 11/13/21 0829    Results Review:  I have reviewed the labs, radiology results, and diagnostic studies.    Laboratory Data:   Results from last 7 days   Lab Units 11/13/21  0654 11/12/21  0613 11/11/21  1306   SODIUM mmol/L 140 141 133*   POTASSIUM mmol/L 4.1 4.1 4.4   CHLORIDE mmol/L 100 101 95*   CO2 mmol/L 32.0* 35.0* 27.0   BUN mg/dL 23 16 14   CREATININE mg/dL 1.44* 1.11* 0.88   GLUCOSE mg/dL 119* 90 95   CALCIUM mg/dL 8.8 9.1 9.0   BILIRUBIN mg/dL  --  0.2 0.3   ALK PHOS U/L  --  65 77   ALT (SGPT) U/L  --  9 10   AST (SGOT) U/L  --  14 18   ANION GAP mmol/L 8.0 5.0 11.0     Estimated Creatinine Clearance: 30.4 mL/min (A) (by C-G formula based on SCr of 1.44 mg/dL (H)).          Results from last 7 days   Lab Units 11/13/21  0654 11/12/21  0613 11/11/21  1306   WBC 10*3/mm3 15.23* 7.68 10.50   HEMOGLOBIN g/dL 9.3* 12.0 12.1   HEMATOCRIT % 29.2* 37.3 37.3   PLATELETS 10*3/mm3 269 291 321     Results from last 7 days   Lab Units 11/12/21  0613   INR  1.10       Culture Data:   Blood Culture   Date Value Ref Range Status   11/11/2021 No growth at 24 hours  Preliminary   11/11/2021 No growth at 24 hours  Preliminary     No results found for: URINECX  No results found for: RESPCX  No results found for: WOUNDCX  No results found for: STOOLCX  No components found for: BODYFLD    Radiology Data:   Imaging Results (Last 24 Hours)     Procedure Component Value Units Date/Time    CT Guided Thoracentesis [[id number]] Collected: 11/12/21 1333     Updated: 11/12/21 1633    Narrative:      PROCEDURE: CT GUIDED THORACENTESIS, CT GUIDED LUNG BIOPSY     INDICATION: Left apical lung mass. Left-sided pleural effusion.,  R91.8 Other nonspecific abnormal finding of lung field J90  Pleural effusion, not elsewhere classified I50.9 Heart failure,  unspecified I10 Essential (primary) hypertension.    COMPARISON: CT chest dated  11/11/2021.    This exam was performed according to our departmental dose  optimization program, which includes automated exposure control,  adjustment of the mA and/or KV according to patient size and/or  use of iterative reconstruction technique.    TECHNIQUE: Following a discussion of the procedure, its benefits,  and risks (including bleeding, infection, mistargeting,  pneumothorax) with the patient, an informed written consent was  obtained and documented in the patient's chart.  The time-out  form was completed to confirm patient identity and side/type of  procedure.    Localizing CT demonstrated an appropriate window for needle  introduction into the left apical lung mass.    The skin over the target area was cleansed with chlorhexidine.  Local anesthesia was obtained using 2% lidocaine, superficially  and at depth.     Using aseptic technique, a 20-gauge coaxial core needle was  advanced into the lung mass and two samples were obtained.     The specimens were handed to the pathologist who confirmed  specimen adequacy.    A sterile bandage was applied.    Following this, an appropriate site was marked in the left  lateral hemithorax. The skin was prepped and draped in the usual  sterile fashion.  10 mL of lidocaine was used for local  anesthesia. A 19-gauge Yueh needle and catheter was inserted into  the pleural space under CT guidance. The needle was removed and  fluid was collected into a vacuum bottle.  Approximately 300 mL of serosanguineous fluid was collected and  submitted for laboratory evaluation, as requested.    The patient tolerated the procedures well and returned to the  medical floor in stable condition. No immediate post procedure  complications.    Blood loss: Minimal.      Impression:      CONCLUSION:   Successful ultrasound-guided left lung mass needle biopsy, and  thoracentesis with removal of 300 mL of serosanguineous fluid, as  described above.    Electronically signed by:  Trent Domínguez  MD  11/12/2021 4:32 PM  CST Workstation: [id number]    CT Needle Biopsy Lung [[id number]] Collected: 11/12/21 1333     Updated: 11/12/21 1633    Narrative:      PROCEDURE: CT GUIDED THORACENTESIS, CT GUIDED LUNG BIOPSY     INDICATION: Left apical lung mass. Left-sided pleural effusion.,  R91.8 Other nonspecific abnormal finding of lung field J90  Pleural effusion, not elsewhere classified I50.9 Heart failure,  unspecified I10 Essential (primary) hypertension.    COMPARISON: CT chest dated 11/11/2021.    This exam was performed according to our departmental dose  optimization program, which includes automated exposure control,  adjustment of the mA and/or KV according to patient size and/or  use of iterative reconstruction technique.    TECHNIQUE: Following a discussion of the procedure, its benefits,  and risks (including bleeding, infection, mistargeting,  pneumothorax) with the patient, an informed written consent was  obtained and documented in the patient's chart.  The time-out  form was completed to confirm patient identity and side/type of  procedure.    Localizing CT demonstrated an appropriate window for needle  introduction into the left apical lung mass.    The skin over the target area was cleansed with chlorhexidine.  Local anesthesia was obtained using 2% lidocaine, superficially  and at depth.     Using aseptic technique, a 20-gauge coaxial core needle was  advanced into the lung mass and two samples were obtained.     The specimens were handed to the pathologist who confirmed  specimen adequacy.    A sterile bandage was applied.    Following this, an appropriate site was marked in the left  lateral hemithorax. The skin was prepped and draped in the usual  sterile fashion.  10 mL of lidocaine was used for local  anesthesia. A 19-gauge Balakameh needle and catheter was inserted into  the pleural space under CT guidance. The needle was removed and  fluid was collected into a vacuum bottle.  Approximately  300 mL of serosanguineous fluid was collected and  submitted for laboratory evaluation, as requested.    The patient tolerated the procedures well and returned to the  medical floor in stable condition. No immediate post procedure  complications.    Blood loss: Minimal.      Impression:      CONCLUSION:   Successful ultrasound-guided left lung mass needle biopsy, and  thoracentesis with removal of 300 mL of serosanguineous fluid, as  described above.    Electronically signed by:  Trent Domínguez MD  11/12/2021 4:32 PM  CST Workstation: [id number]          I have reviewed the patient's current medications.     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  Findings of pleural fluid analysis have been reviewed and cytology/pathology are pending.  Continue noninvasive respiratory therapy, bronchodilators and empiric antimicrobial therapy.  Blood cultures have shown no growth.  Input by Dr. Nolasco is appreciated..     Accelerated hypertension: Patient is now hypotensive.  Antihypertensives remain on hold for now.  Have been blood     Acute kidney injury (likely prerenal): Creatinine has increased to 1.44 today.  Her baseline creatinine is mostly less than 1.  Lasix and lisinopril have been discontinued.  Continue to monitor renal function, avoid nephrotoxins and consult nephrologist if the need arises.    Anemia (likely of chronic inflammation/iron deficiency): Hemoglobin did drop from 12.7 on admission to 9.3 today.  Patient does not have any evidence of acute blood loss and findings of iron studies have been noted.  Begin iron supplementation, continue to monitor hemoglobin and transfuse if the need arises.     Paroxysmal  atrial fibrillation: Patient is in normal sinus rhythm.  Continue amiodarone and Lopressor.  She is not anticoagulated secondary to risk of fall.     Coronary artery disease: Continue beta-blocker.  Resume antiplatelet therapy in a.m.      4.3 cm AAA: Patient is to continue surveillance/monitoring as outpatient.     Severe malnutrition/cachexia: Dietitian is following.    Deconditioning: Continue PT and OT.     Continue GI and DVT prophylaxis.     Overall prognosis is guarded.    Code status is 'no intubation and no CPR'.       Discharge Planning: In progress.    I confirmed that the patient's Advance Care Plan is present, code status is documented, or surrogate decision maker is listed in the patient's medical record.     I have utilized all available immediate resources to obtain, update, or review the patient's current medications      Mike Teresa MD   11/13/21   10:25 CST

## 2021-11-13 NOTE — PLAN OF CARE
Problem: Adult Inpatient Plan of Care  Goal: Plan of Care Review  Outcome: Ongoing, Progressing  Flowsheets (Taken 11/13/2021 0226)  Progress: no change  Plan of Care Reviewed With: patient  Outcome Summary: Pt bp was low, pt felt hot, and nauseated gave 500 cc bolus and albumin, zofran for nausea, interventions effective.     Problem: Adult Inpatient Plan of Care  Goal: Patient-Specific Goal (Individualized)  Outcome: Ongoing, Progressing     Problem: Adult Inpatient Plan of Care  Goal: Absence of Hospital-Acquired Illness or Injury  Outcome: Ongoing, Progressing     Problem: Adult Inpatient Plan of Care  Goal: Absence of Hospital-Acquired Illness or Injury  Intervention: Identify and Manage Fall Risk  Recent Flowsheet Documentation  Taken 11/13/2021 0200 by Ariana Payne RN  Safety Promotion/Fall Prevention:   activity supervised   assistive device/personal items within reach   clutter free environment maintained   fall prevention program maintained   safety round/check completed   nonskid shoes/slippers when out of bed  Taken 11/13/2021 0018 by Ariana Payne RN  Safety Promotion/Fall Prevention:   activity supervised   assistive device/personal items within reach   clutter free environment maintained   fall prevention program maintained   safety round/check completed   muscle strengthening facilitated  Taken 11/12/2021 2236 by Ariana Payne, RN  Safety Promotion/Fall Prevention:   activity supervised   assistive device/personal items within reach   clutter free environment maintained   fall prevention program maintained   safety round/check completed   nonskid shoes/slippers when out of bed  Taken 11/12/2021 2125 by Ariana Payne, RN  Safety Promotion/Fall Prevention:   activity supervised   assistive device/personal items within reach   clutter free environment maintained   fall prevention program maintained   safety round/check completed   nonskid shoes/slippers when out of bed  Taken  11/12/2021 2019 by Ariana Payne RN  Safety Promotion/Fall Prevention:   assistive device/personal items within reach   clutter free environment maintained   activity supervised   safety round/check completed   nonskid shoes/slippers when out of bed  Taken 11/12/2021 1924 by Ariana Payne RN  Safety Promotion/Fall Prevention:   activity supervised   assistive device/personal items within reach   clutter free environment maintained   fall prevention program maintained   safety round/check completed   nonskid shoes/slippers when out of bed     Problem: Adult Inpatient Plan of Care  Goal: Absence of Hospital-Acquired Illness or Injury  Intervention: Prevent Skin Injury  Recent Flowsheet Documentation  Taken 11/13/2021 0200 by Ariana Payne RN  Body Position: supine, legs elevated  Taken 11/13/2021 0018 by Ariana Payne RN  Body Position: supine  Taken 11/12/2021 2236 by Ariana Payne RN  Body Position: tilted, right  Taken 11/12/2021 2019 by Ariana Payne RN  Body Position: (floated) other (see comments)     Problem: Adult Inpatient Plan of Care  Goal: Absence of Hospital-Acquired Illness or Injury  Intervention: Prevent and Manage VTE (venous thromboembolism) Risk  Recent Flowsheet Documentation  Taken 11/12/2021 1924 by Ariana Payne RN  VTE Prevention/Management: bleeding risk factor(s) identified     Problem: Adult Inpatient Plan of Care  Goal: Optimal Comfort and Wellbeing  Outcome: Ongoing, Progressing     Problem: Adult Inpatient Plan of Care  Goal: Optimal Comfort and Wellbeing  Intervention: Provide Person-Centered Care  Recent Flowsheet Documentation  Taken 11/12/2021 1924 by Ariana Payne RN  Trust Relationship/Rapport:   care explained   questions answered   thoughts/feelings acknowledged     Problem: Adult Inpatient Plan of Care  Goal: Readiness for Transition of Care  Outcome: Ongoing, Progressing     Problem: Fall Injury Risk  Goal: Absence of Fall and  Fall-Related Injury  Outcome: Ongoing, Progressing     Problem: Fall Injury Risk  Goal: Absence of Fall and Fall-Related Injury  Intervention: Promote Injury-Free Environment  Recent Flowsheet Documentation  Taken 11/13/2021 0200 by Ariana Payne RN  Safety Promotion/Fall Prevention:   activity supervised   assistive device/personal items within reach   clutter free environment maintained   fall prevention program maintained   safety round/check completed   nonskid shoes/slippers when out of bed  Taken 11/13/2021 0018 by Ariana Payne RN  Safety Promotion/Fall Prevention:   activity supervised   assistive device/personal items within reach   clutter free environment maintained   fall prevention program maintained   safety round/check completed   muscle strengthening facilitated  Taken 11/12/2021 2236 by Ariana Payne RN  Safety Promotion/Fall Prevention:   activity supervised   assistive device/personal items within reach   clutter free environment maintained   fall prevention program maintained   safety round/check completed   nonskid shoes/slippers when out of bed  Taken 11/12/2021 2125 by Ariana Payne RN  Safety Promotion/Fall Prevention:   activity supervised   assistive device/personal items within reach   clutter free environment maintained   fall prevention program maintained   safety round/check completed   nonskid shoes/slippers when out of bed  Taken 11/12/2021 2019 by Ariana Payne RN  Safety Promotion/Fall Prevention:   assistive device/personal items within reach   clutter free environment maintained   activity supervised   safety round/check completed   nonskid shoes/slippers when out of bed  Taken 11/12/2021 1924 by Ariana Payne RN  Safety Promotion/Fall Prevention:   activity supervised   assistive device/personal items within reach   clutter free environment maintained   fall prevention program maintained   safety round/check completed   nonskid shoes/slippers  when out of bed     Problem: Skin Injury Risk Increased  Goal: Skin Health and Integrity  Outcome: Ongoing, Progressing     Problem: Skin Injury Risk Increased  Goal: Skin Health and Integrity  Intervention: Optimize Skin Protection  Recent Flowsheet Documentation  Taken 11/12/2021 2236 by Ariana Payne RN  Head of Bed ([facility]): [facility] at 45 degrees  Taken 11/12/2021 1924 by Ariana Payne RN  Pressure Reduction Techniques:   frequent weight shift encouraged   weight shift assistance provided  Pressure Reduction Devices: pressure-redistributing mattress utilized     Problem: Breathing Pattern Ineffective  Goal: Effective Breathing Pattern  Outcome: Ongoing, Progressing     Problem: Breathing Pattern Ineffective  Goal: Effective Breathing Pattern  Intervention: Promote Improved Breathing Pattern  Recent Flowsheet Documentation  Taken 11/12/2021 2236 by Ariana Payne RN  Head of Bed ([facility]): [facility] at 45 degrees   Goal Outcome Evaluation:  Plan of Care Reviewed With: patient        Progress: no change  Outcome Summary: Pt bp was low, pt felt hot, and nauseated gave 500 cc bolus and albumin, zofran for nausea, interventions effective.

## 2021-11-13 NOTE — NURSING NOTE
Patient sitting on side of bed. . Communicated with Dr Teresa. Stat EKG ordered. AFIB RVR confirmed. MD to place orders. Patient positioned back in bed. No complaint of chest pain.

## 2021-11-14 NOTE — SIGNIFICANT NOTE
NT sx not performed @ this time; discussed procedure with pt et daughter et NT sx postponed @ the moment; aerobika instructed; pt had productive cough while using aerobika; will continue to monitor need for NT sx et encourage coughing with aerobika use.

## 2021-11-14 NOTE — PLAN OF CARE
Chest PA/Lat today, breathing seems improved from yesterday. Patient remains in NSR.  Goal Outcome Evaluation:

## 2021-11-14 NOTE — PLAN OF CARE
Problem: Adult Inpatient Plan of Care  Goal: Plan of Care Review  Outcome: Ongoing, Progressing  Flowsheets  Taken 11/14/2021 0258  Progress: improving  Outcome Summary: pt vs stable, pt resting well at this time.  Taken 11/13/2021 0226  Plan of Care Reviewed With: patient     Problem: Adult Inpatient Plan of Care  Goal: Patient-Specific Goal (Individualized)  Outcome: Ongoing, Progressing     Problem: Adult Inpatient Plan of Care  Goal: Absence of Hospital-Acquired Illness or Injury  Outcome: Ongoing, Progressing     Problem: Adult Inpatient Plan of Care  Goal: Absence of Hospital-Acquired Illness or Injury  Intervention: Identify and Manage Fall Risk  Recent Flowsheet Documentation  Taken 11/14/2021 0232 by Ariana Payne RN  Safety Promotion/Fall Prevention:   activity supervised   assistive device/personal items within reach   clutter free environment maintained   fall prevention program maintained   safety round/check completed   nonskid shoes/slippers when out of bed  Taken 11/14/2021 0007 by Ariana Payne RN  Safety Promotion/Fall Prevention:   assistive device/personal items within reach   activity supervised   clutter free environment maintained   fall prevention program maintained   safety round/check completed   nonskid shoes/slippers when out of bed  Taken 11/13/2021 2212 by Ariana Payne RN  Safety Promotion/Fall Prevention:   activity supervised   assistive device/personal items within reach   clutter free environment maintained   fall prevention program maintained   safety round/check completed   nonskid shoes/slippers when out of bed  Taken 11/13/2021 2110 by Ariana Payne RN  Safety Promotion/Fall Prevention:   activity supervised   assistive device/personal items within reach   clutter free environment maintained   fall prevention program maintained   safety round/check completed   nonskid shoes/slippers when out of bed  Taken 11/13/2021 2023 by Ariana Payne  RN  Safety Promotion/Fall Prevention:   activity supervised   assistive device/personal items within reach   clutter free environment maintained   fall prevention program maintained   safety round/check completed   nonskid shoes/slippers when out of bed  Taken 11/13/2021 1935 by Ariana Payne RN  Safety Promotion/Fall Prevention:   activity supervised   assistive device/personal items within reach   clutter free environment maintained   fall prevention program maintained   safety round/check completed   nonskid shoes/slippers when out of bed     Problem: Adult Inpatient Plan of Care  Goal: Absence of Hospital-Acquired Illness or Injury  Intervention: Prevent Skin Injury  Recent Flowsheet Documentation  Taken 11/14/2021 0232 by Ariana Payne RN  Body Position: supine, legs elevated  Taken 11/14/2021 0045 by Ariana Payne RN  Body Position: supine  Taken 11/13/2021 2212 by Ariana Payne RN  Body Position: (pt does not want to get woken up if sleep) patient/family refused  Taken 11/13/2021 2023 by Ariana Payne RN  Body Position: tilted, left     Problem: Adult Inpatient Plan of Care  Goal: Absence of Hospital-Acquired Illness or Injury  Intervention: Prevent and Manage VTE (venous thromboembolism) Risk  Recent Flowsheet Documentation  Taken 11/13/2021 2023 by Ariana Payne RN  VTE Prevention/Management: bleeding risk factor(s) identified     Problem: Adult Inpatient Plan of Care  Goal: Optimal Comfort and Wellbeing  Outcome: Ongoing, Progressing     Problem: Adult Inpatient Plan of Care  Goal: Optimal Comfort and Wellbeing  Intervention: Provide Person-Centered Care  Recent Flowsheet Documentation  Taken 11/13/2021 2023 by Ariana Payne RN  Trust Relationship/Rapport:   care explained   questions answered     Problem: Adult Inpatient Plan of Care  Goal: Readiness for Transition of Care  Outcome: Ongoing, Progressing     Problem: Fall Injury Risk  Goal: Absence of Fall and  Fall-Related Injury  Outcome: Ongoing, Progressing     Problem: Fall Injury Risk  Goal: Absence of Fall and Fall-Related Injury  Intervention: Promote Injury-Free Environment  Recent Flowsheet Documentation  Taken 11/14/2021 0232 by Ariana Payne RN  Safety Promotion/Fall Prevention:   activity supervised   assistive device/personal items within reach   clutter free environment maintained   fall prevention program maintained   safety round/check completed   nonskid shoes/slippers when out of bed  Taken 11/14/2021 0007 by rAiana Payne RN  Safety Promotion/Fall Prevention:   assistive device/personal items within reach   activity supervised   clutter free environment maintained   fall prevention program maintained   safety round/check completed   nonskid shoes/slippers when out of bed  Taken 11/13/2021 2212 by Ariana Payne RN  Safety Promotion/Fall Prevention:   activity supervised   assistive device/personal items within reach   clutter free environment maintained   fall prevention program maintained   safety round/check completed   nonskid shoes/slippers when out of bed  Taken 11/13/2021 2110 by Ariana Payne RN  Safety Promotion/Fall Prevention:   activity supervised   assistive device/personal items within reach   clutter free environment maintained   fall prevention program maintained   safety round/check completed   nonskid shoes/slippers when out of bed  Taken 11/13/2021 2023 by Ariana Payne RN  Safety Promotion/Fall Prevention:   activity supervised   assistive device/personal items within reach   clutter free environment maintained   fall prevention program maintained   safety round/check completed   nonskid shoes/slippers when out of bed  Taken 11/13/2021 1935 by Ariana Payne RN  Safety Promotion/Fall Prevention:   activity supervised   assistive device/personal items within reach   clutter free environment maintained   fall prevention program maintained   safety  round/check completed   nonskid shoes/slippers when out of bed     Problem: Skin Injury Risk Increased  Goal: Skin Health and Integrity  Outcome: Ongoing, Progressing     Problem: Skin Injury Risk Increased  Goal: Skin Health and Integrity  Intervention: Optimize Skin Protection  Recent Flowsheet Documentation  Taken 11/14/2021 0232 by Ariana Payne RN  Head of Bed (HOB): HOB at 45 degrees  Taken 11/13/2021 2023 by Ariana Payne RN  Pressure Reduction Techniques:   frequent weight shift encouraged   weight shift assistance provided  Head of Bed (HOB): HOB at 60-90 degrees  Pressure Reduction Devices: pressure-redistributing mattress utilized     Problem: Breathing Pattern Ineffective  Goal: Effective Breathing Pattern  Outcome: Ongoing, Progressing     Problem: Breathing Pattern Ineffective  Goal: Effective Breathing Pattern  Intervention: Promote Improved Breathing Pattern  Recent Flowsheet Documentation  Taken 11/14/2021 0232 by Ariana Payne RN  Head of Bed (HOB): HOB at 45 degrees  Taken 11/13/2021 2023 by Ariana Payne RN  Head of Bed (HOB): HOB at 60-90 degrees   Goal Outcome Evaluation:  Plan of Care Reviewed With: patient        Progress: improving  Outcome Summary: pt vs stable, pt resting well at this time.

## 2021-11-14 NOTE — PROGRESS NOTES
[facility] Medicine Services  INPATIENT PROGRESS NOTE    Length of Stay: 3  Date of Admission: 11/11/2021  Primary Care Physician: Ramiro Gloria MD    Subjective   Chief Complaint: No new complaints.        HPI: Patient is seen for follow-up.  She is doing better, less short of air and maintaining O2 saturation in the low 90s on 3 L of supplemental oxygen.  She had a brief run of uncontrolled atrial fibrillation yesterday but has since converted to normal sinus rhythm.        Review of Systems   Constitutional: Positive for activity change and fatigue. Negative for appetite change, chills, diaphoresis and fever.   HENT: Negative for trouble swallowing and voice change.    Eyes: Positive for visual disturbance. Negative for photophobia.   Respiratory: Negative for cough, choking, chest tightness, shortness of breath, wheezing and stridor.    Cardiovascular: Negative for chest pain, palpitations and leg swelling.   Gastrointestinal: Negative for abdominal distention, abdominal pain, blood in stool, constipation, diarrhea, nausea and vomiting.   Endocrine: Negative for cold intolerance, heat intolerance, polydipsia, polyphagia and polyuria.   Genitourinary: Negative for decreased urine volume, difficulty urinating, dysuria, enuresis, flank pain, frequency, hematuria and urgency.   Musculoskeletal: Negative for arthralgias, gait problem, myalgias, neck pain and neck stiffness.   Skin: Negative for pallor, rash and wound.   Neurological: Negative for dizziness, tremors, seizures, syncope, facial asymmetry, speech difficulty, weakness, light-headedness, numbness and headaches.   Hematological: Does not bruise/bleed easily.   Psychiatric/Behavioral: Negative for agitation, behavioral problems and confusion.       Objective    Temp:  [97 °F (36.1 °C)-98.1 °F (36.7 °C)] 98.1 °F (36.7 °C)  Heart Rate:  [] 99  Resp:  [18-20] 18  BP: (100-150)/(51-79) 150/79  Vitals and nursing  note reviewed.   Constitutional:       General: She is not in acute distress.     Appearance: She is underweight. She is not diaphoretic.      Comments: Patient is underweight, cachectic and seems severely malnourished.   HENT:      Head: Normocephalic and atraumatic.      Right Ear: External ear normal.      Left Ear: External ear normal.      Nose: Nose normal.   Eyes: She has some visual impairment.     Extraocular Movements: Extraocular movements intact.      Conjunctiva/sclera: Conjunctivae normal.      Pupils: Pupils are equal, round, and reactive to light.   Neck:      Thyroid: No thyromegaly.      Vascular: No JVD.   Cardiovascular:      Rate and Rhythm: Normal rate and regular rhythm.      Heart sounds: Normal heart sounds. No murmur heard.  No friction rub. No gallop.    Pulmonary:      Effort: Pulmonary effort is normal. No respiratory distress.      Breath sounds: No stridor. Examination of the left-middle field reveals decreased breath sounds. Examination of the left-lower field reveals decreased breath sounds. Decreased breath sounds and rhonchi present. No wheezing or rales.   Chest:      Chest wall: No tenderness.   Abdominal:      General: Bowel sounds are normal. There is no distension.      Palpations: Abdomen is soft. There is no mass.      Tenderness: There is no abdominal tenderness. There is no right CVA tenderness, left CVA tenderness, guarding or rebound.      Hernia: No hernia is present  Musculoskeletal:         General: No swelling, tenderness, deformity or signs of injury. Normal range of motion.      Cervical back: Normal range of motion and neck supple.      Right lower leg: No edema.      Left lower leg: No edema.   Skin:     General: Skin is warm and dry.      Coloration: Skin is not jaundiced or pale.      Findings: No bruising, erythema, lesion or rash.      Comments: She has chronic stasis changes both legs.   Neurological:      General: No focal deficit present.      Mental  Status: She is alert and oriented to person, place, and time.      Cranial Nerves: No cranial nerve deficit.      Sensory: No sensory deficit.      Motor: No weakness.      Coordination: Coordination normal.      Gait: Gait normal.      Deep Tendon Reflexes: Reflexes are normal and symmetric. Reflexes normal.   Psychiatric:         Mood and Affect: Mood normal.         Behavior: Behavior normal. Behavior is cooperative.         Thought Content: Thought content normal.         Judgment: Judgment normal      Medication Review:    Current Facility-Administered Medications:   •  acetaminophen (TYLENOL) tablet 650 mg, 650 mg, Oral, Q4H PRN, 650 mg at 11/14/21 0846 **OR** acetaminophen (TYLENOL) 160 MG/5ML solution 650 mg, 650 mg, Oral, Q4H PRN **OR** acetaminophen (TYLENOL) suppository 650 mg, 650 mg, Rectal, Q4H PRN, Mike Teresa MD  •  albuterol (PROVENTIL) nebulizer solution 0.083% 2.5 mg/3mL, 2.5 mg, Nebulization, Q4H PRN, Mike Teresa MD  •  aluminum-magnesium hydroxide-simethicone (MAALOX MAX) 400-400-40 MG/5ML suspension 15 mL, 15 mL, Oral, Q6H PRN, Mike Teresa MD  •  amiodarone (PACERONE) half tablet 100 mg, 100 mg, Oral, Daily, Mike Teresa MD, 100 mg at 11/14/21 0849  •  aspirin EC tablet 81 mg, 81 mg, Oral, Daily, Mike Teresa MD, 81 mg at 11/14/21 0846  •  sennosides-docusate (PERICOLACE) 8.6-50 MG per tablet 2 tablet, 2 tablet, Oral, BID, 2 tablet at 11/14/21 0846 **AND** polyethylene glycol (MIRALAX) packet 17 g, 17 g, Oral, Daily PRN **AND** bisacodyl (DULCOLAX) EC tablet 5 mg, 5 mg, Oral, Daily PRN **AND** bisacodyl (DULCOLAX) suppository 10 mg, 10 mg, Rectal, Daily PRN, Mike Teresa MD  •  budesonide-formoterol (SYMBICORT) 160-4.5 MCG/ACT inhaler 2 puff, 2 puff, Inhalation, BID - RT, Mike Teresa MD, 2 puff at 11/14/21 0640  •  clopidogrel (PLAVIX) tablet 75 mg, 75 mg, Oral, Every Other Day, Mike Teresa MD, 75 mg at 11/14/21 0847  •  dilTIAZem  (CARDIZEM) 100 mg in 100 mL NS infusion (ADV), 5 mg/hr, Intravenous, Titrated, Mike Teresa MD  •  enoxaparin (LOVENOX) syringe 40 mg, 40 mg, Subcutaneous, Q24H, Mike Teresa MD, 40 mg at 11/13/21 2023  •  famotidine (PEPCID) tablet 20 mg, 20 mg, Oral, BID, Mike Teresa MD, 20 mg at 11/14/21 0847  •  ferric gluconate (FERRLECIT) 125 mg in sodium chloride 0.9 % 100 mL IVPB, 125 mg, Intravenous, Daily, Mike Teresa MD, Last Rate: 110 mL/hr at 11/14/21 0900, 125 mg at 11/14/21 0900  •  gabapentin (NEURONTIN) capsule 300 mg, 300 mg, Oral, Nightly, Mike Teresa MD, 300 mg at 11/13/21 2024  •  Hold medication, 1 each, Does not apply, Continuous PRN, Mike Teresa MD  •  hydrALAZINE (APRESOLINE) tablet 50 mg, 50 mg, Oral, TID, Mike Teresa MD, 50 mg at 11/14/21 0847  •  hydrocortisone-bacitracin-zinc oxide-nystatin (MAGIC BARRIER) ointment 1 application, 1 application, Topical, PRN, Mike Teresa MD, 1 application at 11/11/21 2237  •  I-Renetta Eye Vitamin, 1 tablet, Oral, BID, Jaimie Gunderson MD, 1 tablet at 11/14/21 0846  •  ipratropium-albuterol (DUO-NEB) nebulizer solution 3 mL, 3 mL, Nebulization, 4x Daily - RT, Mike Teresa MD, 3 mL at 11/14/21 0632  •  levoFLOXacin (LEVAQUIN) 750 mg/150 mL D5W (premix) (LEVAQUIN) 750 mg, 750 mg, Intravenous, Once, Mike Teresa MD  •  magnesium oxide (MAG-OX) tablet 400 mg, 400 mg, Oral, Daily, Miek Teresa MD, 400 mg at 11/14/21 0847  •  methylPREDNISolone sodium succinate (SOLU-Medrol) injection 60 mg, 60 mg, Intravenous, Q8H, Mike Teresa MD, 60 mg at 11/14/21 0847  •  metoprolol tartrate (LOPRESSOR) tablet 25 mg, 25 mg, Oral, Q12H, Mike Teresa MD, 25 mg at 11/14/21 0847  •  ondansetron (ZOFRAN) tablet 4 mg, 4 mg, Oral, Q6H PRN **OR** ondansetron (ZOFRAN) injection 4 mg, 4 mg, Intravenous, Q6H PRN, Mike Teresa MD, 4 mg at 11/12/21 9907  •  Pharmacy to Dose Zosyn, , Does not  apply, Continuous, Mike Teresa MD  •  piperacillin-tazobactam (ZOSYN) 3.375 g/100 mL 0.9% NS IVPB (mbp), 3.375 g, Intravenous, Q8H, Mike Teresa MD, Last Rate: 0 mL/hr at 11/12/21 0354, 3.375 g at 11/14/21 0846  •  sodium chloride 0.9 % flush 10 mL, 10 mL, Intravenous, PRN, Mike Teresa MD  •  sodium chloride 0.9 % flush 10 mL, 10 mL, Intravenous, Q12H, Mike Teresa MD, 10 mL at 11/14/21 0850  •  sodium chloride 0.9 % flush 10 mL, 10 mL, Intravenous, PRN, Mike Teresa MD  •  traMADol (ULTRAM) tablet 50 mg, 50 mg, Oral, Q6H PRN, Mike Teresa MD, 50 mg at 11/12/21 1736  •  zinc sulfate (ZINCATE) capsule 220 mg, 220 mg, Oral, Daily, Mike Teresa MD, 220 mg at 11/14/21 0849    Results Review:  I have reviewed the labs, radiology results, and diagnostic studies.    Laboratory Data:   Results from last 7 days   Lab Units 11/14/21  0832 11/13/21  0654 11/12/21  0613 11/11/21  1306 11/11/21  1306   SODIUM mmol/L 139 140 141   < > 133*   POTASSIUM mmol/L 4.2 4.1 4.1   < > 4.4   CHLORIDE mmol/L 98 100 101   < > 95*   CO2 mmol/L 29.0 32.0* 35.0*   < > 27.0   BUN mg/dL 25* 23 16   < > 14   CREATININE mg/dL 1.24* 1.44* 1.11*   < > 0.88   GLUCOSE mg/dL 173* 119* 90   < > 95   CALCIUM mg/dL 9.9 8.8 9.1   < > 9.0   BILIRUBIN mg/dL  --   --  0.2  --  0.3   ALK PHOS U/L  --   --  65  --  77   ALT (SGPT) U/L  --   --  9  --  10   AST (SGOT) U/L  --   --  14  --  18   ANION GAP mmol/L 12.0 8.0 5.0   < > 11.0    < > = values in this interval not displayed.     Estimated Creatinine Clearance: 37.6 mL/min (A) (by C-G formula based on SCr of 1.24 mg/dL (H)).          Results from last 7 days   Lab Units 11/14/21  0832 11/13/21  0654 11/12/21  0613 11/11/21  1306   WBC 10*3/mm3 29.87* 15.23* 7.68 10.50   HEMOGLOBIN g/dL 10.3* 9.3* 12.0 12.1   HEMATOCRIT % 33.1* 29.2* 37.3 37.3   PLATELETS 10*3/mm3 339 269 291 321     Results from last 7 days   Lab Units 11/12/21  0613   INR  1.10        Culture Data:   Blood Culture   Date Value Ref Range Status   11/11/2021 No growth at 2 days  Preliminary   11/11/2021 No growth at 2 days  Preliminary     No results found for: URINECX  Respiratory Culture   Date Value Ref Range Status   11/13/2021   Preliminary    Scant growth (1+) Normal Respiratory Katie: NO S.aureus/MRSA or Pseudomonas aeruginosa     No results found for: WOUNDCX  No results found for: STOOLCX  No components found for: BODYFLD    Radiology Data:   Imaging Results (Last 24 Hours)     Procedure Component Value Units Date/Time    XR Chest PA & Lateral [[id number]] Resulted: 11/14/21 1002     Updated: 11/14/21 1002          I have reviewed the patient's current medications.     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  Findings of pleural fluid analysis have been reviewed and cytology/pathology are pending.  Continue noninvasive respiratory therapy, bronchodilators and empiric antimicrobial therapy.  Blood cultures have shown no growth and respiratory culture showed mixed bacterial katie.  Leukocytosis is likely steroid-induced/due to acute illness and will be monitored.  Input by Dr. Nolasco is appreciated.    Accelerated hypertension: Patient had an episode of hypotension yesterday but blood pressure has since improved and normalized.  Resume antihypertensives.      Acute kidney injury (likely prerenal): Improving as creatinine is down to 1.24 today. Her baseline creatinine is mostly less than 1.  Lasix and lisinopril have been discontinued.  Continue to monitor renal function, avoid nephrotoxins and consult nephrologist if the need arises.    Anemia (likely of chronic inflammation/iron  deficiency): Hemoglobin did drop from 12.7 on admission to 9.3 on 11/13/2021 but has improved to 10.3 today. Patient does not have any evidence of acute blood loss and findings of iron studies have been noted.  Continue iron supplementation, continue to monitor hemoglobin and transfuse if the need arises.     Paroxysmal atrial fibrillation: Patient is in normal sinus rhythm.  Continue amiodarone and Lopressor.  She is not anticoagulated secondary to risk of fall.     Coronary artery disease: Continue beta-blocker and antiplatelet therapy.       4.3 cm AAA: Patient is to continue surveillance/monitoring as outpatient.     Severe malnutrition/cachexia: Dietitian is following.    Deconditioning: Continue PT and OT.     Continue GI and DVT prophylaxis.     Overall prognosis is guarded.    Code status is 'no intubation and no CPR'.    Update was given to patient's daughter who was at the bedside.       Discharge Planning: In progress.    I confirmed that the patient's Advance Care Plan is present, code status is documented, or surrogate decision maker is listed in the patient's medical record.     I have utilized all available immediate resources to obtain, update, or review the patient's current medications      Mike Teresa MD   11/14/21   10:11 CST

## 2021-11-15 NOTE — PLAN OF CARE
Problem: Adult Inpatient Plan of Care  Goal: Plan of Care Review  Outcome: Ongoing, Progressing  Flowsheets  Taken 11/15/2021 0535  Progress: no change  Outcome Summary: pt vs stable pt had ct of chest this morning, awaiting results, general surgery consult today.  Taken 11/13/2021 0226  Plan of Care Reviewed With: patient     Problem: Adult Inpatient Plan of Care  Goal: Patient-Specific Goal (Individualized)  Outcome: Ongoing, Progressing     Problem: Adult Inpatient Plan of Care  Goal: Absence of Hospital-Acquired Illness or Injury  Outcome: Ongoing, Progressing     Problem: Adult Inpatient Plan of Care  Goal: Absence of Hospital-Acquired Illness or Injury  Intervention: Identify and Manage Fall Risk  Recent Flowsheet Documentation  Taken 11/15/2021 0438 by Ariana Payne RN  Safety Promotion/Fall Prevention:   activity supervised   assistive device/personal items within reach   clutter free environment maintained   fall prevention program maintained   nonskid shoes/slippers when out of bed   safety round/check completed  Taken 11/15/2021 0221 by Ariana Payne RN  Safety Promotion/Fall Prevention:   activity supervised   assistive device/personal items within reach   clutter free environment maintained   fall prevention program maintained   safety round/check completed   nonskid shoes/slippers when out of bed  Taken 11/15/2021 0048 by Ariana Payne RN  Safety Promotion/Fall Prevention:   activity supervised   assistive device/personal items within reach   clutter free environment maintained   fall prevention program maintained   safety round/check completed   nonskid shoes/slippers when out of bed  Taken 11/14/2021 2229 by Ariana Payne RN  Safety Promotion/Fall Prevention:   activity supervised   assistive device/personal items within reach   clutter free environment maintained   fall prevention program maintained   nonskid shoes/slippers when out of bed  Taken 11/14/2021 2149 by  Payne, Ariana L, RN  Safety Promotion/Fall Prevention:   assistive device/personal items within reach   clutter free environment maintained   activity supervised   fall prevention program maintained   safety round/check completed   nonskid shoes/slippers when out of bed  Taken 11/14/2021 2010 by Ariana Payne RN  Safety Promotion/Fall Prevention:   activity supervised   assistive device/personal items within reach   clutter free environment maintained   fall prevention program maintained   safety round/check completed   nonskid shoes/slippers when out of bed  Taken 11/14/2021 1926 by Ariana Payne RN  Safety Promotion/Fall Prevention:   activity supervised   clutter free environment maintained   assistive device/personal items within reach   fall prevention program maintained   safety round/check completed   patient off unit     Problem: Adult Inpatient Plan of Care  Goal: Absence of Hospital-Acquired Illness or Injury  Intervention: Prevent Skin Injury  Recent Flowsheet Documentation  Taken 11/15/2021 0438 by Ariana Payne RN  Body Position: supine, legs elevated  Taken 11/15/2021 0221 by Ariana Payne RN  Body Position: (pt states she is comfortable at this time) patient/family refused  Taken 11/15/2021 0048 by Ariana Payne RN  Body Position: supine  Taken 11/14/2021 2229 by Ariana Payne RN  Body Position: tilted, right  Taken 11/14/2021 2010 by Ariana Payne RN  Body Position: supine, legs elevated     Problem: Adult Inpatient Plan of Care  Goal: Absence of Hospital-Acquired Illness or Injury  Intervention: Prevent and Manage VTE (venous thromboembolism) Risk  Recent Flowsheet Documentation  Taken 11/14/2021 1926 by Ariana Payne RN  VTE Prevention/Management: bleeding risk factor(s) identified     Problem: Adult Inpatient Plan of Care  Goal: Optimal Comfort and Wellbeing  Outcome: Ongoing, Progressing     Problem: Adult Inpatient Plan of Care  Goal: Optimal  Comfort and Wellbeing  Intervention: Provide Person-Centered Care  Recent Flowsheet Documentation  Taken 11/14/2021 1926 by Ariana Payne RN  Trust Relationship/Rapport:   care explained   questions answered   thoughts/feelings acknowledged     Problem: Adult Inpatient Plan of Care  Goal: Readiness for Transition of Care  Outcome: Ongoing, Progressing     Problem: Fall Injury Risk  Goal: Absence of Fall and Fall-Related Injury  Outcome: Ongoing, Progressing     Problem: Fall Injury Risk  Goal: Absence of Fall and Fall-Related Injury  Intervention: Promote Injury-Free Environment  Recent Flowsheet Documentation  Taken 11/15/2021 0438 by Ariana Payne RN  Safety Promotion/Fall Prevention:   activity supervised   assistive device/personal items within reach   clutter free environment maintained   fall prevention program maintained   nonskid shoes/slippers when out of bed   safety round/check completed  Taken 11/15/2021 0221 by Ariana Payne RN  Safety Promotion/Fall Prevention:   activity supervised   assistive device/personal items within reach   clutter free environment maintained   fall prevention program maintained   safety round/check completed   nonskid shoes/slippers when out of bed  Taken 11/15/2021 0048 by Ariana Payne RN  Safety Promotion/Fall Prevention:   activity supervised   assistive device/personal items within reach   clutter free environment maintained   fall prevention program maintained   safety round/check completed   nonskid shoes/slippers when out of bed  Taken 11/14/2021 2229 by Ariana Payne RN  Safety Promotion/Fall Prevention:   activity supervised   assistive device/personal items within reach   clutter free environment maintained   fall prevention program maintained   nonskid shoes/slippers when out of bed  Taken 11/14/2021 2149 by Ariana Payne RN  Safety Promotion/Fall Prevention:   assistive device/personal items within reach   clutter free  environment maintained   activity supervised   fall prevention program maintained   safety round/check completed   nonskid shoes/slippers when out of bed  Taken 11/14/2021 2010 by Ariana Payne RN  Safety Promotion/Fall Prevention:   activity supervised   assistive device/personal items within reach   clutter free environment maintained   fall prevention program maintained   safety round/check completed   nonskid shoes/slippers when out of bed  Taken 11/14/2021 1926 by Ariana Payne RN  Safety Promotion/Fall Prevention:   activity supervised   clutter free environment maintained   assistive device/personal items within reach   fall prevention program maintained   safety round/check completed   patient off unit     Problem: Skin Injury Risk Increased  Goal: Skin Health and Integrity  Outcome: Ongoing, Progressing     Problem: Skin Injury Risk Increased  Goal: Skin Health and Integrity  Intervention: Optimize Skin Protection  Recent Flowsheet Documentation  Taken 11/15/2021 0438 by Ariana Payne RN  Head of Bed (HOB): HOB at 30-45 degrees  Taken 11/14/2021 2229 by Ariana Payne RN  Head of Bed (HOB): HOB at 60 degrees  Taken 11/14/2021 2010 by Ariana Payne RN  Head of Bed (HOB): HOB at 60-90 degrees  Taken 11/14/2021 1926 by Ariana Payne RN  Pressure Reduction Techniques:   weight shift assistance provided   frequent weight shift encouraged  Pressure Reduction Devices: pressure-redistributing mattress utilized     Problem: Breathing Pattern Ineffective  Goal: Effective Breathing Pattern  Outcome: Ongoing, Progressing     Problem: Breathing Pattern Ineffective  Goal: Effective Breathing Pattern  Intervention: Promote Improved Breathing Pattern  Recent Flowsheet Documentation  Taken 11/15/2021 0438 by Ariana Payne RN  Head of Bed (HOB): HOB at 30-45 degrees  Taken 11/14/2021 2229 by Ariana Payne RN  Head of Bed (HOB): HOB at 60 degrees  Taken 11/14/2021 2010 by  Ariana Payne, RN  Head of Bed (HOB): HOB at 60-90 degrees   Goal Outcome Evaluation:  Plan of Care Reviewed With: patient        Progress: no change  Outcome Summary: pt vs stable pt had ct of chest this morning, awaiting results, general surgery consult today.

## 2021-11-15 NOTE — PROGRESS NOTES
[facility] Medicine Services  INPATIENT PROGRESS NOTE    Length of Stay: 4  Date of Admission: 11/11/2021  Primary Care Physician: Ramiro Gloria MD    Subjective   Chief Complaint: No new complaints.        HPI: Patient is seen for follow-up.  She is doing better, less short of air and maintaining O2 saturation in the low 90s on 3 L of supplemental oxygen.  She had a brief run of uncontrolled atrial fibrillation yesterday but did get back to normal sinus rhythm.         Review of Systems   Constitutional: Positive for activity change and fatigue. Negative for appetite change, chills, diaphoresis and fever.   HENT: Negative for trouble swallowing and voice change.    Eyes: Positive for visual disturbance. Negative for photophobia.   Respiratory: Negative for cough, choking, chest tightness, shortness of breath, wheezing and stridor.    Cardiovascular: Negative for chest pain, palpitations and leg swelling.   Gastrointestinal: Negative for abdominal distention, abdominal pain, blood in stool, constipation, diarrhea, nausea and vomiting.   Endocrine: Negative for cold intolerance, heat intolerance, polydipsia, polyphagia and polyuria.   Genitourinary: Negative for decreased urine volume, difficulty urinating, dysuria, enuresis, flank pain, frequency, hematuria and urgency.   Musculoskeletal: Negative for arthralgias, gait problem, myalgias, neck pain and neck stiffness.   Skin: Negative for pallor, rash and wound.   Neurological: Negative for dizziness, tremors, seizures, syncope, facial asymmetry, speech difficulty, weakness, light-headedness, numbness and headaches.   Hematological: Does not bruise/bleed easily.   Psychiatric/Behavioral: Negative for agitation, behavioral problems and confusion.       Objective    Temp:  [92 °F (33.3 °C)-98.7 °F (37.1 °C)] 96.3 °F (35.7 °C)  Heart Rate:  [] 90  Resp:  [18-21] 20  BP: (108-155)/(52-68) 137/62  Vitals and nursing note  reviewed.   Constitutional:       General: She is not in acute distress.     Appearance: She is underweight. She is not diaphoretic.      Comments: Patient is underweight, cachectic and seems severely malnourished.   HENT:      Head: Normocephalic and atraumatic.      Right Ear: External ear normal.      Left Ear: External ear normal.      Nose: Nose normal.   Eyes: She has some visual impairment.     Extraocular Movements: Extraocular movements intact.      Conjunctiva/sclera: Conjunctivae normal.      Pupils: Pupils are equal, round, and reactive to light.   Neck:      Thyroid: No thyromegaly.      Vascular: No JVD.   Cardiovascular:      Rate and Rhythm: Normal rate and regular rhythm.      Heart sounds: Normal heart sounds. No murmur heard.  No friction rub. No gallop.    Pulmonary:      Effort: Pulmonary effort is normal. No respiratory distress.      Breath sounds: No stridor. Examination of the left-middle field reveals decreased breath sounds. Examination of the left-lower field reveals decreased breath sounds. Decreased breath sounds and rhonchi present. No wheezing or rales.   Chest:      Chest wall: No tenderness.   Abdominal:      General: Bowel sounds are normal. There is no distension.      Palpations: Abdomen is soft. There is no mass.      Tenderness: There is no abdominal tenderness. There is no right CVA tenderness, left CVA tenderness, guarding or rebound.      Hernia: No hernia is present  Musculoskeletal:         General: No swelling, tenderness, deformity or signs of injury. Normal range of motion.      Cervical back: Normal range of motion and neck supple.      Right lower leg: No edema.      Left lower leg: No edema.   Skin:     General: Skin is warm and dry.      Coloration: Skin is not jaundiced or pale.      Findings: No bruising, erythema, lesion or rash.      Comments: She has chronic stasis changes both legs.   Neurological:      General: No focal deficit present.      Mental Status:  She is alert and oriented to person, place, and time.      Cranial Nerves: No cranial nerve deficit.      Sensory: No sensory deficit.      Motor: No weakness.      Coordination: Coordination normal.      Gait: Gait normal.      Deep Tendon Reflexes: Reflexes are normal and symmetric. Reflexes normal.   Psychiatric:         Mood and Affect: Mood normal.         Behavior: Behavior normal. Behavior is cooperative.         Thought Content: Thought content normal.         Judgment: Judgment normal      Medication Review:    Current Facility-Administered Medications:   •  acetaminophen (TYLENOL) tablet 650 mg, 650 mg, Oral, Q4H PRN, 650 mg at 11/15/21 0837 **OR** acetaminophen (TYLENOL) 160 MG/5ML solution 650 mg, 650 mg, Oral, Q4H PRN **OR** acetaminophen (TYLENOL) suppository 650 mg, 650 mg, Rectal, Q4H PRN, Mike Teresa MD  •  acetylcysteine (MUCOMYST) 10 % solution 4 mL, 4 mL, Nebulization, Q4H - RT, Jules Toledo MD, 4 mL at 11/15/21 0728  •  albuterol (PROVENTIL) nebulizer solution 0.083% 2.5 mg/3mL, 2.5 mg, Nebulization, Q4H PRN, Mike Teresa MD, 2.5 mg at 11/15/21 0301  •  aluminum-magnesium hydroxide-simethicone (MAALOX MAX) 400-400-40 MG/5ML suspension 15 mL, 15 mL, Oral, Q6H PRN, Mike Teresa MD  •  amiodarone (PACERONE) half tablet 100 mg, 100 mg, Oral, Daily, Mike Teresa MD, 100 mg at 11/15/21 0839  •  sennosides-docusate (PERICOLACE) 8.6-50 MG per tablet 2 tablet, 2 tablet, Oral, BID, 2 tablet at 11/15/21 0838 **AND** polyethylene glycol (MIRALAX) packet 17 g, 17 g, Oral, Daily PRN **AND** bisacodyl (DULCOLAX) EC tablet 5 mg, 5 mg, Oral, Daily PRN **AND** bisacodyl (DULCOLAX) suppository 10 mg, 10 mg, Rectal, Daily PRN, Mike Teresa MD  •  budesonide-formoterol (SYMBICORT) 160-4.5 MCG/ACT inhaler 2 puff, 2 puff, Inhalation, BID - RT, Mike Teresa MD, 2 puff at 11/15/21 0728  •  famotidine (PEPCID) tablet 20 mg, 20 mg, Oral, BID, Mike Teresa MD, 20  mg at 11/15/21 0838  •  ferric gluconate (FERRLECIT) 125 mg in sodium chloride 0.9 % 100 mL IVPB, 125 mg, Intravenous, Daily, Mike Teresa MD, Last Rate: 110 mL/hr at 11/14/21 0900, 125 mg at 11/14/21 0900  •  folic acid (FOLVITE) tablet 1 mg, 1 mg, Oral, Daily, Pee Nolasco MD, 1 mg at 11/15/21 0838  •  gabapentin (NEURONTIN) capsule 300 mg, 300 mg, Oral, Nightly, Mike Teresa MD, 300 mg at 11/14/21 2018  •  Hold medication, 1 each, Does not apply, Continuous PRN, Mike Teresa MD  •  hydrALAZINE (APRESOLINE) tablet 50 mg, 50 mg, Oral, TID, Mike Teresa MD, 50 mg at 11/15/21 0838  •  hydrocortisone-bacitracin-zinc oxide-nystatin (MAGIC BARRIER) ointment 1 application, 1 application, Topical, PRN, Mike Teresa MD, 1 application at 11/11/21 2237  •  I-Renetta Eye Vitamin, 1 tablet, Oral, BID, Jaimie Gunderson MD, 1 tablet at 11/15/21 0838  •  ipratropium-albuterol (DUO-NEB) nebulizer solution 3 mL, 3 mL, Nebulization, 4x Daily - RT, Mike Teresa MD, 3 mL at 11/15/21 0728  •  levoFLOXacin (LEVAQUIN) 750 mg/150 mL D5W (premix) (LEVAQUIN) 750 mg, 750 mg, Intravenous, Once, Mike Teresa MD  •  magnesium oxide (MAG-OX) tablet 400 mg, 400 mg, Oral, Daily, Mike Teresa MD, 400 mg at 11/15/21 0838  •  methylPREDNISolone sodium succinate (SOLU-Medrol) injection 60 mg, 60 mg, Intravenous, Q8H, Mike Teresa MD, 60 mg at 11/15/21 0049  •  metoprolol tartrate (LOPRESSOR) tablet 25 mg, 25 mg, Oral, Q12H, Mike Teresa MD, 25 mg at 11/15/21 0854  •  ondansetron (ZOFRAN) tablet 4 mg, 4 mg, Oral, Q6H PRN **OR** ondansetron (ZOFRAN) injection 4 mg, 4 mg, Intravenous, Q6H PRN, Mike Teresa MD, 4 mg at 11/12/21 6065  •  Pharmacy to Dose Zosyn, , Does not apply, Continuous, Mike Teresa MD  •  piperacillin-tazobactam (ZOSYN) 3.375 g/100 mL 0.9% NS IVPB (mbp), 3.375 g, Intravenous, Q8H, Mike Teresa MD, Last Rate: 0 mL/hr at 11/12/21 0354,  3.375 g at 11/15/21 0847  •  sodium chloride 0.9 % flush 10 mL, 10 mL, Intravenous, PRN, Mike Teresa MD  •  sodium chloride 0.9 % flush 10 mL, 10 mL, Intravenous, Q12H, Mike Teresa MD, 10 mL at 11/14/21 2018  •  sodium chloride 0.9 % flush 10 mL, 10 mL, Intravenous, PRN, Mike Teresa MD  •  traMADol (ULTRAM) tablet 50 mg, 50 mg, Oral, Q6H PRN, Mike Teresa MD, 50 mg at 11/12/21 1736  •  vitamin B-12 (CYANOCOBALAMIN) tablet 1,000 mcg, 1,000 mcg, Oral, Daily, Pee Nolasco MD, 1,000 mcg at 11/15/21 0838  •  zinc sulfate (ZINCATE) capsule 220 mg, 220 mg, Oral, Daily, Mike Teresa MD, 220 mg at 11/15/21 0839    Results Review:  I have reviewed the labs, radiology results, and diagnostic studies.    Laboratory Data:   Results from last 7 days   Lab Units 11/15/21  0515 11/14/21  0832 11/13/21  0654 11/12/21  0613 11/12/21  0613 11/11/21  1306 11/11/21  1306   SODIUM mmol/L 138 139 140   < > 141   < > 133*   POTASSIUM mmol/L 4.1 4.2 4.1   < > 4.1   < > 4.4   CHLORIDE mmol/L 100 98 100   < > 101   < > 95*   CO2 mmol/L 30.0* 29.0 32.0*   < > 35.0*   < > 27.0   BUN mg/dL 25* 25* 23   < > 16   < > 14   CREATININE mg/dL 1.06* 1.24* 1.44*   < > 1.11*   < > 0.88   GLUCOSE mg/dL 137* 173* 119*   < > 90   < > 95   CALCIUM mg/dL 9.3 9.9 8.8   < > 9.1   < > 9.0   BILIRUBIN mg/dL  --   --   --   --  0.2  --  0.3   ALK PHOS U/L  --   --   --   --  65  --  77   ALT (SGPT) U/L  --   --   --   --  9  --  10   AST (SGOT) U/L  --   --   --   --  14  --  18   ANION GAP mmol/L 8.0 12.0 8.0   < > 5.0   < > 11.0    < > = values in this interval not displayed.     Estimated Creatinine Clearance: 43.1 mL/min (A) (by C-G formula based on SCr of 1.06 mg/dL (H)).          Results from last 7 days   Lab Units 11/15/21  0515 11/14/21  0832 11/13/21  0654 11/12/21  0613 11/11/21  1306   WBC 10*3/mm3 24.06* 29.87* 15.23* 7.68 10.50   HEMOGLOBIN g/dL 9.2* 10.3* 9.3* 12.0 12.1   HEMATOCRIT % 28.4* 33.1* 29.2* 37.3  37.3   PLATELETS 10*3/mm3 297 339 269 291 321     Results from last 7 days   Lab Units 11/12/21  0613   INR  1.10       Culture Data:   No results found for: BLOODCX  No results found for: URINECX  Respiratory Culture   Date Value Ref Range Status   11/13/2021 Scant growth (1+) Gram Negative Bacilli (A)  Preliminary   11/13/2021   Preliminary    Rare Normal Respiratory Marjorie: NO S.aureus/MRSA or Pseudomonas aeruginosa     No results found for: WOUNDCX  No results found for: STOOLCX  No components found for: BODYFLD    Radiology Data:   Imaging Results (Last 24 Hours)     Procedure Component Value Units Date/Time    CT Chest Without Contrast Diagnostic [[id number]] Collected: 11/15/21 0527     Updated: 11/15/21 0743    Narrative:        PROCEDURE: CT chest without contrast    REASON FOR EXAM: Abnormal xray - pleural effusion, R91.8 Other  nonspecific abnormal finding of lung field J90 Pleural effusion,  not elsewhere classified I50.9 Heart failure, unspecified I10  Essential (primary) hypertension I48.0 Paroxysmal atrial  fibrillation R06.00 Dyspnea, unspecified I25.10 Atherosclerotic  heart disease of native coronary artery without angina pectoris    This exam was performed according to our departmental  dose-optimization program, which includes automated exposure  control, adjustment of the mA and/or kV according to patient size  and/or use of iterative reconstruction technique.    FINDINGS: Comparison study dated November 11, 2021. Axial  computer tomography sequential imaging was performed from the  thoracic inlet through the diaphragms without administration of  IV contrast. .Sagittal and coronal reformation was performed .    Mediastinal structures of the chest reveal left perihilar  infiltrating opacity with associated mild narrowing of the left  upper lobe bronchus. The left perihilar infiltrating opacity  extends into the left upper lobe superior perihilar region where  it becomes a hypodense circular opacity  which measures 5.98 x 5.6  cm. Interval development of left upper lobe peripheral patchy  opacities. Right upper lobe posterior segment peripheral small  interstitial groundglass opacity. Right middle lobe peripheral  small interstitial groundglass opacity. Right lower lobe lateral  basilar segment small interstitial groundglass opacity. Right  lower lobe posterior basilar segment small patchy opacity. Left  lower lobe small patchy opacity. Lungs are otherwise clear.  Moderate left pleural effusion. Small right pleural effusion.  Visualized upper abdominal structures are unremarkable. No acute  osseous abnormality.      Impression:      1.Mediastinal structures of the chest reveal left perihilar  infiltrating opacity with associated mild narrowing of the left  upper lobe bronchus. The left perihilar infiltrating opacity  extends into the left upper lobe superior perihilar region where  it becomes a hypodense circular opacity which measures 5.98 x 5.6  cm. These findings would be suspicious for neoplasm versus less  likely inflammatory process. Recommend clinical correlation and  if clinically indicated PET scan, sampling, a follow-up CT in  three months to further evaluate.  2. Interval development of left upper lobe peripheral patchy  opacities suspicious for postobstructive atelectasis and/or  pneumonia.  3. Right middle lobe and right lower lobe small interstitial  groundglass opacities. This would be suspicious for inflammatory  changes versus edema.  4. Right lower lobe posterior basilar segment and left lower lobe  small patchy opacities suspicious for passive atelectasis and/or  pneumonia.  5. Moderate left pleural effusion and small right pleural  effusion.    Electronically signed by:  Eliseo Smith MD  11/15/2021 7:42 AM CST  Workstation: [id number]    XR Chest PA & Lateral [[id number]] Collected: 11/14/21 1002     Updated: 11/14/21 1051    Narrative:      EXAM: XR CHEST 2 VIEWS    COMPARISONS: Radiograph  11/11/2021. CT-guided thoracentesis  12/12/2021    INDICATION: f/u pneumonia, R91.8 Other nonspecific abnormal  finding of lung field J90 Pleural effusion, not elsewhere  classified I50.9 Heart failure, unspecified I10 Essential  (primary) hypertension    FINDINGS:   Interval near complete opacification of the left hemithorax with  upper and lower lobe atelectasis. Gastric air bubble versus small  anterior left lower hydropneumothorax. No mediastinal shift. No  acute osseous or ovale.      Impression:      Near complete opacification of the left thorax most consistent  with effusion, atelectasis, and airspace disease/pneumonia.     Probable left stomach bubble, less likely small anterior  hydropneumothorax at the left lung base given post procedural CT  appearance of the chest. May further evaluated with CT chest as  clinically indicated.    STAT RSC Call Result to Licensed Caregiver sent  11/14/2021 10:50  AM CST    Electronically signed by:  Joao Connor MD  11/14/2021  10:50 AM CST Workstation: [id number]          I have reviewed the patient's current medications.     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  Findings of pleural fluid analysis have been reviewed and cytology/pathology are pending.  Continue noninvasive respiratory therapy, bronchodilators and empiric antimicrobial therapy.  Blood cultures have shown no growth and respiratory culture showed mixed bacterial katie/scant growth of gram-negative bacilli.  Leukocytosis is likely steroid-induced/due to acute illness, improving and will be monitored.  Dr. Nolasco is following.    Left lung collapse: Chest x-ray done 11/14/2021 showed near  complete opacification of the left hemithorax which may be due to mucous plug or arising from malignancy.  Patient was treated with Mucomyst sinus suctioning and CT scan of the chest done today showed some improvement.  CT surgery has been consulted and patient may require bronchoscopy if the need arises.  Repeat chest x-ray in a.m.      Accelerated hypertension: Blood pressure has improved and remained stable.  Continue antihypertensives.    Acute kidney injury (likely prerenal): Much improved as creatinine is down to 1.06 today. Her baseline creatinine is mostly less than 1.  Lasix and lisinopril have been discontinued.  Continue to monitor renal function, avoid nephrotoxins and consult nephrologist if the need arises.    Anemia (likely of chronic inflammation/iron deficiency): Hemoglobin did drop from 12.7 on admission to 9.3 on 11/13/2021 but has remained stable. Patient does not have any evidence of acute blood loss and findings of iron studies have been noted.  Continue iron supplementation, continue to monitor hemoglobin and transfuse if the need arises.     Paroxysmal atrial fibrillation: Patient is in normal sinus rhythm.  Continue amiodarone and Lopressor.  She is not anticoagulated secondary to risk of fall.     Coronary artery disease: Continue beta-blocker.  Antiplatelet therapy is currently on hold in the event patient will need bronchoscopy.        4.3 cm AAA: Patient is to continue surveillance/monitoring as outpatient.     Severe malnutrition/cachexia: Dietitian is following.    Deconditioning: Continue PT and OT.     Continue GI and DVT prophylaxis.     Overall prognosis is guarded.    Code status is 'no intubation and no CPR'.    Update was given to patient's daughter who was at the bedside.       Discharge Planning: In progress.    I confirmed that the patient's Advance Care Plan is present, code status is documented, or surrogate decision maker is listed in the patient's medical record.     I  have utilized all available immediate resources to obtain, update, or review the patient's current medications      Mike Teresa MD   11/15/21   09:46 CST

## 2021-11-15 NOTE — CONSULTS
CVTS CONSULTATION          Patient Care Team:  Ramiro Gloria MD as PCP - General  Layla, Nicola Mcmanus MD as Surgeon (Orthopedic Surgery)     Requesting Provider: Dr. Watters requesting Dr. Toledo    Chief complaint: Dyspnea      SUBJECTIVE       History of Present Illness:  81 y.o. female with HTN(stable, increased risk stroke, rupture), Hyperlipidemia(stable, increased risk cardiovascular events), COPD(stable, increased risk pulmonary complications) and Atrial fibrillation(stable, increased risk stroke)  CAD, Lung mass (new, suspicious for malignancy), Left pleural effusion (new), GERD, AAA status post repair. Admit 11/11/21 with 2 week onset shortness of breath, hoarseness in voice.  ED evaluation demonstrated large left lung mass admitted to hospital for further evaluation.  Oncology consulted and ordered FNA, a few days later chest film was obtained demonstrating opacified left hemithorax.  CT surgery was consulted to evaluate potential need for bronchoscopy.  Currently awaiting pathology, elevated wbc on admit she is on zosyn to treat possible pneumonia per primary team. former smoker and quit 2017.  No other associated symptoms or modifying factors.    4/2021 Carotid Duplex: RACHEL 0-49% mPSV 11c/s Ratio 1.2, LICA 0-49% mPSV 118c/s Ratio 1.8, Antegrade vertebrals    11/2021 CT Chest: LEN Mass 8 x 6 x 9 cm, moderate L effusion, AAA status post repair  11/2021 CT thoracentesis/Biopsy LEN mass: 300ml serosanguinous  11/2021 CXR: Complete opacification L hemithorax  11/2021 CT chest: Improved aeration LEN.  Mild to moderate L effusion.      11/2021 TTE: EF 55%, grade I DD, moderate dilation RV, small pericardial effusion, moderate MAC with mild MR.    The following portions of the patient's history were reviewed and updated as appropriate: allergies, current medications, past family history, past medical history, past social history, past surgical history and problem list.  Recent images independently  reviewed.  Available laboratory values reviewed.    Review of Systems   Constitutional: Positive for activity change and fatigue.   HENT: Positive for voice change. Negative for hearing loss and trouble swallowing.    Eyes: Positive for visual disturbance.   Respiratory: Positive for chest tightness and shortness of breath.    Cardiovascular: Negative for chest pain and leg swelling.   Gastrointestinal: Negative for abdominal pain, nausea and vomiting.   Endocrine: Negative for polyuria.   Genitourinary: Negative for difficulty urinating, flank pain and hematuria.   Musculoskeletal: Positive for arthralgias and back pain.   Skin: Negative for wound.   Neurological: Negative for dizziness, weakness and light-headedness.   Hematological: Bruises/bleeds easily.   Psychiatric/Behavioral: Negative for agitation, behavioral problems and confusion.        Past Medical History:   Diagnosis Date   • Abdominal aortic aneurysm (AAA) without rupture (HCC)    • Atrial fibrillation (HCC) 10/2/2017    New onset.  S/p cardioversion  On coumadin now.  Amiodarone    • CAD (coronary artery disease)    • Gastroesophageal reflux disease    • Hypercholesterolemia    • Hypertension    • Nuclear senile cataract    • Osteoarthritis    • Osteoporosis    • Peripheral arterial disease (HCC)    • Solitary lung nodule      Past Surgical History:   Procedure Laterality Date   • ABDOMINAL AORTIC ANEURYSM REPAIR     • HIP INTERTROCHANTERIC NAILING Left 10/1/2017    Procedure: HIP INTERTROCHANTERIC NAILING - LEFT - [place];  Surgeon: Nicola Rich MD;  Location: [facility];  Service:      Family History   Problem Relation Age of Onset   • Hypertension Mother    • Coronary artery disease Father      Social History     Tobacco Use   • Smoking status: Former Smoker     Packs/day: 1.00     Years: 57.00     Pack years: 57.00     Start date: 1961     Quit date: 10/1/2017     Years since quittin.1   • Smokeless tobacco: Never Used    Substance Use Topics   • Alcohol use: No   • Drug use: No     Medications Prior to Admission   Medication Sig Dispense Refill Last Dose   • Acetaminophen (TYLENOL ARTHRITIS PAIN PO) Take 500 mg by mouth 3 (Three) Times a Day As Needed. Indications: Mild Pain   11/11/2021 at Unknown time   • amiodarone (PACERONE) 200 MG tablet Take 1 tablet by mouth Daily. (Patient taking differently: Take 50 mg by mouth Daily.) 30 tablet 0 11/11/2021 at Unknown time   • aspirin 81 MG EC tablet Take 81 mg by mouth daily.   11/11/2021 at Unknown time   • Cholecalciferol (GNP VITAMIN D SUPER STRENGTH) 5000 UNITS tablet Take  by mouth.   Past Week at Unknown time   • clopidogrel (PLAVIX) 75 MG tablet Take 1 tablet by mouth Daily. (Patient taking differently: Take 75 mg by mouth Every Other Day.) 90 tablet 3 Past Week at Unknown time   • famotidine (PEPCID) 20 MG tablet Take 1 tablet by mouth 2 (Two) Times a Day As Needed for Heartburn. (Patient taking differently: Take 1 tablet by mouth 2 (Two) Times a Day.) 60 tablet 11 11/11/2021 at Unknown time   • ferrous sulfate 324 (65 Fe) MG tablet delayed-release EC tablet Take 1 tablet by mouth Daily With Breakfast. 30 tablet 0 11/11/2021 at Unknown time   • furosemide (LASIX) 40 MG tablet Take 40 mg by mouth Daily. ONE TABLET BY MOUTH EVERY DAY   11/11/2021 at Unknown time   • gabapentin (NEURONTIN) 300 MG capsule Take 1-2 capsules by mouth Every Night. 180 capsule 1 11/10/2021 at Unknown time   • hydrALAZINE (APRESOLINE) 25 MG tablet Take 1 tablet by mouth 4 (Four) Times a Day. (Patient taking differently: Take 50 mg by mouth 4 (Four) Times a Day. Patient states she takes as needed) 120 tablet 11 11/10/2021 at Unknown time   • lisinopril (PRINIVIL,ZESTRIL) 40 MG tablet Take 1 tablet by mouth Daily. 90 tablet 3 11/11/2021 at Unknown time   • magnesium oxide (MAGOX) 400 (241.3 Mg) MG tablet tablet Take 1 tablet by mouth Daily. 30 each 0 11/11/2021 at Unknown time   • metoprolol tartrate  "(LOPRESSOR) 50 MG tablet TAKE 1 TABLET BY MOUTH EVERY 12 (TWELVE) HOURS. 180 tablet 2 11/11/2021 at Unknown time   • Multiple Vitamins-Minerals (VISION FORMULA PO) Take 1 tablet by mouth every day.   11/11/2021 at Unknown time   • polyethylene glycol (MIRALAX) powder ONE CAPFUL IN 8 OUNCES OF LIQUID ONCE DAILY UP TO THREE TIMES DAILY AS NEEDED. 1581 g 11 Past Week at Unknown time   • Zinc 50 MG capsule Take 1 capsule by mouth Daily.   11/11/2021 at Unknown time   • traMADol (Ultram) 50 MG tablet Take 1 tablet by mouth Every 6 (Six) Hours As Needed for Moderate Pain . 30 tablet 0 Unknown at Unknown time     acetylcysteine, 4 mL, Nebulization, Q4H - RT  amiodarone, 100 mg, Oral, Daily  budesonide-formoterol, 2 puff, Inhalation, BID - RT  famotidine, 20 mg, Oral, BID  sodium ferric gluconate complex IVPB in 100 mL NS, 125 mg, Intravenous, Daily  folic acid, 1 mg, Oral, Daily  gabapentin, 300 mg, Oral, Nightly  hydrALAZINE, 50 mg, Oral, TID  multivitamin with minerals, 1 tablet, Oral, BID  ipratropium-albuterol, 3 mL, Nebulization, 4x Daily - RT  levoFLOXacin, 750 mg, Intravenous, Once  magnesium oxide, 400 mg, Oral, Daily  methylPREDNISolone sodium succinate, 60 mg, Intravenous, Q8H  metoprolol tartrate, 25 mg, Oral, Q12H  piperacillin-tazobactam, 3.375 g, Intravenous, Q8H  senna-docusate sodium, 2 tablet, Oral, BID  sodium chloride, 10 mL, Intravenous, Q12H  vitamin B-12, 1,000 mcg, Oral, Daily  zinc sulfate, 220 mg, Oral, Daily      Allergies:  Patient has no known allergies.    OBJECTIVE        Vital Signs  Temp:  [92 °F (33.3 °C)-98.7 °F (37.1 °C)] 96.3 °F (35.7 °C)  Heart Rate:  [] 90  Resp:  [18-21] 20  BP: (108-155)/(52-68) 137/62    Flowsheet Rows      First Filed Value   Admission Height 172.7 cm (68\") Documented at 11/11/2021 1215   Admission Weight 66.7 kg (147 lb) Documented at 11/11/2021 1215        172.7 cm (67.99\")    Physical Exam  Vitals and nursing note reviewed.   Constitutional:       " Appearance: She is ill-appearing.   HENT:      Head: Normocephalic.      Nose: Nose normal.      Mouth/Throat:      Mouth: Mucous membranes are moist.      Pharynx: Oropharynx is clear.   Eyes:      Extraocular Movements: Extraocular movements intact.      Pupils: Pupils are equal, round, and reactive to light.   Cardiovascular:      Rate and Rhythm: Normal rate.      Pulses: Normal pulses.   Pulmonary:      Effort: Pulmonary effort is normal. No respiratory distress.      Comments: Decreased breath sounds LLL  Abdominal:      General: Abdomen is flat.      Palpations: Abdomen is soft.   Musculoskeletal:      Cervical back: Normal range of motion.      Right lower leg: No edema.      Left lower leg: No edema.   Skin:     General: Skin is warm and dry.      Capillary Refill: Capillary refill takes 2 to 3 seconds.      Comments: Scattered bruising BUE, BLE   Neurological:      General: No focal deficit present.      Mental Status: She is alert and oriented to person, place, and time. Mental status is at baseline.   Psychiatric:         Mood and Affect: Mood normal.         Behavior: Behavior normal.         Results Review:   Lab Results (last 24 hours)     Procedure Component Value Units Date/Time    Non-gynecologic Cytology [[id number]] Collected: 11/12/21 1359    Specimen: Pleural Fluid from Lung, L Updated: 11/15/21 0720    CBC & Differential [[id number]]  (Abnormal) Collected: 11/15/21 0515    Specimen: Blood Updated: 11/15/21 0608    Narrative:      The following orders were created for panel order CBC & Differential.  Procedure                               Abnormality         Status                     ---------                               -----------         ------                     CBC Auto Differential[[id number]]        Abnormal            Final result                 Please view results for these tests on the individual orders.    CBC Auto Differential [[id number]]  (Abnormal) Collected: 11/15/21 0515     Specimen: Blood Updated: 11/15/21 0608     WBC 24.06 10*3/mm3      RBC 3.21 10*6/mm3      Hemoglobin 9.2 g/dL      Hematocrit 28.4 %      MCV 88.5 fL      MCH 28.7 pg      MCHC 32.4 g/dL      RDW 16.0 %      RDW-SD 52.1 fl      MPV 9.0 fL      Platelets 297 10*3/mm3      Neutrophil % 94.5 %      Lymphocyte % 1.2 %      Monocyte % 3.0 %      Eosinophil % 0.0 %      Basophil % 0.2 %      Immature Grans % 1.1 %      Neutrophils, Absolute 22.72 10*3/mm3      Lymphocytes, Absolute 0.29 10*3/mm3      Monocytes, Absolute 0.72 10*3/mm3      Eosinophils, Absolute 0.00 10*3/mm3      Basophils, Absolute 0.06 10*3/mm3      Immature Grans, Absolute 0.27 10*3/mm3      nRBC 0.0 /100 WBC     Basic Metabolic Panel [[id number]]  (Abnormal) Collected: 11/15/21 0515    Specimen: Blood Updated: 11/15/21 0606     Glucose 137 mg/dL      BUN 25 mg/dL      Creatinine 1.06 mg/dL      Sodium 138 mmol/L      Potassium 4.1 mmol/L      Chloride 100 mmol/L      CO2 30.0 mmol/L      Calcium 9.3 mg/dL      eGFR Non African Amer 50 mL/min/1.73      BUN/Creatinine Ratio 23.6     Anion Gap 8.0 mmol/L     Narrative:      GFR Normal >60  Chronic Kidney Disease <60  Kidney Failure <15      Blood Culture - Blood, Arm, Left [[id number]]  (Normal) Collected: 11/11/21 2158    Specimen: Blood from Arm, Left Updated: 11/14/21 2216     Blood Culture No growth at 3 days    Blood Culture - Blood, Arm, Left [[id number]]  (Normal) Collected: 11/11/21 1747    Specimen: Blood from Arm, Left Updated: 11/14/21 1800     Blood Culture No growth at 3 days        Imaging Results (Last 24 Hours)     Procedure Component Value Units Date/Time    CT Chest Without Contrast Diagnostic [[id number]] Collected: 11/15/21 0527     Updated: 11/15/21 0743    Narrative:        PROCEDURE: CT chest without contrast    REASON FOR EXAM: Abnormal xray - pleural effusion, R91.8 Other  nonspecific abnormal finding of lung field J90 Pleural effusion,  not elsewhere classified I50.9 Heart failure,  unspecified I10  Essential (primary) hypertension I48.0 Paroxysmal atrial  fibrillation R06.00 Dyspnea, unspecified I25.10 Atherosclerotic  heart disease of native coronary artery without angina pectoris    This exam was performed according to our departmental  dose-optimization program, which includes automated exposure  control, adjustment of the mA and/or kV according to patient size  and/or use of iterative reconstruction technique.    FINDINGS: Comparison study dated November 11, 2021. Axial  computer tomography sequential imaging was performed from the  thoracic inlet through the diaphragms without administration of  IV contrast. .Sagittal and coronal reformation was performed .    Mediastinal structures of the chest reveal left perihilar  infiltrating opacity with associated mild narrowing of the left  upper lobe bronchus. The left perihilar infiltrating opacity  extends into the left upper lobe superior perihilar region where  it becomes a hypodense circular opacity which measures 5.98 x 5.6  cm. Interval development of left upper lobe peripheral patchy  opacities. Right upper lobe posterior segment peripheral small  interstitial groundglass opacity. Right middle lobe peripheral  small interstitial groundglass opacity. Right lower lobe lateral  basilar segment small interstitial groundglass opacity. Right  lower lobe posterior basilar segment small patchy opacity. Left  lower lobe small patchy opacity. Lungs are otherwise clear.  Moderate left pleural effusion. Small right pleural effusion.  Visualized upper abdominal structures are unremarkable. No acute  osseous abnormality.      Impression:      1.Mediastinal structures of the chest reveal left perihilar  infiltrating opacity with associated mild narrowing of the left  upper lobe bronchus. The left perihilar infiltrating opacity  extends into the left upper lobe superior perihilar region where  it becomes a hypodense circular opacity which measures 5.98 x  5.6  cm. These findings would be suspicious for neoplasm versus less  likely inflammatory process. Recommend clinical correlation and  if clinically indicated PET scan, sampling, a follow-up CT in  three months to further evaluate.  2. Interval development of left upper lobe peripheral patchy  opacities suspicious for postobstructive atelectasis and/or  pneumonia.  3. Right middle lobe and right lower lobe small interstitial  groundglass opacities. This would be suspicious for inflammatory  changes versus edema.  4. Right lower lobe posterior basilar segment and left lower lobe  small patchy opacities suspicious for passive atelectasis and/or  pneumonia.  5. Moderate left pleural effusion and small right pleural  effusion.    Electronically signed by:  Eliseo Smith MD  11/15/2021 7:42 AM CST  Workstation: [id number]    XR Chest PA & Lateral [[id number]] Collected: 11/14/21 1002     Updated: 11/14/21 1051    Narrative:      EXAM: XR CHEST 2 VIEWS    COMPARISONS: Radiograph 11/11/2021. CT-guided thoracentesis  12/12/2021    INDICATION: f/u pneumonia, R91.8 Other nonspecific abnormal  finding of lung field J90 Pleural effusion, not elsewhere  classified I50.9 Heart failure, unspecified I10 Essential  (primary) hypertension    FINDINGS:   Interval near complete opacification of the left hemithorax with  upper and lower lobe atelectasis. Gastric air bubble versus small  anterior left lower hydropneumothorax. No mediastinal shift. No  acute osseous or ovale.      Impression:      Near complete opacification of the left thorax most consistent  with effusion, atelectasis, and airspace disease/pneumonia.     Probable left stomach bubble, less likely small anterior  hydropneumothorax at the left lung base given post procedural CT  appearance of the chest. May further evaluated with CT chest as  clinically indicated.    STAT RSC Call Result to Licensed Caregiver sent  11/14/2021 10:50  AM CST    Electronically signed by:   Joao Connor MD  11/14/2021  10:50 AM CST Workstation: [id number]              ASSESSMENT/PLAN         Mass of left lung      Assessment & Plan    Left Lung Mass, Post-obstructive atelectasis, Left Pleural effusion  Status post Left CT guided FNA, thoracentesis.  Path remains pending.  Developed post obstructive atelectasis secondary to Left lung mass burden and possible pneumonia.  Placed on mucomyst with bronchodilator scheduled, repeat CT of chest this AM with overall improved aeration to LEN.  Continue supportive care, nebs with mucomyst, repeat chest film in AM.  Overall symptomatically stable, with satisfactory oxygenation on minimal O2.  She remains high risk for any level of anesthesia.  Discussed situation with patient and she does not wish to pursue any surgical or procedural intervention at this time.       Thank you for the opportunity to participate in this patient's care.        I discussed the patients findings and my recommendations with Dr. Toledo            This document has been electronically signed by Zach Stokes, AGACNP-BC @  On November 15, 2021 09:32 CST

## 2021-11-15 NOTE — PROGRESS NOTES
HISTORY OF PRESENT ILLNESS:    Complains of chest tightness.  Complains of shortness of breath and left posterior rib cage pain.  No fever.        PAST MEDICAL HISTORY:    Past Medical History:   Diagnosis Date   • Abdominal aortic aneurysm (AAA) without rupture (HCC)    • Atrial fibrillation (HCC) 10/2/2017    New onset.  S/p cardioversion  On coumadin now.  Amiodarone    • CAD (coronary artery disease)    • Gastroesophageal reflux disease    • Hypercholesterolemia    • Hypertension    • Nuclear senile cataract    • Osteoarthritis    • Osteoporosis    • Peripheral arterial disease (HCC)    • Solitary lung nodule        SOCIAL HISTORY:    Social History     Tobacco Use   • Smoking status: Former Smoker     Packs/day: 1.00     Years: 57.00     Pack years: 57.00     Start date: 1961     Quit date: 10/1/2017     Years since quittin.1   • Smokeless tobacco: Never Used   Substance Use Topics   • Alcohol use: No   • Drug use: No       Surgical History :  Past Surgical History:   Procedure Laterality Date   • ABDOMINAL AORTIC ANEURYSM REPAIR     • HIP INTERTROCHANTERIC NAILING Left 10/1/2017    Procedure: HIP INTERTROCHANTERIC NAILING - LEFT - [place];  Surgeon: Nicola Rich MD;  Location: [facility];  Service:        ALLERGIES:    No Known Allergies      FAMILY HISTORY:  Family History   Problem Relation Age of Onset   • Hypertension Mother    • Coronary artery disease Father            REVIEW OF SYSTEMS:      CONSTITUTIONAL:  Complains of fatigue.  Positive for recent weight loss.  Positive for poor appetite.  Denies any fever, chills .     EYES: Positive for chronic visual disturbances. No discharge. No new lesion.    ENMT:  No epistaxis, mouth sores or difficulty swallowing.    RESPIRATORY: Positive for shortness of breath and cough.  No hemoptysis.  Positive for chest tightness.    CARDIOVASCULAR:  No chest pain or palpitations.    GASTROINTESTINAL:  No abdominal pain nausea, vomiting or  "blood in the stool.    GENITOURINARY: No dysuria or hematuria.    MUSCULOSKELETAL:  No new back pain or arthralgia.    LYMPHATICS:  Denies any abnormal swollen glands anywhere in the body.    NEUROLOGICAL : No tingling or numbness. No headache or dizziness. No seizures or balance problems.    SKIN: Positive for bruising    ENDOCRINE : No new hot or cold intolerance. No new polyuria. No polydipsia.        PHYSICAL EXAMINATION:      VITAL SIGNS:  /82 (BP Location: Left arm, Patient Position: Lying)   Pulse 88   Temp 98.2 °F (36.8 °C) (Oral)   Resp 18   Ht 172.7 cm (67.99\")   Wt 64.5 kg (142 lb 2 oz)   LMP  (LMP Unknown)   SpO2 90%   BMI 21.62 kg/m²       11/14/21  0636 11/14/21  1057 11/15/21  0527   Weight: 65.9 kg (145 lb 4.8 oz) (extra pillows) 65.9 kg (145 lb 4.5 oz) 64.5 kg (142 lb 2 oz)         ECOG performance status: 2-3    CONSTITUTIONAL:  Not in any distress.    EYES: Mild conjunctival Pallor. No Icterus. No Pterygium. Extraocular Movements intact.No ptosis.    ENMT:  Normocephalic, atraumatic. No facial asymmetry noted.    NECK:  No adenopathy. Trachea midline. No JVD.    RESPIRATORY: Diminished air entry in left lung field.  No rhonchi or wheezing.    CARDIOVASCULAR:  S1, S2. Regular rate and rhythm. No murmur or gallop appreciated.    ABDOMEN:  Soft, obese, nontender. Bowel sounds present in all four quadrants.  No hepatosplenomegaly appreciated.    MUSCULOSKELETAL:  No edema. No calf tenderness.  Decreased range of motion.    NEUROLOGIC:    No motor  deficit appreciated.     SKIN : Bruising present on bilateral upper extremity.  Skin is warm and moist to touch.    LYMPHATICS: No new enlarged lymph nodes in neck or supraclavicular area.    PSYCHIATRY: Alert, awake and oriented ×3. Normal affect.  Normal judgment.  Makes good eye contact.        DIAGNOSTIC DATA:    Glucose   Date Value Ref Range Status   11/15/2021 137 (H) 65 - 99 mg/dL Final     Sodium   Date Value Ref Range Status "   11/15/2021 138 136 - 145 mmol/L Final     Potassium   Date Value Ref Range Status   11/15/2021 4.1 3.5 - 5.2 mmol/L Final     CO2   Date Value Ref Range Status   11/15/2021 30.0 (H) 22.0 - 29.0 mmol/L Final     Chloride   Date Value Ref Range Status   11/15/2021 100 98 - 107 mmol/L Final     Anion Gap   Date Value Ref Range Status   11/15/2021 8.0 5.0 - 15.0 mmol/L Final     Creatinine   Date Value Ref Range Status   11/15/2021 1.06 (H) 0.57 - 1.00 mg/dL Final     BUN   Date Value Ref Range Status   11/15/2021 25 (H) 8 - 23 mg/dL Final     BUN/Creatinine Ratio   Date Value Ref Range Status   11/15/2021 23.6 7.0 - 25.0 Final     Calcium   Date Value Ref Range Status   11/15/2021 9.3 8.6 - 10.5 mg/dL Final     eGFR Non  Amer   Date Value Ref Range Status   11/15/2021 50 (L) >60 mL/min/1.73 Final     Lab Results   Component Value Date    WBC 24.06 (H) 11/15/2021    HGB 9.2 (L) 11/15/2021    HCT 28.4 (L) 11/15/2021    MCV 88.5 11/15/2021     11/15/2021     Lab Results   Component Value Date    NEUTROABS 22.72 (H) 11/15/2021    IRON 12 (L) 11/13/2021    TIBC 209 (L) 11/13/2021    LABIRON 6 (L) 11/13/2021    FERRITIN 189.70 (H) 11/13/2021    [id number] 310 11/11/2021    FOLATE 9.13 11/11/2021     No results found for: , LABCA2, AFPTM, HCGQUANT, , CHROMGRNA, 3PHYH96VBK, CEA, REFLABREPO        PATHOLOGY:  * Cannot find OR log *         RADIOLOGY DATA :  CT Chest Without Contrast Diagnostic    Result Date: 11/15/2021  1.Mediastinal structures of the chest reveal left perihilar infiltrating opacity with associated mild narrowing of the left upper lobe bronchus. The left perihilar infiltrating opacity extends into the left upper lobe superior perihilar region where it becomes a hypodense circular opacity which measures 5.98 x 5.6 cm. These findings would be suspicious for neoplasm versus less likely inflammatory process. Recommend clinical correlation and if clinically indicated PET scan, sampling, a  follow-up CT in three months to further evaluate. 2. Interval development of left upper lobe peripheral patchy opacities suspicious for postobstructive atelectasis and/or pneumonia. 3. Right middle lobe and right lower lobe small interstitial groundglass opacities. This would be suspicious for inflammatory changes versus edema. 4. Right lower lobe posterior basilar segment and left lower lobe small patchy opacities suspicious for passive atelectasis and/or pneumonia. 5. Moderate left pleural effusion and small right pleural effusion. Electronically signed by:  Eliseo Smith MD  11/15/2021 7:42 AM CST Workstation: [id number]    CT Chest With Contrast Diagnostic    Result Date: 11/11/2021  1.  Large spiculated mass in the left upper lobe and aorticopulmonary window region of the mediastinum with two central areas of necrosis or fluid collections. Findings are highly suspicious for neoplasm but could also be due to an infectious process. This mass exerts mass effect on the left upper lobe bronchial structures and left upper lobe pulmonary artery. 2.  Moderate-sized left pleural effusion with adjacent subsegmental atelectasis of the left lower lobe. 3.  Coronary artery atherosclerotic disease. 4.  Abdominal aortic aneurysms at the hiatus and at the level of the kidneys measuring up to 4.3 cm in diameter. There may have been a previous abdominal aortic aneurysm repair of the more inferior level. These findings are incompletely evaluated. Recommend correlation with surgical history, and more complete imaging of the abdomen and pelvis to evaluate the size of the aneurysm. For management of the more superior aneurysm, 4.0-4.4 cm AAA, recommend follow-up every 12 months and recommend vascular consultation. Electronically signed by:  Trent Domínguez MD  11/11/2021 3:48 PM CST Workstation: [id number]    XR Chest 1 View    Result Date: 11/11/2021  1. Large 11.8 x 8.1 cm masslike density in the left upper lobe with questionable  cavitation/air-fluid level as above. The findings are indicative of underlying pulmonary neoplasm. CT of the chest with contrast is recommended for confirmation and staging. 2. Moderate size left pleural effusion. Electronically signed by:  Paty Albert MD  11/11/2021 1:46 PM CST Workstation: [id number]    CT Needle Biopsy Lung    Result Date: 11/12/2021  CONCLUSION: Successful ultrasound-guided left lung mass needle biopsy, and thoracentesis with removal of 300 mL of serosanguineous fluid, as described above. Electronically signed by:  Trent Domínguez MD  11/12/2021 4:32 PM CST Workstation: [id number]    XR Chest PA & Lateral    Result Date: 11/14/2021  Near complete opacification of the left thorax most consistent with effusion, atelectasis, and airspace disease/pneumonia. Probable left stomach bubble, less likely small anterior hydropneumothorax at the left lung base given post procedural CT appearance of the chest. May further evaluated with CT chest as clinically indicated. STAT RSC Call Result to Licensed Caregiver sent  11/14/2021 10:50 AM CST Electronically signed by:  Joao Connor MD  11/14/2021 10:50 AM CST Workstation: [id number]    CT Guided Thoracentesis    Result Date: 11/12/2021  CONCLUSION: Successful ultrasound-guided left lung mass needle biopsy, and thoracentesis with removal of 300 mL of serosanguineous fluid, as described above. Electronically signed by:  Trent Domínguez MD  11/12/2021 4:32 PM CST Workstation: [id number]        ASSESSMENT AND PLAN:      1.  Anemia:  -Multifactorial  -Currently on intravenous iron with Ferrlecit daily which is tolerating well.  -Has been started on B12 and folic acid p.o. daily  -Recommend transfusing as needed if hemoglobin is less than 7    2.  Large left upper lobe mass with pleural effusion worrisome for malignancy  -Had a CT-guided biopsy as well as thoracentesis on November 12, 2021.  Pathology reports are pending.    3.  Atrial fibrillation    4.   Leukocytosis:  -Reactive due to Solu-Medrol plus pneumonia.  Will monitor with CBC             Pee Nolasco MD  11/15/2021  16:47 CST        Part of this note may be an electronic transcription/translation of spoken language to printed text using the Dragon Dictation System.

## 2021-11-16 NOTE — CASE MANAGEMENT/SOCIAL WORK
Discharge Planning Assessment  [facility]     Patient Name: Anahi Calix  MRN: [id number]  Today's Date: 11/16/2021    Admit Date: 11/11/2021     Discharge Needs Assessment     Row Name 11/16/21 1056       Living Environment    Lives With alone    Current Living Arrangements home/apartment/condo    Primary Care Provided by self    Provides Primary Care For no one    Family Caregiver if Needed child(nury), adult    Family Caregiver Names DAughter, Isamar and son, Aris.    Quality of Family Relationships involved; supportive    Able to Return to Prior Arrangements yes       Resource/Environmental Concerns    Resource/Environmental Concerns none    Transportation Concerns car, none       Transition Planning    Patient/Family Anticipates Transition to home; home with help/services    Transportation Anticipated family or friend will provide       Discharge Needs Assessment    Readmission Within the Last 30 Days no previous admission in last 30 days    Current Outpatient/Agency/Support Group meal delivery service; homecare agency  Patient is on waiting list for Meals on Wheels and is active with [facility] for PT/OT.    Equipment Currently Used at Home cane, straight; commode; walker, rolling; power chair,(recliner lift); grab bar    Concerns to be Addressed adjustment to diagnosis/illness; care coordination/care conferences    Anticipated Changes Related to Illness inability to care for self    Equipment Needed After Discharge oxygen  Use [facility] if oxygen needed.    Outpatient/Agency/Support Group Needs meal delivery service; homecare agency  Unknown at this time. Awaiting test results.    Provided Post Acute Provider List? N/A    N/A Provider List Comment Patient is active with [facility].    Current Discharge Risk terminally ill; lives alone               Discharge Plan     Row Name 11/16/21 1105       Plan    Plan Comments CM verified demographics, pcp, and [facility]. Patient has rx  coverage. Dr. Gloria had set up patient for home health services with [facility]. NIMA verified this with Alysha at [facility]. Patient and family are awaiting results of needle biopsy for probable lung ca before making any decisions. NIMA explained that we would follow up as needed.   MALINI Mcfarlane    Row Name 11/16/21 0928       Plan    Plan Comments - Patient requiring 6L oxygen today from 2L yesterday. Repeat chest xray ordered.  MALINI Mcfarlane              Continued Care and Services - Admitted Since 11/11/2021    Coordination has not been started for this encounter.       Expected Discharge Date and Time     Expected Discharge Date Expected Discharge Time    Nov 19, 2021          Demographic Summary     Row Name 11/16/21 1058       General Information    Admission Type inpatient    Arrived From home    Required Notices Provided Important Message from Medicare    Referral Source high risk screening  LACE of 12 at time of assessment.    Preferred Language English               Functional Status     Row Name 11/16/21 1054       Functional Status    Usual Activity Tolerance moderate    Current Activity Tolerance poor    Functional Status Comments Uses cane to assist with ambulation. Patient very SOA with activity now.       Functional Status, IADL    Medications independent    Meal Preparation independent    Housekeeping assistive person    Laundry assistive person    Shopping assistive person    IADL Comments Patients daughter, Isamar, assist with IADL's.       Mental Status    General Appearance WDL WDL       Mental Status Summary    Recent Changes in Mental Status/Cognitive Functioning no changes       Employment/    Employment Status retired               Psychosocial    No documentation.                Abuse/Neglect    No documentation.                Legal    No documentation.                Substance Abuse    No documentation.                Patient Forms    No documentation.                    Ana Sharma RN

## 2021-11-16 NOTE — PROGRESS NOTES
"  Cardiothoracic - Vascular Surgery Daily Note        LOS: 5 days   Patient Care Team:  Ramiro Gloria MD as PCP - General  Nicola Rich MD as Surgeon (Orthopedic Surgery)    Chief Complaint: Dyspnea      Subjective     The following portions of the patient's history were reviewed and updated as appropriate: allergies, current medications, past family history, past medical history, past social history, past surgical history and problem list.     Subjective:  Symptoms:  Stable.  She reports shortness of breath.  No chest pain.    Diet:  Poor intake.    Activity level: Impaired due to weakness.          Objective     Vital Signs  Temp:  [96.6 °F (35.9 °C)-98.2 °F (36.8 °C)] 96.6 °F (35.9 °C)  Heart Rate:  [63-96] 79  Resp:  [18] 18  BP: (111-181)/(56-84) 126/82  Body mass index is 21.93 kg/m².    Intake/Output Summary (Last 24 hours) at 11/16/2021 1056  Last data filed at 11/16/2021 0950  Gross per 24 hour   Intake 1080 ml   Output --   Net 1080 ml     I/O this shift:  In: 240 [P.O.:240]  Out: -     Wt Readings from Last 3 Encounters:   11/16/21 65.4 kg (144 lb 3.2 oz)   08/30/21 67.9 kg (149 lb 9.6 oz)   05/11/21 73.8 kg (162 lb 12.8 oz)         Physical Exam   Objective:  General Appearance:  Comfortable, ill-appearing, in no acute distress and not in pain.    Vital signs: (most recent): Blood pressure 126/82, pulse 79, temperature 96.6 °F (35.9 °C), temperature source Oral, resp. rate 18, height 172.7 cm (67.99\"), weight 65.4 kg (144 lb 3.2 oz), SpO2 93 %.  Vital signs are normal.  (02 6L).    Output: Producing urine and minimal stool output.    HEENT: Normal HEENT exam.    Lungs:  Normal effort and normal respiratory rate.  (Left hemithorax absent lung sounds)  Heart: Normal rate.    Abdomen: Abdomen is soft.  Bowel sounds are normal.   There is no abdominal tenderness.     Extremities: Normal range of motion.  There is dependent edema.    Pulses: Distal pulses are intact.  There are no decreased " pulses.    Neurological: Patient is alert and oriented to person, place and time.  GCS score is 15.    Pupils:  Pupils are equal, round, and reactive to light.    Skin:  Warm and dry.  (Scattered bruising )              Results Review:    Lab Results   Component Value Date    WBC 24.06 (H) 11/15/2021    HGB 9.2 (L) 11/15/2021    HCT 28.4 (L) 11/15/2021    MCV 88.5 11/15/2021     11/15/2021     Lab Results   Component Value Date    GLUCOSE 130 (H) 11/16/2021    BUN 25 (H) 11/16/2021    CREATININE 0.99 11/16/2021    EGFRIFNONA 54 (L) 11/16/2021    BCR 25.3 (H) 11/16/2021    K 4.6 11/16/2021    CO2 32.0 (H) 11/16/2021    CALCIUM 9.2 11/16/2021    ALBUMIN 3.10 (L) 11/12/2021    AST 14 11/12/2021    ALT 9 11/12/2021       Calcium Calcium   Date/Time Value Ref Range Status   11/16/2021 0521 9.2 8.6 - 10.5 mg/dL Final   11/15/2021 0515 9.3 8.6 - 10.5 mg/dL Final   11/14/2021 0832 9.9 8.6 - 10.5 mg/dL Final      Magnesium No results found for: MG     Imaging Results (Last 24 Hours)     Procedure Component Value Units Date/Time    XR Chest PA & Lateral [[id number]] Resulted: 11/16/21 0815     Updated: 11/16/21 0820                              acetylcysteine, 4 mL, Nebulization, Q4H - RT  amiodarone, 100 mg, Oral, Daily  budesonide-formoterol, 2 puff, Inhalation, BID - RT  famotidine, 20 mg, Oral, BID  sodium ferric gluconate complex IVPB in 100 mL NS, 125 mg, Intravenous, Daily  folic acid, 1 mg, Oral, Daily  [START ON 11/17/2021] furosemide, 40 mg, Oral, Daily  gabapentin, 300 mg, Oral, Nightly  hydrALAZINE, 50 mg, Oral, TID  multivitamin with minerals, 1 tablet, Oral, BID  ipratropium-albuterol, 3 mL, Nebulization, 4x Daily - RT  levoFLOXacin, 750 mg, Intravenous, Once  [START ON 11/17/2021] lisinopril, 20 mg, Oral, Q24H  magnesium oxide, 400 mg, Oral, Daily  methylPREDNISolone sodium succinate, 60 mg, Intravenous, Q8H  metoprolol tartrate, 25 mg, Oral, Q12H  piperacillin-tazobactam, 3.375 g, Intravenous,  Q8H  senna-docusate sodium, 2 tablet, Oral, BID  sodium chloride, 10 mL, Intravenous, Q12H  vitamin B-12, 1,000 mcg, Oral, Daily  zinc sulfate, 220 mg, Oral, Daily      hold, 1 each  Pharmacy to Dose Zosyn,               ASSESSMENT/PLAN     Left Lung Mass, Post-obstructive atelectasis, Left Pleural effusion  Status post Left CT guided FNA, thoracentesis.  Path + for non-small cell lung cancer.    Placed on mucomyst with bronchodilator scheduled for conservative management of post obstructive atelectasis, chest film this AM with persistent near complete opacification of L hemithorax.      Lengthy discussion was held with patient and family.  She remains high risk for respiratory failure with any intervention.  Without treatment for NSCLC bronchoscopy would only provide temporary relief of current symptoms with little chance of long term success secondary to tumor burden.      Oncology to discuss pathology and options        This document has been electronically signed by Zach Stokes [id number] @  On November 16, 2021 10:56 CST

## 2021-11-16 NOTE — PLAN OF CARE
Goal Outcome Evaluation:  Plan of Care Reviewed With: patient, daughter           Outcome Summary: Pt eating 50-75% at meals at this time. Added supplements for added javi/pro. Will monitor POC.

## 2021-11-16 NOTE — CONSULTS
"Adult Nutrition  Assessment    Patient Name:  Anahi Calix  YOB: 1940  MRN: [id number]  Admit Date:  11/11/2021    Assessment Date:  11/16/2021    Comments:  RD f/u. Lung biopsy indicates non small cell lung ca. Per med notes, pt poor candidate for aggressive treatment. Pt is eating 50-75% at meals at this time. Added supplements for added javi/pro. Will cont to monitor POC.      Reason for Assessment     Row Name 11/16/21 1226          Reason for Assessment    Reason For Assessment follow-up protocol                Nutrition/Diet History     Row Name 11/16/21 1227          Nutrition/Diet History    Typical Food/Fluid Intake Spoke w/family who says pt is eating \"ok\". She has not gotten any boost or ice cream.     Factors Affecting Nutritional Intake other (see comments)  SOA                  Labs/Tests/Procedures/Meds     Row Name 11/16/21 1227          Labs/Procedures/Meds    Lab Results Reviewed reviewed            Diagnostic Tests/Procedures    Diagnostic Test/Procedure Reviewed reviewed, pertinent     Diagnostic Test/Procedures Comments lulng biopsy + for non small cell lung ca            Medications    Pertinent Medications Reviewed reviewed                    Nutrition Prescription Ordered     Row Name 11/16/21 1228          Nutrition Prescription PO    Current PO Diet Regular     Fluid Consistency Thin     Common Modifiers Cardiac; Consistent Carbohydrate                Evaluation of Received Nutrient/Fluid Intake     Row Name 11/16/21 1228          PO Evaluation    Number of Meals 4     % PO Intake 50 x 1, 75 x 3                     Electronically signed by:  Magda Cerna RD  11/16/21 12:30 CST  "

## 2021-11-16 NOTE — PROGRESS NOTES
HISTORY OF PRESENT ILLNESS:    Continues to have shortness of breath.  No bleeding or fever reported.        PAST MEDICAL HISTORY:    Past Medical History:   Diagnosis Date   • Abdominal aortic aneurysm (AAA) without rupture (HCC)    • Atrial fibrillation (HCC) 10/2/2017    New onset.  S/p cardioversion  On coumadin now.  Amiodarone    • CAD (coronary artery disease)    • Gastroesophageal reflux disease    • Hypercholesterolemia    • Hypertension    • Nuclear senile cataract    • Osteoarthritis    • Osteoporosis    • Peripheral arterial disease (HCC)    • Solitary lung nodule        SOCIAL HISTORY:    Social History     Tobacco Use   • Smoking status: Former Smoker     Packs/day: 1.00     Years: 57.00     Pack years: 57.00     Start date: 1961     Quit date: 10/1/2017     Years since quittin.1   • Smokeless tobacco: Never Used   Substance Use Topics   • Alcohol use: No   • Drug use: No       Surgical History :  Past Surgical History:   Procedure Laterality Date   • ABDOMINAL AORTIC ANEURYSM REPAIR     • HIP INTERTROCHANTERIC NAILING Left 10/1/2017    Procedure: HIP INTERTROCHANTERIC NAILING - LEFT - [place];  Surgeon: Nicola Rich MD;  Location: [facility] OR;  Service:        ALLERGIES:    No Known Allergies      FAMILY HISTORY:  Family History   Problem Relation Age of Onset   • Hypertension Mother    • Coronary artery disease Father            REVIEW OF SYSTEMS:      CONSTITUTIONAL:  Complains of fatigue.  Positive for generalized weakness.  Positive for weight loss.  Positive for poor appetite.  Denies any fever, chills .     EYES: No visual disturbances. No discharge. No new lesion.    ENMT:  No epistaxis, mouth sores or difficulty swallowing.    RESPIRATORY: Positive for shortness of breath and cough.    CARDIOVASCULAR: Positive for chest tightness.  No chest pain or palpitations.    GASTROINTESTINAL:  No abdominal pain nausea, vomiting or blood in the stool.    GENITOURINARY: No dysuria  "or hematuria.    MUSCULOSKELETAL: Positive for arthralgia    LYMPHATICS:  Denies any abnormal swollen glands anywhere in the body.    NEUROLOGICAL : No tingling or numbness. No headache or dizziness. No seizures or balance problems.    SKIN: Positive for bruising    ENDOCRINE : No new hot or cold intolerance. No new polyuria. No polydipsia.        PHYSICAL EXAMINATION:      VITAL SIGNS:  /88 (BP Location: Right arm)   Pulse 105   Temp 98 °F (36.7 °C) (Oral)   Resp 18   Ht 172.7 cm (67.99\")   Wt 65.4 kg (144 lb 3.2 oz)   LMP  (LMP Unknown)   SpO2 92%   BMI 21.93 kg/m²       11/14/21  1057 11/15/21  0527 11/16/21  0406   Weight: 65.9 kg (145 lb 4.5 oz) 64.5 kg (142 lb 2 oz) 65.4 kg (144 lb 3.2 oz)         ECOG performance status: 3    CONSTITUTIONAL:  Not in any distress.    EYES: Mild conjunctival Pallor. No Icterus. No Pterygium. Extraocular Movements intact.No ptosis.    ENMT:  Normocephalic, atraumatic. No facial asymmetry noted.    NECK:  No adenopathy. Trachea midline.     RESPIRATORY: Diminished air entry in left lung field.  No rhonchi or wheezing.    CARDIOVASCULAR:  S1, S2.  Irregular. No murmur or gallop appreciated.    ABDOMEN:  Soft, obese, nontender. Bowel sounds present in all four quadrants.  No hepatosplenomegaly appreciated.    MUSCULOSKELETAL:  Trace edema. No calf tenderness. Decreased range of motion.    NEUROLOGIC:    No motor  deficit appreciated. Cranial nerves II-XII grossly intact.    SKIN : Bruising present on bilateral upper extremity. Skin is warm and moist to touch.    LYMPHATICS: No new enlarged lymph nodes in neck or supraclavicular area.    PSYCHIATRY: Alert, awake and oriented ×3.           DIAGNOSTIC DATA:    Glucose   Date Value Ref Range Status   11/16/2021 130 (H) 65 - 99 mg/dL Final     Sodium   Date Value Ref Range Status   11/16/2021 135 (L) 136 - 145 mmol/L Final     Potassium   Date Value Ref Range Status   11/16/2021 4.6 3.5 - 5.2 mmol/L Final     CO2   Date " Value Ref Range Status   11/16/2021 32.0 (H) 22.0 - 29.0 mmol/L Final     Chloride   Date Value Ref Range Status   11/16/2021 98 98 - 107 mmol/L Final     Anion Gap   Date Value Ref Range Status   11/16/2021 5.0 5.0 - 15.0 mmol/L Final     Creatinine   Date Value Ref Range Status   11/16/2021 0.99 0.57 - 1.00 mg/dL Final     BUN   Date Value Ref Range Status   11/16/2021 25 (H) 8 - 23 mg/dL Final     BUN/Creatinine Ratio   Date Value Ref Range Status   11/16/2021 25.3 (H) 7.0 - 25.0 Final     Calcium   Date Value Ref Range Status   11/16/2021 9.2 8.6 - 10.5 mg/dL Final     eGFR Non  Amer   Date Value Ref Range Status   11/16/2021 54 (L) >60 mL/min/1.73 Final     Lab Results   Component Value Date    WBC 24.06 (H) 11/15/2021    HGB 9.2 (L) 11/15/2021    HCT 28.4 (L) 11/15/2021    MCV 88.5 11/15/2021     11/15/2021     Lab Results   Component Value Date    NEUTROABS 22.72 (H) 11/15/2021    IRON 12 (L) 11/13/2021    TIBC 209 (L) 11/13/2021    LABIRON 6 (L) 11/13/2021    FERRITIN 189.70 (H) 11/13/2021    FOWEEYHF14 310 11/11/2021    FOLATE 9.13 11/11/2021     No results found for: , LABCA2, AFPTM, HCGQUANT, , CHROMGRNA, [id number], CEA, REFLABREPO        PATHOLOGY:  * Cannot find OR log *         RADIOLOGY DATA :  CT Chest Without Contrast Diagnostic    Result Date: 11/15/2021  1.Mediastinal structures of the chest reveal left perihilar infiltrating opacity with associated mild narrowing of the left upper lobe bronchus. The left perihilar infiltrating opacity extends into the left upper lobe superior perihilar region where it becomes a hypodense circular opacity which measures 5.98 x 5.6 cm. These findings would be suspicious for neoplasm versus less likely inflammatory process. Recommend clinical correlation and if clinically indicated PET scan, sampling, a follow-up CT in three months to further evaluate. 2. Interval development of left upper lobe peripheral patchy opacities suspicious for  postobstructive atelectasis and/or pneumonia. 3. Right middle lobe and right lower lobe small interstitial groundglass opacities. This would be suspicious for inflammatory changes versus edema. 4. Right lower lobe posterior basilar segment and left lower lobe small patchy opacities suspicious for passive atelectasis and/or pneumonia. 5. Moderate left pleural effusion and small right pleural effusion. Electronically signed by:  Eliseo Smith MD  11/15/2021 7:42 AM CST Workstation: [id number]    CT Chest With Contrast Diagnostic    Result Date: 11/11/2021  1.  Large spiculated mass in the left upper lobe and aorticopulmonary window region of the mediastinum with two central areas of necrosis or fluid collections. Findings are highly suspicious for neoplasm but could also be due to an infectious process. This mass exerts mass effect on the left upper lobe bronchial structures and left upper lobe pulmonary artery. 2.  Moderate-sized left pleural effusion with adjacent subsegmental atelectasis of the left lower lobe. 3.  Coronary artery atherosclerotic disease. 4.  Abdominal aortic aneurysms at the hiatus and at the level of the kidneys measuring up to 4.3 cm in diameter. There may have been a previous abdominal aortic aneurysm repair of the more inferior level. These findings are incompletely evaluated. Recommend correlation with surgical history, and more complete imaging of the abdomen and pelvis to evaluate the size of the aneurysm. For management of the more superior aneurysm, 4.0-4.4 cm AAA, recommend follow-up every 12 months and recommend vascular consultation. Electronically signed by:  Trent Domínguez MD  11/11/2021 3:48 PM CST Workstation: [id number]    XR Chest 1 View    Result Date: 11/11/2021  1. Large 11.8 x 8.1 cm masslike density in the left upper lobe with questionable cavitation/air-fluid level as above. The findings are indicative of underlying pulmonary neoplasm. CT of the chest with contrast is  recommended for confirmation and staging. 2. Moderate size left pleural effusion. Electronically signed by:  Paty Albert MD  11/11/2021 1:46 PM CST Workstation: [id number]    CT Needle Biopsy Lung    Result Date: 11/12/2021  CONCLUSION: Successful ultrasound-guided left lung mass needle biopsy, and thoracentesis with removal of 300 mL of serosanguineous fluid, as described above. Electronically signed by:  Trent Domínguez MD  11/12/2021 4:32 PM CST Workstation: [id number]    XR Chest PA & Lateral    Result Date: 11/16/2021  Opacified left hemithorax as above without mediastinal shift may represent a balance between pleural effusion and volume loss/consolidation associated with left upper lung mass. Findings suspicious for fluid overload/CHF. Clinical correlation recommended. Electronically signed by:  Nav Edwards MD  11/16/2021 10:57 AM CST Workstation: [id number]    XR Chest PA & Lateral    Result Date: 11/14/2021  Near complete opacification of the left thorax most consistent with effusion, atelectasis, and airspace disease/pneumonia. Probable left stomach bubble, less likely small anterior hydropneumothorax at the left lung base given post procedural CT appearance of the chest. May further evaluated with CT chest as clinically indicated. STAT RSC Call Result to Licensed Caregiver sent  11/14/2021 10:50 AM CST Electronically signed by:  Joao Connor MD  11/14/2021 10:50 AM CST Workstation: [id number]    CT Guided Thoracentesis    Result Date: 11/12/2021  CONCLUSION: Successful ultrasound-guided left lung mass needle biopsy, and thoracentesis with removal of 300 mL of serosanguineous fluid, as described above. Electronically signed by:  Trent Domínguez MD  11/12/2021 4:32 PM CST Workstation: [id number]        ASSESSMENT AND PLAN:      1.  Non-small cell cancer of left lung:  -CT-guided lung biopsy on November 12, 2021 showed non-small cell lung cancer on preliminary report.  Immunohistochemical  stains are being carried out to differentiate between squamous cell and adenocarcinoma.   -Result of pathology showing positivity for non-small cell lung cancer were discussed with patient and her daughter.  -Due to her performance status patient is not a candidate for any kind of systemic treatment with chemotherapy.  -Option of palliative radiation was discussed with patient.  -Patient is not interested in doing any chemotherapy or radiation at present.  -Option of hospice was discussed with patient and she is agreeable to hospice.  -We will consult case management for hospice.    2.  Anemia:  -Currently on intravenous Ferrlecit along with B12 and folic acid p.o. daily    3.  Leukocytosis:  -Reactive will monitor with CBC    4.  Atrial fibrillation    5.  Change in level of care:  -Patient wants to be comfortable with hospice.        PHQ-9 Total Score:       [unfilled]       Pee Nolasco MD  11/16/2021  17:00 CST        Part of this note may be an electronic transcription/translation of spoken language to printed text using the Dragon Dictation System.

## 2021-11-16 NOTE — PROGRESS NOTES
[facility] Medicine Services  INPATIENT PROGRESS NOTE    Length of Stay: 5  Date of Admission: 11/11/2021  Primary Care Physician: Ramiro Gloria MD    Subjective   Chief Complaint: Shortness of air.          HPI: Patient is seen for follow-up.  She is doing better, remains short of air especially on exertion and now on supplemental oxygen of 6 L nasal prongs with saturation in the low 90s.          Review of Systems   Constitutional: Positive for activity change and fatigue. Negative for appetite change, chills, diaphoresis and fever.   HENT: Negative for trouble swallowing and voice change.    Eyes: Positive for visual disturbance. Negative for photophobia.   Respiratory: Positive for shortness of breath. Negative for cough, choking, chest tightness, wheezing and stridor.    Cardiovascular: Negative for chest pain, palpitations and leg swelling.   Gastrointestinal: Negative for abdominal distention, abdominal pain, blood in stool, constipation, diarrhea, nausea and vomiting.   Endocrine: Negative for cold intolerance, heat intolerance, polydipsia, polyphagia and polyuria.   Genitourinary: Negative for decreased urine volume, difficulty urinating, dysuria, enuresis, flank pain, frequency, hematuria and urgency.   Musculoskeletal: Negative for arthralgias, gait problem, myalgias, neck pain and neck stiffness.   Skin: Negative for pallor, rash and wound.   Neurological: Negative for dizziness, tremors, seizures, syncope, facial asymmetry, speech difficulty, weakness, light-headedness, numbness and headaches.   Hematological: Does not bruise/bleed easily.   Psychiatric/Behavioral: Negative for agitation, behavioral problems and confusion.       Objective    Temp:  [96.6 °F (35.9 °C)-98.2 °F (36.8 °C)] 96.6 °F (35.9 °C)  Heart Rate:  [63-96] 79  Resp:  [18] 18  BP: (111-181)/(56-84) 126/82  Vitals and nursing note reviewed.   Constitutional:       General: She is not in acute  distress.     Appearance: She is underweight. She is not diaphoretic.      Comments: Patient is underweight, cachectic and seems severely malnourished.   HENT:      Head: Normocephalic and atraumatic.      Right Ear: External ear normal.      Left Ear: External ear normal.      Nose: Nose normal.   Eyes: She has some visual impairment.     Extraocular Movements: Extraocular movements intact.      Conjunctiva/sclera: Conjunctivae normal.      Pupils: Pupils are equal, round, and reactive to light.   Neck:      Thyroid: No thyromegaly.      Vascular: No JVD.   Cardiovascular:      Rate and Rhythm: Normal rate and regular rhythm.      Heart sounds: Normal heart sounds. No murmur heard.  No friction rub. No gallop.    Pulmonary:      Effort: Pulmonary effort is normal. No respiratory distress.      Breath sounds: No stridor. Examination of the left-middle field reveals decreased breath sounds. Examination of the left-lower field reveals decreased breath sounds. Decreased breath sounds and rhonchi present. No wheezing or rales.   Chest:      Chest wall: No tenderness.   Abdominal:      General: Bowel sounds are normal. There is no distension.      Palpations: Abdomen is soft. There is no mass.      Tenderness: There is no abdominal tenderness. There is no right CVA tenderness, left CVA tenderness, guarding or rebound.      Hernia: No hernia is present  Musculoskeletal:         General: No swelling, tenderness, deformity or signs of injury. Normal range of motion.      Cervical back: Normal range of motion and neck supple.      Right lower leg: No edema.      Left lower leg: No edema.   Skin:     General: Skin is warm and dry.      Coloration: Skin is not jaundiced or pale.      Findings: No bruising, erythema, lesion or rash.      Comments: She has chronic stasis changes both legs.   Neurological:      General: No focal deficit present.      Mental Status: She is alert and oriented to person, place, and time.       Cranial Nerves: No cranial nerve deficit.      Sensory: No sensory deficit.      Motor: No weakness.      Coordination: Coordination normal.      Gait: Gait normal.      Deep Tendon Reflexes: Reflexes are normal and symmetric. Reflexes normal.   Psychiatric:         Mood and Affect: Mood normal.         Behavior: Behavior normal. Behavior is cooperative.         Thought Content: Thought content normal.         Judgment: Judgment normal      Medication Review:    Current Facility-Administered Medications:   •  acetaminophen (TYLENOL) tablet 650 mg, 650 mg, Oral, Q4H PRN, 650 mg at 11/16/21 0934 **OR** acetaminophen (TYLENOL) 160 MG/5ML solution 650 mg, 650 mg, Oral, Q4H PRN **OR** acetaminophen (TYLENOL) suppository 650 mg, 650 mg, Rectal, Q4H PRN, Mike Teresa MD  •  acetylcysteine (MUCOMYST) 10 % solution 4 mL, 4 mL, Nebulization, Q4H - RT, Jules Toledo MD, 4 mL at 11/16/21 0726  •  albuterol (PROVENTIL) nebulizer solution 0.083% 2.5 mg/3mL, 2.5 mg, Nebulization, Q4H PRN, Mike Teresa MD, 2.5 mg at 11/15/21 2309  •  aluminum-magnesium hydroxide-simethicone (MAALOX MAX) 400-400-40 MG/5ML suspension 15 mL, 15 mL, Oral, Q6H PRN, Mike Teresa MD  •  amiodarone (PACERONE) half tablet 100 mg, 100 mg, Oral, Daily, Mike Teresa MD, 100 mg at 11/16/21 0930  •  sennosides-docusate (PERICOLACE) 8.6-50 MG per tablet 2 tablet, 2 tablet, Oral, BID, 2 tablet at 11/16/21 0929 **AND** polyethylene glycol (MIRALAX) packet 17 g, 17 g, Oral, Daily PRN **AND** bisacodyl (DULCOLAX) EC tablet 5 mg, 5 mg, Oral, Daily PRN **AND** bisacodyl (DULCOLAX) suppository 10 mg, 10 mg, Rectal, Daily PRN, Mike Teresa MD  •  budesonide-formoterol (SYMBICORT) 160-4.5 MCG/ACT inhaler 2 puff, 2 puff, Inhalation, BID - RT, Mike Teresa MD, 2 puff at 11/16/21 0727  •  famotidine (PEPCID) tablet 20 mg, 20 mg, Oral, BID, Mike Teresa MD, 20 mg at 11/16/21 0931  •  ferric gluconate (FERRLECIT) 125  mg in sodium chloride 0.9 % 100 mL IVPB, 125 mg, Intravenous, Daily, Mike Teresa MD, Last Rate: 110 mL/hr at 11/16/21 0931, 125 mg at 11/16/21 0931  •  folic acid (FOLVITE) tablet 1 mg, 1 mg, Oral, Daily, Pee Nolasco MD, 1 mg at 11/16/21 0931  •  gabapentin (NEURONTIN) capsule 300 mg, 300 mg, Oral, Nightly, Mike Teresa MD, 300 mg at 11/15/21 2015  •  Hold medication, 1 each, Does not apply, Continuous PRN, Mike Teresa MD  •  hydrALAZINE (APRESOLINE) tablet 50 mg, 50 mg, Oral, TID, Mike Teresa MD, 50 mg at 11/16/21 0931  •  hydrocortisone-bacitracin-zinc oxide-nystatin (MAGIC BARRIER) ointment 1 application, 1 application, Topical, PRN, Mike Teresa MD, 1 application at 11/16/21 0933  •  I-Renetta Eye Vitamin, 1 tablet, Oral, BID, Jaimie Gunderson MD, 1 tablet at 11/16/21 0929  •  ipratropium-albuterol (DUO-NEB) nebulizer solution 3 mL, 3 mL, Nebulization, 4x Daily - RT, Mike Teresa MD, 3 mL at 11/16/21 0726  •  levoFLOXacin (LEVAQUIN) 750 mg/150 mL D5W (premix) (LEVAQUIN) 750 mg, 750 mg, Intravenous, Once, Mike Teresa MD  •  magnesium oxide (MAG-OX) tablet 400 mg, 400 mg, Oral, Daily, Mike Teresa MD, 400 mg at 11/16/21 0930  •  methylPREDNISolone sodium succinate (SOLU-Medrol) injection 60 mg, 60 mg, Intravenous, Q8H, Mike Teresa MD, 60 mg at 11/16/21 0048  •  metoprolol tartrate (LOPRESSOR) tablet 25 mg, 25 mg, Oral, Q12H, Mike Teresa MD, 25 mg at 11/16/21 0931  •  ondansetron (ZOFRAN) tablet 4 mg, 4 mg, Oral, Q6H PRN, 4 mg at 11/15/21 2015 **OR** ondansetron (ZOFRAN) injection 4 mg, 4 mg, Intravenous, Q6H PRN, Mike Teresa MD, 4 mg at 11/12/21 2249  •  Pharmacy to Dose Zosyn, , Does not apply, Continuous, Mike Teresa MD  •  piperacillin-tazobactam (ZOSYN) 3.375 g/100 mL 0.9% NS IVPB (mbp), 3.375 g, Intravenous, Q8H, Mike Teresa MD, Last Rate: 0 mL/hr at 11/12/21 0354, 3.375 g at 11/16/21 0930  •  sodium  chloride 0.9 % flush 10 mL, 10 mL, Intravenous, PRN, Mike Teresa MD  •  sodium chloride 0.9 % flush 10 mL, 10 mL, Intravenous, Q12H, Mike Teresa MD, 10 mL at 11/16/21 0931  •  sodium chloride 0.9 % flush 10 mL, 10 mL, Intravenous, PRN, Mike Teresa MD  •  traMADol (ULTRAM) tablet 50 mg, 50 mg, Oral, Q6H PRN, Mike Teresa MD, 50 mg at 11/15/21 2016  •  vitamin B-12 (CYANOCOBALAMIN) tablet 1,000 mcg, 1,000 mcg, Oral, Daily, Pee Nolasco MD, 1,000 mcg at 11/16/21 0931  •  zinc sulfate (ZINCATE) capsule 220 mg, 220 mg, Oral, Daily, Mike Teresa MD, 220 mg at 11/16/21 0930    Results Review:  I have reviewed the labs, radiology results, and diagnostic studies.    Laboratory Data:   Results from last 7 days   Lab Units 11/16/21  0521 11/15/21  0515 11/14/21  0832 11/13/21  0654 11/12/21  0613 11/11/21  1306 11/11/21  1306   SODIUM mmol/L 135* 138 139   < > 141   < > 133*   POTASSIUM mmol/L 4.6 4.1 4.2   < > 4.1   < > 4.4   CHLORIDE mmol/L 98 100 98   < > 101   < > 95*   CO2 mmol/L 32.0* 30.0* 29.0   < > 35.0*   < > 27.0   BUN mg/dL 25* 25* 25*   < > 16   < > 14   CREATININE mg/dL 0.99 1.06* 1.24*   < > 1.11*   < > 0.88   GLUCOSE mg/dL 130* 137* 173*   < > 90   < > 95   CALCIUM mg/dL 9.2 9.3 9.9   < > 9.1   < > 9.0   BILIRUBIN mg/dL  --   --   --   --  0.2  --  0.3   ALK PHOS U/L  --   --   --   --  65  --  77   ALT (SGPT) U/L  --   --   --   --  9  --  10   AST (SGOT) U/L  --   --   --   --  14  --  18   ANION GAP mmol/L 5.0 8.0 12.0   < > 5.0   < > 11.0    < > = values in this interval not displayed.     Estimated Creatinine Clearance: 46.8 mL/min (by C-G formula based on SCr of 0.99 mg/dL).          Results from last 7 days   Lab Units 11/15/21  0515 11/14/21  0832 11/13/21  0654 11/12/21  0613 11/11/21  1306   WBC 10*3/mm3 24.06* 29.87* 15.23* 7.68 10.50   HEMOGLOBIN g/dL 9.2* 10.3* 9.3* 12.0 12.1   HEMATOCRIT % 28.4* 33.1* 29.2* 37.3 37.3   PLATELETS 10*3/mm3 297 339 269 291 683      Results from last 7 days   Lab Units 11/12/21  0613   INR  1.10       Culture Data:   No results found for: BLOODCX  No results found for: URINECX  No results found for: RESPCX  No results found for: WOUNDCX  No results found for: STOOLCX  No components found for: BODYFLD    Radiology Data:   Imaging Results (Last 24 Hours)     Procedure Component Value Units Date/Time    XR Chest PA & Lateral [[id number]] Resulted: 11/16/21 0815     Updated: 11/16/21 0820          I have reviewed the patient's current medications.     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  Findings of pleural fluid analysis have been reviewed and cytology/pathology are pending.  Continue noninvasive respiratory therapy, bronchodilators and empiric antimicrobial therapy.  Blood cultures have shown no growth and respiratory culture showed mixed bacterial katie/scant growth of gram-negative bacilli.  Leukocytosis is likely steroid-induced/due to acute illness, improving and will be monitored.  Dr. Nolasco is following.    Left lung collapse: Chest x-ray done 11/14/2021 showed near complete opacification of the left hemithorax which may be due to mucous plug or arising from malignancy.  Patient was treated with Mucomyst, suctioning and CT scan of the chest done 11/15/2021 showed some improvement.  Chest x-ray done this a.m. shows persistent left lung collapse.  CT surgery has been consulted and bronchoscopy is being contemplated.         Accelerated hypertension: Blood pressure has improved and remained stable.  Continue antihypertensives.    Acute kidney injury (likely prerenal): Resolved as creatinine is down to 0.99. Her baseline creatinine is  mostly less than 1.      Anemia (likely of chronic inflammation/iron deficiency): Hemoglobin did drop from 12.7 on admission to 9.3 on 11/13/2021 but has remained stable. Patient does not have any evidence of acute blood loss and findings of iron studies have been noted.  Continue iron supplementation, continue to monitor hemoglobin and transfuse if the need arises.     Paroxysmal atrial fibrillation: Patient is in normal sinus rhythm.  Continue amiodarone and Lopressor.  She is not anticoagulated secondary to risk of fall.     Coronary artery disease: Continue beta-blocker.  Antiplatelet therapy is currently on hold in the event patient will need bronchoscopy.        4.3 cm AAA: Patient is to continue surveillance/monitoring as outpatient.     Severe malnutrition/cachexia: Dietitian is following.    Deconditioning: Continue PT and OT.     Continue GI and DVT prophylaxis.     Overall prognosis is guarded.    Code status is 'no intubation and no CPR'.    Update was given to patient's daughter who was at the bedside.       Discharge Planning: In progress.    I confirmed that the patient's Advance Care Plan is present, code status is documented, or surrogate decision maker is listed in the patient's medical record.     I have utilized all available immediate resources to obtain, update, or review the patient's current medications      Mike Teresa MD   11/16/21   10:32 CST

## 2021-11-16 NOTE — PLAN OF CARE
Problem: Adult Inpatient Plan of Care  Goal: Plan of Care Review  Outcome: Ongoing, Progressing  Flowsheets (Taken 11/16/2021 0154)  Progress: declining  Plan of Care Reviewed With: patient  Outcome Summary: pt oxygen been turned up by respiratory x2 pt on 6 liters via nc, pt having some pain in left side/flank area, gave tramadol, daughter at bedside.     Problem: Adult Inpatient Plan of Care  Goal: Patient-Specific Goal (Individualized)  Outcome: Ongoing, Progressing     Problem: Adult Inpatient Plan of Care  Goal: Absence of Hospital-Acquired Illness or Injury  Outcome: Ongoing, Progressing     Problem: Adult Inpatient Plan of Care  Goal: Absence of Hospital-Acquired Illness or Injury  Intervention: Identify and Manage Fall Risk  Recent Flowsheet Documentation  Taken 11/16/2021 0015 by Ariana Payne RN  Safety Promotion/Fall Prevention:   activity supervised   assistive device/personal items within reach   clutter free environment maintained   fall prevention program maintained   safety round/check completed   nonskid shoes/slippers when out of bed  Taken 11/15/2021 2215 by Ariana Payne RN  Safety Promotion/Fall Prevention:   assistive device/personal items within reach   clutter free environment maintained   activity supervised   fall prevention program maintained   safety round/check completed   nonskid shoes/slippers when out of bed  Taken 11/15/2021 2120 by Ariana Payne, RN  Safety Promotion/Fall Prevention:   activity supervised   assistive device/personal items within reach   clutter free environment maintained   fall prevention program maintained   safety round/check completed   nonskid shoes/slippers when out of bed  Taken 11/15/2021 2015 by Ariana Payne RN  Safety Promotion/Fall Prevention:   clutter free environment maintained   fall prevention program maintained   safety round/check completed   nonskid shoes/slippers when out of bed  Taken 11/15/2021 2014 by Ariana Payne  MALINI MCCULLOUGH  Safety Promotion/Fall Prevention:   activity supervised   assistive device/personal items within reach   clutter free environment maintained   fall prevention program maintained   safety round/check completed   nonskid shoes/slippers when out of bed  Taken 11/15/2021 1950 by Ariana Payne RN  Safety Promotion/Fall Prevention:   activity supervised   clutter free environment maintained   assistive device/personal items within reach   fall prevention program maintained   safety round/check completed   muscle strengthening facilitated     Problem: Adult Inpatient Plan of Care  Goal: Absence of Hospital-Acquired Illness or Injury  Intervention: Prevent Skin Injury  Recent Flowsheet Documentation  Taken 11/16/2021 0015 by Ariana Payne RN  Body Position: tilted, right  Taken 11/15/2021 2215 by Ariana Payne RN  Body Position: patient/family refused  Taken 11/15/2021 2014 by Ariana Payne RN  Body Position: supine     Problem: Adult Inpatient Plan of Care  Goal: Absence of Hospital-Acquired Illness or Injury  Intervention: Prevent and Manage VTE (venous thromboembolism) Risk  Recent Flowsheet Documentation  Taken 11/15/2021 2015 by Ariana Payne RN  VTE Prevention/Management: bleeding risk factor(s) identified     Problem: Adult Inpatient Plan of Care  Goal: Optimal Comfort and Wellbeing  Outcome: Ongoing, Progressing     Problem: Adult Inpatient Plan of Care  Goal: Optimal Comfort and Wellbeing  Intervention: Provide Person-Centered Care  Recent Flowsheet Documentation  Taken 11/15/2021 2015 by Ariana Payne RN  Trust Relationship/Rapport:   care explained   questions answered   thoughts/feelings acknowledged     Problem: Adult Inpatient Plan of Care  Goal: Readiness for Transition of Care  Outcome: Ongoing, Progressing     Problem: Fall Injury Risk  Goal: Absence of Fall and Fall-Related Injury  Outcome: Ongoing, Progressing     Problem: Fall Injury Risk  Goal: Absence of Fall  and Fall-Related Injury  Intervention: Promote Injury-Free Environment  Recent Flowsheet Documentation  Taken 11/16/2021 0015 by Ariana Payne RN  Safety Promotion/Fall Prevention:   activity supervised   assistive device/personal items within reach   clutter free environment maintained   fall prevention program maintained   safety round/check completed   nonskid shoes/slippers when out of bed  Taken 11/15/2021 2215 by Ariana Payne RN  Safety Promotion/Fall Prevention:   assistive device/personal items within reach   clutter free environment maintained   activity supervised   fall prevention program maintained   safety round/check completed   nonskid shoes/slippers when out of bed  Taken 11/15/2021 2120 by Ariana Payne RN  Safety Promotion/Fall Prevention:   activity supervised   assistive device/personal items within reach   clutter free environment maintained   fall prevention program maintained   safety round/check completed   nonskid shoes/slippers when out of bed  Taken 11/15/2021 2015 by Ariana Payne RN  Safety Promotion/Fall Prevention:   clutter free environment maintained   fall prevention program maintained   safety round/check completed   nonskid shoes/slippers when out of bed  Taken 11/15/2021 2014 by Ariana Payne RN  Safety Promotion/Fall Prevention:   activity supervised   assistive device/personal items within reach   clutter free environment maintained   fall prevention program maintained   safety round/check completed   nonskid shoes/slippers when out of bed  Taken 11/15/2021 1950 by Ariana Payne, RN  Safety Promotion/Fall Prevention:   activity supervised   clutter free environment maintained   assistive device/personal items within reach   fall prevention program maintained   safety round/check completed   muscle strengthening facilitated     Problem: Skin Injury Risk Increased  Goal: Skin Health and Integrity  Outcome: Ongoing, Progressing     Problem: Skin  Injury Risk Increased  Goal: Skin Health and Integrity  Intervention: Optimize Skin Protection  Recent Flowsheet Documentation  Taken 11/15/2021 2015 by Ariana Payne RN  Pressure Reduction Techniques:   frequent weight shift encouraged   weight shift assistance provided  Pressure Reduction Devices: specialty bed utilized  Taken 11/15/2021 2014 by Ariana Payne RN  Head of Bed (HOB): HOB at 60 degrees     Problem: Breathing Pattern Ineffective  Goal: Effective Breathing Pattern  Outcome: Ongoing, Progressing     Problem: Breathing Pattern Ineffective  Goal: Effective Breathing Pattern  Intervention: Promote Improved Breathing Pattern  Recent Flowsheet Documentation  Taken 11/15/2021 2014 by Ariana Payne RN  Head of Bed (HOB): HOB at 60 degrees   Goal Outcome Evaluation:  Plan of Care Reviewed With: patient        Progress: declining  Outcome Summary: pt oxygen been turned up by respiratory x2 pt on 6 liters via nc, pt having some pain in left side/flank area, gave tramadol, daughter at bedside.

## 2021-11-16 NOTE — PROGRESS NOTES
Item 0 Points 1 Point 2 Points 3 Points 4 Points Subtotal   Mental Status Alert, oriented, cooperative Lethargic, follows commands Confused, not following commands Obtunded or Somnolent Comatose 0   Respiratory Pattern Regular RR 8-16 breaths/minute Increased RR 18-25 breaths/minute Dyspnea on exertion, irregular RR 26-30 breaths/minute Shortness of breath,  RR 31-35 breaths/minute Accessory muscle use, severe SOB  RR > 35 breaths/minute 1   Breath Sounds Clear Decreased unilaterally Decreased bilaterally Basilar crackles Wheezing and/or rhonchi 4   Cough Strong, spontaneous, non-productive Strong productive Weak, non-productive Weak, productive or weak with rhonchi Absent or may require suctioning 0   Pulmonary Status Nonsmoker, no previous history, >1 year quit < 1 PPD  <1 year quit > or = 1 PPD Diagnosed pulmonary disease (severe or chronic) Severe or chronic pulmonary disease with exacerbation 3   Surgical Status None General surgery (non-abdominal or non-thoracic) Lower abdominal Thoracic or upper abdominal Thoracic with pulmonary disease 0   Chest X-ray Clear Chronic changes Infiltrates, atelectasis or pleural effusion Infiltrates in > 1 lobe Diffuse infiltrates and atelectasis and/or effusions 4   Activity   Level Ambulatory Ambulatory with assistance Non-ambulatory Paraplegic Quadriplegic 1        Total Score   13   Score    Drug Therapy Frequency 20 or >    Q4 Duoneb with Q2 Albuterol PRN 15-19    Q6 Duoneb with Q4 Albuterol PRN 10-14    QID Duoneb with Q4 Albuterol PRN 5-9    TID Duoneb with Q6 Albuterol PRN 0-4    Q4 PRN Duoneb                  Lung Expansion Therapy (PEP) Bronchopulmonary Hygiene (CPT)   Q4 & PRN - Severe atelectasis, poor oxygenation Q4 - Copious secretions, dyspnea, unable to sleep   QID - High risk for persistent atelectasis, existence of atelectasis QID & Q4 PRN - Moderate secretion production   TID - At risk for developing atelectasis TID - Small amounts of secretions with poor  cough   BID - Prevention of atelectasis BID - Unable to breathe deeply and cough spontaneously     RT Comments / Recommendations:        Pt scored @ 13 today on assessment for RT protocol. No changes made @ this time to therapy regimen

## 2021-11-17 NOTE — DISCHARGE PLACEMENT REQUEST
"  If additional clinicals needed call and please keep me updated. Thank you    MALINI Perez CM at [phone]      Ailya Vargas (81 y.o. Female)             Date of Birth Social Security Number Address Home Phone MRN    1940  [address] [facility] [id number]    Denominational Marital Status             Yarsani        Admission Date Admission Type Admitting Provider Attending Provider Department, Room/Bed    11/11/21 Emergency Andrew Watters MD Williams, Kevin L, MD [address], 318/1    Discharge Date Discharge Disposition Discharge Destination                         Attending Provider: Andrew Watters MD    Allergies: No Known Allergies    Isolation: None   Infection: None   Code Status: No CPR   Advance Care Planning Activity    Ht: 172.7 cm (67.99\")   Wt: 65.5 kg (144 lb 6.4 oz)    Admission Cmt: None   Principal Problem: None                Active Insurance as of 11/11/2021     Primary Coverage     Payor Plan Insurance Group Employer/Plan Group    MEDICARE MEDICARE A & B      Payor Plan Address Payor Plan Phone Number Payor Plan Fax Number Effective Dates    [address] [phone]  9/1/2006 - None Entered    [facility] [id number]       Subscriber Name Subscriber Birth Date Member ID       ALIYA VARGAS 1940 [id number]           Secondary Coverage     Payor Plan Insurance Group Employer/Plan Group    [facility]     Payor Plan Address Payor Plan Phone Number Payor Plan Fax Number Effective Dates    [address] [address]   11/1/2016 - None Entered    [facility] [phone]       Subscriber Name Subscriber Birth Date Member ID       ALIYA VARGAS 1940 [id number]                 Emergency Contacts      (Rel.) Home Phone Work Phone Mobile Phone    YogiAris lin (Son) -- -- [phone]    LintonIsamar yi (Daughter) -- -- [phone]            Insurance Information                MEDICARE/MEDICARE A & B " Phone: [phone]    Subscriber: Anahi [employer] Subscriber#: [id number]    Group#: -- Precert#: --        [facility] Phone: --    Subscriber: Anahi [employer] Subscriber#: [id number]    Group#: [facility] Precert#: --             History & Physical      KathleenendMike delgadillo MD at 11/11/21 3384                [facility] Medicine Services  INPATIENT HISTORY AND PHYSICAL       Patient Care Team:  Ramiro Gloria MD as PCP - General  Nicola Rich MD as Surgeon (Orthopedic Surgery)    Date of Admission: 11/11/2021    Primary Care Physician: Ramiro Gloria MD    Chief complaint   Chief Complaint   Patient presents with   • Shortness of Breath       Subjective     Patient is a 81 y.o. female with history of hypertension, coronary artery disease, AAA, atrial fibrillation, nicotine dependence (she quit in 2077) presents with 2-week history of progressively worsening shortness of breath associated with nonproductive cough, weakness and overall functional decline.  No reported history of fever, chills, nausea, vomiting, chest pain, unintentional weight loss, hemoptysis, orthopnea or paroxysmal nocturnal dyspnea.    On triage, she was noted to have elevated blood pressure of 213/86 which improved slightly with IV hydralazine.    Blood work showed negative troponin, unremarkable CBC and CMP.  Chest x-ray showed moderate left-sided pleural effusion and large 11.8 x 8.1 cm masslike density in the left upper lobe indicative of neoplasm.      Review of Systems   Constitutional: Positive for activity change and fatigue. Negative for appetite change, chills, diaphoresis and fever.   HENT: Negative for trouble swallowing and voice change.    Eyes: Negative for photophobia and visual disturbance.   Respiratory: Negative for cough, choking, chest tightness, shortness of breath, wheezing and stridor.    Cardiovascular: Negative for chest pain, palpitations and leg swelling.    Gastrointestinal: Negative for abdominal distention, abdominal pain, blood in stool, constipation, diarrhea, nausea and vomiting.   Endocrine: Negative for cold intolerance, heat intolerance, polydipsia, polyphagia and polyuria.   Genitourinary: Negative for decreased urine volume, difficulty urinating, dysuria, enuresis, flank pain, frequency, hematuria and urgency.   Musculoskeletal: Negative for arthralgias, gait problem, myalgias, neck pain and neck stiffness.   Skin: Negative for pallor, rash and wound.   Neurological: Negative for dizziness, tremors, seizures, syncope, facial asymmetry, speech difficulty, weakness, light-headedness, numbness and headaches.   Hematological: Does not bruise/bleed easily.   Psychiatric/Behavioral: Negative for agitation, behavioral problems and confusion.         History  Past Medical History:   Diagnosis Date   • Abdominal aortic aneurysm (AAA) without rupture (HCC)    • Atrial fibrillation (HCC) 10/2/2017    New onset.  S/p cardioversion  On coumadin now.  Amiodarone    • CAD (coronary artery disease)    • Gastroesophageal reflux disease    • Hypercholesterolemia    • Hypertension    • Nuclear senile cataract    • Osteoarthritis    • Osteoporosis    • Peripheral arterial disease (HCC)    • Solitary lung nodule      Past Surgical History:   Procedure Laterality Date   • ABDOMINAL AORTIC ANEURYSM REPAIR     • HIP INTERTROCHANTERIC NAILING Left 10/1/2017    Procedure: HIP INTERTROCHANTERIC NAILING - LEFT - [place];  Surgeon: Nicola Rich MD;  Location: [facility];  Service:      Family History   Problem Relation Age of Onset   • Hypertension Mother    • Coronary artery disease Father      Social History     Tobacco Use   • Smoking status: Former Smoker     Packs/day: 1.00     Years: 57.00     Pack years: 57.00     Start date: 1961     Quit date: 10/1/2017     Years since quittin.1   • Smokeless tobacco: Never Used   Substance Use Topics   • Alcohol use: No    • Drug use: No     (Not in a hospital admission)    Allergies:  Patient has no known allergies.  Prior to Admission medications    Medication Sig Start Date End Date Taking? Authorizing Provider   Acetaminophen (TYLENOL ARTHRITIS PAIN PO) Take 500 mg by mouth 3 (Three) Times a Day As Needed. Indications: Mild Pain 10/1/21   Sher Murillo MD   amiodarone (PACERONE) 200 MG tablet Take 1 tablet by mouth Daily.  Patient taking differently: Take 100 mg by mouth Daily. 10/24/17   Nikhil Rouse MD   aspirin 81 MG EC tablet Take 81 mg by mouth daily.    Sher Murillo MD   Cholecalciferol (GNP VITAMIN D SUPER STRENGTH) 5000 UNITS tablet Take  by mouth.    Sher Murillo MD   clopidogrel (PLAVIX) 75 MG tablet Take 1 tablet by mouth Daily. 2/8/21   Ramiro Gloria MD   famotidine (PEPCID) 20 MG tablet Take 1 tablet by mouth 2 (Two) Times a Day As Needed for Heartburn.  Patient taking differently: Take 1 tablet by mouth 2 (Two) Times a Day. 11/6/18   Ramiro Gloria MD   ferrous sulfate 324 (65 Fe) MG tablet delayed-release EC tablet Take 1 tablet by mouth Daily With Breakfast. 10/25/17   Nikhil Rouse MD   furosemide (LASIX) 40 MG tablet Take 40 mg by mouth Daily. ONE TABLET BY MOUTH EVERY DAY 10/30/21   Sher Murillo MD   gabapentin (NEURONTIN) 300 MG capsule Take 1-2 capsules by mouth Every Night. 11/1/21   Ramiro Gloria MD   hydrALAZINE (APRESOLINE) 25 MG tablet Take 1 tablet by mouth 4 (Four) Times a Day.  Patient taking differently: Take 25 mg by mouth 4 (Four) Times a Day. Patient states she takes as needed 2/6/20   Ramiro Gloria MD   hydrALAZINE (APRESOLINE) 50 MG tablet Take 50 mg by mouth 3 (Three) Times a Day. 1/6/21   Sher Murillo MD   lisinopril (PRINIVIL,ZESTRIL) 40 MG tablet Take 1 tablet by mouth Daily. 2/8/21   Ramiro Gloria MD   magnesium oxide (MAGOX) 400 (241.3 Mg) MG tablet tablet Take 1 tablet by mouth Daily. 10/24/17   Nikhil Rouse  MD   metoprolol tartrate (LOPRESSOR) 50 MG tablet TAKE 1 TABLET BY MOUTH EVERY 12 (TWELVE) HOURS. 4/9/21   Yasmine Jose APRN   Multiple Vitamins-Minerals (VISION FORMULA PO) Take 1 tablet by mouth every day.    Provider, MD Sher   polyethylene glycol (MIRALAX) powder ONE CAPFUL IN 8 OUNCES OF LIQUID ONCE DAILY UP TO THREE TIMES DAILY AS NEEDED. 1/25/18   Ramiro Gloria MD   traMADol (Ultram) 50 MG tablet Take 1 tablet by mouth Every 6 (Six) Hours As Needed for Moderate Pain . 3/2/21   Ramiro Gloria MD   Zinc 50 MG capsule Take 1 capsule by mouth Daily.    Provider, MD Sher       Objective        Vital Signs  Temp:  [97.5 °F (36.4 °C)] 97.5 °F (36.4 °C)  Heart Rate:  [56-65] 62  Resp:  [20] 20  BP: (155-213)/(72-98) 194/81      Physical Exam  Vitals and nursing note reviewed.   Constitutional:       General: She is not in acute distress.     Appearance: She is underweight. She is not diaphoretic.      Comments: Patient is underweight, cachectic and seems severely malnourished.   HENT:      Head: Normocephalic and atraumatic.      Right Ear: External ear normal.      Left Ear: External ear normal.      Nose: Nose normal.   Eyes:      Extraocular Movements: Extraocular movements intact.      Conjunctiva/sclera: Conjunctivae normal.      Pupils: Pupils are equal, round, and reactive to light.   Neck:      Thyroid: No thyromegaly.      Vascular: No JVD.   Cardiovascular:      Rate and Rhythm: Normal rate and regular rhythm.      Heart sounds: Normal heart sounds. No murmur heard.  No friction rub. No gallop.    Pulmonary:      Effort: Pulmonary effort is normal. No respiratory distress.      Breath sounds: No stridor. Examination of the left-middle field reveals decreased breath sounds. Examination of the left-lower field reveals decreased breath sounds. Decreased breath sounds and rhonchi present. No wheezing or rales.   Chest:      Chest wall: No tenderness.   Abdominal:      General: Bowel  sounds are normal. There is no distension.      Palpations: Abdomen is soft. There is no mass.      Tenderness: There is no abdominal tenderness. There is no right CVA tenderness, left CVA tenderness, guarding or rebound.      Hernia: No hernia is present.   Musculoskeletal:         General: No swelling, tenderness, deformity or signs of injury. Normal range of motion.      Cervical back: Normal range of motion and neck supple.      Right lower leg: No edema.      Left lower leg: No edema.   Skin:     General: Skin is warm and dry.      Coloration: Skin is not jaundiced or pale.      Findings: No bruising, erythema, lesion or rash.      Comments: She has chronic stasis changes both legs.   Neurological:      General: No focal deficit present.      Mental Status: She is alert and oriented to person, place, and time.      Cranial Nerves: No cranial nerve deficit.      Sensory: No sensory deficit.      Motor: No weakness.      Coordination: Coordination normal.      Gait: Gait normal.      Deep Tendon Reflexes: Reflexes are normal and symmetric. Reflexes normal.   Psychiatric:         Mood and Affect: Mood normal.         Behavior: Behavior normal. Behavior is cooperative.         Thought Content: Thought content normal.         Judgment: Judgment normal.           Results Review:     Results from last 7 days   Lab Units 11/11/21  1306   SODIUM mmol/L 133*   POTASSIUM mmol/L 4.4   CHLORIDE mmol/L 95*   CO2 mmol/L 27.0   BUN mg/dL 14   CREATININE mg/dL 0.88   GLUCOSE mg/dL 95   CALCIUM mg/dL 9.0   BILIRUBIN mg/dL 0.3   ALK PHOS U/L 77   ALT (SGPT) U/L 10   AST (SGOT) U/L 18             Results from last 7 days   Lab Units 11/11/21  1306   WBC 10*3/mm3 10.50   HEMOGLOBIN g/dL 12.1   HEMATOCRIT % 37.3   PLATELETS 10*3/mm3 321           Imaging Results (Last 7 Days)     Procedure Component Value Units Date/Time    CT Chest With Contrast Diagnostic [[id number]] Collected: 11/11/21 1444     Updated: 11/11/21 1550     Narrative:      PROCEDURE: CT SCAN OF THE CHEST WITH INTRAVENOUS CONTRAST.    CLINICAL INFORMATION:  large mass seen on CXR     This exam was performed using radiation doses that are as low as  reasonably achievable (ALARA).  This exam was performed according to our departmental dose  optimization program, which includes automated exposure control,  adjustment of the mA and/or KV according to patient size and/or  use of iterative reconstruction technique.    COMPARISON: Chest x-ray dated 11/11/2021. Coronary CT dated  9/11/2016.    CONTRAST: 100 cc intravenous Isovue 300    TECHNIQUE: Axial images were obtained and multiplanar  reconstructions were made.      FINDINGS:  LUNGS/PLEURA: There is a large mass in the anterior left upper  lobe and left mediastinum/aorticopulmonary window region  measuring 8 x 6 x 9 cm. Inside the mass there are two areas of  what appear to represent necrosis or fluid collections, the  larger one is more superior and anterior measuring 5 x 5 cm and  the smaller one is more inferior and medial measuring 1.9 x 3.8  cm. There is surrounding consolidation and nodularity in the left  lung apex and around the parameter of the mass. The margins of  the mass are spiculated.  There is a moderate size left pleural effusion with adjacent  subsegmental atelectasis of the left lower lobe. There is  platelike atelectasis in the posterior right lung base. There is  centrilobular emphysema in the upper lobes bilaterally.  TRACHEA AND BRONCHI:  The left upper lobe bronchi are narrowed by  the mass.  MEDIASTINUM, OSEI AND LYMPH NODES: The mediastinum, osei and  lymph nodes are normal.  HEART AND PERICARDIUM: The heart and pericardium are normal.  VASCULAR: There is either near complete, or complete obliteration  of the left upper lobe pulmonary artery due to the mass. There is  coronary artery calcification/coronary artery disease. There is  calcification of the mitral annulus. There is  aneurysmal  dilatation of the proximal abdominal aorta at the level of the  diaphragmatic hiatus measuring 3.9 cm in diameter, and partially  visualized aneurysmal dilatation of the more distal abdominal  aorta at the level of the kidneys which measures 4.3 x 4 cm,  which appears to have undergone surgical repair but is not well  evaluated.  UPPER ABDOMEN: There is prominence of both adrenal glands which  could be due to metastatic disease or at analysis but is  nonspecific.  OSSEOUS STRUCTURES: Bones appear demineralized. There are mild  compression deformities of multiple thoracic and upper lumbar  levels similar to the prior study.      Impression:      1.  Large spiculated mass in the left upper lobe and  aorticopulmonary window region of the mediastinum with two  central areas of necrosis or fluid collections. Findings are  highly suspicious for neoplasm but could also be due to an  infectious process. This mass exerts mass effect on the left  upper lobe bronchial structures and left upper lobe pulmonary  artery.  2.  Moderate-sized left pleural effusion with adjacent  subsegmental atelectasis of the left lower lobe.  3.  Coronary artery atherosclerotic disease.  4.  Abdominal aortic aneurysms at the hiatus and at the level of  the kidneys measuring up to 4.3 cm in diameter. There may have  been a previous abdominal aortic aneurysm repair of the more  inferior level. These findings are incompletely evaluated.  Recommend correlation with surgical history, and more complete  imaging of the abdomen and pelvis to evaluate the size of the  aneurysm.    For management of the more superior aneurysm,  4.0-4.4 cm AAA, recommend follow-up every 12 months and recommend  vascular consultation.    Electronically signed by:  Trent Domínguez MD  11/11/2021 3:48 PM  CST Workstation: [id number]    XR Chest 1 View [[id number]] Collected: 11/11/21 1325     Updated: 11/11/21 8442    Narrative:      EXAM DESCRIPTION:     XR CHEST 1  VW    CLINICAL HISTORY:     81 years  Female  SOA Triage Protocol    COMPARISON:     March 2, 2021    TECHNIQUE:     One view-AP portable radiograph the chest    FINDINGS:     There is a large ovoid masslike density involving the left upper  lobe measuring 11.8 x 8.1 cm in size. There is a questionable  air-fluid level superior aspect of this mass. The findings are  highly suspicious for neoplasm. Infiltrate is possible but less  likely. There is a moderate size left pleural effusion. The right  lung is clear. The cardiac silhouette and pulmonary vasculature  demonstrate no evidence of acute congestive heart failure.      Impression:          1. Large 11.8 x 8.1 cm masslike density in the left upper lobe  with questionable cavitation/air-fluid level as above. The  findings are indicative of underlying pulmonary neoplasm. CT of  the chest with contrast is recommended for confirmation and  staging.  2. Moderate size left pleural effusion.        Electronically signed by:  Paty Albert MD  11/11/2021 1:46 PM  CST Workstation: [id number]          Assessment / Plan       Hospital Problem List:    Mass of left lung  This is likely secondary to malignancy and complicated by malignant pleural effusion/possible postobstructive  pneumonia: Begin noninvasive respiratory therapy, empiric antimicrobial therapy and schedule patient for CT-guided thoracentesis/lung biopsy.  Blood cultures have been drawn and results are pending.  Consult oncologist.    Accelerated hypertension: Continue patient's home medications and make adjustments as needed for optimal blood pressure control.  She may need Cardene infusion if the need arises.    Paroxysmal atrial fibrillation: Patient is in normal sinus rhythm.  Continue amiodarone and Lopressor.  She is not anticoagulated secondary to risk of fall.    Coronary artery disease: Continue beta-blocker.  Hold dual antiplatelet therapy pending thoracentesis/lung biopsy.    4.3 cm AAA: Patient is to  continue surveillance/monitoring as outpatient.    Severe malnutrition/cachexia: Consult dietitian.    Begin GI and DVT prophylaxis.    Additional orders and treatment plan as hospital course dictates.    Code status was discussed with the patient and she wishes to be 'no intubation and no CPR'.        I confirmed that the patient's Advance Care Plan is present, code status is documented, or surrogate decision maker is listed in the patient's medical record.     I have utilized all available immediate resources to obtain, update, or review the patient's current medications    I discussed the patient's findings and my recommendations with patient and her daughter.     Mike Teresa MD  11/11/21  16:56 CST      Dictated Utilizing Dragon Dictation         Electronically signed by Mike Teresa MD at 11/11/21 1737          Physician Progress Notes (last 24 hours)      Eriberto Madrid MD at 11/17/21 1206              [facility] Medicine Services  INPATIENT PROGRESS NOTE    Length of Stay: 6  Date of Admission: 11/11/2021  Primary Care Physician: Ramiro Gloria MD    Subjective   Chief Complaint: soa       HPI: Pt cont to be SOA beyond her baseline.  She has requested Hospice at discharge.  Family at bedside.         Review of Systems   Comprehensive ROS completed.  Pertinent positives and negatives per HPI.  All others reviewed and negative.      Objective    Temp:  [96.8 °F (36 °C)-98 °F (36.7 °C)] 97.3 °F (36.3 °C)  Heart Rate:  [] 62  Resp:  [18-20] 20  BP: (122-155)/(68-88) 154/70    Physical Exam  Constitutional:       General: She is not in acute distress.     Appearance: Normal appearance.   HENT:      Head: Normocephalic and atraumatic.      Right Ear: External ear normal.      Left Ear: External ear normal.      Nose: Nose normal.      Mouth/Throat:      Mouth: Mucous membranes are moist.      Pharynx: Oropharynx is clear.   Eyes:      General: No  scleral icterus.     Extraocular Movements: Extraocular movements intact.      Pupils: Pupils are equal, round, and reactive to light.   Cardiovascular:      Rate and Rhythm: Normal rate and regular rhythm.   Pulmonary:      Effort: Accessory muscle usage present.      Comments: Dullness, left lung.  Coarse BS  Abdominal:      Palpations: Abdomen is soft.      Tenderness: There is no abdominal tenderness. There is no guarding or rebound.   Musculoskeletal:      Cervical back: Neck supple.      Right lower leg: No edema.      Left lower leg: No edema.   Lymphadenopathy:      Cervical: No cervical adenopathy.   Skin:     General: Skin is warm and dry.      Findings: No rash.   Neurological:      General: No focal deficit present.      Mental Status: She is alert and oriented to person, place, and time.   Psychiatric:         Mood and Affect: Mood normal.         Behavior: Behavior normal.             Results Review:  I have reviewed the labs, radiology results, and diagnostic studies.    Laboratory Data:   Results from last 7 days   Lab Units 11/17/21  0508 11/16/21  0521 11/15/21  0515 11/13/21  0654 11/12/21  0613 11/11/21  1306 11/11/21  1306   SODIUM mmol/L 137 135* 138   < > 141   < > 133*   POTASSIUM mmol/L 4.9 4.6 4.1   < > 4.1   < > 4.4   CHLORIDE mmol/L 97* 98 100   < > 101   < > 95*   CO2 mmol/L 33.0* 32.0* 30.0*   < > 35.0*   < > 27.0   BUN mg/dL 26* 25* 25*   < > 16   < > 14   CREATININE mg/dL 1.00 0.99 1.06*   < > 1.11*   < > 0.88   GLUCOSE mg/dL 124* 130* 137*   < > 90   < > 95   CALCIUM mg/dL 9.7 9.2 9.3   < > 9.1   < > 9.0   BILIRUBIN mg/dL 0.3  --   --   --  0.2  --  0.3   ALK PHOS U/L 79  --   --   --  65  --  77   ALT (SGPT) U/L 23  --   --   --  9  --  10   AST (SGOT) U/L 30  --   --   --  14  --  18   ANION GAP mmol/L 7.0 5.0 8.0   < > 5.0   < > 11.0    < > = values in this interval not displayed.     Estimated Creatinine Clearance: 46.4 mL/min (by C-G formula based on SCr of 1 mg/dL).           Results from last 7 days   Lab Units 11/17/21  0508 11/15/21  0515 11/14/21  0832 11/13/21  0654 11/12/21  0613   WBC 10*3/mm3 17.11* 24.06* 29.87* 15.23* 7.68   HEMOGLOBIN g/dL 10.3* 9.2* 10.3* 9.3* 12.0   HEMATOCRIT % 33.3* 28.4* 33.1* 29.2* 37.3   PLATELETS 10*3/mm3 353 297 339 269 291     Results from last 7 days   Lab Units 11/12/21  0613   INR  1.10       Culture Data:   No results found for: BLOODCX  No results found for: URINECX  No results found for: RESPCX  No results found for: WOUNDCX  No results found for: STOOLCX  No components found for: BODYFLD    Radiology Data:   Imaging Results (Last 24 Hours)     ** No results found for the last 24 hours. **          I have reviewed the patient's current medications.     Assessment/Plan     Active Hospital Problems    Diagnosis    • Mass of left lung        Plan:      1.  Left lung mass/ NSCLC  2.  Collapse left lung  3.  HTN  4.  LAURA - resolved  5.  Anemia - iron deficient  6.  PAF - rate controlled - no LTAC due to fall risk  7.  CAD  8.  AAA (4.3 cm)  9.  Severe PCM    - Hospice referral made.    - cont O2 support  - cont supportive care and home medications as tolerated              Discharge Planning: I expect patient to be discharged to home with Hospice in 1-2 days.    Eriberto Madrid MD    Electronically signed by Eriberto Madrid MD at 11/17/21 1258     Pee Nolasco MD at 11/16/21 1700                HISTORY OF PRESENT ILLNESS:    Continues to have shortness of breath.  No bleeding or fever reported.        PAST MEDICAL HISTORY:    Past Medical History:   Diagnosis Date   • Abdominal aortic aneurysm (AAA) without rupture (HCC)    • Atrial fibrillation (HCC) 10/2/2017    New onset.  S/p cardioversion  On coumadin now.  Amiodarone    • CAD (coronary artery disease)    • Gastroesophageal reflux disease    • Hypercholesterolemia    • Hypertension    • Nuclear senile cataract    • Osteoarthritis    • Osteoporosis    • Peripheral arterial disease  "(HCC)    • Solitary lung nodule        SOCIAL HISTORY:    Social History     Tobacco Use   • Smoking status: Former Smoker     Packs/day: 1.00     Years: 57.00     Pack years: 57.00     Start date: 1961     Quit date: 10/1/2017     Years since quittin.1   • Smokeless tobacco: Never Used   Substance Use Topics   • Alcohol use: No   • Drug use: No       Surgical History :  Past Surgical History:   Procedure Laterality Date   • ABDOMINAL AORTIC ANEURYSM REPAIR     • HIP INTERTROCHANTERIC NAILING Left 10/1/2017    Procedure: HIP INTERTROCHANTERIC NAILING - LEFT - [place];  Surgeon: Nicola Rich MD;  Location: [facility];  Service:        ALLERGIES:    No Known Allergies      FAMILY HISTORY:  Family History   Problem Relation Age of Onset   • Hypertension Mother    • Coronary artery disease Father            REVIEW OF SYSTEMS:      CONSTITUTIONAL:  Complains of fatigue.  Positive for generalized weakness.  Positive for weight loss.  Positive for poor appetite.  Denies any fever, chills .     EYES: No visual disturbances. No discharge. No new lesion.    ENMT:  No epistaxis, mouth sores or difficulty swallowing.    RESPIRATORY: Positive for shortness of breath and cough.    CARDIOVASCULAR: Positive for chest tightness.  No chest pain or palpitations.    GASTROINTESTINAL:  No abdominal pain nausea, vomiting or blood in the stool.    GENITOURINARY: No dysuria or hematuria.    MUSCULOSKELETAL: Positive for arthralgia    LYMPHATICS:  Denies any abnormal swollen glands anywhere in the body.    NEUROLOGICAL : No tingling or numbness. No headache or dizziness. No seizures or balance problems.    SKIN: Positive for bruising    ENDOCRINE : No new hot or cold intolerance. No new polyuria. No polydipsia.        PHYSICAL EXAMINATION:      VITAL SIGNS:  /88 (BP Location: Right arm)   Pulse 105   Temp 98 °F (36.7 °C) (Oral)   Resp 18   Ht 172.7 cm (67.99\")   Wt 65.4 kg (144 lb 3.2 oz)   LMP  (LMP Unknown) "   SpO2 92%   BMI 21.93 kg/m²       11/14/21  1057 11/15/21  0527 11/16/21  0406   Weight: 65.9 kg (145 lb 4.5 oz) 64.5 kg (142 lb 2 oz) 65.4 kg (144 lb 3.2 oz)         ECOG performance status: 3    CONSTITUTIONAL:  Not in any distress.    EYES: Mild conjunctival Pallor. No Icterus. No Pterygium. Extraocular Movements intact.No ptosis.    ENMT:  Normocephalic, atraumatic. No facial asymmetry noted.    NECK:  No adenopathy. Trachea midline.     RESPIRATORY: Diminished air entry in left lung field.  No rhonchi or wheezing.    CARDIOVASCULAR:  S1, S2.  Irregular. No murmur or gallop appreciated.    ABDOMEN:  Soft, obese, nontender. Bowel sounds present in all four quadrants.  No hepatosplenomegaly appreciated.    MUSCULOSKELETAL:  Trace edema. No calf tenderness. Decreased range of motion.    NEUROLOGIC:    No motor  deficit appreciated. Cranial nerves II-XII grossly intact.    SKIN : Bruising present on bilateral upper extremity. Skin is warm and moist to touch.    LYMPHATICS: No new enlarged lymph nodes in neck or supraclavicular area.    PSYCHIATRY: Alert, awake and oriented ×3.           DIAGNOSTIC DATA:    Glucose   Date Value Ref Range Status   11/16/2021 130 (H) 65 - 99 mg/dL Final     Sodium   Date Value Ref Range Status   11/16/2021 135 (L) 136 - 145 mmol/L Final     Potassium   Date Value Ref Range Status   11/16/2021 4.6 3.5 - 5.2 mmol/L Final     CO2   Date Value Ref Range Status   11/16/2021 32.0 (H) 22.0 - 29.0 mmol/L Final     Chloride   Date Value Ref Range Status   11/16/2021 98 98 - 107 mmol/L Final     Anion Gap   Date Value Ref Range Status   11/16/2021 5.0 5.0 - 15.0 mmol/L Final     Creatinine   Date Value Ref Range Status   11/16/2021 0.99 0.57 - 1.00 mg/dL Final     BUN   Date Value Ref Range Status   11/16/2021 25 (H) 8 - 23 mg/dL Final     BUN/Creatinine Ratio   Date Value Ref Range Status   11/16/2021 25.3 (H) 7.0 - 25.0 Final     Calcium   Date Value Ref Range Status   11/16/2021 9.2 8.6 -  10.5 mg/dL Final     eGFR Non  Amer   Date Value Ref Range Status   11/16/2021 54 (L) >60 mL/min/1.73 Final     Lab Results   Component Value Date    WBC 24.06 (H) 11/15/2021    HGB 9.2 (L) 11/15/2021    HCT 28.4 (L) 11/15/2021    MCV 88.5 11/15/2021     11/15/2021     Lab Results   Component Value Date    NEUTROABS 22.72 (H) 11/15/2021    IRON 12 (L) 11/13/2021    TIBC 209 (L) 11/13/2021    LABIRON 6 (L) 11/13/2021    FERRITIN 189.70 (H) 11/13/2021    [id number] 310 11/11/2021    FOLATE 9.13 11/11/2021     No results found for: , LABCA2, AFPTM, HCGQUANT, , CHROMGRNA, [id number], CEA, REFLABREPO        PATHOLOGY:  * Cannot find OR log *         RADIOLOGY DATA :  CT Chest Without Contrast Diagnostic    Result Date: 11/15/2021  1.Mediastinal structures of the chest reveal left perihilar infiltrating opacity with associated mild narrowing of the left upper lobe bronchus. The left perihilar infiltrating opacity extends into the left upper lobe superior perihilar region where it becomes a hypodense circular opacity which measures 5.98 x 5.6 cm. These findings would be suspicious for neoplasm versus less likely inflammatory process. Recommend clinical correlation and if clinically indicated PET scan, sampling, a follow-up CT in three months to further evaluate. 2. Interval development of left upper lobe peripheral patchy opacities suspicious for postobstructive atelectasis and/or pneumonia. 3. Right middle lobe and right lower lobe small interstitial groundglass opacities. This would be suspicious for inflammatory changes versus edema. 4. Right lower lobe posterior basilar segment and left lower lobe small patchy opacities suspicious for passive atelectasis and/or pneumonia. 5. Moderate left pleural effusion and small right pleural effusion. Electronically signed by:  Eliseo Smith MD  11/15/2021 7:42 AM CST Workstation: [id number]    CT Chest With Contrast Diagnostic    Result Date: 11/11/2021  1.   Large spiculated mass in the left upper lobe and aorticopulmonary window region of the mediastinum with two central areas of necrosis or fluid collections. Findings are highly suspicious for neoplasm but could also be due to an infectious process. This mass exerts mass effect on the left upper lobe bronchial structures and left upper lobe pulmonary artery. 2.  Moderate-sized left pleural effusion with adjacent subsegmental atelectasis of the left lower lobe. 3.  Coronary artery atherosclerotic disease. 4.  Abdominal aortic aneurysms at the hiatus and at the level of the kidneys measuring up to 4.3 cm in diameter. There may have been a previous abdominal aortic aneurysm repair of the more inferior level. These findings are incompletely evaluated. Recommend correlation with surgical history, and more complete imaging of the abdomen and pelvis to evaluate the size of the aneurysm. For management of the more superior aneurysm, 4.0-4.4 cm AAA, recommend follow-up every 12 months and recommend vascular consultation. Electronically signed by:  Trent Domínguez MD  11/11/2021 3:48 PM CST Workstation: [id number]    XR Chest 1 View    Result Date: 11/11/2021  1. Large 11.8 x 8.1 cm masslike density in the left upper lobe with questionable cavitation/air-fluid level as above. The findings are indicative of underlying pulmonary neoplasm. CT of the chest with contrast is recommended for confirmation and staging. 2. Moderate size left pleural effusion. Electronically signed by:  Paty Albert MD  11/11/2021 1:46 PM CST Workstation: [id number]    CT Needle Biopsy Lung    Result Date: 11/12/2021  CONCLUSION: Successful ultrasound-guided left lung mass needle biopsy, and thoracentesis with removal of 300 mL of serosanguineous fluid, as described above. Electronically signed by:  Trent Domínguez MD  11/12/2021 4:32 PM CST Workstation: [id number]    XR Chest PA & Lateral    Result Date: 11/16/2021  Opacified left hemithorax as above  without mediastinal shift may represent a balance between pleural effusion and volume loss/consolidation associated with left upper lung mass. Findings suspicious for fluid overload/CHF. Clinical correlation recommended. Electronically signed by:  Nav Edwards MD  11/16/2021 10:57 AM CST Workstation: [id number]    XR Chest PA & Lateral    Result Date: 11/14/2021  Near complete opacification of the left thorax most consistent with effusion, atelectasis, and airspace disease/pneumonia. Probable left stomach bubble, less likely small anterior hydropneumothorax at the left lung base given post procedural CT appearance of the chest. May further evaluated with CT chest as clinically indicated. STAT RSC Call Result to Licensed Caregiver sent  11/14/2021 10:50 AM CST Electronically signed by:  Joao Connor MD  11/14/2021 10:50 AM CST Workstation: [id number]    CT Guided Thoracentesis    Result Date: 11/12/2021  CONCLUSION: Successful ultrasound-guided left lung mass needle biopsy, and thoracentesis with removal of 300 mL of serosanguineous fluid, as described above. Electronically signed by:  Trent Domínguez MD  11/12/2021 4:32 PM CST Workstation: [id number]        ASSESSMENT AND PLAN:      1.  Non-small cell cancer of left lung:  -CT-guided lung biopsy on November 12, 2021 showed non-small cell lung cancer on preliminary report.  Immunohistochemical stains are being carried out to differentiate between squamous cell and adenocarcinoma.   -Result of pathology showing positivity for non-small cell lung cancer were discussed with patient and her daughter.  -Due to her performance status patient is not a candidate for any kind of systemic treatment with chemotherapy.  -Option of palliative radiation was discussed with patient.  -Patient is not interested in doing any chemotherapy or radiation at present.  -Option of hospice was discussed with patient and she is agreeable to hospice.  -We will consult case  management for hospice.    2.  Anemia:  -Currently on intravenous Ferrlecit along with B12 and folic acid p.o. daily    3.  Leukocytosis:  -Reactive will monitor with CBC    4.  Atrial fibrillation    5.  Change in level of care:  -Patient wants to be comfortable with hospice.        PHQ-9 Total Score:       [unfilled]       Pee Nolasco MD  11/16/2021  17:00 CST        Part of this note may be an electronic transcription/translation of spoken language to printed text using the Dragon Dictation System.    Electronically signed by Pee Nolasco MD at 11/16/21 0212

## 2021-11-17 NOTE — DISCHARGE PLACEMENT REQUEST
"Flako Vargasa Taryn (81 y.o. Female)             Date of Birth Social Security Number Address Home Phone MRN    1940  [address] [facility] [address] [phone] [id number]    Episcopalian Marital Status             Buddhist        Admission Date Admission Type Admitting Provider Attending Provider Department, Room/Bed    11/11/21 Emergency Andrew Watters MD Williams, Kevin L, MD [address], 318/[address]    Discharge Date Discharge Disposition Discharge Destination                         Attending Provider: Andrew Watters MD    Allergies: No Known Allergies    Isolation: None   Infection: None   Code Status: No CPR   Advance Care Planning Activity    Ht: 172.7 cm (67.99\")   Wt: 65.5 kg (144 lb 6.4 oz)    Admission Cmt: None   Principal Problem: None                Active Insurance as of 11/11/2021     Primary Coverage     Payor Plan Insurance Group Employer/Plan Group    MEDICARE MEDICARE A & B      Payor Plan Address Payor Plan Phone Number Payor Plan Fax Number Effective Dates    [address] [phone]  9/1/2006 - None Entered    [facility] [id number]       Subscriber Name Subscriber Birth Date Member ID       ALIYA VARGAS 1940 [id number]           Secondary Coverage     Payor Plan Insurance Group Employer/Plan Group    [facility]     Payor Plan Address Payor Plan Phone Number Payor Plan Fax Number Effective Dates    [address] [address]   11/1/2016 - None Entered    [facility] [address]       Subscriber Name Subscriber Birth Date Member ID       ALIYA VARGAS 1940 [id number]                 Emergency Contacts      (Rel.) Home Phone Work Phone Mobile Phone    YogiAris (Son) -- -- [phone]    LintonIsamar yi (Daughter) -- -- [phone]            Insurance Information                MEDICARE/MEDICARE A & B Phone: [phone]    Subscriber: Aliya Vargas Subscriber#: [id number]    Group#: -- Precert#: --        " KENYATTA/KENYATTA Phone: --    Subscriber: Anahi Calix Subscriber#: [id number]    Group#: KENYATTA Precert#: --

## 2021-11-17 NOTE — PROGRESS NOTES
[facility] Medicine Services  INPATIENT PROGRESS NOTE    Length of Stay: 6  Date of Admission: 11/11/2021  Primary Care Physician: Ramiro Gloria MD    Subjective   Chief Complaint: soa       HPI: Pt cont to be SOA beyond her baseline.  She has requested Hospice at discharge.  Family at bedside.         Review of Systems   Comprehensive ROS completed.  Pertinent positives and negatives per HPI.  All others reviewed and negative.      Objective    Temp:  [96.8 °F (36 °C)-98 °F (36.7 °C)] 97.3 °F (36.3 °C)  Heart Rate:  [] 62  Resp:  [18-20] 20  BP: (122-155)/(68-88) 154/70    Physical Exam  Constitutional:       General: She is not in acute distress.     Appearance: Normal appearance.   HENT:      Head: Normocephalic and atraumatic.      Right Ear: External ear normal.      Left Ear: External ear normal.      Nose: Nose normal.      Mouth/Throat:      Mouth: Mucous membranes are moist.      Pharynx: Oropharynx is clear.   Eyes:      General: No scleral icterus.     Extraocular Movements: Extraocular movements intact.      Pupils: Pupils are equal, round, and reactive to light.   Cardiovascular:      Rate and Rhythm: Normal rate and regular rhythm.   Pulmonary:      Effort: Accessory muscle usage present.      Comments: Dullness, left lung.  Coarse BS  Abdominal:      Palpations: Abdomen is soft.      Tenderness: There is no abdominal tenderness. There is no guarding or rebound.   Musculoskeletal:      Cervical back: Neck supple.      Right lower leg: No edema.      Left lower leg: No edema.   Lymphadenopathy:      Cervical: No cervical adenopathy.   Skin:     General: Skin is warm and dry.      Findings: No rash.   Neurological:      General: No focal deficit present.      Mental Status: She is alert and oriented to person, place, and time.   Psychiatric:         Mood and Affect: Mood normal.         Behavior: Behavior normal.             Results Review:  I have  reviewed the labs, radiology results, and diagnostic studies.    Laboratory Data:   Results from last 7 days   Lab Units 11/17/21  0508 11/16/21  0521 11/15/21  0515 11/13/21  0654 11/12/21  0613 11/11/21  1306 11/11/21  1306   SODIUM mmol/L 137 135* 138   < > 141   < > 133*   POTASSIUM mmol/L 4.9 4.6 4.1   < > 4.1   < > 4.4   CHLORIDE mmol/L 97* 98 100   < > 101   < > 95*   CO2 mmol/L 33.0* 32.0* 30.0*   < > 35.0*   < > 27.0   BUN mg/dL 26* 25* 25*   < > 16   < > 14   CREATININE mg/dL 1.00 0.99 1.06*   < > 1.11*   < > 0.88   GLUCOSE mg/dL 124* 130* 137*   < > 90   < > 95   CALCIUM mg/dL 9.7 9.2 9.3   < > 9.1   < > 9.0   BILIRUBIN mg/dL 0.3  --   --   --  0.2  --  0.3   ALK PHOS U/L 79  --   --   --  65  --  77   ALT (SGPT) U/L 23  --   --   --  9  --  10   AST (SGOT) U/L 30  --   --   --  14  --  18   ANION GAP mmol/L 7.0 5.0 8.0   < > 5.0   < > 11.0    < > = values in this interval not displayed.     Estimated Creatinine Clearance: 46.4 mL/min (by C-G formula based on SCr of 1 mg/dL).          Results from last 7 days   Lab Units 11/17/21  0508 11/15/21  0515 11/14/21  0832 11/13/21  0654 11/12/21  0613   WBC 10*3/mm3 17.11* 24.06* 29.87* 15.23* 7.68   HEMOGLOBIN g/dL 10.3* 9.2* 10.3* 9.3* 12.0   HEMATOCRIT % 33.3* 28.4* 33.1* 29.2* 37.3   PLATELETS 10*3/mm3 353 297 339 269 291     Results from last 7 days   Lab Units 11/12/21  0613   INR  1.10       Culture Data:   No results found for: BLOODCX  No results found for: URINECX  No results found for: RESPCX  No results found for: WOUNDCX  No results found for: STOOLCX  No components found for: BODYFLD    Radiology Data:   Imaging Results (Last 24 Hours)     ** No results found for the last 24 hours. **          I have reviewed the patient's current medications.     Assessment/Plan     Active Hospital Problems    Diagnosis    • Mass of left lung        Plan:      1.  Left lung mass/ NSCLC  2.  Collapse left lung  3.  HTN  4.  LAURA - resolved  5.  Anemia - iron  deficient  6.  PAF - rate controlled - no LTAC due to fall risk  7.  CAD  8.  AAA (4.3 cm)  9.  Severe PCM    - Hospice referral made.    - cont O2 support  - cont supportive care and home medications as tolerated              Discharge Planning: I expect patient to be discharged to home with Hospice in 1-2 days.    Eriberto Madrid MD

## 2021-11-17 NOTE — DISCHARGE PLACEMENT REQUEST
"lFako Vargasa Taryn (81 y.o. Female)             Date of Birth Social Security Number Address Home Phone MRN    1940   [facility] [address] [phone] [id number]    Taoism Marital Status             Sikh        Admission Date Admission Type Admitting Provider Attending Provider Department, Room/Bed    11/11/21 Emergency Andrew Watters MD Williams, Kevin L, MD [address], [address]    Discharge Date Discharge Disposition Discharge Destination                         Attending Provider: Andrew Watters MD    Allergies: No Known Allergies    Isolation: None   Infection: None   Code Status: No CPR   Advance Care Planning Activity    Ht: 172.7 cm (67.99\")   Wt: 65.5 kg (144 lb 6.4 oz)    Admission Cmt: None   Principal Problem: None                Active Insurance as of 11/11/2021     Primary Coverage     Payor Plan Insurance Group Employer/Plan Group    MEDICARE MEDICARE A & B      Payor Plan Address Payor Plan Phone Number Payor Plan Fax Number Effective Dates    [address] [phone]  9/1/2006 - None Entered    [facility] [address]       Subscriber Name Subscriber Birth Date Member ID       ALIYA VARGAS 1940 [id number]           Secondary Coverage     Payor Plan Insurance Group Employer/Plan Group    [facility]     Payor Plan Address Payor Plan Phone Number Payor Plan Fax Number Effective Dates    [address] [address]   11/1/2016 - None Entered    [facility] [address]       Subscriber Name Subscriber Birth Date Member ID       ALIYA VARGAS 1940 [id number]                 Emergency Contacts      (Rel.) Home Phone Work Phone Mobile Phone    YogiAris (Son) -- -- [phone]    LintonIsamar yi (Daughter) -- -- [phone]            Insurance Information                MEDICARE/MEDICARE A & B Phone: [phone]    Subscriber: Aliya Vargas Subscriber#: [id number]    Group#: -- Precert#: --        " KENYATTA/KENYATTA Phone: --    Subscriber: Anahi Calix Subscriber#: [id number]    Group#: [id number] Precert#: --

## 2021-11-17 NOTE — DISCHARGE PLACEMENT REQUEST
"  Please keep me updated. Ana with Case Management at [phone].  Thank you      Aliya Calix (81 y.o. Female)             Date of Birth Social Security Number Address Home Phone MRN    1940  [address] [place] [address]  [place] [facility] [address] [phone] [id number]    [employer] Marital Status             Jainism        Admission Date Admission Type Admitting Provider Attending Provider Department, Room/Bed    11/11/21 Emergency Andrew Watters MD Williams, Kevin L, MD [address], 318/1    Discharge Date Discharge Disposition Discharge Destination                         Attending Provider: Andrew Watters MD    Allergies: No Known Allergies    Isolation: None   Infection: None   Code Status: No CPR   Advance Care Planning Activity    Ht: 172.7 cm (67.99\")   Wt: 65.5 kg (144 lb 6.4 oz)    Admission Cmt: None   Principal Problem: None                Active Insurance as of 11/11/2021     Primary Coverage     Payor Plan Insurance Group Employer/Plan Group    MEDICARE MEDICARE A & B      Payor Plan Address Payor Plan Phone Number Payor Plan Fax Number Effective Dates    [address] [phone]  9/1/2006 - None Entered    [facility] [id number]       Subscriber Name Subscriber Birth Date Member ID       ALIYA CALIX 1940 [id number]           Secondary Coverage     Payor Plan Insurance Group Employer/Plan Group    [facility]     Payor Plan Address Payor Plan Phone Number Payor Plan Fax Number Effective Dates    [address] [address]   11/1/2016 - None Entered    [facility] [id number]       Subscriber Name Subscriber Birth Date Member ID       ALIYA CALIX 1940 [id number]                 Emergency Contacts      (Rel.) Home Phone Work Phone Mobile Phone    YogiAris lin (Son) -- -- [phone]    oRyerBrittneeIsamar (Daughter) -- -- [phone]            Insurance Information                MEDICARE/MEDICARE A & B Phone: [phone]    " Subscriber: Anahi Calix Subscriber#: [id number]    Group#: -- Precert#: --        [facility] Phone: --    Subscriber: Anahi Calix Subscriber#: [id number]    Group#: [id number] Precert#: --           Deangelo Stokes APRN   Nurse Practitioner   Cardiothoracic Surgery   Progress Notes       Attested   Date of Service:  11/16/21 1055   Creation Time:  11/16/21 1055              Attested        Attestation signed by Jules Toledo MD at 11/16/21 1134   I have reviewed this documentation and agree.  Advanced non-small cell lung cancer.  Poor candidate for aggressive treatment.  Appears complete atelectasis LEFT lung due to obstruction, mucus plugging.  Had initial improvement with mucomyst.  Bronchoscopy may temporize, but with high risk requiring long term mechanical ventilation.  Hospice reasonable consideration.             This document has been electronically signed by Jules Toledo MD on November 16, 2021 11:34 CST                  Cardiothoracic - Vascular Surgery Daily Note          LOS: 5 days   Patient Care Team:  Ramiro Gloria MD as PCP - General  Nicola Rich MD as Surgeon (Orthopedic Surgery)     Chief Complaint: Dyspnea        Subjective      The following portions of the patient's history were reviewed and updated as appropriate: allergies, current medications, past family history, past medical history, past social history, past surgical history and problem list.      Subjective:  Symptoms:  Stable.  She reports shortness of breath.  No chest pain.    Diet:  Poor intake.    Activity level: Impaired due to weakness.             Objective      Vital Signs  Temp:  [96.6 °F (35.9 °C)-98.2 °F (36.8 °C)] 96.6 °F (35.9 °C)  Heart Rate:  [63-96] 79  Resp:  [18] 18  BP: (111-181)/(56-84) 126/82  Body mass index is 21.93 kg/m².     Intake/Output Summary (Last 24 hours) at 11/16/2021 1056  Last data filed at 11/16/2021 0950      Gross per 24 hour   Intake 1080 ml   Output --  "  Net 1080 ml      I/O this shift:  In: 240 [P.O.:240]  Out: -          Wt Readings from Last 3 Encounters:   11/16/21 65.4 kg (144 lb 3.2 oz)   08/30/21 67.9 kg (149 lb 9.6 oz)   05/11/21 73.8 kg (162 lb 12.8 oz)            Physical Exam   Objective:  General Appearance:  Comfortable, ill-appearing, in no acute distress and not in pain.    Vital signs: (most recent): Blood pressure 126/82, pulse 79, temperature 96.6 °F (35.9 °C), temperature source Oral, resp. rate 18, height 172.7 cm (67.99\"), weight 65.4 kg (144 lb 3.2 oz), SpO2 93 %.  Vital signs are normal.  (02 6L).    Output: Producing urine and minimal stool output.    HEENT: Normal HEENT exam.    Lungs:  Normal effort and normal respiratory rate.  (Left hemithorax absent lung sounds)  Heart: Normal rate.    Abdomen: Abdomen is soft.  Bowel sounds are normal.   There is no abdominal tenderness.     Extremities: Normal range of motion.  There is dependent edema.    Pulses: Distal pulses are intact.  There are no decreased pulses.    Neurological: Patient is alert and oriented to person, place and time.  GCS score is 15.    Pupils:  Pupils are equal, round, and reactive to light.    Skin:  Warm and dry.  (Scattered bruising )                    Results Review:          Lab Results   Component Value Date     WBC 24.06 (H) 11/15/2021     HGB 9.2 (L) 11/15/2021     HCT 28.4 (L) 11/15/2021     MCV 88.5 11/15/2021      11/15/2021            Lab Results   Component Value Date     GLUCOSE 130 (H) 11/16/2021     BUN 25 (H) 11/16/2021     CREATININE 0.99 11/16/2021     EGFRIFNONA 54 (L) 11/16/2021     BCR 25.3 (H) 11/16/2021     K 4.6 11/16/2021     CO2 32.0 (H) 11/16/2021     CALCIUM 9.2 11/16/2021     ALBUMIN 3.10 (L) 11/12/2021     AST 14 11/12/2021     ALT 9 11/12/2021         Calcium       Calcium   Date/Time Value Ref Range Status   11/16/2021 0521 9.2 8.6 - 10.5 mg/dL Final   11/15/2021 0515 9.3 8.6 - 10.5 mg/dL Final   11/14/2021 0832 9.9 8.6 - 10.5 " mg/dL Final       Magnesium No results found for: MG               Imaging Results (Last 24 Hours)      Procedure Component Value Units Date/Time     XR Chest PA & Lateral [[id number]] Resulted: 11/16/21 0815       Updated: 11/16/21 0820                                           acetylcysteine, 4 mL, Nebulization, Q4H - RT  amiodarone, 100 mg, Oral, Daily  budesonide-formoterol, 2 puff, Inhalation, BID - RT  famotidine, 20 mg, Oral, BID  sodium ferric gluconate complex IVPB in 100 mL NS, 125 mg, Intravenous, Daily  folic acid, 1 mg, Oral, Daily  [START ON 11/17/2021] furosemide, 40 mg, Oral, Daily  gabapentin, 300 mg, Oral, Nightly  hydrALAZINE, 50 mg, Oral, TID  multivitamin with minerals, 1 tablet, Oral, BID  ipratropium-albuterol, 3 mL, Nebulization, 4x Daily - RT  levoFLOXacin, 750 mg, Intravenous, Once  [START ON 11/17/2021] lisinopril, 20 mg, Oral, Q24H  magnesium oxide, 400 mg, Oral, Daily  methylPREDNISolone sodium succinate, 60 mg, Intravenous, Q8H  metoprolol tartrate, 25 mg, Oral, Q12H  piperacillin-tazobactam, 3.375 g, Intravenous, Q8H  senna-docusate sodium, 2 tablet, Oral, BID  sodium chloride, 10 mL, Intravenous, Q12H  vitamin B-12, 1,000 mcg, Oral, Daily  zinc sulfate, 220 mg, Oral, Daily        hold, 1 each  Pharmacy to Dose Zosyn,                     ASSESSMENT/PLAN      Left Lung Mass, Post-obstructive atelectasis, Left Pleural effusion  Status post Left CT guided FNA, thoracentesis.  Path + for non-small cell lung cancer.    Placed on mucomyst with bronchodilator scheduled for conservative management of post obstructive atelectasis, chest film this AM with persistent near complete opacification of L hemithorax.       Lengthy discussion was held with patient and family.  She remains high risk for respiratory failure with any intervention.  Without treatment for NSCLC bronchoscopy would only provide temporary relief of current symptoms with little chance of long term success secondary to tumor  burden.       Oncology to discuss pathology and options          This document has been electronically signed by Zach Kike, AGACNP-BC @  On November 16, 2021 10:56 CST               Cosigned by: Jules Toledo MD at 11/16/21 1134       Revision History                                   History & Physical      Mike Teresa MD at 11/11/21 4196                [facility] Medicine Services  INPATIENT HISTORY AND PHYSICAL       Patient Care Team:  Ramiro Gloria MD as PCP - General  Layla, Nicola Mcmanus MD as Surgeon (Orthopedic Surgery)    Date of Admission: 11/11/2021    Primary Care Physician: Ramiro Gloria MD    Chief complaint   Chief Complaint   Patient presents with   • Shortness of Breath       Subjective     Patient is a 81 y.o. female with history of hypertension, coronary artery disease, AAA, atrial fibrillation, nicotine dependence (she quit in 2077) presents with 2-week history of progressively worsening shortness of breath associated with nonproductive cough, weakness and overall functional decline.  No reported history of fever, chills, nausea, vomiting, chest pain, unintentional weight loss, hemoptysis, orthopnea or paroxysmal nocturnal dyspnea.    On triage, she was noted to have elevated blood pressure of 213/86 which improved slightly with IV hydralazine.    Blood work showed negative troponin, unremarkable CBC and CMP.  Chest x-ray showed moderate left-sided pleural effusion and large 11.8 x 8.1 cm masslike density in the left upper lobe indicative of neoplasm.      Review of Systems   Constitutional: Positive for activity change and fatigue. Negative for appetite change, chills, diaphoresis and fever.   HENT: Negative for trouble swallowing and voice change.    Eyes: Negative for photophobia and visual disturbance.   Respiratory: Negative for cough, choking, chest tightness, shortness of breath, wheezing and stridor.    Cardiovascular:  Negative for chest pain, palpitations and leg swelling.   Gastrointestinal: Negative for abdominal distention, abdominal pain, blood in stool, constipation, diarrhea, nausea and vomiting.   Endocrine: Negative for cold intolerance, heat intolerance, polydipsia, polyphagia and polyuria.   Genitourinary: Negative for decreased urine volume, difficulty urinating, dysuria, enuresis, flank pain, frequency, hematuria and urgency.   Musculoskeletal: Negative for arthralgias, gait problem, myalgias, neck pain and neck stiffness.   Skin: Negative for pallor, rash and wound.   Neurological: Negative for dizziness, tremors, seizures, syncope, facial asymmetry, speech difficulty, weakness, light-headedness, numbness and headaches.   Hematological: Does not bruise/bleed easily.   Psychiatric/Behavioral: Negative for agitation, behavioral problems and confusion.         History  Past Medical History:   Diagnosis Date   • Abdominal aortic aneurysm (AAA) without rupture (HCC)    • Atrial fibrillation (HCC) 10/2/2017    New onset.  S/p cardioversion  On coumadin now.  Amiodarone    • CAD (coronary artery disease)    • Gastroesophageal reflux disease    • Hypercholesterolemia    • Hypertension    • Nuclear senile cataract    • Osteoarthritis    • Osteoporosis    • Peripheral arterial disease (HCC)    • Solitary lung nodule      Past Surgical History:   Procedure Laterality Date   • ABDOMINAL AORTIC ANEURYSM REPAIR     • HIP INTERTROCHANTERIC NAILING Left 10/1/2017    Procedure: HIP INTERTROCHANTERIC NAILING - LEFT - [place];  Surgeon: Nicola Rich MD;  Location: [facility];  Service:      Family History   Problem Relation Age of Onset   • Hypertension Mother    • Coronary artery disease Father      Social History     Tobacco Use   • Smoking status: Former Smoker     Packs/day: 1.00     Years: 57.00     Pack years: 57.00     Start date: 1961     Quit date: 10/1/2017     Years since quittin.1   • Smokeless tobacco:  Never Used   Substance Use Topics   • Alcohol use: No   • Drug use: No     (Not in a hospital admission)    Allergies:  Patient has no known allergies.  Prior to Admission medications    Medication Sig Start Date End Date Taking? Authorizing Provider   Acetaminophen (TYLENOL ARTHRITIS PAIN PO) Take 500 mg by mouth 3 (Three) Times a Day As Needed. Indications: Mild Pain 10/1/21   Sher Murillo MD   amiodarone (PACERONE) 200 MG tablet Take 1 tablet by mouth Daily.  Patient taking differently: Take 100 mg by mouth Daily. 10/24/17   Nikhil Rouse MD   aspirin 81 MG EC tablet Take 81 mg by mouth daily.    Sher Murillo MD   Cholecalciferol (GNP VITAMIN D SUPER STRENGTH) 5000 UNITS tablet Take  by mouth.    Sher Murillo MD   clopidogrel (PLAVIX) 75 MG tablet Take 1 tablet by mouth Daily. 2/8/21   Ramiro Gloria MD   famotidine (PEPCID) 20 MG tablet Take 1 tablet by mouth 2 (Two) Times a Day As Needed for Heartburn.  Patient taking differently: Take 1 tablet by mouth 2 (Two) Times a Day. 11/6/18   Ramiro Gloria MD   ferrous sulfate 324 (65 Fe) MG tablet delayed-release EC tablet Take 1 tablet by mouth Daily With Breakfast. 10/25/17   Nikhil Rouse MD   furosemide (LASIX) 40 MG tablet Take 40 mg by mouth Daily. ONE TABLET BY MOUTH EVERY DAY 10/30/21   Sher Murillo MD   gabapentin (NEURONTIN) 300 MG capsule Take 1-2 capsules by mouth Every Night. 11/1/21   Ramiro Gloria MD   hydrALAZINE (APRESOLINE) 25 MG tablet Take 1 tablet by mouth 4 (Four) Times a Day.  Patient taking differently: Take 25 mg by mouth 4 (Four) Times a Day. Patient states she takes as needed 2/6/20   Ramiro Gloria MD   hydrALAZINE (APRESOLINE) 50 MG tablet Take 50 mg by mouth 3 (Three) Times a Day. 1/6/21   Sher Murillo MD   lisinopril (PRINIVIL,ZESTRIL) 40 MG tablet Take 1 tablet by mouth Daily. 2/8/21   Ramiro Gloria MD   magnesium oxide (MAGOX) 400 (241.3 Mg) MG tablet tablet Take 1  tablet by mouth Daily. 10/24/17   Nikhil Rouse MD   metoprolol tartrate (LOPRESSOR) 50 MG tablet TAKE 1 TABLET BY MOUTH EVERY 12 (TWELVE) HOURS. 4/9/21   Yasmine Jose APRN   Multiple Vitamins-Minerals (VISION FORMULA PO) Take 1 tablet by mouth every day.    Provider, MD Sher   polyethylene glycol (MIRALAX) powder ONE CAPFUL IN 8 OUNCES OF LIQUID ONCE DAILY UP TO THREE TIMES DAILY AS NEEDED. 1/25/18   Ramiro Gloria MD   traMADol (Ultram) 50 MG tablet Take 1 tablet by mouth Every 6 (Six) Hours As Needed for Moderate Pain . 3/2/21   Ramiro Gloria MD   Zinc 50 MG capsule Take 1 capsule by mouth Daily.    Provider, MD Sher       Objective        Vital Signs  Temp:  [97.5 °F (36.4 °C)] 97.5 °F (36.4 °C)  Heart Rate:  [56-65] 62  Resp:  [20] 20  BP: (155-213)/(72-98) 194/81      Physical Exam  Vitals and nursing note reviewed.   Constitutional:       General: She is not in acute distress.     Appearance: She is underweight. She is not diaphoretic.      Comments: Patient is underweight, cachectic and seems severely malnourished.   HENT:      Head: Normocephalic and atraumatic.      Right Ear: External ear normal.      Left Ear: External ear normal.      Nose: Nose normal.   Eyes:      Extraocular Movements: Extraocular movements intact.      Conjunctiva/sclera: Conjunctivae normal.      Pupils: Pupils are equal, round, and reactive to light.   Neck:      Thyroid: No thyromegaly.      Vascular: No JVD.   Cardiovascular:      Rate and Rhythm: Normal rate and regular rhythm.      Heart sounds: Normal heart sounds. No murmur heard.  No friction rub. No gallop.    Pulmonary:      Effort: Pulmonary effort is normal. No respiratory distress.      Breath sounds: No stridor. Examination of the left-middle field reveals decreased breath sounds. Examination of the left-lower field reveals decreased breath sounds. Decreased breath sounds and rhonchi present. No wheezing or rales.   Chest:      Chest  wall: No tenderness.   Abdominal:      General: Bowel sounds are normal. There is no distension.      Palpations: Abdomen is soft. There is no mass.      Tenderness: There is no abdominal tenderness. There is no right CVA tenderness, left CVA tenderness, guarding or rebound.      Hernia: No hernia is present.   Musculoskeletal:         General: No swelling, tenderness, deformity or signs of injury. Normal range of motion.      Cervical back: Normal range of motion and neck supple.      Right lower leg: No edema.      Left lower leg: No edema.   Skin:     General: Skin is warm and dry.      Coloration: Skin is not jaundiced or pale.      Findings: No bruising, erythema, lesion or rash.      Comments: She has chronic stasis changes both legs.   Neurological:      General: No focal deficit present.      Mental Status: She is alert and oriented to person, place, and time.      Cranial Nerves: No cranial nerve deficit.      Sensory: No sensory deficit.      Motor: No weakness.      Coordination: Coordination normal.      Gait: Gait normal.      Deep Tendon Reflexes: Reflexes are normal and symmetric. Reflexes normal.   Psychiatric:         Mood and Affect: Mood normal.         Behavior: Behavior normal. Behavior is cooperative.         Thought Content: Thought content normal.         Judgment: Judgment normal.           Results Review:     Results from last 7 days   Lab Units 11/11/21  1306   SODIUM mmol/L 133*   POTASSIUM mmol/L 4.4   CHLORIDE mmol/L 95*   CO2 mmol/L 27.0   BUN mg/dL 14   CREATININE mg/dL 0.88   GLUCOSE mg/dL 95   CALCIUM mg/dL 9.0   BILIRUBIN mg/dL 0.3   ALK PHOS U/L 77   ALT (SGPT) U/L 10   AST (SGOT) U/L 18             Results from last 7 days   Lab Units 11/11/21  1306   WBC 10*3/mm3 10.50   HEMOGLOBIN g/dL 12.1   HEMATOCRIT % 37.3   PLATELETS 10*3/mm3 321           Imaging Results (Last 7 Days)     Procedure Component Value Units Date/Time    CT Chest With Contrast Diagnostic [[id number]] Collected:  11/11/21 1444     Updated: 11/11/21 1550    Narrative:      PROCEDURE: CT SCAN OF THE CHEST WITH INTRAVENOUS CONTRAST.    CLINICAL INFORMATION:  large mass seen on CXR     This exam was performed using radiation doses that are as low as  reasonably achievable (ALARA).  This exam was performed according to our departmental dose  optimization program, which includes automated exposure control,  adjustment of the mA and/or KV according to patient size and/or  use of iterative reconstruction technique.    COMPARISON: Chest x-ray dated 11/11/2021. Coronary CT dated  9/11/2016.    CONTRAST: 100 cc intravenous Isovue 300    TECHNIQUE: Axial images were obtained and multiplanar  reconstructions were made.      FINDINGS:  LUNGS/PLEURA: There is a large mass in the anterior left upper  lobe and left mediastinum/aorticopulmonary window region  measuring 8 x 6 x 9 cm. Inside the mass there are two areas of  what appear to represent necrosis or fluid collections, the  larger one is more superior and anterior measuring 5 x 5 cm and  the smaller one is more inferior and medial measuring 1.9 x 3.8  cm. There is surrounding consolidation and nodularity in the left  lung apex and around the parameter of the mass. The margins of  the mass are spiculated.  There is a moderate size left pleural effusion with adjacent  subsegmental atelectasis of the left lower lobe. There is  platelike atelectasis in the posterior right lung base. There is  centrilobular emphysema in the upper lobes bilaterally.  TRACHEA AND BRONCHI:  The left upper lobe bronchi are narrowed by  the mass.  MEDIASTINUM, OSEI AND LYMPH NODES: The mediastinum, osei and  lymph nodes are normal.  HEART AND PERICARDIUM: The heart and pericardium are normal.  VASCULAR: There is either near complete, or complete obliteration  of the left upper lobe pulmonary artery due to the mass. There is  coronary artery calcification/coronary artery disease. There is  calcification of the  mitral annulus. There is aneurysmal  dilatation of the proximal abdominal aorta at the level of the  diaphragmatic hiatus measuring 3.9 cm in diameter, and partially  visualized aneurysmal dilatation of the more distal abdominal  aorta at the level of the kidneys which measures 4.3 x 4 cm,  which appears to have undergone surgical repair but is not well  evaluated.  UPPER ABDOMEN: There is prominence of both adrenal glands which  could be due to metastatic disease or at analysis but is  nonspecific.  OSSEOUS STRUCTURES: Bones appear demineralized. There are mild  compression deformities of multiple thoracic and upper lumbar  levels similar to the prior study.      Impression:      1.  Large spiculated mass in the left upper lobe and  aorticopulmonary window region of the mediastinum with two  central areas of necrosis or fluid collections. Findings are  highly suspicious for neoplasm but could also be due to an  infectious process. This mass exerts mass effect on the left  upper lobe bronchial structures and left upper lobe pulmonary  artery.  2.  Moderate-sized left pleural effusion with adjacent  subsegmental atelectasis of the left lower lobe.  3.  Coronary artery atherosclerotic disease.  4.  Abdominal aortic aneurysms at the hiatus and at the level of  the kidneys measuring up to 4.3 cm in diameter. There may have  been a previous abdominal aortic aneurysm repair of the more  inferior level. These findings are incompletely evaluated.  Recommend correlation with surgical history, and more complete  imaging of the abdomen and pelvis to evaluate the size of the  aneurysm.    For management of the more superior aneurysm,  4.0-4.4 cm AAA, recommend follow-up every 12 months and recommend  vascular consultation.    Electronically signed by:  Trent Domínguez MD  11/11/2021 3:48 PM  CST Workstation: [id number]    XR Chest 1 View [[id number]] Collected: 11/11/21 1325     Updated: 11/11/21 1347    Narrative:      EXAM  DESCRIPTION:     XR CHEST 1 VW    CLINICAL HISTORY:     81 years  Female  [facility] Triage Protocol    COMPARISON:     March 2, 2021    TECHNIQUE:     One view-AP portable radiograph the chest    FINDINGS:     There is a large ovoid masslike density involving the left upper  lobe measuring 11.8 x 8.1 cm in size. There is a questionable  air-fluid level superior aspect of this mass. The findings are  highly suspicious for neoplasm. Infiltrate is possible but less  likely. There is a moderate size left pleural effusion. The right  lung is clear. The cardiac silhouette and pulmonary vasculature  demonstrate no evidence of acute congestive heart failure.      Impression:          1. Large 11.8 x 8.1 cm masslike density in the left upper lobe  with questionable cavitation/air-fluid level as above. The  findings are indicative of underlying pulmonary neoplasm. CT of  the chest with contrast is recommended for confirmation and  staging.  2. Moderate size left pleural effusion.        Electronically signed by:  Paty Albert MD  11/11/2021 1:46 PM  CST Workstation: [id number]          Assessment / Plan       Hospital Problem List:    Mass of left lung  This is likely secondary to malignancy and complicated by malignant pleural effusion/possible postobstructive  pneumonia: Begin noninvasive respiratory therapy, empiric antimicrobial therapy and schedule patient for CT-guided thoracentesis/lung biopsy.  Blood cultures have been drawn and results are pending.  Consult oncologist.    Accelerated hypertension: Continue patient's home medications and make adjustments as needed for optimal blood pressure control.  She may need Cardene infusion if the need arises.    Paroxysmal atrial fibrillation: Patient is in normal sinus rhythm.  Continue amiodarone and Lopressor.  She is not anticoagulated secondary to risk of fall.    Coronary artery disease: Continue beta-blocker.  Hold dual antiplatelet therapy pending thoracentesis/lung  biopsy.    4.3 cm AAA: Patient is to continue surveillance/monitoring as outpatient.    Severe malnutrition/cachexia: Consult dietitian.    Begin GI and DVT prophylaxis.    Additional orders and treatment plan as hospital course dictates.    Code status was discussed with the patient and she wishes to be 'no intubation and no CPR'.        I confirmed that the patient's Advance Care Plan is present, code status is documented, or surrogate decision maker is listed in the patient's medical record.     I have utilized all available immediate resources to obtain, update, or review the patient's current medications    I discussed the patient's findings and my recommendations with patient and her daughter.     Mike Teresa MD  11/11/21  16:56 CST      Dictated Utilizing Dragon Dictation         Electronically signed by Mike Teresa MD at 11/11/21 1737         Current Facility-Administered Medications   Medication Dose Route Frequency Provider Last Rate Last Admin   • acetaminophen (TYLENOL) tablet 650 mg  650 mg Oral Q4H PRN Mike Teresa MD   650 mg at 11/17/21 0831    Or   • acetaminophen (TYLENOL) 160 MG/5ML solution 650 mg  650 mg Oral Q4H PRN Mike Teresa MD        Or   • acetaminophen (TYLENOL) suppository 650 mg  650 mg Rectal Q4H PRN Mike Teresa MD       • acetylcysteine (MUCOMYST) 10 % solution 4 mL  4 mL Nebulization Q4H - RT Jules Toledo MD   4 mL at 11/17/21 1106   • albuterol (PROVENTIL) nebulizer solution 0.083% 2.5 mg/3mL  2.5 mg Nebulization Q4H PRN Mike Teresa MD   2.5 mg at 11/16/21 2330   • aluminum-magnesium hydroxide-simethicone (MAALOX MAX) 400-400-40 MG/5ML suspension 15 mL  15 mL Oral Q6H PRN Mike Teresa MD       • amiodarone (PACERONE) half tablet 100 mg  100 mg Oral Daily Mike Teresa MD   100 mg at 11/17/21 0827   • sennosides-docusate (PERICOLACE) 8.6-50 MG per tablet 2 tablet  2 tablet Oral BID Mike Teresa MD   2 tablet  at 11/17/21 0827    And   • polyethylene glycol (MIRALAX) packet 17 g  17 g Oral Daily PRN Mike Teresa MD   17 g at 11/17/21 0825    And   • bisacodyl (DULCOLAX) EC tablet 5 mg  5 mg Oral Daily PRN Mike Teresa MD        And   • bisacodyl (DULCOLAX) suppository 10 mg  10 mg Rectal Daily PRN Mike Teresa MD       • budesonide-formoterol (SYMBICORT) 160-4.5 MCG/ACT inhaler 2 puff  2 puff Inhalation BID - RT Mike Teresa MD   2 puff at 11/17/21 0714   • famotidine (PEPCID) tablet 20 mg  20 mg Oral BID Mike Teresa MD   20 mg at 11/17/21 0827   • ferric gluconate (FERRLECIT) 125 mg in sodium chloride 0.9 % 100 mL IVPB  125 mg Intravenous Daily Mike Teresa  mL/hr at 11/16/21 0931 125 mg at 11/16/21 0931   • folic acid (FOLVITE) tablet 1 mg  1 mg Oral Daily Pee Nolasco MD   1 mg at 11/17/21 0827   • furosemide (LASIX) injection 20 mg  20 mg Intravenous Daily Mike Teresa MD   20 mg at 11/17/21 0827   • gabapentin (NEURONTIN) capsule 300 mg  300 mg Oral Nightly Mike Teresa MD   300 mg at 11/16/21 2033   • Hold medication  1 each Does not apply Continuous PRN Mike Tereas MD       • hydrALAZINE (APRESOLINE) tablet 50 mg  50 mg Oral TID Mike Teresa MD   50 mg at 11/17/21 0827   • hydrocortisone-bacitracin-zinc oxide-nystatin (MAGIC BARRIER) ointment 1 application  1 application Topical PRN Mike Teresa MD   1 application at 11/16/21 0933   • I-Renetta Eye Vitamin  1 tablet Oral BID Jaimie Gunderson MD   1 tablet at 11/17/21 0827   • ipratropium-albuterol (DUO-NEB) nebulizer solution 3 mL  3 mL Nebulization 4x Daily - RT Mike Teresa MD   3 mL at 11/17/21 1106   • levoFLOXacin (LEVAQUIN) 750 mg/150 mL D5W (premix) (LEVAQUIN) 750 mg  750 mg Intravenous Once Mike Teresa MD       • lisinopril (PRINIVIL,ZESTRIL) tablet 20 mg  20 mg Oral Q24H Mike Teresa MD   20 mg at 11/17/21 0827   • magnesium oxide (MAG-OX)  tablet 400 mg  400 mg Oral Daily Mike Teresa MD   400 mg at 11/17/21 0827   • methylPREDNISolone sodium succinate (SOLU-Medrol) injection 60 mg  60 mg Intravenous Q8H Mike Teresa MD   60 mg at 11/17/21 0826   • metoprolol tartrate (LOPRESSOR) tablet 25 mg  25 mg Oral Q12H Mike Teresa MD   25 mg at 11/17/21 0827   • ondansetron (ZOFRAN) tablet 4 mg  4 mg Oral Q6H PRN Mike Teresa MD   4 mg at 11/15/21 2015    Or   • ondansetron (ZOFRAN) injection 4 mg  4 mg Intravenous Q6H PRN Mike Teresa MD   4 mg at 11/12/21 2249   • Pharmacy to Dose Zosyn   Does not apply Continuous Mike Teresa MD       • piperacillin-tazobactam (ZOSYN) 3.375 g/100 mL 0.9% NS IVPB (mbp)  3.375 g Intravenous Q8H Mike Teresa MD 0 mL/hr at 11/12/21 0354 3.375 g at 11/17/21 0825   • sodium chloride 0.9 % flush 10 mL  10 mL Intravenous PRN Mike Teresa MD       • sodium chloride 0.9 % flush 10 mL  10 mL Intravenous Q12H Mike Teresa MD   10 mL at 11/17/21 0825   • sodium chloride 0.9 % flush 10 mL  10 mL Intravenous PRN Mike Teresa MD       • traMADol (ULTRAM) tablet 50 mg  50 mg Oral Q6H PRN Mike Teresa MD   50 mg at 11/15/21 2016   • vitamin B-12 (CYANOCOBALAMIN) tablet 1,000 mcg  1,000 mcg Oral Daily Pee Nolasco MD   1,000 mcg at 11/17/21 0828   • zinc sulfate (ZINCATE) capsule 220 mg  220 mg Oral Daily Mike Teresa MD   220 mg at 11/17/21 0827        Physician Progress Notes (last 24 hours)      Pee Nolasco MD at 11/16/21 1700                HISTORY OF PRESENT ILLNESS:    Continues to have shortness of breath.  No bleeding or fever reported.        PAST MEDICAL HISTORY:    Past Medical History:   Diagnosis Date   • Abdominal aortic aneurysm (AAA) without rupture (HCC)    • Atrial fibrillation (HCC) 10/2/2017    New onset.  S/p cardioversion  On coumadin now.  Amiodarone    • CAD (coronary artery disease)    • Gastroesophageal reflux disease    •  Hypercholesterolemia    • Hypertension    • Nuclear senile cataract    • Osteoarthritis    • Osteoporosis    • Peripheral arterial disease (HCC)    • Solitary lung nodule        SOCIAL HISTORY:    Social History     Tobacco Use   • Smoking status: Former Smoker     Packs/day: 1.00     Years: 57.00     Pack years: 57.00     Start date: 1961     Quit date: 10/1/2017     Years since quittin.1   • Smokeless tobacco: Never Used   Substance Use Topics   • Alcohol use: No   • Drug use: No       Surgical History :  Past Surgical History:   Procedure Laterality Date   • ABDOMINAL AORTIC ANEURYSM REPAIR     • HIP INTERTROCHANTERIC NAILING Left 10/1/2017    Procedure: HIP INTERTROCHANTERIC NAILING - LEFT - [place];  Surgeon: Nicola Rich MD;  Location: [facility] OR;  Service:        ALLERGIES:    No Known Allergies      FAMILY HISTORY:  Family History   Problem Relation Age of Onset   • Hypertension Mother    • Coronary artery disease Father            REVIEW OF SYSTEMS:      CONSTITUTIONAL:  Complains of fatigue.  Positive for generalized weakness.  Positive for weight loss.  Positive for poor appetite.  Denies any fever, chills .     EYES: No visual disturbances. No discharge. No new lesion.    ENMT:  No epistaxis, mouth sores or difficulty swallowing.    RESPIRATORY: Positive for shortness of breath and cough.    CARDIOVASCULAR: Positive for chest tightness.  No chest pain or palpitations.    GASTROINTESTINAL:  No abdominal pain nausea, vomiting or blood in the stool.    GENITOURINARY: No dysuria or hematuria.    MUSCULOSKELETAL: Positive for arthralgia    LYMPHATICS:  Denies any abnormal swollen glands anywhere in the body.    NEUROLOGICAL : No tingling or numbness. No headache or dizziness. No seizures or balance problems.    SKIN: Positive for bruising    ENDOCRINE : No new hot or cold intolerance. No new polyuria. No polydipsia.        PHYSICAL EXAMINATION:      VITAL SIGNS:  /88 (BP Location:  "Right arm)   Pulse 105   Temp 98 °F (36.7 °C) (Oral)   Resp 18   Ht 172.7 cm (67.99\")   Wt 65.4 kg (144 lb 3.2 oz)   LMP  (LMP Unknown)   SpO2 92%   BMI 21.93 kg/m²       11/14/21  1057 11/15/21  0527 11/16/21  0406   Weight: 65.9 kg (145 lb 4.5 oz) 64.5 kg (142 lb 2 oz) 65.4 kg (144 lb 3.2 oz)         ECOG performance status: 3    CONSTITUTIONAL:  Not in any distress.    EYES: Mild conjunctival Pallor. No Icterus. No Pterygium. Extraocular Movements intact.No ptosis.    ENMT:  Normocephalic, atraumatic. No facial asymmetry noted.    NECK:  No adenopathy. Trachea midline.     RESPIRATORY: Diminished air entry in left lung field.  No rhonchi or wheezing.    CARDIOVASCULAR:  S1, S2.  Irregular. No murmur or gallop appreciated.    ABDOMEN:  Soft, obese, nontender. Bowel sounds present in all four quadrants.  No hepatosplenomegaly appreciated.    MUSCULOSKELETAL:  Trace edema. No calf tenderness. Decreased range of motion.    NEUROLOGIC:    No motor  deficit appreciated. Cranial nerves II-XII grossly intact.    SKIN : Bruising present on bilateral upper extremity. Skin is warm and moist to touch.    LYMPHATICS: No new enlarged lymph nodes in neck or supraclavicular area.    PSYCHIATRY: Alert, awake and oriented ×3.           DIAGNOSTIC DATA:    Glucose   Date Value Ref Range Status   11/16/2021 130 (H) 65 - 99 mg/dL Final     Sodium   Date Value Ref Range Status   11/16/2021 135 (L) 136 - 145 mmol/L Final     Potassium   Date Value Ref Range Status   11/16/2021 4.6 3.5 - 5.2 mmol/L Final     CO2   Date Value Ref Range Status   11/16/2021 32.0 (H) 22.0 - 29.0 mmol/L Final     Chloride   Date Value Ref Range Status   11/16/2021 98 98 - 107 mmol/L Final     Anion Gap   Date Value Ref Range Status   11/16/2021 5.0 5.0 - 15.0 mmol/L Final     Creatinine   Date Value Ref Range Status   11/16/2021 0.99 0.57 - 1.00 mg/dL Final     BUN   Date Value Ref Range Status   11/16/2021 25 (H) 8 - 23 mg/dL Final "     BUN/Creatinine Ratio   Date Value Ref Range Status   11/16/2021 25.3 (H) 7.0 - 25.0 Final     Calcium   Date Value Ref Range Status   11/16/2021 9.2 8.6 - 10.5 mg/dL Final     eGFR Non  Amer   Date Value Ref Range Status   11/16/2021 54 (L) >60 mL/min/1.73 Final     Lab Results   Component Value Date    WBC 24.06 (H) 11/15/2021    HGB 9.2 (L) 11/15/2021    HCT 28.4 (L) 11/15/2021    MCV 88.5 11/15/2021     11/15/2021     Lab Results   Component Value Date    NEUTROABS 22.72 (H) 11/15/2021    IRON 12 (L) 11/13/2021    TIBC 209 (L) 11/13/2021    LABIRON 6 (L) 11/13/2021    FERRITIN 189.70 (H) 11/13/2021    FAVAXAEE13 310 11/11/2021    FOLATE 9.13 11/11/2021     No results found for: , LABCA2, AFPTM, HCGQUANT, , CHROMGRNA, 7DVIH45ONS, CEA, REFLABREPO        PATHOLOGY:  * Cannot find OR log *         RADIOLOGY DATA :  CT Chest Without Contrast Diagnostic    Result Date: 11/15/2021  1.Mediastinal structures of the chest reveal left perihilar infiltrating opacity with associated mild narrowing of the left upper lobe bronchus. The left perihilar infiltrating opacity extends into the left upper lobe superior perihilar region where it becomes a hypodense circular opacity which measures 5.98 x 5.6 cm. These findings would be suspicious for neoplasm versus less likely inflammatory process. Recommend clinical correlation and if clinically indicated PET scan, sampling, a follow-up CT in three months to further evaluate. 2. Interval development of left upper lobe peripheral patchy opacities suspicious for postobstructive atelectasis and/or pneumonia. 3. Right middle lobe and right lower lobe small interstitial groundglass opacities. This would be suspicious for inflammatory changes versus edema. 4. Right lower lobe posterior basilar segment and left lower lobe small patchy opacities suspicious for passive atelectasis and/or pneumonia. 5. Moderate left pleural effusion and small right pleural effusion.  Electronically signed by:  Eliseo Smith MD  11/15/2021 7:42 AM CST Workstation: [id number]    CT Chest With Contrast Diagnostic    Result Date: 11/11/2021  1.  Large spiculated mass in the left upper lobe and aorticopulmonary window region of the mediastinum with two central areas of necrosis or fluid collections. Findings are highly suspicious for neoplasm but could also be due to an infectious process. This mass exerts mass effect on the left upper lobe bronchial structures and left upper lobe pulmonary artery. 2.  Moderate-sized left pleural effusion with adjacent subsegmental atelectasis of the left lower lobe. 3.  Coronary artery atherosclerotic disease. 4.  Abdominal aortic aneurysms at the hiatus and at the level of the kidneys measuring up to 4.3 cm in diameter. There may have been a previous abdominal aortic aneurysm repair of the more inferior level. These findings are incompletely evaluated. Recommend correlation with surgical history, and more complete imaging of the abdomen and pelvis to evaluate the size of the aneurysm. For management of the more superior aneurysm, 4.0-4.4 cm AAA, recommend follow-up every 12 months and recommend vascular consultation. Electronically signed by:  Trent Domínguez MD  11/11/2021 3:48 PM CST Workstation: [id number]    XR Chest 1 View    Result Date: 11/11/2021  1. Large 11.8 x 8.1 cm masslike density in the left upper lobe with questionable cavitation/air-fluid level as above. The findings are indicative of underlying pulmonary neoplasm. CT of the chest with contrast is recommended for confirmation and staging. 2. Moderate size left pleural effusion. Electronically signed by:  Paty Albert MD  11/11/2021 1:46 PM CST Workstation: [id number]    CT Needle Biopsy Lung    Result Date: 11/12/2021  CONCLUSION: Successful ultrasound-guided left lung mass needle biopsy, and thoracentesis with removal of 300 mL of serosanguineous fluid, as described above. Electronically signed by:   Trent Domínguez MD  11/12/2021 4:32 PM CST Workstation: [id number]    XR Chest PA & Lateral    Result Date: 11/16/2021  Opacified left hemithorax as above without mediastinal shift may represent a balance between pleural effusion and volume loss/consolidation associated with left upper lung mass. Findings suspicious for fluid overload/CHF. Clinical correlation recommended. Electronically signed by:  Nav Edwards MD  11/16/2021 10:57 AM CST Workstation: [id number]    XR Chest PA & Lateral    Result Date: 11/14/2021  Near complete opacification of the left thorax most consistent with effusion, atelectasis, and airspace disease/pneumonia. Probable left stomach bubble, less likely small anterior hydropneumothorax at the left lung base given post procedural CT appearance of the chest. May further evaluated with CT chest as clinically indicated. STAT C Call Result to Licensed Caregiver sent  11/14/2021 10:50 AM CST Electronically signed by:  Joao Connor MD  11/14/2021 10:50 AM CST Workstation: [id number]    CT Guided Thoracentesis    Result Date: 11/12/2021  CONCLUSION: Successful ultrasound-guided left lung mass needle biopsy, and thoracentesis with removal of 300 mL of serosanguineous fluid, as described above. Electronically signed by:  Trent Domínguez MD  11/12/2021 4:32 PM CST Workstation: [id number]        ASSESSMENT AND PLAN:      1.  Non-small cell cancer of left lung:  -CT-guided lung biopsy on November 12, 2021 showed non-small cell lung cancer on preliminary report.  Immunohistochemical stains are being carried out to differentiate between squamous cell and adenocarcinoma.   -Result of pathology showing positivity for non-small cell lung cancer were discussed with patient and her daughter.  -Due to her performance status patient is not a candidate for any kind of systemic treatment with chemotherapy.  -Option of palliative radiation was discussed with patient.  -Patient is not interested in  doing any chemotherapy or radiation at present.  -Option of hospice was discussed with patient and she is agreeable to hospice.  -We will consult case management for hospice.    2.  Anemia:  -Currently on intravenous Ferrlecit along with B12 and folic acid p.o. daily    3.  Leukocytosis:  -Reactive will monitor with CBC    4.  Atrial fibrillation    5.  Change in level of care:  -Patient wants to be comfortable with hospice.        PHQ-9 Total Score:       [unfilled]       Pee Nolasco MD  11/16/2021  17:00 CST        Part of this note may be an electronic transcription/translation of spoken language to printed text using the Dragon Dictation System.    Electronically signed by Pee Nolasco MD at 11/16/21 1984

## 2021-11-18 NOTE — PROGRESS NOTES
[facility]  INPATIENT PROGRESS NOTE    Length of Stay: 7  Date of Admission: 11/11/2021  Primary Care Physician: Ramiro Gloria MD    Subjective   Chief Complaint: soa       HPI: Pt cont to have SOA. She is feeling anxious about going home. She is having pain in her shoulder and upper back.     Review of Systems   Comprehensive ROS completed.  Pertinent positives and negatives per HPI.  All others reviewed and negative.      Objective    Temp:  [96.7 °F (35.9 °C)-97.7 °F (36.5 °C)] 97.1 °F (36.2 °C)  Heart Rate:  [58-73] 71  Resp:  [16-20] 20  BP: (135-161)/(61-89) 161/89    Physical Exam  Constitutional:       General: She is not in acute distress.     Appearance: Normal appearance.   HENT:      Head: Normocephalic and atraumatic.      Right Ear: External ear normal.      Left Ear: External ear normal.      Nose: Nose normal.      Mouth/Throat:      Mouth: Mucous membranes are moist.      Pharynx: Oropharynx is clear.   Eyes:      General: No scleral icterus.     Extraocular Movements: Extraocular movements intact.      Pupils: Pupils are equal, round, and reactive to light.   Cardiovascular:      Rate and Rhythm: Normal rate and regular rhythm.   Pulmonary:      Effort: Accessory muscle usage present.      Comments: Dullness, left lung.  Coarse BS  Abdominal:      Palpations: Abdomen is soft.      Tenderness: There is no abdominal tenderness. There is no guarding or rebound.   Musculoskeletal:      Cervical back: Neck supple.      Right lower leg: No edema.      Left lower leg: No edema.   Lymphadenopathy:      Cervical: No cervical adenopathy.   Skin:     General: Skin is warm and dry.      Findings: No rash.   Neurological:      General: No focal deficit present.      Mental Status: She is alert and oriented to person, place, and time.   Psychiatric:         Mood and Affect: Mood normal.         Behavior: Behavior normal.             Results  Review:  I have reviewed the labs, radiology results, and diagnostic studies.    Laboratory Data:   Results from last 7 days   Lab Units 11/17/21  0508 11/16/21  0521 11/15/21  0515 11/13/21  0654 11/12/21  0613   SODIUM mmol/L 137 135* 138   < > 141   POTASSIUM mmol/L 4.9 4.6 4.1   < > 4.1   CHLORIDE mmol/L 97* 98 100   < > 101   CO2 mmol/L 33.0* 32.0* 30.0*   < > 35.0*   BUN mg/dL 26* 25* 25*   < > 16   CREATININE mg/dL 1.00 0.99 1.06*   < > 1.11*   GLUCOSE mg/dL 124* 130* 137*   < > 90   CALCIUM mg/dL 9.7 9.2 9.3   < > 9.1   BILIRUBIN mg/dL 0.3  --   --   --  0.2   ALK PHOS U/L 79  --   --   --  65   ALT (SGPT) U/L 23  --   --   --  9   AST (SGOT) U/L 30  --   --   --  14   ANION GAP mmol/L 7.0 5.0 8.0   < > 5.0    < > = values in this interval not displayed.     Estimated Creatinine Clearance: 46.3 mL/min (by C-G formula based on SCr of 1 mg/dL).          Results from last 7 days   Lab Units 11/17/21  0508 11/15/21  0515 11/14/21  0832 11/13/21  0654 11/12/21  0613   WBC 10*3/mm3 17.11* 24.06* 29.87* 15.23* 7.68   HEMOGLOBIN g/dL 10.3* 9.2* 10.3* 9.3* 12.0   HEMATOCRIT % 33.3* 28.4* 33.1* 29.2* 37.3   PLATELETS 10*3/mm3 353 297 339 269 291     Results from last 7 days   Lab Units 11/12/21  0613   INR  1.10       Culture Data:   No results found for: BLOODCX  No results found for: URINECX  No results found for: RESPCX  No results found for: WOUNDCX  No results found for: STOOLCX  No components found for: BODYFLD    Radiology Data:   Imaging Results (Last 24 Hours)     ** No results found for the last 24 hours. **          I have reviewed the patient's current medications.     Assessment/Plan     Active Hospital Problems    Diagnosis    • Mass of left lung        Plan:      1.  Left lung mass/ NSCLC  2.  Collapse left lung  3.  HTN  4.  LARUA - resolved  5.  Anemia - iron deficient  6.  PAF - rate controlled - no LTAC due to fall risk  7.  CAD  8.  AAA (4.3 cm)  9.  Severe PCM    - Hospice accepted for home care  -  plans to dc home tomorrow with Hospice once all DME delivered  - cont O2 support  - cont supportive care and home medications as tolerated    Pain meds increased and ativan added             Discharge Planning: I expect patient to be discharged to home with Hospice in 1-2 days.    Eriberto Madrid MD

## 2021-11-18 NOTE — CONSULTS
Adult Nutrition  Assessment    Patient Name:  Anahi Calix  YOB: 1940  MRN: [id number]  Admit Date:  11/11/2021    Assessment Date:  11/18/2021    Comments:  RD f/u. Pt receiving ADA/HH diet. Intakes at %. Per med notes, pt plans to d/c w/hospice. RD will monitor course for changed and assess prn.                          Electronically signed by:  Magda Cerna RD  11/18/21 13:52 CST

## 2021-11-18 NOTE — PROGRESS NOTES
Item 0 Points 1 Point 2 Points 3 Points 4 Points Subtotal   Mental Status Alert, oriented, cooperative Lethargic, follows commands Confused, not following commands Obtunded or Somnolent Comatose 0     Respiratory Pattern Regular RR 8-16 breaths/minute Increased RR 18-25 breaths/minute Dyspnea on exertion, irregular RR 26-30 breaths/minute Shortness of breath,  RR 31-35 breaths/minute Accessory muscle use, severe SOB  RR > 35 breaths/minute 1     Breath Sounds Clear Decreased unilaterally Decreased bilaterally Basilar crackles Wheezing and/or rhonchi 4   Cough Strong, spontaneous, non-productive Strong productive Weak, non-productive Weak, productive or weak with rhonchi Absent or may require suctioning 2   Pulmonary Status Nonsmoker, no previous history, >1 year quit < 1 PPD  <1 year quit > or = 1 PPD Diagnosed pulmonary disease (severe or chronic) Severe or chronic pulmonary disease with exacerbation 4   Surgical Status None General surgery (non-abdominal or non-thoracic) Lower abdominal Thoracic or upper abdominal Thoracic with pulmonary disease 0     Chest X-ray Clear Chronic changes Infiltrates, atelectasis or pleural effusion Infiltrates in > 1 lobe Diffuse infiltrates and atelectasis and/or effusions 4   Activity   Level Ambulatory Ambulatory with assistance Non-ambulatory Paraplegic Quadriplegic 2        Total Score   17   Score    Drug Therapy Frequency 20 or >    Q4 Duoneb with Q2 Albuterol PRN 15-19    Q6 Duoneb with Q4 Albuterol PRN 10-14    QID Duoneb with Q4 Albuterol PRN 5-9    TID Duoneb with Q6 Albuterol PRN 0-4    Q4 PRN Duoneb                  Lung Expansion Therapy (PEP) Bronchopulmonary Hygiene (CPT)   Q4 & PRN - Severe atelectasis, poor oxygenation Q4 - Copious secretions, dyspnea, unable to sleep   QID - High risk for persistent atelectasis, existence of atelectasis QID & Q4 PRN - Moderate secretion production   TID - At risk for developing atelectasis TID - Small amounts of secretions with  poor cough   BID - Prevention of atelectasis BID - Unable to breathe deeply and cough spontaneously     RT Comments / Recommendations:    Patient re-eval completed.  I spoke with Zach Stokes in regard to Mucomyst order and changes made.  Nebs will be changed to q6 hours and prn.

## 2021-11-19 NOTE — DISCHARGE SUMMARY
"    [facility] Medicine Services  DISCHARGE SUMMARY       Date of Admission: 11/11/2021  Date of Discharge:  11/19/2021  Primary Care Physician: Ramiro Gloria MD    Presenting Problem/History of Present Illness:  Pleural effusion [J90]  Uncontrolled hypertension [I10]  Mass of left lung [R91.8]  Acute on chronic congestive heart failure, unspecified heart failure type (HCC) [I50.9]       Final Discharge Diagnoses:  Active Hospital Problems    Diagnosis    • Mass of left lung        Consults:   Consults     Date and Time Order Name Status Description    11/14/2021 11:22 AM Inpatient Cardiothoracic Surgery Consult Completed     11/11/2021  4:52 PM Inpatient Hematology & Oncology Consult Completed     11/11/2021  4:36 PM Hospitalist (on-call MD unless specified)                Chief Complaint on Day of Discharge: Shortness of air    Hospital Course:  The patient is a 81 y.o. female who presented to [facility] with hypertension, coronary artery disease, AAA, atrial fibrillation, nicotine dependence (she quit in 2077) presents with 2-week history of progressively worsening shortness of breath associated with nonproductive cough, weakness and overall functional decline.  Patient admitted to the hospital started on IV antibiotics and supplemental oxygen.  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  She was then discharged home on hospice after family decided.    Condition on Discharge: Guarded    Physical Exam on Discharge:  /74 (BP Location: Right arm, Patient Position: Lying)   Pulse 66   Temp 97 °F (36.1 °C) (Temporal)   Resp 17   Ht 172.7 cm (67.99\")   Wt 65 kg (143 lb 3.2 oz)   LMP  (LMP Unknown)   SpO2 97%   BMI 21.78 kg/m²   Physical Exam  Vitals and nursing note reviewed.   Constitutional:       General: She is not in acute distress.     Appearance: She is well-developed. She is ill-appearing. She is not " diaphoretic.   HENT:      Head: Normocephalic and atraumatic.   Cardiovascular:      Rate and Rhythm: Normal rate.   Pulmonary:      Effort: Respiratory distress present.      Breath sounds: No wheezing.   Abdominal:      General: There is no distension.      Palpations: Abdomen is soft.   Musculoskeletal:         General: Normal range of motion.   Skin:     General: Skin is warm and dry.   Neurological:      Mental Status: She is alert.      Cranial Nerves: No cranial nerve deficit.   Psychiatric:         Behavior: Behavior normal.         Thought Content: Thought content normal.         Judgment: Judgment normal.           Discharge Disposition:  Hospice/Home    Discharge Medications:     Discharge Medications      New Medications      Instructions Start Date   budesonide-formoterol 160-4.5 MCG/ACT inhaler  Commonly known as: SYMBICORT   2 puffs, Inhalation, 2 Times Daily - RT      fentaNYL 25 MCG/HR patch  Commonly known as: DURAGESIC   1 patch, Transdermal, Every 72 Hours   Start Date: November 21, 2021     LORazepam 1 MG tablet  Commonly known as: ATIVAN   1 mg, Oral, Every 8 Hours PRN      ondansetron 4 MG tablet  Commonly known as: ZOFRAN   4 mg, Oral, Every 6 Hours PRN      oxyCODONE 5 MG/5ML solution  Commonly known as: ROXICODONE   5 mg, Oral, Every 2 Hours PRN         Changes to Medications      Instructions Start Date   amiodarone 200 MG tablet  Commonly known as: PACERONE  What changed: how much to take   200 mg, Oral, Daily      famotidine 20 MG tablet  Commonly known as: PEPCID  What changed: when to take this   20 mg, Oral, 2 Times Daily PRN      hydrALAZINE 25 MG tablet  Commonly known as: APRESOLINE  What changed:   · how much to take  · additional instructions   25 mg, Oral, 4 Times Daily         Continue These Medications      Instructions Start Date   gabapentin 300 MG capsule  Commonly known as: NEURONTIN   300-600 mg, Oral, Nightly      lisinopril 40 MG tablet  Commonly known as:  PRINIVIL,ZESTRIL   40 mg, Oral, Daily      magnesium oxide 400 (241.3 Mg) MG tablet tablet  Commonly known as: MAGOX   400 mg, Oral, Daily      metoprolol tartrate 50 MG tablet  Commonly known as: LOPRESSOR   50 mg, Oral, Every 12 Hours Scheduled      multivitamin with minerals tablet tablet   1 tablet, Oral, Daily Digoxin      Zinc 50 MG capsule   1 capsule, Oral, Daily         Stop These Medications    aspirin 81 MG EC tablet     clopidogrel 75 MG tablet  Commonly known as: PLAVIX     ferrous sulfate 324 (65 Fe) MG tablet delayed-release EC tablet     furosemide 40 MG tablet  Commonly known as: LASIX     GNP Vitamin D Super Strength 125 MCG (5000 UT) tablet  Generic drug: Cholecalciferol     polyethylene glycol 17 GM/SCOOP powder  Commonly known as: MIRALAX     traMADol 50 MG tablet  Commonly known as: Ultram     TYLENOL ARTHRITIS PAIN PO            Discharge Diet:   Diet Instructions     Diet: Cardiac      Discharge Diet: Cardiac          Activity at Discharge: As tolerated    Discharge Care Plan/Instructions: Continue with medications and follow-up with PCP    Follow-up Appointments:   Follow-up with PCP.    Test Results Pending at Discharge:   Pending Labs     Order Current Status    Non-gynecologic Cytology Collected (11/12/21 1401)    Fine Needle Aspiration In process    Flow Cytometry In process          Amrik oGel MD  11/19/21  13:39 CST

## 2021-11-19 NOTE — CASE MANAGEMENT/SOCIAL WORK
[facility] Encounter Date/Time: 2021 [facility]   Hospital Account: [id number]    MRN: [id number]   Patient:  Aliya Vargas   Contact Serial #: [id number]   SSN:          ENCOUNTER             Patient Class: Inpatient   Unit: [address] Service: Medicine     Bed: 318/1   Admitting Provider: Andrew Watters MD   Referring Physician: Juan Carlos Schmitz   Attending Provider: Andrew Watters MD   Adm Diagnosis: Pleural effusion [J90]               PATIENT          Name: Aliya Vargas : 1940 (81 yrs)   Address: [address] [place] [address] RD Sex: Female   City: [place] [address]   County: [place]   Marital Status:  Ethnicity: NOT                                                                              Race: WHITE   Primary Care Provider: Ramiro Gloria MD Patients Phone: Home Phone: [phone]     Mobile Phone: [phone]   EMERGENCY CONTACT   Contact Name Legal Guardian? Relationship to Patient Home Phone Work Phone   1. Aris Vargas  2. Isamar Linton      Son  Daughter                GUARANTOR  Guarantor: Aliya Vargas     : 1940   Address: [address] [place] [address] Rd Sex: [place]     [place] [address]     Relation to Patient: Self       Home Phone: [phone]   Guarantor ID: [id number]       Work Phone:     GUARANTOR EMPLOYER   Employer:           Status: RETIRED   COVERAGE          PRIMARY INSURANCE   Payor: MEDICARE Plan: MEDICARE A & B   Group Number:   Insurance Type: INDEMNITY   Subscriber Name: ALIYA VARGAS Subscriber : 1940   Subscriber ID: [id number] Coverage Address: [facility] [address]  [place] [address]   Pat. Rel. to Subscriber: Self Coverage Phone:     SECONDARY INSURANCE   [place]: [place] Plan: [place]   Group Number: [facility] Insurance Type: INDEMNITY   Subscriber Name: ALIYA VARGAS Subscriber : 1940   Subscriber ID: [id number] Coverage Address:     Pat. Rel. to Subscriber: SELF Coverage Phone:    "     Contact Serial # ([id number])  `       November 19, 2021    Chart ID ([id number])                Physicians Statement of Medical Necessity for  Ambulance Transportation    GENERAL INFORMATION     Name: Anahi Calix  YOB: 1940  Medicare #: [id number]  Transport Date: 11/19/2021 (Valid for round trips this date, or for scheduled repetitive trips for 60 days from the date signed below.)  Origin: [facility], room 318  Destination: [address] [place] [address]  Is the Patient's stay covered under Medicare Part A (PPS/DRG?) Yes  Closest appropriate facility?  Yes  If this a hosp-hosp transfer?  No  Is this a hospice patient?  Yes    MEDICAL NECESSITY QUESTIONAIRE    Ambulance Transportation is medically necessary only if other means of transportation are contraindicated or would be potentially harmful to the patient.  To meet this requirement, the patient must be either \"bed confined\" or suffer from a condition such that transport by means other than an ambulance is contraindicated by the patient's condition.  The following questions must be answered by the healthcare professional signing below for this form to be valid:     1) Describe the MEDICAL CONDITION (physical and/or mental) of this patient AT THE TIME OF AMBULANCE TRANSPORT that requires the patient to be transported in an ambulance, and why transport by other means is contraindicated by the patient's condition:      Acute respiratory failure due to left lung mass.     2) Is this patient \"bed confined\" as defined below? No   To be \"bed confined\" the patient must satisfy all three of the following criteria:  (1) unable to get up from bed without assistance; AND (2) unable to ambulate;  AND (3) unable to sit in a chair or wheelchair.  3) Can this patient safely be transported by car or wheelchair van (I.e., may safely sit during transport, without an attendant or monitoring?) No  4. In addition to completing " questions 1-3 above, please check any of the following conditions that apply*:          *Note: supporting documentation for any boxes checked must be maintained in the patient's medical records Unable to tolerate seated position for time needed to transport      SIGNATURE OF PHYSICIAN OR OTHER AUTHORIZED HEALTHCARE PROFESSIONAL    I certify that the above information is true and correct based on my evaluation of this patient, and represent that the patient requires transport by ambulance and that other forms of transport are contraindicated.  I understand that this information will be used by the Centers for Medicare and Medicaid Services (CMS) to support the determiniation of medical necessity for ambulance services, and I represent that I have personal knowledge of the patient's condition at the time of transport.       If this box is checked, I also certify that the patient is physically or mentally incapable of signing the ambulance service's claim form and that the institution with which I am affiliated has furnished care, services or assistance to the patient.  My signature below is made on behalf of the patient pursuant to 42 .36(b)(4). In accordance with 42 .37, the specific reason(s) that the patient is physically or mentally incapable of signing the claim for is as follows:     Signature of Physician or Healthcare Professional    Ana Sharma RN CM Date/Time:   11/19/21     (For Scheduled repetitive transport, this form is not valid for transports performed more than 60 days after this date).                                                                                                                                            --------------------------------------------------------------------------------------------  Printed Name and Credentials of Physician or Authorized Healthcare Professional     *Form must be signed by patient's attending physician for scheduled, repetitive  transports,.  For non-repetitive ambulance transports, if unable to obtain the signature of the attending physician, any of the following may sign (please select below):     Physician  Clinical Nurse Specialist  x Registered Nurse     Physician Assistant  Discharge Planner  Licensed Practical Nurse     Nurse Practitioner  x

## 2021-11-22 NOTE — PAYOR COMM NOTE
"Wilma Toure  Case Management Extender  [phone] phone  [phone] fax    Aliya Vargas (81 y.o. Female)             Date of Birth Social Security Number Address Home Phone MRN    1940  [address] [place] [address]  [place] [address] [phone] [id number]    Ep[employer] Marital Status             Rastafarian        Admission Date Admission Type Admitting Provider Attending Provider Department, Room/Bed    11/11/21 Emergency Andrew Watters MD  [address], 318/1    Discharge Date Discharge Disposition Discharge Destination          11/19/2021 Hospice/Home              Attending Provider: (none)   Allergies: No Known Allergies    Isolation: None   Infection: None   Code Status: Prior   Advance Care Planning Activity    Ht: 172.7 cm (67.99\")   Wt: 65 kg (143 lb 3.2 oz)    Admission Cmt: None   Principal Problem: None                Active Insurance as of 11/11/2021     Primary Coverage     Payor Plan Insurance Group Employer/Plan Group    MEDICARE MEDICARE A & B      Payor Plan Address Payor Plan Phone Number Payor Plan Fax Number Effective Dates    [address] [phone]  9/1/2006 - None Entered    [facility] [address]       Subscriber Name Subscriber Birth Date Member ID       ALIYA VARGAS 1940 [id number]           Secondary Coverage     Payor Plan Insurance Group Employer/Plan Group    [facility]     Payor Plan Address Payor Plan Phone Number Payor Plan Fax Number Effective Dates    [address] [address]   11/1/2016 - None Entered    [facility] [address]       Subscriber Name Subscriber Birth Date Member ID       ALIYA VARGAS 1940 [id number]                 Emergency Contacts      (Rel.) Home Phone Work Phone Mobile Phone    YogiAris lin (Son) -- -- [phone]    Isamar Linton (Daughter) -- -- [phone]               Discharge Summary      Amrik Goel MD at 11/19/21 1339              [facility] Team " "[facility] Medicine Services  DISCHARGE SUMMARY       Date of Admission: 11/11/2021  Date of Discharge:  11/19/2021  Primary Care Physician: Ramiro Gloria MD    Presenting Problem/History of Present Illness:  Pleural effusion [J90]  Uncontrolled hypertension [I10]  Mass of left lung [R91.8]  Acute on chronic congestive heart failure, unspecified heart failure type (HCC) [I50.9]       Final Discharge Diagnoses:  Active Hospital Problems    Diagnosis    • Mass of left lung        Consults:   Consults     Date and Time Order Name Status Description    11/14/2021 11:22 AM Inpatient Cardiothoracic Surgery Consult Completed     11/11/2021  4:52 PM Inpatient Hematology & Oncology Consult Completed     11/11/2021  4:36 PM Hospitalist (on-call MD unless specified)                Chief Complaint on Day of Discharge: Shortness of air    Hospital Course:  The patient is a 81 y.o. female who presented to [facility] with hypertension, coronary artery disease, AAA, atrial fibrillation, nicotine dependence (she quit in 2077) presents with 2-week history of progressively worsening shortness of breath associated with nonproductive cough, weakness and overall functional decline.  Patient admitted to the hospital started on IV antibiotics and supplemental oxygen.  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  She was then discharged home on hospice after family decided.    Condition on Discharge: Guarded    Physical Exam on Discharge:  /74 (BP Location: Right arm, Patient Position: Lying)   Pulse 66   Temp 97 °F (36.1 °C) (Temporal)   Resp 17   Ht 172.7 cm (67.99\")   Wt 65 kg (143 lb 3.2 oz)   LMP  (LMP Unknown)   SpO2 97%   BMI 21.78 kg/m²   Physical Exam  Vitals and nursing note reviewed.   Constitutional:       General: She is not in acute distress.     Appearance: She is well-developed. She is ill-appearing. She is not diaphoretic.   HENT:      Head: Normocephalic " and atraumatic.   Cardiovascular:      Rate and Rhythm: Normal rate.   Pulmonary:      Effort: Respiratory distress present.      Breath sounds: No wheezing.   Abdominal:      General: There is no distension.      Palpations: Abdomen is soft.   Musculoskeletal:         General: Normal range of motion.   Skin:     General: Skin is warm and dry.   Neurological:      Mental Status: She is alert.      Cranial Nerves: No cranial nerve deficit.   Psychiatric:         Behavior: Behavior normal.         Thought Content: Thought content normal.         Judgment: Judgment normal.           Discharge Disposition:  Hospice/Home    Discharge Medications:     Discharge Medications      New Medications      Instructions Start Date   budesonide-formoterol 160-4.5 MCG/ACT inhaler  Commonly known as: SYMBICORT   2 puffs, Inhalation, 2 Times Daily - RT      fentaNYL 25 MCG/HR patch  Commonly known as: DURAGESIC   1 patch, Transdermal, Every 72 Hours   Start Date: November 21, 2021     LORazepam 1 MG tablet  Commonly known as: ATIVAN   1 mg, Oral, Every 8 Hours PRN      ondansetron 4 MG tablet  Commonly known as: ZOFRAN   4 mg, Oral, Every 6 Hours PRN      oxyCODONE 5 MG/5ML solution  Commonly known as: ROXICODONE   5 mg, Oral, Every 2 Hours PRN         Changes to Medications      Instructions Start Date   amiodarone 200 MG tablet  Commonly known as: PACERONE  What changed: how much to take   200 mg, Oral, Daily      famotidine 20 MG tablet  Commonly known as: PEPCID  What changed: when to take this   20 mg, Oral, 2 Times Daily PRN      hydrALAZINE 25 MG tablet  Commonly known as: APRESOLINE  What changed:   · how much to take  · additional instructions   25 mg, Oral, 4 Times Daily         Continue These Medications      Instructions Start Date   gabapentin 300 MG capsule  Commonly known as: NEURONTIN   300-600 mg, Oral, Nightly      lisinopril 40 MG tablet  Commonly known as: PRINIVIL,ZESTRIL   40 mg, Oral, Daily      magnesium oxide  400 (241.3 Mg) MG tablet tablet  Commonly known as: MAGOX   400 mg, Oral, Daily      metoprolol tartrate 50 MG tablet  Commonly known as: LOPRESSOR   50 mg, Oral, Every 12 Hours Scheduled      multivitamin with minerals tablet tablet   1 tablet, Oral, Daily Digoxin      Zinc 50 MG capsule   1 capsule, Oral, Daily         Stop These Medications    aspirin 81 MG EC tablet     clopidogrel 75 MG tablet  Commonly known as: PLAVIX     ferrous sulfate 324 (65 Fe) MG tablet delayed-release EC tablet     furosemide 40 MG tablet  Commonly known as: LASIX     GNP Vitamin D Super Strength 125 MCG (5000 UT) tablet  Generic drug: Cholecalciferol     polyethylene glycol 17 GM/SCOOP powder  Commonly known as: MIRALAX     traMADol 50 MG tablet  Commonly known as: Ultram     TYLENOL ARTHRITIS PAIN PO            Discharge Diet:   Diet Instructions     Diet: Cardiac      Discharge Diet: Cardiac          Activity at Discharge: As tolerated    Discharge Care Plan/Instructions: Continue with medications and follow-up with PCP    Follow-up Appointments:   Follow-up with PCP.    Test Results Pending at Discharge:   Pending Labs     Order Current Status    Non-gynecologic Cytology Collected (11/12/21 1401)    Fine Needle Aspiration In process    Flow Cytometry In process          Amrik Goel MD  11/19/21  13:39 CST          Electronically signed by Amrik Goel MD at 11/19/21 1341

## 2024-09-30 NOTE — PROGRESS NOTES
Item 0 Points 1 Point 2 Points 3 Points 4 Points Subtotal   Mental Status Alert, oriented, cooperative Lethargic, follows commands Confused, not following commands Obtunded or Somnolent Comatose 0   Respiratory Pattern Regular RR 8-16 breaths/minute Increased RR 18-25 breaths/minute Dyspnea on exertion, irregular RR 26-30 breaths/minute Shortness of breath,  RR 31-35 breaths/minute Accessory muscle use, severe SOB  RR > 35 breaths/minute 1   Breath Sounds Clear Decreased unilaterally Decreased bilaterally Basilar crackles Wheezing and/or rhonchi 1   Cough Strong, spontaneous, non-productive Strong productive Weak, non-productive Weak, productive or weak with rhonchi Absent or may require suctioning 1   Pulmonary Status Nonsmoker, no previous history, >1 year quit < 1 PPD  <1 year quit > or = 1 PPD Diagnosed pulmonary disease (severe or chronic) Severe or chronic pulmonary disease with exacerbation 3   Surgical Status None General surgery (non-abdominal or non-thoracic) Lower abdominal Thoracic or upper abdominal Thoracic with pulmonary disease 3   Chest X-ray Clear Chronic changes Infiltrates, atelectasis or pleural effusion Infiltrates in > 1 lobe Diffuse infiltrates and atelectasis and/or effusions 2   Activity   Level Ambulatory Ambulatory with assistance Non-ambulatory Paraplegic Quadriplegic 1        Total Score   12   Score    Drug Therapy Frequency 20 or >    Q4 Duoneb with Q2 Albuterol PRN 15-19    Q6 Duoneb with Q4 Albuterol PRN 10-14    QID Duoneb with Q4 Albuterol PRN 5-9    TID Duoneb with Q6 Albuterol PRN 0-4    Q4 PRN Duoneb                  Lung Expansion Therapy (PEP) Bronchopulmonary Hygiene (CPT)   Q4 & PRN - Severe atelectasis, poor oxygenation Q4 - Copious secretions, dyspnea, unable to sleep   QID - High risk for persistent atelectasis, existence of atelectasis QID & Q4 PRN - Moderate secretion production   TID - At risk for developing atelectasis TID - Small amounts of secretions with poor  0 cough   BID - Prevention of atelectasis BID - Unable to breathe deeply and cough spontaneously     RT Comments / Recommendations:

## (undated) DEVICE — TP ELAS ELASTIKON ADHS 4IN 2.5YD

## (undated) DEVICE — TP MEFIX 4IN 11YD

## (undated) DEVICE — SUT VIC 0 CT 36IN J958H

## (undated) DEVICE — GOWN,PREVENTION PLUS,XLONG/XLARGE,STRL: Brand: MEDLINE

## (undated) DEVICE — K-WIRE
Type: IMPLANTABLE DEVICE | Site: HIP | Status: NON-FUNCTIONAL
Removed: 2017-10-01

## (undated) DEVICE — PAD,ABDOMINAL,5"X9",STERILE,LF,1/PK: Brand: MEDLINE INDUSTRIES, INC.

## (undated) DEVICE — GLV SURG SENSICARE ALOE LF PF SZ7.5 GRN

## (undated) DEVICE — DRSNG GZ CURAD XEROFORM NONADHS 5X9IN STRL

## (undated) DEVICE — SOL IRR NACL 0.9PCT BT 1000ML

## (undated) DEVICE — GLV SURG TRIUMPH LT PF LTX 8 STRL

## (undated) DEVICE — GLV SURG SENSICARE GREEN W/ALOE PF LF 8.5 STRL

## (undated) DEVICE — APPL CHLORAPREP W/TINT 26ML ORNG

## (undated) DEVICE — GUIDE WIRE, BALL-TIPPED, STERILE

## (undated) DEVICE — PK HIP LF 60

## (undated) DEVICE — GLV SURG TRIUMPH PF LTX 7 STRL